# Patient Record
Sex: FEMALE | Race: WHITE | NOT HISPANIC OR LATINO | Employment: OTHER | URBAN - METROPOLITAN AREA
[De-identification: names, ages, dates, MRNs, and addresses within clinical notes are randomized per-mention and may not be internally consistent; named-entity substitution may affect disease eponyms.]

---

## 2017-03-21 DIAGNOSIS — I10 HYPERTENSION GOAL BP (BLOOD PRESSURE) < 140/90: ICD-10-CM

## 2017-03-21 NOTE — TELEPHONE ENCOUNTER
Lisinopril-hydrochl 20-25      Last Written Prescription Date: 06/16/2016  Last Fill Quantity: 90, # refills: 2  Last Office Visit with G, P or [facility] prescribing provider: 07/06/2016       Potassium   Date Value Ref Range Status   02/04/2015 4.1 3.4 - 5.3 mmol/L Final     Creatinine   Date Value Ref Range Status   02/04/2015 0.59 0.52 - 1.04 mg/dL Final     BP Readings from Last 3 Encounters:   11/23/16 122/68   07/06/16 116/54   04/19/16 122/82       Thank you!  ~Carolin [employer]  [facility]

## 2017-03-22 RX ORDER — LISINOPRIL AND HYDROCHLOROTHIAZIDE 20; 25 MG/1; MG/1
1 TABLET ORAL DAILY
Qty: 90 TABLET | Refills: 0 | Status: SHIPPED | OUTPATIENT
Start: 2017-03-22 | End: 2017-04-04

## 2017-03-22 NOTE — TELEPHONE ENCOUNTER
Routing refill request to provider for review/approval because:  Labs not current:  No K+ since 2015.  Isamar Rapp [facility]  [phone]

## 2017-03-23 NOTE — TELEPHONE ENCOUNTER
Refill approved x1.  Clinic appointment please for a yearly medicare exam with lab panel/screenings.    Thankyou.    Health Maintenance Due   Topic Date Due     DEXA SCAN SCREENING (SYSTEM ASSIGNED)  09/28/2008     PHQ-9 Q6 MONTHS (NO INBASKET)  10/19/2016     FALL RISK ASSESSMENT  04/19/2017     DEPRESSION ACTION PLAN Q1 YR (NO INBASKET)  04/19/2017

## 2017-03-31 NOTE — TELEPHONE ENCOUNTER
Patient will call back to schedule her yearly exam.    Krysta Nino Elliott  March 31, 2017 2:24 PM

## 2017-04-04 ENCOUNTER — OFFICE VISIT (OUTPATIENT)
Dept: FAMILY MEDICINE | Facility: CLINIC | Age: 72
End: 2017-04-04
Payer: COMMERCIAL

## 2017-04-04 VITALS
OXYGEN SATURATION: 97 % | WEIGHT: 131 LBS | BODY MASS INDEX: 22.36 KG/M2 | SYSTOLIC BLOOD PRESSURE: 97 MMHG | HEIGHT: 64 IN | DIASTOLIC BLOOD PRESSURE: 60 MMHG | TEMPERATURE: 97.4 F | HEART RATE: 76 BPM

## 2017-04-04 DIAGNOSIS — Z12.11 SCREEN FOR COLON CANCER: ICD-10-CM

## 2017-04-04 DIAGNOSIS — F32.0 MAJOR DEPRESSIVE DISORDER, SINGLE EPISODE, MILD (H): ICD-10-CM

## 2017-04-04 DIAGNOSIS — Z78.0 ASYMPTOMATIC POSTMENOPAUSAL STATUS: ICD-10-CM

## 2017-04-04 DIAGNOSIS — I10 HYPERTENSION GOAL BP (BLOOD PRESSURE) < 140/90: ICD-10-CM

## 2017-04-04 DIAGNOSIS — Z00.00 ROUTINE GENERAL MEDICAL EXAMINATION AT A HEALTH CARE FACILITY: Primary | ICD-10-CM

## 2017-04-04 DIAGNOSIS — R25.1 TREMOR: ICD-10-CM

## 2017-04-04 DIAGNOSIS — Z23 NEED FOR SHINGLES VACCINE: ICD-10-CM

## 2017-04-04 LAB — VIT B12 SERPL-MCNC: 316 PG/ML (ref 193–986)

## 2017-04-04 PROCEDURE — 82607 VITAMIN B-12: CPT | Performed by: NURSE PRACTITIONER

## 2017-04-04 PROCEDURE — 36415 COLL VENOUS BLD VENIPUNCTURE: CPT | Performed by: NURSE PRACTITIONER

## 2017-04-04 PROCEDURE — 80053 COMPREHEN METABOLIC PANEL: CPT | Performed by: NURSE PRACTITIONER

## 2017-04-04 PROCEDURE — 99397 PER PM REEVAL EST PAT 65+ YR: CPT | Performed by: NURSE PRACTITIONER

## 2017-04-04 PROCEDURE — 84443 ASSAY THYROID STIM HORMONE: CPT | Performed by: NURSE PRACTITIONER

## 2017-04-04 PROCEDURE — 80061 LIPID PANEL: CPT | Performed by: NURSE PRACTITIONER

## 2017-04-04 RX ORDER — CYCLOBENZAPRINE HCL 10 MG
5-10 TABLET ORAL 3 TIMES DAILY PRN
Qty: 30 TABLET | Refills: 1 | Status: CANCELLED | OUTPATIENT
Start: 2017-04-04

## 2017-04-04 RX ORDER — ALBUTEROL SULFATE 90 UG/1
2 AEROSOL, METERED RESPIRATORY (INHALATION) EVERY 4 HOURS PRN
Qty: 1 INHALER | Refills: 5 | Status: SHIPPED | OUTPATIENT
Start: 2017-04-04 | End: 2018-09-18

## 2017-04-04 RX ORDER — ESCITALOPRAM OXALATE 10 MG/1
10 TABLET ORAL DAILY
Qty: 90 TABLET | Refills: 3 | Status: SHIPPED | OUTPATIENT
Start: 2017-04-04 | End: 2018-06-07

## 2017-04-04 RX ORDER — LISINOPRIL AND HYDROCHLOROTHIAZIDE 20; 25 MG/1; MG/1
1 TABLET ORAL DAILY
Qty: 90 TABLET | Refills: 3 | Status: SHIPPED | OUTPATIENT
Start: 2017-04-04 | End: 2018-06-28

## 2017-04-04 RX ORDER — AMLODIPINE BESYLATE 2.5 MG/1
2.5 TABLET ORAL DAILY
Qty: 90 TABLET | Refills: 3 | Status: SHIPPED | OUTPATIENT
Start: 2017-04-04 | End: 2018-04-23

## 2017-04-04 NOTE — LETTER
[facility]   [address], [place]  [address]  [phone]      April 6, 2017      Lenora Hammonds  [address] [address]              Dear MsWolfgang Hammonds,    This is to let you know that the results of your recent blood tests are all great. Let me know if you have any questions.    Results for orders placed or performed in visit on 04/04/17   Comprehensive metabolic panel   Result Value Ref Range    Sodium 143 133 - 144 mmol/L    Potassium 4.0 3.4 - 5.3 mmol/L    Chloride 106 94 - 109 mmol/L    Carbon Dioxide 29 20 - 32 mmol/L    Anion Gap 8 3 - 14 mmol/L    Glucose 91 70 - 99 mg/dL    Urea Nitrogen 26 7 - 30 mg/dL    Creatinine 0.65 0.52 - 1.04 mg/dL    GFR Estimate 89 >60 mL/min/1.7m2    GFR Estimate If Black >90   GFR Calc   >60 mL/min/1.7m2    Calcium 9.6 8.5 - 10.1 mg/dL    Bilirubin Total 0.4 0.2 - 1.3 mg/dL    Albumin 3.8 3.4 - 5.0 g/dL    Protein Total 7.1 6.8 - 8.8 g/dL    Alkaline Phosphatase 67 40 - 150 U/L    ALT 14 0 - 50 U/L    AST 16 0 - 45 U/L   Lipid panel reflex to direct LDL   Result Value Ref Range    Cholesterol 195 <200 mg/dL    Triglycerides 63 <150 mg/dL    HDL Cholesterol 60 >49 mg/dL    LDL Cholesterol Calculated 122 (H) <100 mg/dL    Non HDL Cholesterol 135 (H) <130 mg/dL   TSH with free T4 reflex   Result Value Ref Range    TSH 0.42 0.40 - 4.00 mU/L   Vitamin B12   Result Value Ref Range    Vitamin B12 316 193 - 986 pg/mL           Sincerely,    Siobhan Pineda, CNP/nr

## 2017-04-04 NOTE — MR AVS SNAPSHOT
After Visit Summary   4/4/2017    Lenora Hammonds    MRN: [id number]           Patient Information     Date Of Birth          1945        Visit Information        Provider Department      4/4/2017 11:00 AM Siobhan Pineda APRN CNP [facility]        Today's Diagnoses     Routine general medical examination at a health care facility    -  1    Major depressive disorder, single episode, mild (H)        Hypertension goal BP (blood pressure) < 140/90        Tremor        Screen for colon cancer        Asymptomatic postmenopausal status          Care Instructions    Dexa scan: [phone] for scheduling.    Return to [facility] for a consultation appointment with the neurologist regarding your tremor.          Preventive Health Recommendations    Female Ages 65 +    Yearly exam:     See your health care provider every year in order to  o Review health changes.   o Discuss preventive care.    o Review your medicines if your doctor has prescribed any.      You no longer need a yearly Pap test unless you've had an abnormal Pap test in the past 10 years. If you have vaginal symptoms, such as bleeding or discharge, be sure to talk with your provider about a Pap test.      Every 1 to 2 years, have a mammogram.  If you are over 69, talk with your health care provider about whether or not you want to continue having screening mammograms.      Every 10 years, have a colonoscopy. Or, have a yearly FIT test (stool test). These exams will check for colon cancer.       Have a cholesterol test every 5 years, or more often if your doctor advises it.       Have a diabetes test (fasting glucose) every three years. If you are at risk for diabetes, you should have this test more often.       At age 65, have a bone density scan (DEXA) to check for osteoporosis (brittle bone disease).    Shots:    Get a flu shot each year.    Get a tetanus shot every 10 years.    Talk to your doctor  about your pneumonia vaccines. There are now two you should receive - Pneumovax (PPSV 23) and Prevnar (PCV 13).    Talk to your doctor about the shingles vaccine.    Talk to your doctor about the hepatitis B vaccine.    Nutrition:     Eat at least 5 servings of fruits and vegetables each day.      Eat whole-grain bread, whole-wheat pasta and brown rice instead of white grains and rice.      Talk to your provider about Calcium and Vitamin D.     Lifestyle    Exercise at least 150 minutes a week (30 minutes a day, 5 days a week). This will help you control your weight and prevent disease.      Limit alcohol to one drink per day.      No smoking.       Wear sunscreen to prevent skin cancer.       See your dentist twice a year for an exam and cleaning.      See your eye doctor every 1 to 2 years to screen for conditions such as glaucoma, macular degeneration and cataracts.        Follow-ups after your visit        Additional Services     NEUROLOGY ADULT REFERRAL       Your provider has referred you to: [facility]: [facility] [phone]   [email or web address]    Reason for Referral: Consult    Please be aware that coverage of these services is subject to the terms and limitations of your health insurance plan.  Call member services at your health plan with any benefit or coverage questions.      Please bring the following with you to your appointment:    (1) Any X-Rays, CTs or MRIs which have been performed.  Contact the facility where they were done to arrange for  prior to your scheduled appointment.    (2) List of current medications  (3) This referral request   (4) Any documents/labs given to you for this referral                  Future tests that were ordered for you today     Open Future Orders        Priority Expected Expires Ordered    Fecal colorectal cancer screen (FIT) Routine 4/25/2017 6/27/2017 4/4/2017    DEXA HIP/PELVIS/SPINE - Future  "Routine  2018            Who to contact     If you have questions or need follow up information about today's clinic visit or your schedule please contact [facility] directly at [phone].  Normal or non-critical lab and imaging results will be communicated to you by MyChart, letter or phone within 4 business days after the clinic has received the results. If you do not hear from us within 7 days, please contact the clinic through [employer] or phone. If you have a critical or abnormal lab result, we will notify you by phone as soon as possible.  Submit refill requests through [employer] or call your pharmacy and they will forward the refill request to us. Please allow 3 business days for your refill to be completed.          Additional Information About Your Visit        [employer] Information     [employer] lets you send messages to your doctor, view your test results, renew your prescriptions, schedule appointments and more. To sign up, go to [email or web address] . Click on \"Log in\" on the left side of the screen, which will take you to the Welcome page. Then click on \"Sign up Now\" on the right side of the page.     You will be asked to enter the access code listed below, as well as some personal information. Please follow the directions to create your username and password.     Your access code is: [email or web address]  Expires: 7/3/2017 11:20 AM     Your access code will  in 90 days. If you need help or a new code, please call your [place] clinic or [phone].        Care EveryWhere ID     This is your Care EveryWhere ID. This could be used by other organizations to access your [place] medical records  [id number]        Your Vitals Were     Pulse Temperature Height Pulse Oximetry Breastfeeding? BMI (Body Mass Index)    76 97.4  F (36.3  C) (Oral) 5' 4\" (1.626 m) 97% No 22.49 kg/m2       Blood Pressure from Last 3 Encounters:   17 97/60   16 122/68   16 116/54    " Weight from Last 3 Encounters:   04/04/17 131 lb (59.4 kg)   11/23/16 125 lb (56.7 kg)   07/06/16 133 lb 6.4 oz (60.5 kg)              We Performed the Following     Comprehensive metabolic panel     DEPRESSION ACTION PLAN (DAP) Order [[id number]]     Lipid panel reflex to direct LDL     NEUROLOGY ADULT REFERRAL     TSH with free T4 reflex     Vitamin B12          Today's Medication Changes          These changes are accurate as of: 4/4/17 11:20 AM.  If you have any questions, ask your nurse or doctor.               These medicines have changed or have updated prescriptions.        Dose/Directions    escitalopram 10 MG tablet   Commonly known as:  LEXAPRO   This may have changed:    - how much to take  - how to take this  - when to take this   Used for:  Major depressive disorder, single episode, mild (H)   Changed by:  Siobhan Pineda APRN CNP        Dose:  10 mg   Take 1 tablet (10 mg) by mouth daily   Quantity:  90 tablet   Refills:  3            Where to get your medicines      These medications were sent to [facility] - [place], [place] - [address]  [address], [place] [address]     Phone:  [phone]     albuterol 108 (90 BASE) MCG/ACT Inhaler    amLODIPine 2.5 MG tablet    escitalopram 10 MG tablet    lisinopril-hydrochlorothiazide 20-25 MG per tablet                Primary Care Provider Office Phone # Fax #    STACY Castro -[phone]       [facility] [address] [place] [address]        Thank you!     Thank you for choosing [facility]  for your care. Our goal is always to provide you with excellent care. Hearing back from our patients is one way we can continue to improve our services. Please take a few minutes to complete the written survey that you may receive in the mail after your visit with us. Thank you!             Your Updated Medication List - Protect others around you: Learn how  to safely use, store and throw away your medicines at www.disposemymeds.org.          This list is accurate as of: 4/4/17 11:20 AM.  Always use your most recent med list.                   Brand Name Dispense Instructions for use    albuterol 108 (90 BASE) MCG/ACT Inhaler    PROAIR HFA/PROVENTIL HFA/VENTOLIN HFA    1 Inhaler    Inhale 2 puffs into the lungs every 4 hours as needed for shortness of breath / dyspnea or wheezing       amLODIPine 2.5 MG tablet    NORVASC    90 tablet    Take 1 tablet (2.5 mg) by mouth daily       aspirin 325 MG tablet      Take 325 mg by mouth daily       cyclobenzaprine 10 MG tablet    FLEXERIL    30 tablet    Take 0.5-1 tablets (5-10 mg) by mouth 3 times daily as needed for muscle spasms       escitalopram 10 MG tablet    LEXAPRO    90 tablet    Take 1 tablet (10 mg) by mouth daily       lisinopril-hydrochlorothiazide 20-25 MG per tablet    PRINZIDE/ZESTORETIC    90 tablet    Take 1 tablet by mouth daily       VITAMIN D3 PO      Take 1,000 Units by mouth daily

## 2017-04-04 NOTE — PATIENT INSTRUCTIONS
Dexa scan: [phone] for scheduling.    Return to [facility] for a consultation appointment with the neurologist regarding your tremor.          Preventive Health Recommendations    Female Ages 65 +    Yearly exam:     See your health care provider every year in order to  o Review health changes.   o Discuss preventive care.    o Review your medicines if your doctor has prescribed any.      You no longer need a yearly Pap test unless you've had an abnormal Pap test in the past 10 years. If you have vaginal symptoms, such as bleeding or discharge, be sure to talk with your provider about a Pap test.      Every 1 to 2 years, have a mammogram.  If you are over 69, talk with your health care provider about whether or not you want to continue having screening mammograms.      Every 10 years, have a colonoscopy. Or, have a yearly FIT test (stool test). These exams will check for colon cancer.       Have a cholesterol test every 5 years, or more often if your doctor advises it.       Have a diabetes test (fasting glucose) every three years. If you are at risk for diabetes, you should have this test more often.       At age 65, have a bone density scan (DEXA) to check for osteoporosis (brittle bone disease).    Shots:    Get a flu shot each year.    Get a tetanus shot every 10 years.    Talk to your doctor about your pneumonia vaccines. There are now two you should receive - Pneumovax (PPSV 23) and Prevnar (PCV 13).    Talk to your doctor about the shingles vaccine.    Talk to your doctor about the hepatitis B vaccine.    Nutrition:     Eat at least 5 servings of fruits and vegetables each day.      Eat whole-grain bread, whole-wheat pasta and brown rice instead of white grains and rice.      Talk to your provider about Calcium and Vitamin D.     Lifestyle    Exercise at least 150 minutes a week (30 minutes a day, 5 days a week). This will help you control your weight and prevent disease.      Limit alcohol  to one drink per day.      No smoking.       Wear sunscreen to prevent skin cancer.       See your dentist twice a year for an exam and cleaning.      See your eye doctor every 1 to 2 years to screen for conditions such as glaucoma, macular degeneration and cataracts.

## 2017-04-04 NOTE — PROGRESS NOTES
SUBJECTIVE:                                                            Lenora Hammonds is a 71 year old female who presents for Preventive Visit.  Are you in the first 12 months of your Medicare Part B coverage?  No    Healthy Habits:    Do you get at least three servings of calcium containing foods daily (dairy, green leafy vegetables, etc.)? yes    Amount of exercise or daily activities, outside of work: 7 day(s) per week    Problems taking medications regularly No    Medication side effects: No    Have you had an eye exam in the past two years? No-due     Do you see a dentist twice per year? yes    Do you have sleep apnea, excessive snoring or daytime drowsiness?no    COGNITIVE SCREEN  1) Repeat 3 items (Banana, Sunrise, Chair)    2) Clock draw: NORMAL  3) 3 item recall: Recalls 3 objects  Results: 3 items recalled: COGNITIVE IMPAIRMENT LESS LIKELY    Mini-CogTM Copyright S [employer]. Licensed by the author for use in [facility]; reprinted with permission (maura@[facility]). All rights reserved.        Reviewed and updated as needed this visit by clinical staff  Tobacco  Allergies  Meds  Med Hx  Surg Hx  Fam Hx  Soc Hx        Reviewed and updated as needed this visit by Provider        Social History   Substance Use Topics     Smoking status: Former Smoker     Packs/day: 0.30     Years: 50.00     Types: Cigarettes     Smokeless tobacco: Never Used      Comment: quit 12/2014     Alcohol use Yes      Comment: occasionally        The patient does not drink >3 drinks per day nor >7 drinks per week.    Today's PHQ-2 Score:   PHQ-2 ( 1999 [employer]) 4/4/2017 1/6/2016   Q1: Little interest or pleasure in doing things 0 0   Q2: Feeling down, depressed or hopeless 0 0   PHQ-2 Score 0 0       Do you feel safe in your environment - No    Do you have a Health Care Directive?: No: Advance care planning was reviewed with patient; patient declined at this time.    Current providers sharing in care for this patient  include:   Patient Care Team:  Siobhan Pineda APRN CNP as PCP - General      Hearing impairment: yes    Ability to successfully perform activities of daily living: Yes, no assistance needed     Fall risk:    Home safety:  none identified      The following health maintenance items are reviewed in Epic and correct as of today:  Health Maintenance   Topic Date Due     DEXA SCAN SCREENING (SYSTEM ASSIGNED)  09/28/2008     PHQ-9 Q6 MONTHS (NO INBASKET)  10/19/2016     FALL RISK ASSESSMENT  04/19/2017     DEPRESSION ACTION PLAN Q1 YR (NO INBASKET)  04/19/2017     FIT Q1 YR (NO INBASKET)  04/25/2017     ADVANCE DIRECTIVE PLANNING Q5 YRS (NO INBASKET)  08/15/2017     INFLUENZA VACCINE (SYSTEM ASSIGNED)  09/01/2017     MAMMO Q1 YR NO INBASKET MESSAGE  11/02/2017     TETANUS IMMUNIZATION (SYSTEM ASSIGNED)  11/17/2018     LIPID SCREEN Q5 YR FEMALE (SYSTEM ASSIGNED)  10/13/2019     PNEUMOCOCCAL  Completed       Depression:  On lexapro for years.  Her previous prescriber just moved and Lenora is asking that I can prescribe the lexapro for her.  Lenora is not in therapy.  Mentally feeling good.  No depression.    Blood pressure:  Taking norvasc and lisinopril/hctz as prescribed.  No side effects or problems with her medications.    Flexeril:      Tremor:  Has been ongoing for years.      ROS:  Constitutional, HEENT, cardiovascular, pulmonary, GI, , musculoskeletal, neuro, skin, endocrine and psych systems are negative, except as otherwise noted.    Problem list, Medication list, Allergies, and Medical/Social/Surgical histories reviewed in [facility] and updated as appropriate.  Labs reviewed in EPIC  BP Readings from Last 3 Encounters:   04/04/17 97/60   11/23/16 122/68   07/06/16 116/54    Wt Readings from Last 3 Encounters:   04/04/17 131 lb (59.4 kg)   11/23/16 125 lb (56.7 kg)   07/06/16 133 lb 6.4 oz (60.5 kg)                  Patient Active Problem List   Diagnosis     Tremor     CARDIOVASCULAR SCREENING; LDL GOAL LESS  THAN 160     Mild major depression (H)     Dyspepsia     Wears glasses     Urinary incontinence     Snores     Histoplasmosis     Advanced directives, counseling/discussion     Neoplasm of uncertain behavior of skin     Epigastric pain     Cervical pain     Benign neoplasm of cranial nerve (H)     Acoustic neuroma (H)     Hypertension goal BP (blood pressure) < 140/90     Acute bilateral low back pain without sciatica     Past Surgical History:   Procedure Laterality Date     C THORACOTOMY,MAJOR,EXPLOR/BIOPSY      histoplasmosis, right lower lobe     TONSILLECTOMY         Social History   Substance Use Topics     Smoking status: Former Smoker     Packs/day: 0.30     Years: 50.00     Types: Cigarettes     Smokeless tobacco: Never Used      Comment: quit 2014     Alcohol use Yes      Comment: occasionally      Family History   Problem Relation Age of Onset     Hypertension Mother       at age 84     Neurologic Disorder Mother      ?parkinsonism - she had a tremor; walked normally. voice was soft     CANCER Father      brain tumor;  at age 62 y rs     CANCER Sister      breast cancer     CANCER Brother      bladder cancer;          Current Outpatient Prescriptions   Medication Sig Dispense Refill     lisinopril-hydrochlorothiazide (PRINZIDE/ZESTORETIC) 20-25 MG per tablet Take 1 tablet by mouth daily 90 tablet 3     amLODIPine (NORVASC) 2.5 MG tablet Take 1 tablet (2.5 mg) by mouth daily 90 tablet 3     escitalopram (LEXAPRO) 10 MG tablet Take 1 tablet (10 mg) by mouth daily 90 tablet 3     albuterol (PROAIR HFA/PROVENTIL HFA/VENTOLIN HFA) 108 (90 BASE) MCG/ACT Inhaler Inhale 2 puffs into the lungs every 4 hours as needed for shortness of breath / dyspnea or wheezing 1 Inhaler 5     cyclobenzaprine (FLEXERIL) 10 MG tablet Take 0.5-1 tablets (5-10 mg) by mouth 3 times daily as needed for muscle spasms 30 tablet 1     Cholecalciferol (VITAMIN D3 PO) Take 1,000 Units by mouth daily       aspirin  "325 MG tablet Take 325 mg by mouth daily       [DISCONTINUED] lisinopril-hydrochlorothiazide (PRINZIDE/ZESTORETIC) 20-25 MG per tablet Take 1 tablet by mouth daily 90 tablet 0     [DISCONTINUED] amLODIPine (NORVASC) 2.5 MG tablet Take 1 tablet (2.5 mg) by mouth daily 90 tablet 3     [DISCONTINUED] escitalopram (LEXAPRO) 10 MG tablet        [DISCONTINUED] albuterol (PROAIR HFA, PROVENTIL HFA, VENTOLIN HFA) 108 (90 BASE) MCG/ACT inhaler Inhale 2 puffs into the lungs every 4 hours as needed for shortness of breath / dyspnea or wheezing 1 Inhaler 0     No Known Allergies  Recent Labs   Lab Test  02/04/15   1234  10/13/14   1352   08/12/11   2205  06/15/11   1047  06/09/10   1415   LDL   --   106   --    --   120  121   HDL   --   57   --    --   57  58   TRIG   --   95   --    --   82  65   ALT   --   22   --   13   --    --    CR  0.59  0.59   < >  0.60   --   0.66   GFRESTIMATED  >90  Non  GFR Calc    >90  Non  GFR Calc     < >  >90   --   90   GFRESTBLACK  >90   GFR Calc    >90   GFR Calc     < >  >90   --   >90   POTASSIUM  4.1  3.6   < >  3.6   --   4.9   TSH   --    --    --    --   0.67  0.49    < > = values in this interval not displayed.      OBJECTIVE:                                                            BP 97/60  Pulse 76  Temp 97.4  F (36.3  C) (Oral)  Ht 5' 4\" (1.626 m)  Wt 131 lb (59.4 kg)  SpO2 97%  Breastfeeding? No  BMI 22.49 kg/m2 Estimated body mass index is 22.49 kg/(m^2) as calculated from the following:    Height as of this encounter: 5' 4\" (1.626 m).    Weight as of this encounter: 131 lb (59.4 kg).  EXAM:   GENERAL: healthy, alert and no distress  EYES: Eyes grossly normal to inspection, PERRL and conjunctivae and sclerae normal  HENT: ear canals and TM's normal, nose and mouth without ulcers or lesions  NECK: no adenopathy, no asymmetry, masses, or scars and thyroid normal to palpation  RESP: lungs clear to auscultation " - no rales, rhonchi or wheezes  CV: regular rate and rhythm, normal S1 S2, no S3 or S4, no murmur, click or rub, no peripheral edema and peripheral pulses strong  ABDOMEN: soft, nontender, no hepatosplenomegaly, no masses and bowel sounds normal  MS: no gross musculoskeletal defects noted, no edema  SKIN: no suspicious lesions or rashes  NEURO: Normal strength and tone, mentation intact and speech normal. + tremor    PSYCH: mentation appears normal, affect normal/bright    ASSESSMENT / PLAN:                                                              (Z00.00) Routine general medical examination at a health care facility  (primary encounter diagnosis)  Comment:   Plan: albuterol (PROAIR HFA/PROVENTIL HFA/VENTOLIN         HFA) 108 (90 BASE) MCG/ACT Inhaler,         Comprehensive metabolic panel, Lipid panel         reflex to direct LDL, TSH with free T4 reflex,         Vitamin B12            (F32.0) Major depressive disorder, single episode, mild (H)  Comment:   Plan: escitalopram (LEXAPRO) 10 MG tablet        She will continue her citalopram.  She declines therapy/referral for therapy.  She will return to see me/PHQ-9 in 6 months. She will return to the clinic sooner if problems.      (I10) Hypertension goal BP (blood pressure) < 140/90  Comment:   Plan: lisinopril-hydrochlorothiazide         (PRINZIDE/ZESTORETIC) 20-25 MG per tablet,         amLODIPine (NORVASC) 2.5 MG tablet        Controlled, refills given.  Continue taking medications as prescribed.  Labs pending for routine follow-up.    (R25.1) Tremor  Comment:   Plan: Comprehensive metabolic panel, TSH with free T4        reflex, Vitamin B12        Lenora Had a neurology consultation a few years ago. It was determined at that time that she had an essential tremor.  She is requesting a another opinion at this time.  Referral given.    (Z12.11) Screen for colon cancer  Comment:   Plan: Fecal colorectal cancer screen (FIT)        FIT at earliest convenience.  If  "negative for blood, next FIT test in one year.  If positive for blood, referral for colonoscopy.  Patient verbalizes understanding.      (Z78.0) Asymptomatic postmenopausal status  Comment:   Plan: DEXA HIP/PELVIS/SPINE - Future        She will proceed with the DEXA at her earliest convenience.    (Z23) Need for shingles vaccine  Comment:   Plan: I did discuss with with the patient today.  After our discussion, she declined vaccination.       COUNSELING:  Reviewed preventive health counseling, as reflected in patient instructions        Estimated body mass index is 22.49 kg/(m^2) as calculated from the following:    Height as of this encounter: 5' 4\" (1.626 m).    Weight as of this encounter: 131 lb (59.4 kg).     reports that she has quit smoking. Her smoking use included Cigarettes. She has a 15.00 pack-year smoking history. She has never used smokeless tobacco.      Appropriate preventive services were discussed with this patient, including applicable screening as appropriate for cardiovascular disease, diabetes, osteopenia/osteoporosis, and glaucoma.  As appropriate for age/gender, discussed screening for colorectal cancer, prostate cancer, breast cancer, and cervical cancer. Checklist reviewing preventive services available has been given to the patient.    Reviewed patients plan of care and provided an AVS. The Basic Care Plan (routine screening as documented in Health Maintenance) for Lenora meets the Care Plan requirement. This Care Plan has been established and reviewed with the Patient.    Counseling Resources:  ATP IV Guidelines  Pooled Cohorts Equation Calculator  Breast Cancer Risk Calculator  FRAX Risk Assessment  ICSI Preventive Guidelines  Dietary Guidelines for Americans, 2010  USDA's MyPlate  ASA Prophylaxis  Lung CA Screening    STACY Forman CNP  [facility]  "

## 2017-04-05 LAB
ALBUMIN SERPL-MCNC: 3.8 G/DL (ref 3.4–5)
ALP SERPL-CCNC: 67 U/L (ref 40–150)
ALT SERPL W P-5'-P-CCNC: 14 U/L (ref 0–50)
ANION GAP SERPL CALCULATED.3IONS-SCNC: 8 MMOL/L (ref 3–14)
AST SERPL W P-5'-P-CCNC: 16 U/L (ref 0–45)
BILIRUB SERPL-MCNC: 0.4 MG/DL (ref 0.2–1.3)
BUN SERPL-MCNC: 26 MG/DL (ref 7–30)
CALCIUM SERPL-MCNC: 9.6 MG/DL (ref 8.5–10.1)
CHLORIDE SERPL-SCNC: 106 MMOL/L (ref 94–109)
CHOLEST SERPL-MCNC: 195 MG/DL
CO2 SERPL-SCNC: 29 MMOL/L (ref 20–32)
CREAT SERPL-MCNC: 0.65 MG/DL (ref 0.52–1.04)
GFR SERPL CREATININE-BSD FRML MDRD: 89 ML/MIN/1.7M2
GLUCOSE SERPL-MCNC: 91 MG/DL (ref 70–99)
HDLC SERPL-MCNC: 60 MG/DL
LDLC SERPL CALC-MCNC: 122 MG/DL
NONHDLC SERPL-MCNC: 135 MG/DL
POTASSIUM SERPL-SCNC: 4 MMOL/L (ref 3.4–5.3)
PROT SERPL-MCNC: 7.1 G/DL (ref 6.8–8.8)
SODIUM SERPL-SCNC: 143 MMOL/L (ref 133–144)
TRIGL SERPL-MCNC: 63 MG/DL
TSH SERPL DL<=0.005 MIU/L-ACNC: 0.42 MU/L (ref 0.4–4)

## 2017-04-05 ASSESSMENT — PATIENT HEALTH QUESTIONNAIRE - PHQ9: SUM OF ALL RESPONSES TO PHQ QUESTIONS 1-9: 0

## 2017-04-06 ENCOUNTER — OFFICE VISIT (OUTPATIENT)
Dept: NEUROLOGY | Facility: CLINIC | Age: 72
End: 2017-04-06
Attending: NURSE PRACTITIONER
Payer: COMMERCIAL

## 2017-04-06 ENCOUNTER — RADIANT APPOINTMENT (OUTPATIENT)
Dept: BONE DENSITY | Facility: CLINIC | Age: 72
End: 2017-04-06
Attending: NURSE PRACTITIONER
Payer: COMMERCIAL

## 2017-04-06 VITALS
RESPIRATION RATE: 16 BRPM | WEIGHT: 131 LBS | HEART RATE: 68 BPM | BODY MASS INDEX: 22.36 KG/M2 | SYSTOLIC BLOOD PRESSURE: 120 MMHG | DIASTOLIC BLOOD PRESSURE: 64 MMHG | TEMPERATURE: 98.1 F | HEIGHT: 64 IN

## 2017-04-06 DIAGNOSIS — R25.1 TREMOR: Primary | ICD-10-CM

## 2017-04-06 DIAGNOSIS — Z78.0 ASYMPTOMATIC POSTMENOPAUSAL STATUS: ICD-10-CM

## 2017-04-06 PROCEDURE — 99204 OFFICE O/P NEW MOD 45 MIN: CPT | Performed by: PSYCHIATRY & NEUROLOGY

## 2017-04-06 PROCEDURE — 77085 DXA BONE DENSITY AXL VRT FX: CPT | Performed by: INTERNAL MEDICINE

## 2017-04-06 PROCEDURE — 82274 ASSAY TEST FOR BLOOD FECAL: CPT | Performed by: NURSE PRACTITIONER

## 2017-04-06 NOTE — PATIENT INSTRUCTIONS
AFTER VISIT SUMMARY (AVS):    At today's visit we discussed various diagnostic possibilities for your symptoms that are likely due to essential tremor, though tremor-predominant Parkinson's disease is not totally excluded.    No new medications were ordered. We discussed several options. At this time you are not interested to try any. You will let me know if you decide in the future.    Preventive Neurology: Encouraged to stay physically and mentally active with particular emphasis on daily mentally stimulating activities of your choice (such as crosswords, puzzles, sudoku, etc.), stretching exercises, walking, and healthy eating.    Next follow-up appointment is in next 1 year or earlier if needed.    Please do not hesitate to call me with any questions or concerns.    Thanks.      Essential Tremor Disorder  Essential tremor disorder is a nerve disorder. It is also called a trembling disorder. It causes a person s hands, head, trunk, voice, or legs to shake rhythmically. It s often confused with Parkinson disease.  A tremor can also be a symptom of another disease or condition. So it s important to see your health care provider for a diagnosis.  Who is likely to get essential tremor?  Everyone has some essential tremor. But these movements usually can t be seen or felt. It s called essential tremor when the movements can be noticed.  Essential tremor is most common in people older than 65. But it can affect people at any age. The condition seems to run in families.  For some people, essential tremor is mild and never gets any worse. For other people, it starts on one side of the body and eventually affects both sides. The hands are the most common part of the body affected. Some people also have changes in the way they walk (gait).  Doctors don t know what causes essential tremor. One theory is that in people with the disorder the cerebellum and certain other parts of the brain don t talk to each other as they  should. The cerebellum is the part of the brain that controls muscle coordination.  What are the symptoms of essential tremor?  People with essential tremor have shaking and trembling at different times and in different cases. But some symptoms are common to all. Here are symptoms you might have:    Tremors occur when you move and are less noticeable when you rest.    Tremors get worse when you take certain medicines.    Tremors get worse after you have caffeine or when you are stressed.    Tremors get worse as you get older.    Tremors don t affect both sides of the body in the same way.  Here are different signs of essential tremor:    Tremors that are most noticeable in your hands    Difficulty doing things with your hands, such as writing or using tools    Shaking or quivering in your voice    Head-nodding that you can t control  In rare instances, you may have tremors in your legs or feet.  How is essential tremor diagnosed?  Your health care provider will ask you questions about your medical and family history. He or she will also look at your trembling symptoms. Your provider will probably need to rule out other conditions that could cause the shaking or trembling. For example, tremors can be symptoms of diseases like hyperthyroidism or Parkinson disease. Your provider might also test you for these diseases.  In some cases, the tremors might be related to other factors. To find out for sure, your provider may have you:    Not drink alcohol. In people who are alcoholics, trembling is a common symptom.    Stop smoking.    Avoid caffeine.    Avoid certain medicines.  How is essential tremor treated?  Essential tremor usually isn t dangerous, but it can be frustrating if you have it. Several medicines are available to treat essential tremor. Some affect how brain nerves work. This can calm your trembling. Sometimes antiseizure medicines are prescribed. Tranquilizers are other medicines that may be given for  essential tremor.  For severe tremors, your health care provider may suggest that a stimulating device be implanted in your brain.  Can I prevent essential tremor?  Alcohol, cigarettes, caffeine, and stress all seem to make tremors worse. Staying away from these things as much as possible can help. Some drugs can also make trembling worse. Ask your health care provider if any of your medications might be playing a role.    6359-1383 [employer]. [address] [place] [address], [place] [address]. All rights reserved. This information is not intended as a substitute for professional medical care. Always follow your healthcare professional's instructions.

## 2017-04-06 NOTE — MR AVS SNAPSHOT
After Visit Summary   4/6/2017    Lenora Hammonds    MRN: [id number]           Patient Information     Date Of Birth          1945        Visit Information        Provider Department      4/6/2017 10:00 AM Alon Pastrana MD [facility]        Today's Diagnoses     Tremor    -  1      Care Instructions    AFTER VISIT SUMMARY (AVS):    At today's visit we discussed various diagnostic possibilities for your symptoms that are likely due to essential tremor, though tremor-predominant Parkinson's disease is not totally excluded.    No new medications were ordered. We discussed several options. At this time you are not interested to try any. You will let riya know if you decide in the future.    Preventive Neurology: Encouraged to stay physically and mentally active with particular emphasis on daily mentally stimulating activities of your choice (such as crosswords, puzzles, sudoku, etc.), stretching exercises, walking, and healthy eating.    Next follow-up appointment is in next 1 year or earlier if needed.    Please do not hesitate to call me with any questions or concerns.    Thanks.      Essential Tremor Disorder  Essential tremor disorder is a nerve disorder. It is also called a trembling disorder. It causes a person s hands, head, trunk, voice, or legs to shake rhythmically. It s often confused with Parkinson disease.  A tremor can also be a symptom of another disease or condition. So it s important to see your health care provider for a diagnosis.  Who is likely to get essential tremor?  Everyone has some essential tremor. But these movements usually can t be seen or felt. It s called essential tremor when the movements can be noticed.  Essential tremor is most common in people older than 65. But it can affect people at any age. The condition seems to run in families.  For some people, essential tremor is mild and never gets any worse. For other people, it  starts on one side of the body and eventually affects both sides. The hands are the most common part of the body affected. Some people also have changes in the way they walk (gait).  Doctors don t know what causes essential tremor. One theory is that in people with the disorder the cerebellum and certain other parts of the brain don t talk to each other as they should. The cerebellum is the part of the brain that controls muscle coordination.  What are the symptoms of essential tremor?  People with essential tremor have shaking and trembling at different times and in different cases. But some symptoms are common to all. Here are symptoms you might have:    Tremors occur when you move and are less noticeable when you rest.    Tremors get worse when you take certain medicines.    Tremors get worse after you have caffeine or when you are stressed.    Tremors get worse as you get older.    Tremors don t affect both sides of the body in the same way.  Here are different signs of essential tremor:    Tremors that are most noticeable in your hands    Difficulty doing things with your hands, such as writing or using tools    Shaking or quivering in your voice    Head-nodding that you can t control  In rare instances, you may have tremors in your legs or feet.  How is essential tremor diagnosed?  Your health care provider will ask you questions about your medical and family history. He or she will also look at your trembling symptoms. Your provider will probably need to rule out other conditions that could cause the shaking or trembling. For example, tremors can be symptoms of diseases like hyperthyroidism or Parkinson disease. Your provider might also test you for these diseases.  In some cases, the tremors might be related to other factors. To find out for sure, your provider may have you:    Not drink alcohol. In people who are alcoholics, trembling is a common symptom.    Stop smoking.    Avoid caffeine.    Avoid certain  medicines.  How is essential tremor treated?  Essential tremor usually isn t dangerous, but it can be frustrating if you have it. Several medicines are available to treat essential tremor. Some affect how brain nerves work. This can calm your trembling. Sometimes antiseizure medicines are prescribed. Tranquilizers are other medicines that may be given for essential tremor.  For severe tremors, your health care provider may suggest that a stimulating device be implanted in your brain.  Can I prevent essential tremor?  Alcohol, cigarettes, caffeine, and stress all seem to make tremors worse. Staying away from these things as much as possible can help. Some drugs can also make trembling worse. Ask your health care provider if any of your medications might be playing a role.    3011-7792 [employer]. [address] [place] [address], [place] [address]. All rights reserved. This information is not intended as a substitute for professional medical care. Always follow your healthcare professional's instructions.              Follow-ups after your visit        Follow-up notes from your care team     Return in about 1 year (around 4/6/2018).      Your next 10 appointments already scheduled     Apr 06, 2017 11:30 AM CDT   DX HIP/PELVIS/SPINE with [id number]   [facility] ([facility])    [address] [place] [id number] [phone]           Please do not take any of the following 48 hours prior to your exam: vitamins, calcium tablets, antacids.              Who to contact     If you have questions or need follow up information about today's clinic visit or your schedule please contact [facility] directly at [phone].  Normal or non-critical lab and imaging results will be communicated to you by MyChart, letter or phone within 4 business days after the clinic has received the results. If you do not hear from us within 7 days, please contact the clinic  "through [employer] or phone. If you have a critical or abnormal lab result, we will notify you by phone as soon as possible.  Submit refill requests through [employer] or call your pharmacy and they will forward the refill request to us. Please allow 3 business days for your refill to be completed.          Additional Information About Your Visit        [employer] Information     [employer] lets you send messages to your doctor, view your test results, renew your prescriptions, schedule appointments and more. To sign up, go to [email or web address] . Click on \"Log in\" on the left side of the screen, which will take you to the Welcome page. Then click on \"Sign up Now\" on the right side of the page.     You will be asked to enter the access code listed below, as well as some personal information. Please follow the directions to create your username and password.     Your access code is: [email or web address]  Expires: 7/3/2017 11:20 AM     Your access code will  in 90 days. If you need help or a new code, please call your [facility] clinic or [phone].        Care EveryWhere ID     This is your Care EveryWhere ID. This could be used by other organizations to access your [facility] medical records  [id number]        Your Vitals Were     Pulse Temperature Respirations Height BMI (Body Mass Index)       68 98.1  F (36.7  C) (Oral) 16 1.626 m (5' 4\") 22.49 kg/m2        Blood Pressure from Last 3 Encounters:   17 120/64   17 97/60   16 122/68    Weight from Last 3 Encounters:   17 59.4 kg (131 lb)   17 59.4 kg (131 lb)   16 56.7 kg (125 lb)              Today, you had the following     No orders found for display       Primary Care Provider Office Phone # Fax #    STACY Castro [phone]       [facility] [address] [place] [address]        Thank you!     Thank you for choosing [facility]  for your care. Our goal is " always to provide you with excellent care. Hearing back from our patients is one way we can continue to improve our services. Please take a few minutes to complete the written survey that you may receive in the mail after your visit with us. Thank you!             Your Updated Medication List - Protect others around you: Learn how to safely use, store and throw away your medicines at www.disposemymeds.org.          This list is accurate as of: 4/6/17 10:56 AM.  Always use your most recent med list.                   Brand Name Dispense Instructions for use    albuterol 108 (90 BASE) MCG/ACT Inhaler    PROAIR HFA/PROVENTIL HFA/VENTOLIN HFA    1 Inhaler    Inhale 2 puffs into the lungs every 4 hours as needed for shortness of breath / dyspnea or wheezing       amLODIPine 2.5 MG tablet    NORVASC    90 tablet    Take 1 tablet (2.5 mg) by mouth daily       aspirin 325 MG tablet      Take 325 mg by mouth daily       cyclobenzaprine 10 MG tablet    FLEXERIL    30 tablet    Take 0.5-1 tablets (5-10 mg) by mouth 3 times daily as needed for muscle spasms       escitalopram 10 MG tablet    LEXAPRO    90 tablet    Take 1 tablet (10 mg) by mouth daily       lisinopril-hydrochlorothiazide 20-25 MG per tablet    PRINZIDE/ZESTORETIC    90 tablet    Take 1 tablet by mouth daily       VITAMIN D3 PO      Take 1,000 Units by mouth daily

## 2017-04-06 NOTE — PROGRESS NOTES
"INITIAL NEUROLOGY CONSULTATION    DATE OF VISIT: 4/6/2017  CLINIC LOCATION: [facility]  MRN: [id number]  PATIENT NAME: Lenora Hammonds  YOB: 1945    PRIMARY CARE PROVIDER: STACY Forman CNP     REASON FOR VISIT:   Chief Complaint   Patient presents with     Consult     tremor     HISTORY OF PRESENT ILLNESS:                                                    Ms. Lneora Hammonds is 71 year old left handed female patient with past medical history of left vestibular schwannoma who was seen in consultation today requested by STACY Forman CNP for tremor.    Per patient's report, she was in her usual state of health until about 7 years ago when she noticed bilateral postural hand tremor. Tremor does not occur at rest or with movements. Mainly, she notices it while holding objects (newspaper). Drinking coffee from a cup or eating soup with a spoon is difficult, because of the constant spilling. She denies any difficulty buttoning her shirts or threading needles. The patient feels that the tremor is stable and not getting worse over time.    She also noticed bilateral lower extremity tremor, when she bends her knees while standing. For this reason, she is not able to play golf anymore. She denies lower extremity tremor while walking or sitting.    Overall, extremity tremor does not affect her everyday activities much and is more of cosmetic nuisance.    She did not notice any changes of her tremor with alcohol. She denies any aggravating or alleviating factors. She thinks that the tremor might be caused by antidepressants, though admits that she was on an antidepressant since 1989 and did not have any tremors, until 7 years ago.    The patient reports that her handwriting is \"terrible\", meaning less legible, but not necessarily smaller. She denies any voice changes, acting out in sleep, anosmia, changes in her facial expressions, slowness of movements, " stiffness, or postural instability.    She endorses family history of tremor in her mother who had bilateral hand tremor for 20 years without any other features of Parkinson's disease. The patient feels that her symptoms are pretty similar to her mother's tremor.     In October 2011, she was seen at [facility] clinic by Dr. Trejo. At that time, she denied any additional symptoms or parkinsonian features. She was noted to have orthostatic tremor present upon standing. Her presentation felt consistent with essential tremor versus parkinsonism. Proposed Dat[employer] was deferred by the patient. Few medication trials were recommended, but the patient was not interested.    Prior neurological history: negative for migraine headaches, stroke, seizure disorders, multiple sclerosis, major head injuries, and CNS infections. As already mentioned, she has left vestibular schwannoma that is currently observed with regular brain MRIs.    Neurologic Review of Systems - no amaurosis, diplopia, abnormal speech, unilateral numbness or weakness. She endorses left sided hearing loss (due to known vestibular schwannoma) and depression (well controlled). Otherwise, she denies any other complaints on 14-point comprehensive review of systems.    PAST MEDICAL/SURGICAL HISTORY:                                                    I personally reviewed patient's past medical and surgical history with the patient at today's visit.  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  Current Outpatient Prescriptions on File Prior to Visit:  lisinopril-hydrochlorothiazide (PRINZIDE/ZESTORETIC) 20-25 MG per tablet Take 1 tablet by mouth daily   amLODIPine (NORVASC) 2.5 MG tablet Take 1 tablet (2.5 mg) by mouth daily   escitalopram (LEXAPRO) 10 MG tablet Take 1 tablet (10 mg) by mouth daily   albuterol (PROAIR HFA/PROVENTIL HFA/VENTOLIN HFA) 108 (90 BASE) MCG/ACT Inhaler Inhale 2 puffs into the lungs every 4 hours as needed for shortness of breath / dyspnea or wheezing   cyclobenzaprine (FLEXERIL) 10 MG tablet Take 0.5-1 tablets (5-10 mg) by mouth 3 times daily as needed for muscle spasms   Cholecalciferol (VITAMIN D3 PO) Take 1,000 Units by mouth daily   aspirin 325 MG tablet Take 325 mg by mouth daily     No current facility-administered medications on file prior to visit.   ALLERGIES:                                                    No Known Allergies  FAMILY/SOCIAL HISTORY:                                                    Family and social history was reviewed with the patient at today's visit. Sister has a history of headaches. Her mother had bilateral hand tremor, but no other features of Parkinson's disease.    Problem (# of Occurrences) Relation (Name,Age of Onset)    CANCER (3) Father: brain tumor;  at age 62 y rs, Sister: breast cancer, Brother: bladder cancer;     Hypertension (1) Mother:  at age 84    Neurologic Disorder (1) Mother: she had a tremor; walked normally. voice was soft        , lives with her , Brent, has 2 grown songs, denies current tobacco, alcohol, and recreational drug use. Former smoker, quit 5 years ago. Retired for the last 7 years. Used to own a hardware company.  Social History   Substance Use Topics     Smoking status: Former Smoker     Packs/day: 0.30     Years: 50.00     Types: Cigarettes     Smokeless tobacco: Never Used      Comment: quit 2014     Alcohol use Yes      Comment: occasionally      REVIEW OF  "SYSTEMS:                                                    Patient has completed a [facility] Patient Health History, including a 14-system review which was personally reviewed, and pertinent positives are listed in HPI. She denies any additional problems on the further questioning.    EXAM:                                                    VITAL SIGNS:   /64 (BP Location: Right arm, Patient Position: Chair, Cuff Size: Adult Regular)  Pulse 68  Temp 98.1  F (36.7  C) (Oral)  Resp 16  Ht 1.626 m (5' 4\")  Wt 59.4 kg (131 lb)  BMI 22.49 kg/m2    General: pt is in NAD, cooperative.  Skin: normal turgor, moist mucous membranes, no lesions/rashes noticed.  HEENT: ATNC, EOMI, PERRL, white sclera, normal conjunctiva, no nystagmus or ptosis. No carotid bruits bilaterally.  Respiratory: lung sounds clear to auscultation bilaterally, no crackles, wheezes, rhonchi. Symmetric lung excursion, no accessory respiratory muscle use.  Cardiovascular: normal S1/S2, no murmurs/rubs/gallops.  Abdomen: Not distended.  : deferred.    Neurological:  Mental: alert, follows commands, mini-cog is 5/5 with 3/3 on memory recall, no aphasia or dysarthria. Fund of knowledge is appropriate for age.  Cranial Nerves:  CN II: visual acuity - able to accurately count fingers with each eye. Visual fields intact, fundi: discs sharp, no papilledema and normal vessels bilaterally.  CN III, IV, VI: EOM intact, pupils equal and reactive  CN V: facial sensation nl  CN VII: face symmetric, no facial droop  CN VIII: hearing diminished in the left ear  CN IX: palate elevation symmetric, uvula at midline  CN XI SCM normal, shoulder shrug nl  CN XII: tongue midline  Motor: Strength: 5/5 in all major groups of all extremities. Normal tone. Patient has bilateral postural moderate frequency and amplitude hand tremor. In addition, she has very mild intermittent head and body tremor. Upon standing (with slightly bent legs at the knees), she " also has mild bilateral lower extremity orthostatic tremor. No other abnormal movements. No bradykinesia, no micrographia, and no hypomimia. No pronator drift b/l. The patient completed spiral tremor sheet which will be scanned into her electronic medical record.  Reflexes: Triceps, biceps, brachioradialis, patellar, and achilles reflexes normal and symmetric. No clonus noted. Toes are down-going b/l.   Sensory: temperature, light touch, pinprick, and vibration intact. Romberg: negative.  Coordination: FNF and heel-shin tests intact b/l. No dysdiadochokinesia with rapid alternating movements.  Gait:  Normal, able tandem, toe, heel walk.    DATA:     LABS: I personally reviewed the following labs:  Office Visit on 04/04/2017   Component Date Value Ref Range Status     Sodium 04/04/2017 143  133 - 144 mmol/L Final     Potassium 04/04/2017 4.0  3.4 - 5.3 mmol/L Final     Chloride 04/04/2017 106  94 - 109 mmol/L Final     Carbon Dioxide 04/04/2017 29  20 - 32 mmol/L Final     Anion Gap 04/04/2017 8  3 - 14 mmol/L Final     Glucose 04/04/2017 91  70 - 99 mg/dL Final    Fasting specimen     Urea Nitrogen 04/04/2017 26  7 - 30 mg/dL Final     Creatinine 04/04/2017 0.65  0.52 - 1.04 mg/dL Final     GFR Estimate 04/04/2017 89  >60 mL/min/1.7m2 Final    Non  GFR Calc     GFR Estimate If Black 04/04/2017   >60 mL/min/1.7m2 Final                    Value:>90   GFR Calc       Calcium 04/04/2017 9.6  8.5 - 10.1 mg/dL Final     Bilirubin Total 04/04/2017 0.4  0.2 - 1.3 mg/dL Final     Albumin 04/04/2017 3.8  3.4 - 5.0 g/dL Final     Protein Total 04/04/2017 7.1  6.8 - 8.8 g/dL Final     Alkaline Phosphatase 04/04/2017 67  40 - 150 U/L Final     ALT 04/04/2017 14  0 - 50 U/L Final     AST 04/04/2017 16  0 - 45 U/L Final     Cholesterol 04/04/2017 195  <200 mg/dL Final     Triglycerides 04/04/2017 63  <150 mg/dL Final    Fasting specimen     HDL Cholesterol 04/04/2017 60  >49 mg/dL Final     LDL  Cholesterol Calculated 04/04/2017 122* <100 mg/dL Final    Comment: Above desirable:  100-129 mg/dl   Borderline High:  130-159 mg/dL   High:             160-189 mg/dL   Very high:       >189 mg/dl       Non HDL Cholesterol 04/04/2017 135* <130 mg/dL Final    Comment: Above Desirable:  130-159 mg/dl   Borderline high:  160-189 mg/dl   High:             190-219 mg/dl   Very high:       >219 mg/dl       TSH 04/04/2017 0.42  0.40 - 4.00 mU/L Final     Vitamin B12 04/04/2017 316  193 - 986 pg/mL Final     IMAGING: I also personally reviewed following brain imaging and agree with the formal radiology report/s as follows:   Brain MRI with imaging primarily for the purposes of stereotactic evaluation 03/26/2014: Impression: Limited imaging performed for the purposes of stereotactic evaluation. Left cerebellopontine angle mass extending into the internal auditory canal compatible with a vestibular schwannoma.    ASSESSMENT and PLAN:      ASSESSMENT: Lenora Hammonds is a 71 year old female who presents with tremor started about 7 years ago. She has positive family history of tremor in her mother.    Her neurological exam is notable for bilateral postural moderate frequency and amplitude hand tremor, mild intermittent head/ body tremor, and orthostatic lower extremity tremor. Limited brain MRI from 2014 and pertinent recent labs were also reviewed.     Her clinical presentation is likely consistent with essential/familial tremor with potential contribution from her medications (Lexapro and albuterol). However, tremor predominant Parkinson's disease cannot be fully excluded. Observation over time is needed for more accurate diagnosis.    Meanwhile, we discussed in detail most likely diagnosis, available treatment options, and prognosis. I reviewed the recommended medications, including propranolol and primidone (their mechanism of action and common/serious side effects). We also discussed the use of benzodiazepines for the  treatment of orthostatic tremor. At this time, the patient is not interested to try any of these options. She will contact my office, if she decides to try them in the future.    DIAGNOSES:    ICD-10-CM    1. Tremor R25.1      PLAN: At today's visit we discussed various diagnostic possibilities for her symptoms that are likely due to essential tremor, though tremor-predominant Parkinson's disease is not totally excluded.    Additional information on essential tremor was provided.    No new medications were ordered.    Preventive Neurology: Encouraged to stay physically and mentally active with particular emphasis on daily mentally stimulating activities of her choice (such as crosswords, puzzles, sudoku, etc.), stretching exercises, walking, and healthy eating.    Next follow-up appointment is in next 1 year or earlier if needed.    I encouraged the patient to call me with any questions or concerns.    Total Time:  60 minutes with > 50% spent counseling the patient on stated above assessment and recommendations, including nature of the diagnosis, needed w/u, proposed plan of treatment, and prognosis.    Alon Pastrana MD  / Neurology  [facility]  (Chart documentation was completed in part with Dragon voice-recognition software. Even though reviewed, some grammatical, spelling, and word errors may remain.)

## 2017-04-06 NOTE — NURSING NOTE
"Chief Complaint   Patient presents with     Consult     tremor       Initial /64 (BP Location: Right arm, Patient Position: Chair, Cuff Size: Adult Regular)  Pulse 68  Temp 98.1  F (36.7  C) (Oral)  Resp 16  Ht 1.626 m (5' 4\")  Wt 59.4 kg (131 lb)  BMI 22.49 kg/m2 Estimated body mass index is 22.49 kg/(m^2) as calculated from the following:    Height as of this encounter: 1.626 m (5' 4\").    Weight as of this encounter: 59.4 kg (131 lb).  Medication Reconciliation: karen Bunn CMA      "

## 2017-04-07 DIAGNOSIS — Z12.11 SCREEN FOR COLON CANCER: ICD-10-CM

## 2017-04-08 LAB — HEMOCCULT STL QL IA: NEGATIVE

## 2017-04-12 PROBLEM — M85.80 OSTEOPENIA: Status: ACTIVE | Noted: 2017-04-12

## 2017-05-13 ENCOUNTER — OFFICE VISIT (OUTPATIENT)
Dept: URGENT CARE | Facility: URGENT CARE | Age: 72
End: 2017-05-13
Payer: COMMERCIAL

## 2017-05-13 VITALS
HEIGHT: 64 IN | SYSTOLIC BLOOD PRESSURE: 100 MMHG | HEART RATE: 91 BPM | OXYGEN SATURATION: 97 % | BODY MASS INDEX: 22.2 KG/M2 | TEMPERATURE: 97.1 F | DIASTOLIC BLOOD PRESSURE: 73 MMHG | WEIGHT: 130 LBS

## 2017-05-13 DIAGNOSIS — J11.1 INFLUENZA-LIKE ILLNESS: Primary | ICD-10-CM

## 2017-05-13 PROCEDURE — 99213 OFFICE O/P EST LOW 20 MIN: CPT

## 2017-05-13 RX ORDER — AZITHROMYCIN 250 MG/1
TABLET, FILM COATED ORAL
Qty: 6 TABLET | Refills: 0 | Status: SHIPPED | OUTPATIENT
Start: 2017-05-13 | End: 2017-10-23

## 2017-05-13 NOTE — PROGRESS NOTES
SUBJECTIVE:   Chief Complaint   Patient presents with     Urgent Care     Cough     c/o cough and chest congestion 3 days     Lenora Hammonds is a 71 year old female who present complaining of flu-like symptoms: fevers, chills, myalgias, headache,  congestion, sore throat and cough for 3 days. Denies    dyspnea /  wheezing.  Has not known exposure to people with influenza.  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        Current Outpatient Prescriptions on File Prior to Visit:  lisinopril-hydrochlorothiazide (PRINZIDE/ZESTORETIC) 20-25 MG per tablet Take 1 tablet by mouth daily   amLODIPine (NORVASC) 2.5 MG tablet Take 1 tablet (2.5 mg) by mouth daily   escitalopram (LEXAPRO) 10 MG tablet Take 1 tablet (10 mg) by mouth daily   albuterol (PROAIR HFA/PROVENTIL HFA/VENTOLIN HFA) 108 (90 BASE) MCG/ACT Inhaler Inhale 2 puffs into the lungs every 4 hours as needed for shortness of breath / dyspnea or wheezing   cyclobenzaprine (FLEXERIL) 10 MG tablet Take 0.5-1 tablets (5-10 mg) by mouth 3 times daily as needed for muscle spasms   Cholecalciferol (VITAMIN D3 PO) Take 1,000 Units by mouth daily   aspirin 325 MG tablet Take 325 mg by mouth daily     No current facility-administered medications on file prior to visit.     Social History   Substance Use Topics     Smoking status: Former Smoker     Packs/day: 0.30     Years: 50.00     Types: Cigarettes     Smokeless tobacco: Never Used      Comment: quit 2014     Alcohol use Yes      Comment: occasionally        Family History   Problem Relation Age of Onset     Hypertension Mother       at age 84     Neurologic Disorder Mother       "?parkinsonism - she had a tremor; walked normally. voice was soft     CANCER Father      brain tumor;  at age 62 y rs     CANCER Sister      breast cancer     CANCER Brother      bladder cancer;          ROS:  INTEGUMENTARY/SKIN: NEGATIVE for worrisome rashes, moles or lesions  EYES: NEGATIVE for vision changes or irritation  GI: NEGATIVE for nausea, abdominal pain, heartburn, or change in bowel habits    OBJECTIVE:  /73 (BP Location: Left arm, Patient Position: Chair, Cuff Size: Adult Regular)  Pulse 91  Temp 97.1  F (36.2  C) (Tympanic)  Ht 5' 4\" (1.626 m)  Wt 130 lb (59 kg)  SpO2 97%  BMI 22.31 kg/m2  Appears fatigued,  moderately ill but not toxic;  Ears normal. Throat and pharynx normal.  Neck supple. No adenopathy in the neck. Sinuses non tender. The chest has a few scattered ronchi.       ASSESSMENT:  Influenza-like illness      - azithromycin (ZITHROMAX) 250 MG tablet; 2 tablets day 1 then 1 tablet daily for 4 days       We discussed the limitations of antiviral therapy against influenza, that treatment should usually be initiated within 2 days of the start of symptoms and is most critical for persons with weak immunity and chronic respiratory illnesses     Symptomatic therapy suggested:  Rest, drink lots of fluids,  Acetaminophen / ibuprofen for body aches and fever,  Salt water gargles for sore throat,  OTC anesthetic lozenges for sore throat,  OTC cough medications.   Call or return to clinic prn if these symptoms worsen or fail to improve as anticipated.     Advised that influenza illness usually lasts a week, sometimes more and that the patient should avoid contact with others, and cover the mouth when coughing to limit spread of the infection.    Discussed that influenza is a potent virus that can cause damage to airways making increased risk for developing bronchitis or pneumonia.  In severe cases of chest symptoms and antibiotic can treat the bacterial superinfection, but " I explained that the antibiotic is not effective against the influenza virus.

## 2017-05-13 NOTE — PATIENT INSTRUCTIONS
Bronchitis, Antibiotic Treatment (Adult)    Bronchitis is an infection of the air passages (bronchial tubes) in your lungs. It often occurs when you have a cold. This illness is contagious during the first few days and is spread through the air by coughing and sneezing, or by direct contact (touching the sick person and then touching your own eyes, nose, or mouth).  Symptoms of bronchitis include cough with mucus (phlegm) and low-grade fever. Bronchitis usually lasts 7 to 14 days. Mild cases can be treated with simple home remedies. More severe infection is treated with an antibiotic.  Home care  Follow these guidelines when caring for yourself at home:    If your symptoms are severe, rest at home for the first 2 to 3 days. When you go back to your usual activities, don't let yourself get too tired.    Do not smoke. Also avoid being exposed to secondhand smoke.    You may use over-the-counter medicines to control fever or pain, unless another medicine was prescribed. (Note: If you have chronic liver or kidney disease or have ever had a stomach ulcer or gastrointestinal bleeding, talk with your healthcare provider before using these medicines. Also talk to your provider if you are taking medicine to prevent blood clots.) Aspirin should never be given to anyone younger than 18 years of age who is ill with a viral infection or fever. It may cause severe liver or brain damage.    Your appetite may be poor, so a light diet is fine. Avoid dehydration by drinking 6 to 8 glasses of fluids per day (such as water, soft drinks, sports drinks, juices, tea, or soup). Extra fluids will help loosen secretions in the nose and lungs.    Over-the-counter cough, cold, and sore-throat medicines will not shorten the length of the illness, but they may be helpful to reduce symptoms. (Note: Do not use decongestants if you have high blood pressure.)    Finish all antibiotic medicine. Do this even if you are feeling better after only a  few days.  Follow-up care  Follow up with your healthcare provider, or as advised. If you had an X-ray or ECG (electrocardiogram), a specialist will review it. You will be notified of any new findings that may affect your care.  Note: If you are age 65 or older, or if you have a chronic lung disease or condition that affects your immune system, or you smoke, talk to your healthcare provider about having pneumococcal vaccinations and a yearly influenza vaccination (flu shot).  When to seek medical advice  Call your healthcare provider right away if any of these occur:    Fever of 100.4 F (38 C) or higher    Coughing up increased amounts of colored sputum    Weakness, drowsiness, headache, facial pain, ear pain, or a stiff neck   Call 911, or get immediate medical care  Contact emergency services right away if any of these occur.    Coughing up blood    Worsening weakness, drowsiness, headache, or stiff neck    Trouble breathing, wheezing, or pain with breathing    5131-3656 [employer]. [address] [place] [address], [place] [address]. All rights reserved. This information is not intended as a substitute for professional medical care. Always follow your healthcare professional's instructions.

## 2017-05-13 NOTE — NURSING NOTE
"Chief Complaint   Patient presents with     Urgent Care     Cough     c/o cough and chest congestion 3 days       Initial /73 (BP Location: Left arm, Patient Position: Chair, Cuff Size: Adult Regular)  Pulse 91  Temp 97.1  F (36.2  C) (Tympanic)  Ht 5' 4\" (1.626 m)  Wt 130 lb (59 kg)  SpO2 97%  BMI 22.31 kg/m2 Estimated body mass index is 22.31 kg/(m^2) as calculated from the following:    Height as of this encounter: 5' 4\" (1.626 m).    Weight as of this encounter: 130 lb (59 kg).  Medication Reconciliation: complete   Smitha Rojas MA    "

## 2017-05-13 NOTE — MR AVS SNAPSHOT
After Visit Summary   5/13/2017    Lenora Hammonds    MRN: [id number]           Patient Information     Date Of Birth          1945        Visit Information        Provider Department      5/13/2017 8:30 AM Provider, [facility]        Today's Diagnoses     Influenza-like illness    -  1      Care Instructions      Bronchitis, Antibiotic Treatment (Adult)    Bronchitis is an infection of the air passages (bronchial tubes) in your lungs. It often occurs when you have a cold. This illness is contagious during the first few days and is spread through the air by coughing and sneezing, or by direct contact (touching the sick person and then touching your own eyes, nose, or mouth).  Symptoms of bronchitis include cough with mucus (phlegm) and low-grade fever. Bronchitis usually lasts 7 to 14 days. Mild cases can be treated with simple home remedies. More severe infection is treated with an antibiotic.  Home care  Follow these guidelines when caring for yourself at home:    If your symptoms are severe, rest at home for the first 2 to 3 days. When you go back to your usual activities, don't let yourself get too tired.    Do not smoke. Also avoid being exposed to secondhand smoke.    You may use over-the-counter medicines to control fever or pain, unless another medicine was prescribed. (Note: If you have chronic liver or kidney disease or have ever had a stomach ulcer or gastrointestinal bleeding, talk with your healthcare provider before using these medicines. Also talk to your provider if you are taking medicine to prevent blood clots.) Aspirin should never be given to anyone younger than 18 years of age who is ill with a viral infection or fever. It may cause severe liver or brain damage.    Your appetite may be poor, so a light diet is fine. Avoid dehydration by drinking 6 to 8 glasses of fluids per day (such as water, soft drinks, sports drinks, juices, tea, or  soup). Extra fluids will help loosen secretions in the nose and lungs.    Over-the-counter cough, cold, and sore-throat medicines will not shorten the length of the illness, but they may be helpful to reduce symptoms. (Note: Do not use decongestants if you have high blood pressure.)    Finish all antibiotic medicine. Do this even if you are feeling better after only a few days.  Follow-up care  Follow up with your healthcare provider, or as advised. If you had an X-ray or ECG (electrocardiogram), a specialist will review it. You will be notified of any new findings that may affect your care.  Note: If you are age 65 or older, or if you have a chronic lung disease or condition that affects your immune system, or you smoke, talk to your healthcare provider about having pneumococcal vaccinations and a yearly influenza vaccination (flu shot).  When to seek medical advice  Call your healthcare provider right away if any of these occur:    Fever of 100.4 F (38 C) or higher    Coughing up increased amounts of colored sputum    Weakness, drowsiness, headache, facial pain, ear pain, or a stiff neck   Call 911, or get immediate medical care  Contact emergency services right away if any of these occur.    Coughing up blood    Worsening weakness, drowsiness, headache, or stiff neck    Trouble breathing, wheezing, or pain with breathing    7448-5605 [employer]. [address] [place] [address], [place] [address]. All rights reserved. This information is not intended as a substitute for professional medical care. Always follow your healthcare professional's instructions.              Follow-ups after your visit        Who to contact     If you have questions or need follow up information about today's clinic visit or your schedule please contact [facility] directly at [phone].  Normal or non-critical lab and imaging results will be communicated to you by MyChart, letter or phone within 4 business  "days after the clinic has received the results. If you do not hear from us within 7 days, please contact the clinic through [employer] or phone. If you have a critical or abnormal lab result, we will notify you by phone as soon as possible.  Submit refill requests through [employer] or call your pharmacy and they will forward the refill request to us. Please allow 3 business days for your refill to be completed.          Additional Information About Your Visit        [employer] Information     [employer] lets you send messages to your doctor, view your test results, renew your prescriptions, schedule appointments and more. To sign up, go to [email or web address] . Click on \"Log in\" on the left side of the screen, which will take you to the Welcome page. Then click on \"Sign up Now\" on the right side of the page.     You will be asked to enter the access code listed below, as well as some personal information. Please follow the directions to create your username and password.     Your access code is: [email or web address]  Expires: 7/3/2017 11:20 AM     Your access code will  in 90 days. If you need help or a new code, please call your [facility] clinic or [phone].        Care EveryWhere ID     This is your Care EveryWhere ID. This could be used by other organizations to access your [facility] medical records  [id number]        Your Vitals Were     Pulse Temperature Height Pulse Oximetry BMI (Body Mass Index)       91 97.1  F (36.2  C) (Tympanic) 5' 4\" (1.626 m) 97% 22.31 kg/m2        Blood Pressure from Last 3 Encounters:   17 100/73   17 120/64   17 97/60    Weight from Last 3 Encounters:   17 130 lb (59 kg)   17 131 lb (59.4 kg)   17 131 lb (59.4 kg)              Today, you had the following     No orders found for display         Today's Medication Changes          These changes are accurate as of: 17  8:50 AM.  If you have any questions, ask your nurse or doctor.               Start " taking these medicines.        Dose/Directions    azithromycin 250 MG tablet   Commonly known as:  ZITHROMAX   Used for:  Influenza-like illness   Started by:  Provider, [facility]        2 tablets day 1 then 1 tablet daily for 4 days   Quantity:  6 tablet   Refills:  0            Where to get your medicines      These medications were sent to [facility] - [place], [place] - [address]  [address], [place] [address]     Phone:  [phone]     azithromycin 250 MG tablet                Primary Care Provider Office Phone # Fax #    Siobhan RAZO Miriam, STACY MEDELLIN [phone]       [facility] [address] [facility] [address]  [place] [address]        Thank you!     Thank you for choosing [facility]  for your care. Our goal is always to provide you with excellent care. Hearing back from our patients is one way we can continue to improve our services. Please take a few minutes to complete the written survey that you may receive in the mail after your visit with us. Thank you!             Your Updated Medication List - Protect others around you: Learn how to safely use, store and throw away your medicines at www.disposemymeds.org.          This list is accurate as of: 5/13/17  8:50 AM.  Always use your most recent med list.                   Brand Name Dispense Instructions for use    albuterol 108 (90 BASE) MCG/ACT Inhaler    PROAIR HFA/PROVENTIL HFA/VENTOLIN HFA    1 Inhaler    Inhale 2 puffs into the lungs every 4 hours as needed for shortness of breath / dyspnea or wheezing       amLODIPine 2.5 MG tablet    NORVASC    90 tablet    Take 1 tablet (2.5 mg) by mouth daily       aspirin 325 MG tablet      Take 325 mg by mouth daily       azithromycin 250 MG tablet    ZITHROMAX    6 tablet    2 tablets day 1 then 1 tablet daily for 4 days       cyclobenzaprine 10 MG tablet    FLEXERIL    30 tablet    Take 0.5-1 tablets (5-10 mg) by mouth 3 times  daily as needed for muscle spasms       escitalopram 10 MG tablet    LEXAPRO    90 tablet    Take 1 tablet (10 mg) by mouth daily       lisinopril-hydrochlorothiazide 20-25 MG per tablet    PRINZIDE/ZESTORETIC    90 tablet    Take 1 tablet by mouth daily       VITAMIN D3 PO      Take 1,000 Units by mouth daily

## 2017-05-15 ENCOUNTER — OFFICE VISIT (OUTPATIENT)
Dept: FAMILY MEDICINE | Facility: CLINIC | Age: 72
End: 2017-05-15
Payer: COMMERCIAL

## 2017-05-15 VITALS
HEART RATE: 86 BPM | WEIGHT: 128 LBS | DIASTOLIC BLOOD PRESSURE: 55 MMHG | TEMPERATURE: 98.3 F | OXYGEN SATURATION: 94 % | RESPIRATION RATE: 14 BRPM | SYSTOLIC BLOOD PRESSURE: 97 MMHG | BODY MASS INDEX: 21.97 KG/M2

## 2017-05-15 DIAGNOSIS — R05.9 COUGH: ICD-10-CM

## 2017-05-15 DIAGNOSIS — J11.1 INFLUENZA-LIKE ILLNESS: Primary | ICD-10-CM

## 2017-05-15 PROCEDURE — 99213 OFFICE O/P EST LOW 20 MIN: CPT | Performed by: NURSE PRACTITIONER

## 2017-05-15 RX ORDER — BENZONATATE 200 MG/1
200 CAPSULE ORAL 3 TIMES DAILY PRN
Qty: 30 CAPSULE | Refills: 0 | Status: SHIPPED | OUTPATIENT
Start: 2017-05-15 | End: 2017-10-23

## 2017-05-15 RX ORDER — CODEINE PHOSPHATE AND GUAIFENESIN 10; 100 MG/5ML; MG/5ML
1-2 SOLUTION ORAL EVERY 4 HOURS PRN
Qty: 120 ML | Refills: 0 | Status: SHIPPED | OUTPATIENT
Start: 2017-05-15 | End: 2017-10-23

## 2017-05-15 NOTE — PATIENT INSTRUCTIONS
For your illness:  Continue to take your azithromycin as prescribed.  You can take the tessalon 3 times a day as needed for cough.  You can also use the albuterol inhaler every 4 hours as needed for wheezing or cough.  You can take the robitussin with codeine at night time for cough suppression and sleep.  Take care of yourself with a healthy diet, fluids, rest, etc.  Follow up with me if your symptoms do not improve or with any worsening.

## 2017-05-15 NOTE — MR AVS SNAPSHOT
"              After Visit Summary   5/15/2017    Lenora Hammonds    MRN: [id number]           Patient Information     Date Of Birth          1945        Visit Information        Provider Department      5/15/2017 2:00 PM Siobhan Pineda APRN CNP [facility]        Today's Diagnoses     Influenza-like illness    -  1    Cough          Care Instructions    For your illness:  Continue to take your azithromycin as prescribed.  You can take the tessalon 3 times a day as needed for cough.  You can also use the albuterol inhaler every 4 hours as needed for wheezing or cough.  You can take the robitussin with codeine at night time for cough suppression and sleep.  Take care of yourself with a healthy diet, fluids, rest, etc.  Follow up with me if your symptoms do not improve or with any worsening.          Follow-ups after your visit        Who to contact     If you have questions or need follow up information about today's clinic visit or your schedule please contact [facility] directly at [phone].  Normal or non-critical lab and imaging results will be communicated to you by [employer], letter or phone within 4 business days after the clinic has received the results. If you do not hear from us within 7 days, please contact the clinic through [employer] or phone. If you have a critical or abnormal lab result, we will notify you by phone as soon as possible.  Submit refill requests through [employer] or call your pharmacy and they will forward the refill request to us. Please allow 3 business days for your refill to be completed.          Additional Information About Your Visit        [employer] Information     [employer] lets you send messages to your doctor, view your test results, renew your prescriptions, schedule appointments and more. To sign up, go to [email or web address] . Click on \"Log in\" on the left side of the screen, which will take you to the Welcome page. Then " "click on \"Sign up Now\" on the right side of the page.     You will be asked to enter the access code listed below, as well as some personal information. Please follow the directions to create your username and password.     Your access code is: [email or web address]  Expires: 7/3/2017 11:20 AM     Your access code will  in 90 days. If you need help or a new code, please call your [facility] clinic or [phone].        Care EveryWhere ID     This is your Care EveryWhere ID. This could be used by other organizations to access your [facility] medical records  [id number]        Your Vitals Were     Pulse Temperature Pulse Oximetry BMI (Body Mass Index)          86 98.3  F (36.8  C) (Oral) 94% 21.97 kg/m2         Blood Pressure from Last 3 Encounters:   05/15/17 97/55   17 100/73   17 120/64    Weight from Last 3 Encounters:   05/15/17 128 lb (58.1 kg)   17 130 lb (59 kg)   17 131 lb (59.4 kg)              Today, you had the following     No orders found for display         Today's Medication Changes          These changes are accurate as of: 5/15/17  2:29 PM.  If you have any questions, ask your nurse or doctor.               Start taking these medicines.        Dose/Directions    benzonatate 200 MG capsule   Commonly known as:  TESSALON   Used for:  Influenza-like illness   Started by:  Siobhan Pineda APRN CNP        Dose:  200 mg   Take 1 capsule (200 mg) by mouth 3 times daily as needed for cough   Quantity:  30 capsule   Refills:  0       guaiFENesin-codeine 100-10 MG/5ML Soln solution   Commonly known as:  ROBITUSSIN AC   Used for:  Cough   Started by:  Siobhan Pineda APRN CNP        Dose:  1-2 tsp.   Take 5-10 mLs by mouth every 4 hours as needed for cough   Quantity:  120 mL   Refills:  0            Where to get your medicines      These medications were sent to [facility] - [place], [place] - [address]  [address], [place] [address]     " Phone:  [phone]     benzonatate 200 MG capsule         Some of these will need a paper prescription and others can be bought over the counter.  Ask your nurse if you have questions.     Bring a paper prescription for each of these medications     guaiFENesin-codeine 100-10 MG/5ML Soln solution                Primary Care Provider Office Phone # Fax #    STACY Castro [phone]       [place] [address]  [place] [address]        Thank you!     Thank you for choosing [facility]  for your care. Our goal is always to provide you with excellent care. Hearing back from our patients is one way we can continue to improve our services. Please take a few minutes to complete the written survey that you may receive in the mail after your visit with us. Thank you!             Your Updated Medication List - Protect others around you: Learn how to safely use, store and throw away your medicines at www.disposemymeds.org.          This list is accurate as of: 5/15/17  2:29 PM.  Always use your most recent med list.                   Brand Name Dispense Instructions for use    albuterol 108 (90 BASE) MCG/ACT Inhaler    PROAIR HFA/PROVENTIL HFA/VENTOLIN HFA    1 Inhaler    Inhale 2 puffs into the lungs every 4 hours as needed for shortness of breath / dyspnea or wheezing       amLODIPine 2.5 MG tablet    NORVASC    90 tablet    Take 1 tablet (2.5 mg) by mouth daily       aspirin 325 MG tablet      Take 325 mg by mouth daily       azithromycin 250 MG tablet    ZITHROMAX    6 tablet    2 tablets day 1 then 1 tablet daily for 4 days       benzonatate 200 MG capsule    TESSALON    30 capsule    Take 1 capsule (200 mg) by mouth 3 times daily as needed for cough       cyclobenzaprine 10 MG tablet    FLEXERIL    30 tablet    Take 0.5-1 tablets (5-10 mg) by mouth 3 times daily as needed for muscle spasms       escitalopram 10 MG tablet    LEXAPRO    90  tablet    Take 1 tablet (10 mg) by mouth daily       guaiFENesin-codeine 100-10 MG/5ML Soln solution    ROBITUSSIN AC    120 mL    Take 5-10 mLs by mouth every 4 hours as needed for cough       lisinopril-hydrochlorothiazide 20-25 MG per tablet    PRINZIDE/ZESTORETIC    90 tablet    Take 1 tablet by mouth daily       VITAMIN D3 PO      Take 1,000 Units by mouth daily

## 2017-05-15 NOTE — NURSING NOTE
"Chief Complaint   Patient presents with     RECHECK       Initial BP 97/55  Pulse 86  Temp 98.3  F (36.8  C) (Oral)  Wt 128 lb (58.1 kg)  SpO2 94%  BMI 21.97 kg/m2 Estimated body mass index is 21.97 kg/(m^2) as calculated from the following:    Height as of 5/13/17: 5' 4\" (1.626 m).    Weight as of this encounter: 128 lb (58.1 kg).  Medication Reconciliation: complete       Jermaine Blanton MA       "

## 2017-05-15 NOTE — PROGRESS NOTES
"  SUBJECTIVE:                                                    Lenora Hammonds is a 71 year old female who presents to clinic today for the following health issues:      ED/UC Followup:    Facility:  [facility]  Date of visit: 05/13/2017  Reason for visit: cough   Current Status: cough has not improved.      Lenora was seen in UC 2 days ago with URI symptoms.  She has been taking her zpack as prescribed (on day 3 today).  Her cough is profound.  Worse when she lays down.  \"I can't eat.\"  Weight is down 2 pounds from 2 days ago.      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     Snores 10/6/2011     Current Outpatient Prescriptions   Medication Sig Dispense Refill     benzonatate (TESSALON) 200 MG capsule Take 1 capsule (200 mg) by mouth 3 times daily as needed for cough 30 capsule 0     guaiFENesin-codeine (ROBITUSSIN AC) 100-10 MG/5ML SOLN solution Take 5-10 mLs by mouth every 4 hours as needed for cough 120 mL 0     azithromycin (ZITHROMAX) 250 MG tablet 2 tablets day 1 then 1 tablet daily for 4 days 6 tablet 0     lisinopril-hydrochlorothiazide (PRINZIDE/ZESTORETIC) 20-25 MG per tablet Take 1 tablet by mouth daily 90 tablet 3     amLODIPine (NORVASC) 2.5 MG tablet Take 1 tablet (2.5 mg) by mouth daily 90 tablet 3     escitalopram (LEXAPRO) 10 MG tablet Take 1 tablet (10 mg) by mouth daily 90 tablet 3     albuterol (PROAIR HFA/PROVENTIL HFA/VENTOLIN HFA) 108 (90 BASE) MCG/ACT Inhaler Inhale 2 puffs into the lungs every 4 hours as needed for shortness of breath / dyspnea or wheezing 1 Inhaler 5     cyclobenzaprine (FLEXERIL) 10 MG tablet Take 0.5-1 tablets (5-10 mg) by mouth 3 times daily as needed for muscle spasms 30 tablet 1     Cholecalciferol (VITAMIN D3 PO) Take 1,000 Units by mouth daily       aspirin 325 MG tablet Take 325 mg by mouth daily       Social History "   Substance Use Topics     Smoking status: Former Smoker     Packs/day: 0.30     Years: 50.00     Types: Cigarettes     Smokeless tobacco: Never Used      Comment: quit 12/2014     Alcohol use Yes      Comment: occasionally        ROS:  7 point Review of systems negative except as stated above.    OBJECTIVE  :BP 97/55  Pulse 86  Temp 98.3  F (36.8  C) (Oral)  Resp 14  Wt 128 lb (58.1 kg)  SpO2 94%  BMI 21.97 kg/m2  EXAM:  Constitutional: healthy, alert, active and no distress  Neck: Neck supple. No adenopathy.  ENT: Bilateral TM's are normal.  Posterior oropharynx is clear.  Nares clear without congestion.  Cardiovascular: S1, S2  Respiratory: frequent hacky/congested cough.  Respirations easy and regular. No respiratory distress. Lungs sounds CTA.  Skin: warm and dry  Psychiatric: mentation appears normal. and affect normal/bright      ASSESSMENT:  (R69) Influenza-like illness  (primary encounter diagnosis)  Comment:   Plan: benzonatate (TESSALON) 200 MG capsule            (R05) Cough  Comment:   Plan: guaiFENesin-codeine (ROBITUSSIN AC) 100-10         MG/5ML SOLN solution         Patient Instructions   For your illness:  Continue to take your azithromycin as prescribed.  You can take the tessalon 3 times a day as needed for cough.  You can also use the albuterol inhaler every 4 hours as needed for wheezing or cough.  You can take the robitussin with codeine at night time for cough suppression and sleep.  Take care of yourself with a healthy diet, fluids, rest, etc.  Follow up with me if your symptoms do not improve or with any worsening.

## 2017-10-23 ENCOUNTER — OFFICE VISIT (OUTPATIENT)
Dept: URGENT CARE | Facility: URGENT CARE | Age: 72
End: 2017-10-23
Payer: COMMERCIAL

## 2017-10-23 ENCOUNTER — RADIANT APPOINTMENT (OUTPATIENT)
Dept: GENERAL RADIOLOGY | Facility: CLINIC | Age: 72
End: 2017-10-23
Attending: FAMILY MEDICINE
Payer: COMMERCIAL

## 2017-10-23 VITALS
TEMPERATURE: 98 F | HEART RATE: 98 BPM | DIASTOLIC BLOOD PRESSURE: 42 MMHG | HEIGHT: 64 IN | BODY MASS INDEX: 22.2 KG/M2 | WEIGHT: 130 LBS | OXYGEN SATURATION: 95 % | SYSTOLIC BLOOD PRESSURE: 102 MMHG

## 2017-10-23 DIAGNOSIS — R07.1 PAINFUL RESPIRATION: ICD-10-CM

## 2017-10-23 DIAGNOSIS — J18.9 PNEUMONIA OF RIGHT UPPER LOBE DUE TO INFECTIOUS ORGANISM: Primary | ICD-10-CM

## 2017-10-23 DIAGNOSIS — R50.9 FEELS FEVERISH: ICD-10-CM

## 2017-10-23 LAB
FLUAV+FLUBV AG SPEC QL: NEGATIVE
FLUAV+FLUBV AG SPEC QL: NEGATIVE
SPECIMEN SOURCE: NORMAL

## 2017-10-23 PROCEDURE — 71020 XR CHEST 2 VW: CPT

## 2017-10-23 PROCEDURE — 87804 INFLUENZA ASSAY W/OPTIC: CPT | Performed by: FAMILY MEDICINE

## 2017-10-23 PROCEDURE — 99214 OFFICE O/P EST MOD 30 MIN: CPT | Performed by: FAMILY MEDICINE

## 2017-10-23 RX ORDER — LEVOFLOXACIN 500 MG/1
500 TABLET, FILM COATED ORAL DAILY
Qty: 10 TABLET | Refills: 0 | Status: SHIPPED | OUTPATIENT
Start: 2017-10-23 | End: 2017-11-02

## 2017-10-23 NOTE — NURSING NOTE
"Lenora Hammonds;   Chief Complaint   Patient presents with     Sick     3 days ago started with pain in right chest with taking a breath, diarrhea, not much coughing - \"feel like I am on my death bed\"      Urgent Care     Initial /42 (BP Location: Left arm, Patient Position: Chair, Cuff Size: Adult Regular)  Pulse 98  Temp 98  F (36.7  C) (Oral)  Ht 5' 4\" (1.626 m)  Wt 130 lb (59 kg)  SpO2 95%  BMI 22.31 kg/m2 Estimated body mass index is 22.31 kg/(m^2) as calculated from the following:    Height as of this encounter: 5' 4\" (1.626 m).    Weight as of this encounter: 130 lb (59 kg)..  BP completed using cuff size regular.  Gunjan Juarez R.N.  "

## 2017-10-23 NOTE — PROGRESS NOTES
"SUBJECTIVE:   Lenora Hammonds is a 72 year old female who complains of malaise, fatigue, feverish feeling, and right pleuritic chest pain for 2 days.  No cough. She denies a history of non-productive cough. She denies a history of asthma. Patient does not smoke cigarettes.  Does have a h/o pneumonia.       OBJECTIVE:/42 (BP Location: Left arm, Patient Position: Chair, Cuff Size: Adult Regular)  Pulse 98  Temp 98  F (36.7  C) (Oral)  Ht 5' 4\" (1.626 m)  Wt 130 lb (59 kg)  SpO2 95%  BMI 22.31 kg/m2   Appearance: in no apparent distress, alert, cooperative and moderately ill.   ENT- ENT exam normal, no neck nodes or sinus tenderness.   Chest - no tachypnea, retractions or cyanosis, S1, S2 normal, no murmur, no gallop, rate regular and crackles noted at lingular area.    Labs:   Results for orders placed or performed in visit on 10/23/17   Influenza A/B antigen   Result Value Ref Range    Influenza A/B Agn Specimen Nasopharyngeal     Influenza A Negative NEG^Negative    Influenza B Negative NEG^Negative        CXR PA/L: New right upper lobe and perihilar infiltrate and  consolidation is concerning for pneumonia. Possible lingular  infiltrate better seen on the lateral view is also evident. Heart is  normal in size. Right hilar clips are unchanged. No pneumothorax or  pleural effusions.     ASSESSMENT:   Right lung pneumonia    PLAN:  As per orders.   Symptomatic therapy suggested: push fluids, rest, use vaporizer or mist needed  and use acetaminophen, ibuprofen as needed. Call or return to clinic prn if these symptoms worsen or fail to improve as anticipated. See PCP in 2-3 weeks for follow-up CXR.   Yolette Anderson MD   "

## 2017-10-23 NOTE — MR AVS SNAPSHOT
After Visit Summary   10/23/2017    Lenora Hammonds    MRN: [id number]           Patient Information     Date Of Birth          1945        Visit Information        Provider Department      10/23/2017 5:50 PM Yolette Hoyt MD [facility]        Today's Diagnoses     Pneumonia of right upper lobe due to infectious organism (H)    -  1    Feels feverish        Painful respiration          Care Instructions      Pneumonia (Adult)  Pneumonia is an infection deep within the lungs. It is in the small air sacs (alveoli). Pneumonia may be caused by a virus or bacteria. Pneumonia caused by bacteria is usually treated with an antibiotic. Severe cases may need to be treated in the hospital. Milder cases can be treated at home. Symptoms usually start to get better during the first 2 days of treatment.    Home care  Follow these guidelines when caring for yourself at home:    Rest at home for the first 2 to 3 days, or until you feel stronger. Don t let yourself get overly tired when you go back to your activities.    Stay away from cigarette smoke - yours or other people s.    You may use acetaminophen or ibuprofen to control fever or pain, unless another medicine was prescribed. If you have chronic liver or kidney disease, talk with your healthcare provider before using these medicines. Also talk with your provider if you ve had a stomach ulcer or gastrointestinal bleeding. Don t give aspirin to anyone younger than 18 years of age who is ill with a fever. It may cause severe liver damage.    Your appetite may be poor, so a light diet is fine.    Drink 6 to 8 glasses of fluids every day to make sure you are getting enough fluids. Beverages can include water, sport drinks, sodas without caffeine, juices, tea, or soup. Fluids will help loosen secretions in the lung. This will make it easier for you to cough up the phlegm (sputum). If you also have heart or kidney disease,  check with your healthcare provider before you drink extra fluids.    Take antibiotic medicine prescribed until it is all gone, even if you are feeling better after a few days.  Follow-up care  Follow up with your healthcare provider in the next 2 to 3 days, or as advised. This is to be sure the medicine is helping you get better.  If you are 65 or older, you should get a pneumococcal vaccine and a yearly flu (influenza) shot. You should also get these vaccines if you have chronic lung disease like asthma, emphysema, or COPD. Recently, a second type of pneumonia vaccine has become available for everyone over 65 years old. This is in addition to the previous vaccine. Ask your provider about this.  When to seek medical advice  Call your healthcare provider right away if any of these occur:    You don t get better within the first 48 hours of treatment    Shortness of breath gets worse    Rapid breathing (more than 25 breaths per minute)    Coughing up blood    Chest pain gets worse with breathing    Fever of 100.4 F (38 C) or higher that doesn t get better with fever medicine    Weakness, dizziness, or fainting that gets worse    Thirst or dry mouth that gets worse    Sinus pain, headache, or a stiff neck    Chest pain not caused by coughing  Date Last Reviewed: 1/1/2017 2000-2017 [employer]. [address] [place] [address], [place] [address]. All rights reserved. This information is not intended as a substitute for professional medical care. Always follow your healthcare professional's instructions.                Follow-ups after your visit        Follow-up notes from your care team     Return if symptoms worsen or fail to improve.      Who to contact     If you have questions or need follow up information about today's clinic visit or your schedule please contact [facility] directly at [phone].  Normal or non-critical lab and imaging results will be communicated to you by  "MyChart, letter or phone within 4 business days after the clinic has received the results. If you do not hear from us within 7 days, please contact the clinic through [employer] or phone. If you have a critical or abnormal lab result, we will notify you by phone as soon as possible.  Submit refill requests through [employer] or call your pharmacy and they will forward the refill request to us. Please allow 3 business days for your refill to be completed.          Additional Information About Your Visit        [employer] Information     [employer] lets you send messages to your doctor, view your test results, renew your prescriptions, schedule appointments and more. To sign up, go to [email or web address] . Click on \"Log in\" on the left side of the screen, which will take you to the Welcome page. Then click on \"Sign up Now\" on the right side of the page.     You will be asked to enter the access code listed below, as well as some personal information. Please follow the directions to create your username and password.     Your access code is: [email or web address]  Expires: 2018  7:10 PM     Your access code will  in 90 days. If you need help or a new code, please call your [facility] clinic or [phone].        Care EveryWhere ID     This is your Care EveryWhere ID. This could be used by other organizations to access your [facility] medical records  [id number]        Your Vitals Were     Pulse Temperature Height Pulse Oximetry BMI (Body Mass Index)       98 98  F (36.7  C) (Oral) 5' 4\" (1.626 m) 95% 22.31 kg/m2        Blood Pressure from Last 3 Encounters:   10/23/17 102/42   05/15/17 97/55   17 100/73    Weight from Last 3 Encounters:   10/23/17 130 lb (59 kg)   05/15/17 128 lb (58.1 kg)   17 130 lb (59 kg)              We Performed the Following     Influenza A/B antigen          Today's Medication Changes          These changes are accurate as of: 10/23/17  7:10 PM.  If you have any questions, ask your nurse " or doctor.               Start taking these medicines.        Dose/Directions    levofloxacin 500 MG tablet   Commonly known as:  LEVAQUIN   Used for:  Pneumonia of right upper lobe due to infectious organism (H)   Started by:  Yolette Hoyt MD        Dose:  500 mg   Take 1 tablet (500 mg) by mouth daily for 10 days   Quantity:  10 tablet   Refills:  0         Stop taking these medicines if you haven't already. Please contact your care team if you have questions.     azithromycin 250 MG tablet   Commonly known as:  ZITHROMAX   Stopped by:  Yolette Hoyt MD           benzonatate 200 MG capsule   Commonly known as:  TESSALON   Stopped by:  Yolette Hoyt MD           cyclobenzaprine 10 MG tablet   Commonly known as:  FLEXERIL   Stopped by:  Yolette Hoyt MD           guaiFENesin-codeine 100-10 MG/5ML Soln solution   Commonly known as:  ROBITUSSIN AC   Stopped by:  Yolette Hoyt MD                Where to get your medicines      These medications were sent to [facility] [address] [place] [id number]     Phone:  [phone]     levofloxacin 500 MG tablet                Primary Care Provider Office Phone # Fax #    Siobhan RAZO Miriam, STACY [facility] [phone]       [address] [place] [address]        Equal Access to Services     MELY MAYNARD : Hadii piper ku hadasho Soomaali, waaxda luqadaha, qaybta kaalmada adeegyada, brent morrison. So [facility] [phone].    ATENCIÓN: Si habla fransisco, tiene a gleason disposición servicios gratuitos de asistencia lingüística. Llame al [phone].    We comply with applicable federal civil rights laws and [place] laws. We do not discriminate on the basis of race, color, national origin, age, disability, sex, sexual orientation, or gender identity.             Thank you!     Thank you for choosing [facility]  for your care. Our goal is always to provide you with excellent care. Hearing back from our patients is one way we can continue to improve our services. Please take a few minutes to complete the written survey that you may receive in the mail after your visit with us. Thank you!             Your Updated Medication List - Protect others around you: Learn how to safely use, store and throw away your medicines at www.disposemymeds.org.          This list is accurate as of: 10/23/17  7:10 PM.  Always use your most recent med list.                   Brand Name Dispense Instructions for use Diagnosis    albuterol 108 (90 BASE) MCG/ACT Inhaler    PROAIR HFA/PROVENTIL HFA/VENTOLIN HFA    1 Inhaler    Inhale 2 puffs into the lungs every 4 hours as needed for shortness of breath / dyspnea or wheezing    Routine general medical examination at a health care facility       amLODIPine 2.5 MG tablet    NORVASC    90 tablet    Take 1 tablet (2.5 mg) by mouth daily    Hypertension goal BP (blood pressure) < 140/90       aspirin 325 MG tablet      Take 325 mg by mouth daily        escitalopram 10 MG tablet    LEXAPRO    90 tablet    Take 1 tablet (10 mg) by mouth daily    Major depressive disorder, single episode, mild (H)       levofloxacin 500 MG tablet    LEVAQUIN    10 tablet    Take 1 tablet (500 mg) by mouth daily for 10 days    Pneumonia of right upper lobe due to infectious organism (H)       lisinopril-hydrochlorothiazide 20-25 MG per tablet    PRINZIDE/ZESTORETIC    90 tablet    Take 1 tablet by mouth daily    Hypertension goal BP (blood pressure) < 140/90       VITAMIN D3 PO      Take 1,000 Units by mouth daily

## 2017-10-24 NOTE — PATIENT INSTRUCTIONS

## 2017-11-01 ENCOUNTER — OFFICE VISIT (OUTPATIENT)
Dept: FAMILY MEDICINE | Facility: CLINIC | Age: 72
End: 2017-11-01
Payer: COMMERCIAL

## 2017-11-01 VITALS
BODY MASS INDEX: 22.2 KG/M2 | DIASTOLIC BLOOD PRESSURE: 55 MMHG | TEMPERATURE: 98.6 F | SYSTOLIC BLOOD PRESSURE: 97 MMHG | RESPIRATION RATE: 20 BRPM | HEART RATE: 104 BPM | WEIGHT: 130 LBS | HEIGHT: 64 IN | OXYGEN SATURATION: 95 %

## 2017-11-01 DIAGNOSIS — K59.00 CONSTIPATION, UNSPECIFIED CONSTIPATION TYPE: ICD-10-CM

## 2017-11-01 DIAGNOSIS — J18.9 PNEUMONIA OF RIGHT UPPER LOBE DUE TO INFECTIOUS ORGANISM: Primary | ICD-10-CM

## 2017-11-01 PROCEDURE — 99213 OFFICE O/P EST LOW 20 MIN: CPT | Performed by: NURSE PRACTITIONER

## 2017-11-01 ASSESSMENT — PATIENT HEALTH QUESTIONNAIRE - PHQ9: SUM OF ALL RESPONSES TO PHQ QUESTIONS 1-9: 4

## 2017-11-01 NOTE — MR AVS SNAPSHOT
After Visit Summary   11/1/2017    Lenora Hammonds    MRN: [id number]           Patient Information     Date Of Birth          1945        Visit Information        Provider Department      11/1/2017 10:20 AM Siobhan Pineda APRN [facility]        Today's Diagnoses     Pneumonia of right upper lobe due to infectious organism (H)    -  1    Constipation, unspecified constipation type          Care Instructions    Use Probiotics (Floragen 3) and yogurt as needed to promote bowel movements.  Continue Miralax once a day as needed.  20mg Prilosec x 2 weeks.  You may use Tums in addition to the prilosec for additional stomach upset.    Return for follow-up chest x-ray in 2 weeks.    Constipation (Adult)  Constipation means that you have bowel movements that are less frequent than usual. Stools often become very hard and difficult to pass.  Constipation is very common. At some point in life it affects almost everyone. Since everyone's bowel habits are different, what is constipation to one person may not be to another. Your healthcare provider may do tests to diagnose constipation. It depends on what he or she finds when evaluating you.    Symptoms of constipation include:    Abdominal pain    Bloating    Vomiting    Painful bowel movements    Itching, swelling, bleeding, or pain around the anus  Causes  Constipation can have many causes. These include:    Diet low in fiber    Too much dairy    Not drinking enough liquids    Lack of exercise or physical activity. This is especially true for older adults.    Changes in lifestyle or daily routine, including pregnancy, aging, work, and travel    Frequent use or misuse of laxatives    Ignoring the urge to have a bowel movement or delaying it until later    Medicines, such as certain prescription pain medicines, iron supplements, antacids, certain antidepressants, and calcium supplements    Diseases like irritable bowel  syndrome, bowel obstructions, stroke, diabetes, thyroid disease, Parkinson disease, hemorrhoids, and colon cancer  Complications  Potential complications of constipation can include:    Hemorrhoids    Rectal bleeding from hemorrhoids or anal fissures (skin tears)    Hernias    Dependency on laxatives    Chronic constipation    Fecal impaction    Bowel obstruction or perforation  Home care  All treatment should be done after talking with your healthcare provider. This is especially true if you have another medical problems, are taking prescription medicines, or are an older adult. Treatment most often involves lifestyle changes. You may also need medicines. Your healthcare provider will tell you which will work best for you. Follow the advice below to help avoid this problem in the future.  Lifestyle changes  These lifestyle changes can help prevent constipation:    Diet. Eat a high-fiber diet, with fresh fruit and vegetables, and reduce dairy intake, meats, and processed foods    Fluids. It's important to get enough fluids each day. Drink plenty of water when you eat more fiber. If you are on diet that limits the amount of fluid you can have, talk about this with your healthcare provider.    Regular exercise. Check with your healthcare provider first.  Medications  Take any medicines as directed. Some laxatives are safe to use only every now and then. Others can be taken on a regular basis. Talk with your doctor or pharmacist if you have questions.  Prescription pain medicines can cause constipation. If you are taking this kind of medicine, ask your healthcare provider if you should also take a stool softener.  Medicines you may take to treat constipation include:    Fiber supplements    Stool softeners    Laxatives    Enemas    Rectal suppositories  Follow-up care  Follow up with your healthcare provider if symptoms don't get better in the next few days. You may need to have more tests or see a specialist.  Call  "911  Call 911 if any of these occur:    Trouble breathing    Stiff, rigid abdomen that is severely painful to touch    Confusion    Fainting or loss of consciousness    Rapid heart rate    Chest pain  When to seek medical advice  Call your healthcare provider right away if any of these occur:    Fever over 100.4 F (38 C)    Failure to resume normal bowel movements    Pain in your abdomen or back gets worse    Nausea or vomiting    Swelling in your abdomen    Blood in the stool    Black, tarry stool    Involuntary weight loss    Weakness  Date Last Reviewed: 12/30/2015 2000-2017 [employer] [address] [place] [address]. All rights reserved. This information is not intended as a substitute for professional medical care. Always follow your healthcare professional's instructions.                    Follow-ups after your visit        Who to contact     If you have questions or need follow up information about today's clinic visit or your schedule please contact [facility] directly at [phone].  Normal or non-critical lab and imaging results will be communicated to you by MyChart, letter or phone within 4 business days after the clinic has received the results. If you do not hear from us within 7 days, please contact the clinic through Zentacthart or phone. If you have a critical or abnormal lab result, we will notify you by phone as soon as possible.  Submit refill requests through Sien or call your pharmacy and they will forward the refill request to us. Please allow 3 business days for your refill to be completed.          Additional Information About Your Visit        ZentactharLastline Information     Sien lets you send messages to your doctor, view your test results, renew your prescriptions, schedule appointments and more. To sign up, go to [email or web address] . Click on \"Log in\" on the left side of the screen, which will take you to the Welcome page. Then click on " "\"Sign up Now\" on the right side of the page.     You will be asked to enter the access code listed below, as well as some personal information. Please follow the directions to create your username and password.     Your access code is: [email or web address]  Expires: 2018  7:10 PM     Your access code will  in 90 days. If you need help or a new code, please call your [facility] or [phone].        Care EveryWhere ID     This is your Care EveryWhere ID. This could be used by other organizations to access your [facility] medical records  [id number]        Your Vitals Were     Pulse Temperature Respirations Height Pulse Oximetry BMI (Body Mass Index)    104 98.6  F (37  C) (Oral) 20 5' 4\" (1.626 m) 95% 22.31 kg/m2       Blood Pressure from Last 3 Encounters:   17 97/55   10/23/17 102/42   05/15/17 97/55    Weight from Last 3 Encounters:   17 130 lb (59 kg)   10/23/17 130 lb (59 kg)   05/15/17 128 lb (58.1 kg)              Today, you had the following     No orders found for display       Primary Care Provider Office Phone # Fax #    STACY Castro [facility] [phone] [address] [place] [address]        Equal Access to Services     [facility]: Hadii aad ku hadasho Soomaali, waaxda luqadaha, qaybta kaalmada adeegyada, brent sin . So [facility] [phone].    ATENCIÓN: Si habla español, tiene a gleason disposición servicios gratuitos de asistencia lingüística. Llame al [phone].    We comply with applicable federal civil rights laws and [place] laws. We do not discriminate on the basis of race, color, national origin, age, disability, sex, sexual orientation, or gender identity.            Thank you!     Thank you for choosing [facility]  for your care. Our goal is always to provide you with excellent care. Hearing back from our patients is one way we can continue to improve our services. Please take a " few minutes to complete the written survey that you may receive in the mail after your visit with us. Thank you!             Your Updated Medication List - Protect others around you: Learn how to safely use, store and throw away your medicines at www.disposemymeds.org.          This list is accurate as of: 11/1/17 10:45 AM.  Always use your most recent med list.                   Brand Name Dispense Instructions for use Diagnosis    albuterol 108 (90 BASE) MCG/ACT Inhaler    PROAIR HFA/PROVENTIL HFA/VENTOLIN HFA    1 Inhaler    Inhale 2 puffs into the lungs every 4 hours as needed for shortness of breath / dyspnea or wheezing    Routine general medical examination at a health care facility       amLODIPine 2.5 MG tablet    NORVASC    90 tablet    Take 1 tablet (2.5 mg) by mouth daily    Hypertension goal BP (blood pressure) < 140/90       aspirin 325 MG tablet      Take 325 mg by mouth daily        escitalopram 10 MG tablet    LEXAPRO    90 tablet    Take 1 tablet (10 mg) by mouth daily    Major depressive disorder, single episode, mild (H)       levofloxacin 500 MG tablet    LEVAQUIN    10 tablet    Take 1 tablet (500 mg) by mouth daily for 10 days    Pneumonia of right upper lobe due to infectious organism (H)       lisinopril-hydrochlorothiazide 20-25 MG per tablet    PRINZIDE/ZESTORETIC    90 tablet    Take 1 tablet by mouth daily    Hypertension goal BP (blood pressure) < 140/90       VITAMIN D3 PO      Take 1,000 Units by mouth daily

## 2017-11-01 NOTE — PROGRESS NOTES
SUBJECTIVE:   Lenora Hammonds is a 72 year old female who presents to clinic today for the following health issues:      ED/UC Followup:    Facility:  [facility]   Date of visit:10/23/17  Reason for visit: pneumonia   Current Status: feeling better but concerned about increased nausea and stomach pain. Has not had a bowel movement in 4 days.      HPI: Patient presents to the clinic for a pneumonia follow-up.  Lenora was diagnosed with a Right lung pneumonia and she was prescribed levaquin. She took her medications as prescribed. She finished her course of levaquin today and reports she is feeling much better.  She is still feeling some tightness in her right lower chest without SOB.  She denies fevers, chills, nausea, vomiting, or diarrhea.  Her appetite is improving and she is drinking plenty of fluids.  She has been drinking Ensure shakes with her decreased appetite.  She is experiencing some epigastric pain since starting the Levoquin that is relieved with eating.  She also states she has not been able to have a BM in 4 days.  Before the pneumonia, she reports daily BMs and no history of constipation.  She started Miralax this morning. Her energy level is still low.       Problem list and histories reviewed & adjusted, as indicated.  Additional history: as documented    Patient Active Problem List   Diagnosis     Tremor     CARDIOVASCULAR SCREENING; LDL GOAL LESS THAN 160     Mild major depression (H)     Dyspepsia     Wears glasses     Urinary incontinence     Snores     Histoplasmosis     Advanced directives, counseling/discussion     Neoplasm of uncertain behavior of skin     Epigastric pain     Cervical pain     Benign neoplasm of cranial nerve (H)     Acoustic neuroma (H)     Hypertension goal BP (blood pressure) < 140/90     Acute bilateral low back pain without sciatica     Osteopenia     Past Surgical History:   Procedure Laterality Date     C THORACOTOMY,MAJOR,EXPLOR/BIOPSY  1983     histoplasmosis, right lower lobe     TONSILLECTOMY         Social History   Substance Use Topics     Smoking status: Former Smoker     Packs/day: 0.30     Years: 50.00     Types: Cigarettes     Smokeless tobacco: Never Used      Comment: quit 2014     Alcohol use Yes      Comment: occasionally      Family History   Problem Relation Age of Onset     Hypertension Mother       at age 84     Neurologic Disorder Mother      ?parkinsonism - she had a tremor; walked normally. voice was soft     CANCER Father      brain tumor;  at age 62 y rs     CANCER Sister      breast cancer     CANCER Brother      bladder cancer;          Current Outpatient Prescriptions   Medication Sig Dispense Refill     omeprazole (PRILOSEC) 20 MG CR capsule Take 1 capsule (20 mg) by mouth daily 14 capsule 0     Probiotic Product (PROBIOTIC FORMULA) CAPS Take 1 capsule by mouth daily . Floragen 3 30 capsule 0     levofloxacin (LEVAQUIN) 500 MG tablet Take 1 tablet (500 mg) by mouth daily for 10 days 10 tablet 0     lisinopril-hydrochlorothiazide (PRINZIDE/ZESTORETIC) 20-25 MG per tablet Take 1 tablet by mouth daily 90 tablet 3     amLODIPine (NORVASC) 2.5 MG tablet Take 1 tablet (2.5 mg) by mouth daily 90 tablet 3     escitalopram (LEXAPRO) 10 MG tablet Take 1 tablet (10 mg) by mouth daily 90 tablet 3     albuterol (PROAIR HFA/PROVENTIL HFA/VENTOLIN HFA) 108 (90 BASE) MCG/ACT Inhaler Inhale 2 puffs into the lungs every 4 hours as needed for shortness of breath / dyspnea or wheezing 1 Inhaler 5     Cholecalciferol (VITAMIN D3 PO) Take 1,000 Units by mouth daily       aspirin 325 MG tablet Take 325 mg by mouth daily           Reviewed and updated as needed this visit by clinical staff       Reviewed and updated as needed this visit by Provider         ROS:  Constitutional, HEENT, cardiovascular, pulmonary, gi and gu systems are negative, except as otherwise noted.      OBJECTIVE:   BP 97/55  Pulse 104  Temp 98.6  F (37  C)  "(Oral)  Resp 20  Ht 5' 4\" (1.626 m)  Wt 130 lb (59 kg)  SpO2 95%  BMI 22.31 kg/m2  Body mass index is 22.31 kg/(m^2).  GENERAL: healthy, alert and no distress  EYES: Eyes grossly normal to inspection, PERRL and conjunctivae and sclerae normal  HENT: ear canals and TM's normal, nose and mouth without ulcers or lesions  NECK: no adenopathy, no asymmetry, masses, or scars and thyroid normal to palpation  RESP: lungs clear to auscultation - no rales, rhonchi or wheezes  CV: regular rate and rhythm, normal S1 S2  ABDOMEN: soft, nontender, no hepatosplenomegaly, no masses and bowel sounds normal  Skin: warm and dry.  Psyche: in good spirits.      ASSESSMENT/PLAN:       1. Pneumonia of right upper lobe due to infectious organism (H)  Patient reports improvement and her assessment today is normal.  She should continue pushing fluids and increase oral intake as tolerated.  She can also continue the ensure shakes until her appetite is back to normal. She is to anticipate slow, steady improvement of her energy level. If her symptoms worsen again in any way, she is to return for a recheck. Otherwise she is to do a follow-up X-ray in 2 weeks to ensure pneumonia resolution.  She agrees.  - XR Chest 2 Views; Future    2. Constipation, unspecified constipation type  Patient is experiencing constipation and GI upset most likely as a result of the Levaquin.  She also had a decreased appetite and has not been ambulating with her illness.  Patient should continue hydration and miralax.  We also recommended a 2 week course of Prilosec to help reduce stomach acid, as well as the Probiotic Capsules (floragen 3 one per day), and yogurt.  If her symptoms persist, she should contact us an we will work her epigastric pain up further.  - omeprazole (PRILOSEC) 20 MG CR capsule; Take 1 capsule (20 mg) by mouth daily  Dispense: 14 capsule; Refill: 0  - Probiotic Product (PROBIOTIC FORMULA) CAPS; Take 1 capsule by mouth daily . Floragen 3  " Dispense: 30 capsule; Refill: 0    This note was scribed by Kandi Frausto RN, Student NP    In supervising the nurse practitioner student, I have reviewed the ROS and PSFH documented by the student.  I performed the pertinent history, exam and assessment and plan components as documented above.   STACY Forman [facility]

## 2017-11-01 NOTE — PATIENT INSTRUCTIONS
Use Probiotics (Floragen 3) and yogurt as needed to promote bowel movements.  Continue Miralax once a day as needed.  20mg Prilosec x 2 weeks.  You may use Tums in addition to the prilosec for additional stomach upset.    Return for follow-up chest x-ray in 2 weeks.    Constipation (Adult)  Constipation means that you have bowel movements that are less frequent than usual. Stools often become very hard and difficult to pass.  Constipation is very common. At some point in life it affects almost everyone. Since everyone's bowel habits are different, what is constipation to one person may not be to another. Your healthcare provider may do tests to diagnose constipation. It depends on what he or she finds when evaluating you.    Symptoms of constipation include:    Abdominal pain    Bloating    Vomiting    Painful bowel movements    Itching, swelling, bleeding, or pain around the anus  Causes  Constipation can have many causes. These include:    Diet low in fiber    Too much dairy    Not drinking enough liquids    Lack of exercise or physical activity. This is especially true for older adults.    Changes in lifestyle or daily routine, including pregnancy, aging, work, and travel    Frequent use or misuse of laxatives    Ignoring the urge to have a bowel movement or delaying it until later    Medicines, such as certain prescription pain medicines, iron supplements, antacids, certain antidepressants, and calcium supplements    Diseases like irritable bowel syndrome, bowel obstructions, stroke, diabetes, thyroid disease, Parkinson disease, hemorrhoids, and colon cancer  Complications  Potential complications of constipation can include:    Hemorrhoids    Rectal bleeding from hemorrhoids or anal fissures (skin tears)    Hernias    Dependency on laxatives    Chronic constipation    Fecal impaction    Bowel obstruction or perforation  Home care  All treatment should be done after talking with your healthcare provider. This is  especially true if you have another medical problems, are taking prescription medicines, or are an older adult. Treatment most often involves lifestyle changes. You may also need medicines. Your healthcare provider will tell you which will work best for you. Follow the advice below to help avoid this problem in the future.  Lifestyle changes  These lifestyle changes can help prevent constipation:    Diet. Eat a high-fiber diet, with fresh fruit and vegetables, and reduce dairy intake, meats, and processed foods    Fluids. It's important to get enough fluids each day. Drink plenty of water when you eat more fiber. If you are on diet that limits the amount of fluid you can have, talk about this with your healthcare provider.    Regular exercise. Check with your healthcare provider first.  Medications  Take any medicines as directed. Some laxatives are safe to use only every now and then. Others can be taken on a regular basis. Talk with your doctor or pharmacist if you have questions.  Prescription pain medicines can cause constipation. If you are taking this kind of medicine, ask your healthcare provider if you should also take a stool softener.  Medicines you may take to treat constipation include:    Fiber supplements    Stool softeners    Laxatives    Enemas    Rectal suppositories  Follow-up care  Follow up with your healthcare provider if symptoms don't get better in the next few days. You may need to have more tests or see a specialist.  Call 911  Call 911 if any of these occur:    Trouble breathing    Stiff, rigid abdomen that is severely painful to touch    Confusion    Fainting or loss of consciousness    Rapid heart rate    Chest pain  When to seek medical advice  Call your healthcare provider right away if any of these occur:    Fever over 100.4 F (38 C)    Failure to resume normal bowel movements    Pain in your abdomen or back gets worse    Nausea or vomiting    Swelling in your abdomen    Blood in the  stool    Black, tarry stool    Involuntary weight loss    Weakness  Date Last Reviewed: 12/30/2015 2000-2017 [employer]. [address] [place] [address], [place] [address]. All rights reserved. This information is not intended as a substitute for professional medical care. Always follow your healthcare professional's instructions.

## 2017-11-01 NOTE — NURSING NOTE
"Chief Complaint   Patient presents with     RECHECK     urgent care follow up for pneumonia      Abdominal Pain       Initial BP 97/55  Pulse 104  Temp 98.6  F (37  C) (Oral)  Resp 20  Ht 5' 4\" (1.626 m)  Wt 130 lb (59 kg)  SpO2 95%  BMI 22.31 kg/m2 Estimated body mass index is 22.31 kg/(m^2) as calculated from the following:    Height as of this encounter: 5' 4\" (1.626 m).    Weight as of this encounter: 130 lb (59 kg).  Medication Reconciliation: complete       Jermaine Blanton MA         "

## 2017-11-06 ENCOUNTER — RADIANT APPOINTMENT (OUTPATIENT)
Dept: MAMMOGRAPHY | Facility: CLINIC | Age: 72
End: 2017-11-06
Attending: NURSE PRACTITIONER
Payer: MEDICARE

## 2017-11-06 DIAGNOSIS — Z12.31 VISIT FOR SCREENING MAMMOGRAM: ICD-10-CM

## 2017-11-06 PROCEDURE — G0202 SCR MAMMO BI INCL CAD: HCPCS

## 2017-11-21 ENCOUNTER — RADIANT APPOINTMENT (OUTPATIENT)
Dept: GENERAL RADIOLOGY | Facility: CLINIC | Age: 72
End: 2017-11-21
Attending: NURSE PRACTITIONER
Payer: COMMERCIAL

## 2017-11-21 PROCEDURE — 71020 XR CHEST 2 VW: CPT

## 2017-11-22 ENCOUNTER — TELEPHONE (OUTPATIENT)
Dept: FAMILY MEDICINE | Facility: CLINIC | Age: 72
End: 2017-11-22

## 2017-11-22 DIAGNOSIS — R91.1 PULMONARY NODULE, RIGHT: ICD-10-CM

## 2017-11-22 DIAGNOSIS — J18.9 PNEUMONIA OF RIGHT UPPER LOBE DUE TO INFECTIOUS ORGANISM: Primary | ICD-10-CM

## 2017-11-22 NOTE — TELEPHONE ENCOUNTER
"I placed a telephone call to the patient.  I left a voicemail asking her to call back regarding her last chest xray results.    Per the xray, her results are not totally clear. She also has a small \"stable\" nodularity (spot) in her right lower lung.  I would recommend that she have another chest xray any time now to make sure the pneumonia has cleared.  I have placed the order as a future chest xray.   She can do the xray and leave.  If she feels like she would need to see a provider for a re-evaluation as well, the xray can be done with that appointment.    "

## 2017-11-24 NOTE — TELEPHONE ENCOUNTER
Triage spoke with patient and conveyed message.  She has set up a CXR repeat on 11/28/17.  She states she is feeling better.   Informed of the details in message below from provider.  Nilda Calderon RN

## 2017-11-28 DIAGNOSIS — J18.9 PNEUMONIA OF RIGHT UPPER LOBE DUE TO INFECTIOUS ORGANISM: ICD-10-CM

## 2017-11-28 PROCEDURE — 71020 XR CHEST 2 VW: CPT

## 2017-12-04 ENCOUNTER — APPOINTMENT (OUTPATIENT)
Dept: CT IMAGING | Facility: CLINIC | Age: 72
End: 2017-12-04
Attending: EMERGENCY MEDICINE
Payer: MEDICARE

## 2017-12-04 ENCOUNTER — HOSPITAL ENCOUNTER (EMERGENCY)
Facility: CLINIC | Age: 72
Discharge: ANOTHER HOSPITAL | End: 2017-12-05
Attending: EMERGENCY MEDICINE | Admitting: EMERGENCY MEDICINE
Payer: MEDICARE

## 2017-12-04 DIAGNOSIS — K52.9 COLITIS: ICD-10-CM

## 2017-12-04 LAB
ALBUMIN SERPL-MCNC: 3.5 G/DL (ref 3.4–5)
ALP SERPL-CCNC: 66 U/L (ref 40–150)
ALT SERPL W P-5'-P-CCNC: 19 U/L (ref 0–50)
ANION GAP SERPL CALCULATED.3IONS-SCNC: 10 MMOL/L (ref 3–14)
AST SERPL W P-5'-P-CCNC: 20 U/L (ref 0–45)
BASOPHILS # BLD AUTO: 0 10E9/L (ref 0–0.2)
BASOPHILS NFR BLD AUTO: 0.1 %
BILIRUB SERPL-MCNC: 1.3 MG/DL (ref 0.2–1.3)
BUN SERPL-MCNC: 43 MG/DL (ref 7–30)
CALCIUM SERPL-MCNC: 9.6 MG/DL (ref 8.5–10.1)
CHLORIDE SERPL-SCNC: 100 MMOL/L (ref 94–109)
CO2 SERPL-SCNC: 28 MMOL/L (ref 20–32)
CREAT SERPL-MCNC: 1.01 MG/DL (ref 0.52–1.04)
DIFFERENTIAL METHOD BLD: ABNORMAL
EOSINOPHIL # BLD AUTO: 0 10E9/L (ref 0–0.7)
EOSINOPHIL NFR BLD AUTO: 0 %
ERYTHROCYTE [DISTWIDTH] IN BLOOD BY AUTOMATED COUNT: 13.4 % (ref 10–15)
GFR SERPL CREATININE-BSD FRML MDRD: 54 ML/MIN/1.7M2
GLUCOSE SERPL-MCNC: 149 MG/DL (ref 70–99)
HCT VFR BLD AUTO: 40.9 % (ref 35–47)
HGB BLD-MCNC: 13.8 G/DL (ref 11.7–15.7)
IMM GRANULOCYTES # BLD: 0.1 10E9/L (ref 0–0.4)
IMM GRANULOCYTES NFR BLD: 0.3 %
LACTATE BLD-SCNC: 1.7 MMOL/L (ref 0.7–2)
LIPASE SERPL-CCNC: 113 U/L (ref 73–393)
LYMPHOCYTES # BLD AUTO: 1.4 10E9/L (ref 0.8–5.3)
LYMPHOCYTES NFR BLD AUTO: 6 %
MCH RBC QN AUTO: 31.2 PG (ref 26.5–33)
MCHC RBC AUTO-ENTMCNC: 33.7 G/DL (ref 31.5–36.5)
MCV RBC AUTO: 93 FL (ref 78–100)
MONOCYTES # BLD AUTO: 1.2 10E9/L (ref 0–1.3)
MONOCYTES NFR BLD AUTO: 5.1 %
NEUTROPHILS # BLD AUTO: 20.2 10E9/L (ref 1.6–8.3)
NEUTROPHILS NFR BLD AUTO: 88.5 %
NRBC # BLD AUTO: 0 10*3/UL
NRBC BLD AUTO-RTO: 0 /100
PLATELET # BLD AUTO: 223 10E9/L (ref 150–450)
POTASSIUM SERPL-SCNC: 3.7 MMOL/L (ref 3.4–5.3)
PROT SERPL-MCNC: 7.7 G/DL (ref 6.8–8.8)
RBC # BLD AUTO: 4.42 10E12/L (ref 3.8–5.2)
SODIUM SERPL-SCNC: 138 MMOL/L (ref 133–144)
WBC # BLD AUTO: 22.8 10E9/L (ref 4–11)

## 2017-12-04 PROCEDURE — 96368 THER/DIAG CONCURRENT INF: CPT | Mod: 59 | Performed by: EMERGENCY MEDICINE

## 2017-12-04 PROCEDURE — 96365 THER/PROPH/DIAG IV INF INIT: CPT | Mod: 59 | Performed by: EMERGENCY MEDICINE

## 2017-12-04 PROCEDURE — 74177 CT ABD & PELVIS W/CONTRAST: CPT

## 2017-12-04 PROCEDURE — 25000128 H RX IP 250 OP 636: Performed by: EMERGENCY MEDICINE

## 2017-12-04 PROCEDURE — 83690 ASSAY OF LIPASE: CPT | Performed by: EMERGENCY MEDICINE

## 2017-12-04 PROCEDURE — 99285 EMERGENCY DEPT VISIT HI MDM: CPT | Mod: Z6 | Performed by: EMERGENCY MEDICINE

## 2017-12-04 PROCEDURE — 83605 ASSAY OF LACTIC ACID: CPT | Performed by: EMERGENCY MEDICINE

## 2017-12-04 PROCEDURE — 25000125 ZZHC RX 250: Performed by: EMERGENCY MEDICINE

## 2017-12-04 PROCEDURE — 80053 COMPREHEN METABOLIC PANEL: CPT | Performed by: EMERGENCY MEDICINE

## 2017-12-04 PROCEDURE — 36415 COLL VENOUS BLD VENIPUNCTURE: CPT | Performed by: EMERGENCY MEDICINE

## 2017-12-04 PROCEDURE — 85025 COMPLETE CBC W/AUTO DIFF WBC: CPT | Performed by: EMERGENCY MEDICINE

## 2017-12-04 PROCEDURE — 99285 EMERGENCY DEPT VISIT HI MDM: CPT | Mod: 25 | Performed by: EMERGENCY MEDICINE

## 2017-12-04 PROCEDURE — 96366 THER/PROPH/DIAG IV INF ADDON: CPT | Mod: 59 | Performed by: EMERGENCY MEDICINE

## 2017-12-04 RX ORDER — IOPAMIDOL 755 MG/ML
100 INJECTION, SOLUTION INTRAVASCULAR ONCE
Status: COMPLETED | OUTPATIENT
Start: 2017-12-04 | End: 2017-12-04

## 2017-12-04 RX ORDER — CIPROFLOXACIN 2 MG/ML
400 INJECTION, SOLUTION INTRAVENOUS ONCE
Status: COMPLETED | OUTPATIENT
Start: 2017-12-04 | End: 2017-12-05

## 2017-12-04 RX ADMIN — SODIUM CHLORIDE 1000 ML: 9 INJECTION, SOLUTION INTRAVENOUS at 20:45

## 2017-12-04 RX ADMIN — CIPROFLOXACIN 400 MG: 2 INJECTION, SOLUTION INTRAVENOUS at 22:36

## 2017-12-04 RX ADMIN — SODIUM CHLORIDE 56 ML: 9 INJECTION, SOLUTION INTRAVENOUS at 21:16

## 2017-12-04 RX ADMIN — IOPAMIDOL 66 ML: 755 INJECTION, SOLUTION INTRAVENOUS at 21:15

## 2017-12-04 RX ADMIN — METRONIDAZOLE 500 MG: 500 INJECTION, SOLUTION INTRAVENOUS at 21:29

## 2017-12-04 ASSESSMENT — ENCOUNTER SYMPTOMS
ABDOMINAL PAIN: 1
DIARRHEA: 1
PALPITATIONS: 0
BLOOD IN STOOL: 1
FREQUENCY: 0
HEMATURIA: 0
VOMITING: 1
CHILLS: 0
FEVER: 0
APPETITE CHANGE: 1
DYSURIA: 0
NAUSEA: 1

## 2017-12-04 NOTE — Clinical Note
Bed Type: Adult Med/Surg [46]   Bed request comments: [facility] requested due to possible need for GI consult for colonoscopy

## 2017-12-05 ENCOUNTER — HOSPITAL ENCOUNTER (INPATIENT)
Facility: CLINIC | Age: 72
LOS: 5 days | Discharge: HOME | DRG: 394 | End: 2017-12-10
Attending: INTERNAL MEDICINE | Admitting: INTERNAL MEDICINE
Payer: MEDICARE

## 2017-12-05 VITALS
SYSTOLIC BLOOD PRESSURE: 132 MMHG | OXYGEN SATURATION: 94 % | RESPIRATION RATE: 16 BRPM | TEMPERATURE: 98.4 F | WEIGHT: 134 LBS | HEART RATE: 89 BPM | DIASTOLIC BLOOD PRESSURE: 65 MMHG | BODY MASS INDEX: 23 KG/M2

## 2017-12-05 DIAGNOSIS — K51.00 PANCOLITIS (H): Primary | ICD-10-CM

## 2017-12-05 LAB
ABO + RH BLD: NORMAL
ABO + RH BLD: NORMAL
ALBUMIN UR-MCNC: 10 MG/DL
APPEARANCE UR: CLEAR
APTT PPP: 31 SEC (ref 22–37)
BASOPHILS # BLD AUTO: 0 10E9/L (ref 0–0.2)
BASOPHILS NFR BLD AUTO: 0.1 %
BILIRUB UR QL STRIP: NEGATIVE
BLD GP AB SCN SERPL QL: NORMAL
BLOOD BANK CMNT PATIENT-IMP: NORMAL
COLOR UR AUTO: YELLOW
DIFFERENTIAL METHOD BLD: ABNORMAL
EOSINOPHIL # BLD AUTO: 0 10E9/L (ref 0–0.7)
EOSINOPHIL NFR BLD AUTO: 0 %
ERYTHROCYTE [DISTWIDTH] IN BLOOD BY AUTOMATED COUNT: 13.7 % (ref 10–15)
GLUCOSE UR STRIP-MCNC: NEGATIVE MG/DL
HCT VFR BLD AUTO: 34.2 % (ref 35–47)
HGB BLD-MCNC: 11 G/DL (ref 11.7–15.7)
HGB BLD-MCNC: 11.2 G/DL (ref 11.7–15.7)
HGB BLD-MCNC: 11.8 G/DL (ref 11.7–15.7)
HGB UR QL STRIP: ABNORMAL
HYALINE CASTS #/AREA URNS LPF: 1 /LPF (ref 0–2)
IMM GRANULOCYTES # BLD: 0.1 10E9/L (ref 0–0.4)
IMM GRANULOCYTES NFR BLD: 0.4 %
INR PPP: 1.2 (ref 0.86–1.14)
KETONES UR STRIP-MCNC: 10 MG/DL
LEUKOCYTE ESTERASE UR QL STRIP: NEGATIVE
LYMPHOCYTES # BLD AUTO: 1.4 10E9/L (ref 0.8–5.3)
LYMPHOCYTES NFR BLD AUTO: 7 %
MCH RBC QN AUTO: 30.3 PG (ref 26.5–33)
MCHC RBC AUTO-ENTMCNC: 32.7 G/DL (ref 31.5–36.5)
MCV RBC AUTO: 92 FL (ref 78–100)
MONOCYTES # BLD AUTO: 1.1 10E9/L (ref 0–1.3)
MONOCYTES NFR BLD AUTO: 5.3 %
MUCOUS THREADS #/AREA URNS LPF: PRESENT /LPF
NEUTROPHILS # BLD AUTO: 17.6 10E9/L (ref 1.6–8.3)
NEUTROPHILS NFR BLD AUTO: 87.2 %
NITRATE UR QL: NEGATIVE
NRBC # BLD AUTO: 0 10*3/UL
NRBC BLD AUTO-RTO: 0 /100
PH UR STRIP: 5.5 PH (ref 5–7)
PLATELET # BLD AUTO: 175 10E9/L (ref 150–450)
RBC # BLD AUTO: 3.7 10E12/L (ref 3.8–5.2)
RBC #/AREA URNS AUTO: 1 /HPF (ref 0–2)
SOURCE: ABNORMAL
SP GR UR STRIP: 1.03 (ref 1–1.03)
SPECIMEN EXP DATE BLD: NORMAL
UROBILINOGEN UR STRIP-MCNC: NORMAL MG/DL (ref 0–2)
WBC # BLD AUTO: 20.2 10E9/L (ref 4–11)
WBC #/AREA URNS AUTO: 1 /HPF (ref 0–2)

## 2017-12-05 PROCEDURE — A9270 NON-COVERED ITEM OR SERVICE: HCPCS | Mod: GY | Performed by: INTERNAL MEDICINE

## 2017-12-05 PROCEDURE — 25000128 H RX IP 250 OP 636: Performed by: EMERGENCY MEDICINE

## 2017-12-05 PROCEDURE — 85025 COMPLETE CBC W/AUTO DIFF WBC: CPT | Performed by: EMERGENCY MEDICINE

## 2017-12-05 PROCEDURE — 87493 C DIFF AMPLIFIED PROBE: CPT | Performed by: PHYSICIAN ASSISTANT

## 2017-12-05 PROCEDURE — 25000128 H RX IP 250 OP 636: Performed by: PHYSICIAN ASSISTANT

## 2017-12-05 PROCEDURE — 86850 RBC ANTIBODY SCREEN: CPT | Performed by: PHYSICIAN ASSISTANT

## 2017-12-05 PROCEDURE — A9270 NON-COVERED ITEM OR SERVICE: HCPCS | Mod: GY | Performed by: EMERGENCY MEDICINE

## 2017-12-05 PROCEDURE — 87506 IADNA-DNA/RNA PROBE TQ 6-11: CPT | Performed by: PHYSICIAN ASSISTANT

## 2017-12-05 PROCEDURE — 25000125 ZZHC RX 250: Performed by: PHYSICIAN ASSISTANT

## 2017-12-05 PROCEDURE — 25000132 ZZH RX MED GY IP 250 OP 250 PS 637: Mod: GY | Performed by: INTERNAL MEDICINE

## 2017-12-05 PROCEDURE — 86901 BLOOD TYPING SEROLOGIC RH(D): CPT | Performed by: PHYSICIAN ASSISTANT

## 2017-12-05 PROCEDURE — S0028 INJECTION, FAMOTIDINE, 20 MG: HCPCS | Performed by: PHYSICIAN ASSISTANT

## 2017-12-05 PROCEDURE — 99207 ZZC CDG-MDM COMPONENT: MEETS LOW - DOWN CODED: CPT | Performed by: INTERNAL MEDICINE

## 2017-12-05 PROCEDURE — 96361 HYDRATE IV INFUSION ADD-ON: CPT | Performed by: EMERGENCY MEDICINE

## 2017-12-05 PROCEDURE — 99222 1ST HOSP IP/OBS MODERATE 55: CPT | Mod: AI | Performed by: INTERNAL MEDICINE

## 2017-12-05 PROCEDURE — 85018 HEMOGLOBIN: CPT | Performed by: PHYSICIAN ASSISTANT

## 2017-12-05 PROCEDURE — 12000000 ZZH R&B MED SURG/OB

## 2017-12-05 PROCEDURE — 25000125 ZZHC RX 250: Performed by: EMERGENCY MEDICINE

## 2017-12-05 PROCEDURE — 85730 THROMBOPLASTIN TIME PARTIAL: CPT | Performed by: PHYSICIAN ASSISTANT

## 2017-12-05 PROCEDURE — 85610 PROTHROMBIN TIME: CPT | Performed by: PHYSICIAN ASSISTANT

## 2017-12-05 PROCEDURE — 36415 COLL VENOUS BLD VENIPUNCTURE: CPT | Performed by: PHYSICIAN ASSISTANT

## 2017-12-05 PROCEDURE — 25000132 ZZH RX MED GY IP 250 OP 250 PS 637: Mod: GY | Performed by: EMERGENCY MEDICINE

## 2017-12-05 PROCEDURE — 81001 URINALYSIS AUTO W/SCOPE: CPT | Performed by: EMERGENCY MEDICINE

## 2017-12-05 PROCEDURE — 86900 BLOOD TYPING SEROLOGIC ABO: CPT | Performed by: PHYSICIAN ASSISTANT

## 2017-12-05 PROCEDURE — 25000128 H RX IP 250 OP 636

## 2017-12-05 RX ORDER — SODIUM CHLORIDE 9 MG/ML
INJECTION, SOLUTION INTRAVENOUS CONTINUOUS
Status: DISCONTINUED | OUTPATIENT
Start: 2017-12-05 | End: 2017-12-05 | Stop reason: HOSPADM

## 2017-12-05 RX ORDER — LIDOCAINE 40 MG/G
CREAM TOPICAL
Status: DISCONTINUED | OUTPATIENT
Start: 2017-12-05 | End: 2017-12-06 | Stop reason: HOSPADM

## 2017-12-05 RX ORDER — PROCHLORPERAZINE MALEATE 5 MG
5 TABLET ORAL EVERY 6 HOURS PRN
Status: DISCONTINUED | OUTPATIENT
Start: 2017-12-05 | End: 2017-12-10 | Stop reason: HOSPADM

## 2017-12-05 RX ORDER — NALOXONE HYDROCHLORIDE 0.4 MG/ML
.1-.4 INJECTION, SOLUTION INTRAMUSCULAR; INTRAVENOUS; SUBCUTANEOUS
Status: DISCONTINUED | OUTPATIENT
Start: 2017-12-05 | End: 2017-12-10 | Stop reason: HOSPADM

## 2017-12-05 RX ORDER — ACETAMINOPHEN 650 MG/1
650 SUPPOSITORY RECTAL EVERY 4 HOURS PRN
Status: DISCONTINUED | OUTPATIENT
Start: 2017-12-05 | End: 2017-12-10 | Stop reason: HOSPADM

## 2017-12-05 RX ORDER — LIDOCAINE 40 MG/G
CREAM TOPICAL
Status: DISCONTINUED | OUTPATIENT
Start: 2017-12-05 | End: 2017-12-10 | Stop reason: HOSPADM

## 2017-12-05 RX ORDER — ACETAMINOPHEN 325 MG/1
650 TABLET ORAL EVERY 4 HOURS PRN
Status: DISCONTINUED | OUTPATIENT
Start: 2017-12-05 | End: 2017-12-10 | Stop reason: HOSPADM

## 2017-12-05 RX ORDER — AMLODIPINE BESYLATE 2.5 MG/1
2.5 TABLET ORAL DAILY
Status: DISCONTINUED | OUTPATIENT
Start: 2017-12-05 | End: 2017-12-05 | Stop reason: HOSPADM

## 2017-12-05 RX ORDER — LISINOPRIL AND HYDROCHLOROTHIAZIDE 20; 25 MG/1; MG/1
1 TABLET ORAL DAILY
Status: DISCONTINUED | OUTPATIENT
Start: 2017-12-05 | End: 2017-12-05 | Stop reason: HOSPADM

## 2017-12-05 RX ORDER — POTASSIUM CHLORIDE 1500 MG/1
20-40 TABLET, EXTENDED RELEASE ORAL
Status: DISCONTINUED | OUTPATIENT
Start: 2017-12-05 | End: 2017-12-10 | Stop reason: HOSPADM

## 2017-12-05 RX ORDER — ONDANSETRON 4 MG/1
4 TABLET, ORALLY DISINTEGRATING ORAL EVERY 6 HOURS PRN
Status: DISCONTINUED | OUTPATIENT
Start: 2017-12-05 | End: 2017-12-10 | Stop reason: HOSPADM

## 2017-12-05 RX ORDER — POTASSIUM CHLORIDE 7.45 MG/ML
10 INJECTION INTRAVENOUS
Status: DISCONTINUED | OUTPATIENT
Start: 2017-12-05 | End: 2017-12-10 | Stop reason: HOSPADM

## 2017-12-05 RX ORDER — SODIUM CHLORIDE 9 MG/ML
INJECTION, SOLUTION INTRAVENOUS
Status: COMPLETED
Start: 2017-12-05 | End: 2017-12-05

## 2017-12-05 RX ORDER — POTASSIUM CL/LIDO/0.9 % NACL 10MEQ/0.1L
10 INTRAVENOUS SOLUTION, PIGGYBACK (ML) INTRAVENOUS
Status: DISCONTINUED | OUTPATIENT
Start: 2017-12-05 | End: 2017-12-10 | Stop reason: HOSPADM

## 2017-12-05 RX ORDER — POTASSIUM CHLORIDE 1.5 G/1.58G
20-40 POWDER, FOR SOLUTION ORAL
Status: DISCONTINUED | OUTPATIENT
Start: 2017-12-05 | End: 2017-12-10 | Stop reason: HOSPADM

## 2017-12-05 RX ORDER — PROCHLORPERAZINE 25 MG
12.5 SUPPOSITORY, RECTAL RECTAL EVERY 12 HOURS PRN
Status: DISCONTINUED | OUTPATIENT
Start: 2017-12-05 | End: 2017-12-10 | Stop reason: HOSPADM

## 2017-12-05 RX ORDER — ESCITALOPRAM OXALATE 10 MG/1
10 TABLET ORAL DAILY
Status: DISCONTINUED | OUTPATIENT
Start: 2017-12-05 | End: 2017-12-05 | Stop reason: HOSPADM

## 2017-12-05 RX ORDER — ONDANSETRON 2 MG/ML
4 INJECTION INTRAMUSCULAR; INTRAVENOUS EVERY 6 HOURS PRN
Status: DISCONTINUED | OUTPATIENT
Start: 2017-12-05 | End: 2017-12-10 | Stop reason: HOSPADM

## 2017-12-05 RX ORDER — HYDROMORPHONE HYDROCHLORIDE 1 MG/ML
0.2 INJECTION, SOLUTION INTRAMUSCULAR; INTRAVENOUS; SUBCUTANEOUS
Status: DISCONTINUED | OUTPATIENT
Start: 2017-12-05 | End: 2017-12-10 | Stop reason: HOSPADM

## 2017-12-05 RX ORDER — POTASSIUM CHLORIDE 29.8 MG/ML
20 INJECTION INTRAVENOUS
Status: DISCONTINUED | OUTPATIENT
Start: 2017-12-05 | End: 2017-12-05 | Stop reason: RX

## 2017-12-05 RX ADMIN — SODIUM CHLORIDE: 9 INJECTION, SOLUTION INTRAVENOUS at 00:18

## 2017-12-05 RX ADMIN — POLYETHYLENE GLYCOL-3350 AND ELECTROLYTES 2000 ML: 236; 6.74; 5.86; 2.97; 22.74 POWDER, FOR SOLUTION ORAL at 17:56

## 2017-12-05 RX ADMIN — SODIUM CHLORIDE, POTASSIUM CHLORIDE, SODIUM LACTATE AND CALCIUM CHLORIDE 500 ML: 600; 310; 30; 20 INJECTION, SOLUTION INTRAVENOUS at 08:52

## 2017-12-05 RX ADMIN — DEXTROSE AND SODIUM CHLORIDE: 5; 900 INJECTION, SOLUTION INTRAVENOUS at 21:28

## 2017-12-05 RX ADMIN — METRONIDAZOLE 500 MG: 500 INJECTION, SOLUTION INTRAVENOUS at 08:56

## 2017-12-05 RX ADMIN — LISINOPRIL AND HYDROCHLOROTHIAZIDE 1 TABLET: 25; 20 TABLET ORAL at 07:46

## 2017-12-05 RX ADMIN — AMLODIPINE BESYLATE 2.5 MG: 2.5 TABLET ORAL at 07:47

## 2017-12-05 RX ADMIN — FAMOTIDINE 20 MG: 10 INJECTION, SOLUTION INTRAVENOUS at 16:23

## 2017-12-05 RX ADMIN — DEXTROSE AND SODIUM CHLORIDE: 5; 900 INJECTION, SOLUTION INTRAVENOUS at 11:49

## 2017-12-05 RX ADMIN — SODIUM CHLORIDE: 9 INJECTION, SOLUTION INTRAVENOUS at 05:25

## 2017-12-05 RX ADMIN — ESCITALOPRAM OXALATE 10 MG: 10 TABLET, FILM COATED ORAL at 07:46

## 2017-12-05 ASSESSMENT — ACTIVITIES OF DAILY LIVING (ADL)
BATHING: 0-->INDEPENDENT
RETIRED_COMMUNICATION: 0-->UNDERSTANDS/COMMUNICATES WITHOUT DIFFICULTY
TOILETING: 2 - ASSISTIVE PERSON
TOILETING: 0-->INDEPENDENT
SWALLOWING: 0 - SWALLOWS FOODS/LIQUIDS WITHOUT DIFFICULTY
FALL_HISTORY_WITHIN_LAST_SIX_MONTHS: NO
BATHING: 2 - ASSISTIVE PERSON
SWALLOWING: 0-->SWALLOWS FOODS/LIQUIDS WITHOUT DIFFICULTY
AMBULATION: 0-->INDEPENDENT
EATING: 0 - INDEPENDENT
TRANSFERRING: 2 - ASSISTIVE PERSON
AMBULATION: 2 - ASSISTIVE PERSON
RETIRED_EATING: 0-->INDEPENDENT
COGNITION: 0 - NO COGNITION ISSUES REPORTED
TRANSFERRING: 0-->INDEPENDENT
COMMUNICATION: 0 - UNDERSTANDS/COMMUNICATES WITHOUT DIFFICULTY
DRESS: 0-->INDEPENDENT
WHICH_OF_THE_ABOVE_FUNCTIONAL_RISKS_HAD_A_RECENT_ONSET_OR_CHANGE?: AMBULATION
DRESS: 0 - INDEPENDENT

## 2017-12-05 NOTE — CONSULTS
[facility]  Gastroenterology Consultation         Lenora Hammonds  [address] [facility] [address]  72 year old female    Admission Date/Time: 12/5/2017  Primary Care Provider: Siobhan Pineda  Referring / Attending Physician:  Myles PERES    We were asked to see the patient in consultation by Dr. Myles PERES for evaluation of GI Bleed.      CC: abdominal pain, rectalbleeding    HPI:  Lenora Hammonds is a 72 year old female who with past medical history of hypertension, depression, acoustic neuroma and tremor who presented to the Emergency Department at the [facility] for evaluation of acute onset abdominal pain.  The patient reported that on Sunday evening, 12/03/2017, she and her  consumed a steak dinner with a baked potato and shortly afterward she developed symptoms consisting of nausea, vomiting and diarrhea.  The patient has experienced symptoms similar to this in the past after eating higher fat content meals and therefore did not seek care immediately.  Her symptoms persisted overnight and into the following morning when she began to develop the presence of blood in her diarrhea.  Her nausea and vomiting subsequently improved, however, she had persistent watery diarrhea with blood present and therefore, she presented to the Emergency Department for evaluation.  She was evaluated with laboratory studies including CMP, CBC, lactic acid, urinalysis which revealed leukocytosis with a white count of 22.8.  Lactic acid was notably not elevated.  Remainder of laboratory studies were unremarkable.  Given patient's age, with symptoms and elevated white count, CT abdomen and pelvis was obtained with IV contrast which revealed moderate to severe colitis involving the transverse, descending and sigmoid colon.  No identifiable abscess or free air.   Patient is feeling somewhat better. Patient has h/o diarrhea for 2-3 days prior to acute abdominal pain. No H/O  fever, chills, nausea, vomiting.         ROS: A comprehensive ten point review of systems was negative aside from those in mentioned in the HPI.      PAST MED HX:  I have reviewed this patient's medical history and updated it with pertinent information if needed.   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 Taking?   Cholecalciferol (VITAMIN D3 PO) 12/4/2017 at Unknown time  Yes Yes   Sig: Take 1,000 Units by mouth daily   Probiotic Product (PROBIOTIC FORMULA) CAPS 12/4/2017 at Unknown time  No Yes   Sig: Take 1 capsule by mouth daily . Floragen 3   albuterol (PROAIR HFA/PROVENTIL HFA/VENTOLIN HFA) 108 (90 BASE) MCG/ACT Inhaler Past Month at Unknown time  No Yes   Sig: Inhale 2 puffs into the lungs every 4 hours as needed for shortness of breath / dyspnea or wheezing   amLODIPine (NORVASC) 2.5 MG tablet 12/4/2017 at Unknown time  No Yes   Sig: Take 1 tablet (2.5 mg) by mouth daily   aspirin 325 MG tablet 12/4/2017 at Unknown time  Yes Yes   Sig: Take 325 mg by mouth daily   escitalopram (LEXAPRO) 10 MG tablet 12/4/2017 at Unknown time  No Yes   Sig: Take 1 tablet (10 mg) by mouth daily   lisinopril-hydrochlorothiazide (PRINZIDE/ZESTORETIC) 20-25 MG per tablet 12/4/2017 at Unknown time  No Yes   Sig: Take 1 tablet by mouth daily   omeprazole (PRILOSEC) 20 MG CR capsule 12/4/2017 at Unknown time  No Yes   Sig: Take 1 capsule (20 mg) by mouth daily      Facility-Administered Medications: None       ALLERGIES: No Known Allergies    SOCIAL HISTORY:  Social History   Substance Use Topics     Smoking status: Former Smoker     Packs/day: 0.30     Years: 50.00     Types: Cigarettes     Smokeless tobacco: Never Used      Comment: quit 12/2014     Alcohol use  Yes      Comment: occasionally        FAMILY HISTORY:  Family History   Problem Relation Age of Onset     Hypertension Mother       at age 84     Neurologic Disorder Mother      ?parkinsonism - she had a tremor; walked normally. voice was soft     CANCER Father      brain tumor;  at age 62 y rs     CANCER Sister      breast cancer     CANCER Brother      bladder cancer;        PHYSICAL EXAM:   General  Awake, alert, oriented  Vital Signs with Ranges  Temp: 98.6  F (37  C) Temp src: Oral BP: 136/50     Resp: 18 SpO2: 95 % O2 Device: None (Room air)         Constitutional: healthy, alert and no distress   Cardiovascular: negative, PMI normal. No lifts, heaves, or thrills. RRR. No murmurs, clicks gallops or rub  Respiratory: negative, Percussion normal. Good diaphragmatic excursion. Lungs clear  Head: Normocephalic. No masses, lesions, tenderness or abnormalities  Neck: Neck supple. No adenopathy. Thyroid symmetric, normal size,, Carotids without bruits.  Abdomen: Abdomen soft, non-tender. BS normal. No masses, organomegaly          ADDITIONAL COMMENTS:   I reviewed the patient's new clinical lab test results.   Recent Labs   Lab Test  17   1140  17   0742  17   2205   WBC   --   20.2*  22.8*   --   7.0   HGB  11.0*  11.2*  13.8   < >  15.4   MCV   --   92  93   --   94   PLT   --   175  223   --   199   INR  1.20*   --    --    --    --     < > = values in this interval not displayed.     Recent Labs   Lab Test  17   1135  02/04/15   1234   POTASSIUM  3.7  4.0  4.1   CHLORIDE  100  106  105   CO2  28  29  34*   BUN  43*  26  22   ANIONGAP  10  8  2*     Recent Labs   Lab Test  17   0519  17   1135  16   1423  10/13/14   1352  11   2205   ALBUMIN   --   3.5  3.8   --   3.9  4.5   BILITOTAL   --   1.3  0.4   --   0.5  1.0   ALT   --   19  14   --   22  13   AST   --   20  16   --   19  22   PROTEIN   10*   --    --   100*   --    --    LIPASE   --   113   --    --    --   122       I reviewed the patient's new imaging results.        CONSULTATION ASSESSMENT AND PLAN:    Active Problems:    Pancolitis (H)    Assessment: 72 year old female with H/O abdominal pain, rectal bleeding, diarrhea. Elevated WBC and pancolitis on CT scan.  D/D would include infectious vs ischemic colitis. Less likely acute IBD.      Plan:  Clear liquid diet   Stool studies.   Colonoscopy tomorrow.   Continue on supportive care and monitor labs         Moise Vásquez MD, FACP  [facility].  Office: [phone]  Cell : [phone]

## 2017-12-05 NOTE — IP AVS SNAPSHOT
[address], [address]    [facility] [address]    Phone:  [phone]                                       After Visit Summary   12/5/2017    Lenora Hammonds    MRN: [id number]           After Visit Summary Signature Page     I have received my discharge instructions, and my questions have been answered. I have discussed any challenges I see with this plan with the nurse or doctor.    ..........................................................................................................................................  Patient/Patient Representative Signature      ..........................................................................................................................................  Patient Representative Print Name and Relationship to Patient    ..................................................               ................................................  Date                                            Time    ..........................................................................................................................................  Reviewed by Signature/Title    ...................................................              ..............................................  Date                                                            Time

## 2017-12-05 NOTE — PHARMACY-ADMISSION MEDICATION HISTORY
Admission medication history interview status for the 12/5/2017  admission is complete. See EPIC admission navigator for prior to admission medications     Medication history source reliability:Good    Actions taken by pharmacist (provider contacted, etc):None     Additional medication history information not noted on PTA med list :None    Medication reconciliation/reorder completed by provider prior to medication history? No    Time spent in this activity: 15 minutes    Prior to Admission medications    Medication Sig Last Dose Taking? Auth Provider   omeprazole (PRILOSEC) 20 MG CR capsule Take 1 capsule (20 mg) by mouth daily 12/4/2017 at Unknown time Yes Siobhan Pineda APRN CNP   Probiotic Product (PROBIOTIC FORMULA) CAPS Take 1 capsule by mouth daily . Floragen 3 12/4/2017 at Unknown time Yes Siobhan Pineda APRN CNP   lisinopril-hydrochlorothiazide (PRINZIDE/ZESTORETIC) 20-25 MG per tablet Take 1 tablet by mouth daily 12/4/2017 at Unknown time Yes Siobhan Pineda APRN CNP   amLODIPine (NORVASC) 2.5 MG tablet Take 1 tablet (2.5 mg) by mouth daily 12/4/2017 at Unknown time Yes Siobhan Pineda APRN CNP   escitalopram (LEXAPRO) 10 MG tablet Take 1 tablet (10 mg) by mouth daily 12/4/2017 at Unknown time Yes Siobhan Pineda APRN CNP   albuterol (PROAIR HFA/PROVENTIL HFA/VENTOLIN HFA) 108 (90 BASE) MCG/ACT Inhaler Inhale 2 puffs into the lungs every 4 hours as needed for shortness of breath / dyspnea or wheezing Past Month at Unknown time Yes Siobhan Pineda APRN CNP   Cholecalciferol (VITAMIN D3 PO) Take 1,000 Units by mouth daily 12/4/2017 at Unknown time Yes Reported, Patient   aspirin 325 MG tablet Take 325 mg by mouth daily 12/4/2017 at Unknown time Yes Reported, Patient

## 2017-12-05 NOTE — H&P
PRIMARY CARE PROVIDER:  Siobhan Pineda      CHIEF COMPLAINT:  Abdominal pain.      HISTORY OF PRESENT ILLNESS:  Lenora Hammonds is a 72-year-old female with past medical history of hypertension, depression, acoustic neuroma and tremor who presented to the Emergency Department at the [facility] for evaluation of acute onset abdominal pain.  The patient reported that on Sunday evening, 12/03/2017, she and her  consumed a steak dinner with a baked potato and shortly afterward she developed symptoms consisting of nausea, vomiting and diarrhea.  The patient has experienced symptoms similar to this in the past after eating higher fat content meals and therefore did not seek care immediately.  Her symptoms persisted overnight and into the following morning when she began to develop the presence of blood in her diarrhea.  Her nausea and vomiting subsequently improved, however, she had persistent watery diarrhea with blood present and therefore, she presented to the Emergency Department for evaluation.      On arrival in the Emergency Department, she was seen by Dr. Banerjee.  She was evaluated with laboratory studies including CMP, CBC, lactic acid, urinalysis which revealed leukocytosis with a white count of 22.8.  Lactic acid was notably not elevated.  Remainder of laboratory studies were unremarkable.  Given patient's age, with symptoms and elevated white count, CT abdomen and pelvis was obtained with IV contrast which revealed moderate to severe colitis involving the transverse, descending and sigmoid colon.  No identifiable abscess or free air.  Given findings, patient was given Cipro and Flagyl and discussed with inpatient Hospitalist Service for admission.  Unfortunately, there were no beds available at the [facility], therefore the patient was transferred to [facility] for admission.      The patient is presently evaluated in hospital  room.  She currently reports that her abdominal pain is still present but it is rated as a 2/10 while at rest and does increase to approximately a 7-8/10 with movement and is primarily located on the left side of her abdomen.  She reports that prior to Sunday she was in her otherwise normal state of health.  She specifically did not have any fevers, chills, difficulty breathing, cough, chest pain, chest discomfort.  Prior to Sunday she was having normal bowel movements without blood present.  She denies any change in urinary symptoms.  She does report that she was recently treated early in November for pneumonia with a course of Levaquin.  She otherwise has not been on any antibiotics recently.  Her  is not ill at home.      PAST MEDICAL HISTORY:   1.  Hypertension.   2.  Depression.   3.  History of acoustic neuroma on the left.   4.  Tremor.       PAST SURGICAL HISTORY:     1.  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   2.  Tonsillectomy.      PRIOR TO ADMISSION MEDICATIONS:    Prior to Admission medications    Medication Sig Last Dose Taking? Auth Provider   omeprazole (PRILOSEC) 20 MG CR capsule Take 1 capsule (20 mg) by mouth daily 12/4/2017 at Unknown time Yes Siobhan Pineda APRN CNP   Probiotic Product (PROBIOTIC FORMULA) CAPS Take 1 capsule by mouth daily . Floragen 3 12/4/2017 at Unknown time Yes Siobhan Pineda APRN CNP   lisinopril-hydrochlorothiazide (PRINZIDE/ZESTORETIC) 20-25 MG per tablet Take 1 tablet by mouth daily 12/4/2017 at Unknown time Yes Siobhan Pineda APRN CNP   amLODIPine (NORVASC) 2.5 MG tablet Take 1 tablet (2.5 mg) by mouth daily 12/4/2017 at Unknown time Yes Siobhan Pineda APRN CNP   escitalopram (LEXAPRO) 10 MG tablet Take 1 tablet (10 mg) by mouth daily 12/4/2017 at Unknown time Yes Siobhan Pineda APRN CNP   albuterol (PROAIR HFA/PROVENTIL HFA/VENTOLIN HFA) 108 (90 BASE) MCG/ACT Inhaler Inhale  2 puffs into the lungs every 4 hours as needed for shortness of breath / dyspnea or wheezing Past Month at Unknown time Yes Siobhan Pineda APRN CNP   Cholecalciferol (VITAMIN D3 PO) Take 1,000 Units by mouth daily 12/4/2017 at Unknown time Yes Reported, Patient   aspirin 325 MG tablet Take 325 mg by mouth daily 12/4/2017 at Unknown time Yes Reported, Patient         ALLERGIES:  No known drug allergies.      FAMILY HISTORY:  Reviewed and is noncontributory to present admission.      SOCIAL HISTORY:  The patient currently lives in [place] with her .  She is a lifelong nonsmoker.  She does not consume alcohol on a regular basis.      REVIEW OF SYSTEMS:  A 10-point review of systems was performed and is otherwise negative.  Please refer to the HPI.      PHYSICAL EXAMINATION:   VITAL SIGNS:  Temperature 98.6, heart rate 86, respiratory rate 18, blood pressure 136/50, SpO2 of 95% on room air.   GENERAL:  Well-developed, well-nourished female who appears comfortable.   HEENT:  Head is normocephalic.  Pupils are equal, round, reactive to light bilaterally.  EOMs are intact bilaterally.  Nose, mouth are patent.  Mucous membranes are dry.   LUNGS:  Clear to auscultation bilaterally without wheezes or crackles.   CARDIOVASCULAR:  Regular rate and rhythm, normal S1, S2, no murmur.   ABDOMEN:  Soft, tender to left abdomen.  She has hyperactive bowel sounds diffusely.  There is no guarding or rigidity.  It is not distended.     MUSCULOSKELETAL:  The patient is spontaneously moving bilateral upper and lower extremities.  Radial and pedal pulses are 2+ bilaterally.   SKIN:  Warm and dry, no rash, no pedal edema.      Labs and imaging as reviewed in epic and as noted in HPI.      ASSESSMENT AND PLAN:  Lenora Hammonds is a 72-year-old female with past medical history of hypertension, depression, acoustic neuroma as well as tremor who presented to the Emergency Department for evaluation of acute onset abdominal pain  with associated nausea, vomiting and bloody diarrhea.  The patient will be admitted under inpatient status for further management.   1.  Pancolitis.  It was identified on CT abdomen and pelvis with moderate to severe colitis in the transverse, descending and sigmoid colon.  The patient does have risk factors for developing C. diff with recent course of antibiotics within the last month.  Additionally considered in the differential is a food borne pathogen given her recent consumption of steak with baked potato immediately prior to symptom onset.  The patient will be continued on Cipro, Flagyl at this time.  Her fever curve will be monitored and we will have GI come see her.  She will also have stool cultures sent for C. diff as well as enteric pathogens and she will have continued IV fluids for ongoing hydration.   2.  Rectal bleeding began following onset of diarrhea.  The patient's hemoglobin while at the Emergency Department in a period of 12 hours decreased from 13.8 to 11.2, although did also receive quite a bit of IV fluid.  Will check hemoglobins q.6 h.  Blood consent has been signed if needed.   3.  Hypertension.  She is maintained prior to admission on lisinopril, hydrochlorothiazide as well as amlodipine which will be resumed with the exception of the hydrochlorothiazide portion.   4.  Depression, continues on prior to admission escitalopram.   5.  Gastroesophageal reflux disease, continue prior to admission omeprazole.   6.  Deep venous thrombosis prophylaxis, PCDs.      CODE STATUS:  Full code.      This patient was staffed with Dr. Olga Lidia Johnson who independently interviewed and evaluated patient and is in agreement with above-mentioned plan.         OLGA LIDIA JOHNSON MD       As dictated by ANGEL PEREZ PA-C            D: 2017 12:24   T: 2017 13:11   MT: CHELITA      Name:     SEBASTIAN PORTILLO   MRN:      [id number]        Account:      [id number]   :      1945           Admitted:      [id number]      Document: [id number]       cc: Siobhan Pineda NP

## 2017-12-05 NOTE — ED NOTES
Patient has IVF bolus infusing.  She is quiet, but in good spirits when spoken to.  Reports that she feels better than when she arrived.  When asked about bowel status, reports she has not had a BM today, and last BM was 2 days ago, and she saw blood in her stool. She had two diarrhea stools that day, with blood.   Last emesis was yesterday afternoon.  She has not eaten since then, but has had water to drink. To CT via cart.

## 2017-12-05 NOTE — ED PROVIDER NOTES
History     Chief Complaint   Patient presents with     Nausea, Vomiting, & Diarrhea     Onset last night at 6 pm with vomiting and diarrhea, today blood noted with diarrhea, left sided abdominal pain.     HPI  Lenora Hammonds is a 72 year old female with a history of HTN and histoplasmosis who presents to the Emergency Department today for evaluation of vomiting and bloody diarrhea. The patient reports that she and her  ate steak and baked potatoes last night and a few hours after she started having vomiting and diarrhea. The patient states that she has a history of vomiting and diarrhea after eating greasy food; however, this time was much worse. This morning, the patient notes that she started having bright red blood in her diarrhea. She has never had bloody stool like this before. She denies having blood in her vomit. She reports that she has had at least 12 episodes of diarrhea and her vomiting has stopped. The patient also reports that she has been having some LLQ abdominal pain. Her pain is mild at rest and rates it at a 2 out of 10; however, with movement or palpation her pain is rated at an 8 out of 10. The patient denies any past diverticulitis or c. diff. The patient denies any urinary symptoms. The patient has not been having any rectal pain. She has never had a colonoscopy in the past.     I have reviewed the Medications, Allergies, Past Medical and Surgical History, and Social History in the [facility] system.    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 voice was soft     CANCER Father      brain tumor;  at age 62 y rs     CANCER Sister      breast cancer     CANCER Brother      bladder cancer;        Social History   Substance Use Topics     Smoking status: Former Smoker     Packs/day: 0.30     Years: 50.00     Types: Cigarettes     Smokeless tobacco: Never Used      Comment: quit 2014     Alcohol use Yes      Comment: occasionally        Current Facility-Administered Medications   Medication     0.9% sodium chloride BOLUS     piperacillin-tazobactam (ZOSYN) 3.375 g vial to attach to  mL bag     Current Outpatient Prescriptions   Medication     omeprazole (PRILOSEC) 20 MG CR capsule     Probiotic Product (PROBIOTIC FORMULA) CAPS     lisinopril-hydrochlorothiazide (PRINZIDE/ZESTORETIC) 20-25 MG per tablet     amLODIPine (NORVASC) 2.5 MG tablet     escitalopram (LEXAPRO) 10 MG tablet     albuterol (PROAIR HFA/PROVENTIL HFA/VENTOLIN HFA) 108 (90 BASE) MCG/ACT Inhaler     Cholecalciferol (VITAMIN D3 PO)     aspirin 325 MG tablet      No Known Allergies     Review of Systems   Constitutional: Positive for appetite change. Negative for chills and fever.   Cardiovascular: Negative for chest pain and palpitations.   Gastrointestinal: Positive for abdominal pain (LLQ), blood in stool, diarrhea, nausea and vomiting (Non bloody).   Genitourinary: Negative for dysuria, frequency, hematuria and urgency.       Physical Exam   BP: 95/63  Pulse: 111  Heart Rate: 111  Temp: 97.6  F (36.4  C)  Resp: 18  Weight: 60.8 kg (134 lb)  SpO2: 98 %      Physical Exam   Constitutional: She is oriented to person, place, and time. She appears well-nourished.   HENT:   Head: Normocephalic and atraumatic.   Mouth/Throat: Oropharynx is clear and moist.   Eyes: Conjunctivae are normal. Pupils are equal, round, and reactive to light.   Neck: Normal range of motion.   Cardiovascular:  Tachycardia present.    Pulmonary/Chest: Effort normal. No respiratory distress. She has no wheezes. She has no rales.   Abdominal: Soft. There is tenderness in the left lower quadrant. There is no rigidity and no guarding.   Musculoskeletal: She exhibits no edema.   Neurological: She is alert and oriented to person, place, and time. No cranial nerve deficit.   Skin: Skin is warm. No rash noted. She is not diaphoretic.   Psychiatric: She has a normal mood and affect.       ED Course   8:17 PM  The patient was seen and examined by Dr. Lawson in Room 09.    ED Course     Procedures             Labs Ordered and Resulted from Time of ED Arrival Up to the Time of Departure from the ED - No data to display         Assessments & Plan (with Medical Decision Making)   72-year-old female without significant past medical history arriving today to the Emergency Department for evaluation primarily of hematochezia.  Upon arrival, she is noted alert, she is currently afebrile, and hemodynamically stable with soft systolic pressures in slight tachycardia to 111.  She appears uncomfortable but is nontoxic.  The patient does have left lower quadrant discomfort with palpation with no generalized pain or involuntary guarding.  I do not suspect acute or surgical abdomen such as ischemic gut, bowel obstruction or volvulus.  Based on history I suspect this to be most consistent with a diverticulitis; however, the patient has had persistent bleeding which she describes as all day long.  She has no sign, clinically, of major hemorrhage.  Based on symptoms and no prior similar history I would obtain laboratory studies to include CBC, CMP, lactate, and CT scan.  With persistence of bleeding, mild tachycardia, and age I would anticipate a night stay NPO status with possible GI prep and colonoscopy.    Laboratory studies reveal a significant leukocytosis with no LA elevation.  With recent antibiotic use, I would plan to cover for possible  colitis (c. Diff) vs. Diverticulitis with Flagyl and Cipro.      CT does reveal moderate to severe colitis.  Based on persistent GI loss and associated bleeding, she will require hospitalization for antibiotics, IVF, and monitoring of Hgb.    I have reviewed the nursing notes.    I have reviewed the findings, diagnosis, plan and need for follow up with the patient.    New Prescriptions    No medications on file       Final diagnoses:   Colitis   I, Jagdish Guzman, am serving as a trained medical scribe to document services personally performed by Awais Lawson MD, based on the provider's statements to me.   Awais ABBOTT MD, was physically present and have reviewed and verified the accuracy of this note documented by Jagdish Guzman.     12/4/2017   [facility], [place], EMERGENCY DEPARTMENT     Awais Lawson MD  12/04/17 4450

## 2017-12-05 NOTE — PROVIDER NOTIFICATION
MD Notification    Notified Person:  PA    Notified Persons Name: JA Jasmine    Notification Date/Time: 12/5/2017 4:45 PM     Notification Interaction:  Talked with Physician    Purpose of Notification: clarification of diet/bowel prep    Orders Received: Give half bowel prep tonight and half in AM, clear liquid diet okay, can still collect stool sample post bowel prep. Okay to give meds in AM if necessary. Plan for colonoscopy around 4pm, may do sooner if able.     Comments:

## 2017-12-05 NOTE — ED PROVIDER NOTES
Sign out note: Lenora Hammonds is a 72 year old female was signed out to me by Dr. Banerjee at 0715 am.  Please see initial dictation for full details and history.  Patient has pancolitis and bloody stool with an elevated white count of 22.8 . Plan at time of sign out is to admit the patient to the medicine service at the [facility].  .       Course in the ED:  Unfortunately, there are no beds available at the [facility] and likely will not be any until late today and perhaps tomorrow.  The [facility] hospitalists (Dr. Sanders and Dr. Landa) feel the patient is not appropriate for this [facility] given her dropping hemoglobin and pancolitis.  There are no GI physicians available at the [place] campus.  Results for orders placed or performed during the hospital encounter of 12/04/17 (from the past 24 hour(s))   CBC with platelets differential   Result Value Ref Range    WBC 22.8 (H) 4.0 - 11.0 10e9/L    RBC Count 4.42 3.8 - 5.2 10e12/L    Hemoglobin 13.8 11.7 - 15.7 g/dL    Hematocrit 40.9 35.0 - 47.0 %    MCV 93 78 - 100 fl    MCH 31.2 26.5 - 33.0 pg    MCHC 33.7 31.5 - 36.5 g/dL    RDW 13.4 10.0 - 15.0 %    Platelet Count 223 150 - 450 10e9/L    Diff Method Automated Method     % Neutrophils 88.5 %    % Lymphocytes 6.0 %    % Monocytes 5.1 %    % Eosinophils 0.0 %    % Basophils 0.1 %    % Immature Granulocytes 0.3 %    Nucleated RBCs 0 0 /100    Absolute Neutrophil 20.2 (H) 1.6 - 8.3 10e9/L    Absolute Lymphocytes 1.4 0.8 - 5.3 10e9/L    Absolute Monocytes 1.2 0.0 - 1.3 10e9/L    Absolute Eosinophils 0.0 0.0 - 0.7 10e9/L    Absolute Basophils 0.0 0.0 - 0.2 10e9/L    Abs Immature Granulocytes 0.1 0 - 0.4 10e9/L    Absolute Nucleated RBC 0.0    Comprehensive metabolic panel   Result Value Ref Range    Sodium 138 133 - 144 mmol/L    Potassium 3.7 3.4 - 5.3 mmol/L    Chloride 100 94 - 109 mmol/L    Carbon Dioxide 28 20 - 32 mmol/L    Anion Gap 10 3 - 14 mmol/L    Glucose 149 (H) 70 - 99 mg/dL    Urea Nitrogen 43 (H) 7 - 30  mg/dL    Creatinine 1.01 0.52 - 1.04 mg/dL    GFR Estimate 54 (L) >60 mL/min/1.7m2    GFR Estimate If Black 65 >60 mL/min/1.7m2    Calcium 9.6 8.5 - 10.1 mg/dL    Bilirubin Total 1.3 0.2 - 1.3 mg/dL    Albumin 3.5 3.4 - 5.0 g/dL    Protein Total 7.7 6.8 - 8.8 g/dL    Alkaline Phosphatase 66 40 - 150 U/L    ALT 19 0 - 50 U/L    AST 20 0 - 45 U/L   Lipase   Result Value Ref Range    Lipase 113 73 - 393 U/L   Lactic acid whole blood   Result Value Ref Range    Lactic Acid 1.7 0.7 - 2.0 mmol/L   CT Abdomen Pelvis w Contrast    Narrative    CT ABDOMEN AND PELVIS WITH CONTRAST   12/4/2017 9:17 PM     HISTORY: Left lower quadrant pain. Diarrhea. Diverticulitis.    TECHNIQUE: CT abdomen and pelvis with Isovue-370, 66mL IV. Radiation  dose for this scan was reduced using automated exposure control,  adjustment of the mA and/or kV according to patient size, or iterative  reconstruction technique.    COMPARISON: None.    FINDINGS: 0.7 cm pulmonary nodule at the right lower lobe series 2  image 1. Moderate to severe colitis involves the transverse colon,  descending colon and sigmoid. There is small to moderate volume free  fluid in the pelvis. No organized abscess identified at this time. No  free air. There is no bowel obstruction. Vascular calcifications.  Liver, gallbladder, adrenals, spleen, pancreas, and kidneys do not  show any acute abnormalities. Appendix not seen for assessment. No  convincing signs of appendicitis.      Impression    IMPRESSION:  1. Moderate to severe colitis involves the transverse colon,  descending colon, and sigmoid. No abscess. Moderate free pelvic fluid.  2. Pulmonary nodule is indeterminant at the right lung base. See below  for follow up imaging guidelines.    Recommendations for an incidental lung nodule = or > 6mm to 8mm:    Low risk patients: Initial follow-up CT at 6-12 months, then  consider CT at 18-24 months if no change.    High risk patients: Initial follow-up CT at 6-12 months, then  CT at  18-24 months if no change.    *Low Risk: Minimal or absent history of smoking or other known risk  factors.  *Nonsolid (ground-glass) or partly solid nodules may require longer  follow-up to exclude indolent adenocarcinoma.  *Recommendations based on Guidelines for the Management of Incidental  Pulmonary Nodules Detected at CT: From the [employer] 2017,  Radiology 2017.    BRONSON TYLER MD   UA reflex to Microscopic and Culture   Result Value Ref Range    Color Urine Yellow     Appearance Urine Clear     Glucose Urine Negative NEG^Negative mg/dL    Bilirubin Urine Negative NEG^Negative    Ketones Urine 10 (A) NEG^Negative mg/dL    Specific Gravity Urine 1.035 1.003 - 1.035    Blood Urine Small (A) NEG^Negative    pH Urine 5.5 5.0 - 7.0 pH    Protein Albumin Urine 10 (A) NEG^Negative mg/dL    Urobilinogen mg/dL Normal 0.0 - 2.0 mg/dL    Nitrite Urine Negative NEG^Negative    Leukocyte Esterase Urine Negative NEG^Negative    Source Midstream Urine     RBC Urine 1 0 - 2 /HPF    WBC Urine 1 0 - 2 /HPF    Mucous Urine Present (A) NEG^Negative /LPF    Hyaline Casts 1 0 - 2 /LPF   CBC with platelets differential   Result Value Ref Range    WBC 20.2 (H) 4.0 - 11.0 10e9/L    RBC Count 3.70 (L) 3.8 - 5.2 10e12/L    Hemoglobin 11.2 (L) 11.7 - 15.7 g/dL    Hematocrit 34.2 (L) 35.0 - 47.0 %    MCV 92 78 - 100 fl    MCH 30.3 26.5 - 33.0 pg    MCHC 32.7 31.5 - 36.5 g/dL    RDW 13.7 10.0 - 15.0 %    Platelet Count 175 150 - 450 10e9/L    Diff Method Automated Method     % Neutrophils 87.2 %    % Lymphocytes 7.0 %    % Monocytes 5.3 %    % Eosinophils 0.0 %    % Basophils 0.1 %    % Immature Granulocytes 0.4 %    Nucleated RBCs 0 0 /100    Absolute Neutrophil 17.6 (H) 1.6 - 8.3 10e9/L    Absolute Lymphocytes 1.4 0.8 - 5.3 10e9/L    Absolute Monocytes 1.1 0.0 - 1.3 10e9/L    Absolute Eosinophils 0.0 0.0 - 0.7 10e9/L    Absolute Basophils 0.0 0.0 - 0.2 10e9/L    Abs Immature Granulocytes 0.1 0 - 0.4 10e9/L    Absolute  Nucleated RBC 0.0       Hemoglobin has dropped from 13.8 on arrival about 12 and half hours ago to 11.2.  White count has gone from 22.8 down to 20.2. Platelets dropped from 223,000 to 175,000.     I talked to Dr. Verde, Hospitalist,  at [facility], who accepted the patient in transfer.  He requested that she be treated with an additional dose of Flagyl IV.  She has already received Flagyl IV and Cipro IV late last evening.  I ordered 500 mg of Flagyl IV as well as a lactated ringer bolus.    I was informed by our tech about 0945 am that the patient used a bedpan and had quite a bit of bloody rectal discharge without apparent stool. C. Diff had been ordered but has not yet been collected.     The patient is a 72-year-old female with pancolitis, bloody diarrhea and likely GI bleed.  The patient was transferred by ambulance to [facility] at approximately 1010  am.   MD Murphy Lara Alda L, MD  12/05/17 1011

## 2017-12-05 NOTE — ED NOTES
73 yo F who presented with vomiting and bloody diarrhea beginning this evening.  No diarrhea overnight.  Lenora will be admitted to the Medicine service at the [facility].  NPO for now.     Neo Banerjee MD  12/05/17 0667

## 2017-12-05 NOTE — IP AVS SNAPSHOT
MRN:[id number]                      After Visit Summary   12/5/2017    Lenora Hammonds    MRN: [id number]           Thank you!     Thank you for choosing [facility] for your care. Our goal is always to provide you with excellent care. Hearing back from our patients is one way we can continue to improve our services. Please take a few minutes to complete the written survey that you may receive in the mail after you visit with us. Thank you!        Patient Information     Date Of Birth          1945        Designated Caregiver       Most Recent Value    Caregiver    Will someone help with your care after discharge? yes    Name of designated caregiver jose    Phone number of caregiver [phone]    Caregiver address [address] [place] [address]      About your hospital stay     You were admitted on:  December 5, 2017 You last received care in the:  [facility] [address] [facility]    You were discharged on:  December 10, 2017       Who to Call     For medical emergencies, please call 911.  For non-urgent questions about your medical care, please call your primary care provider or clinic, [phone]  For questions related to your surgery, please call your surgery clinic        Attending Provider     Provider Specialty    Santi Verde MD Internal Medicine       Primary Care Provider Office Phone # Fax #    STACY Castro [facility] [phone]      After Care Instructions     Activity       Your activity upon discharge: activity as tolerated            Diet       Follow this diet upon discharge: Orders Placed This Encounter      Regular Diet Adult                  Follow-up Appointments     Follow-up and recommended labs and tests        Follow up with primary care provider, Siobhan Pineda, within 1-2 weeks, for hospital follow- up.                  Further instructions from your care team       Moise Vásquez MD,  12/7/2017  [facility]  "Consultants  Office : [phone]    Pending Results     No orders found from 12/3/2017 to 2017.            Statement of Approval     Ordered          12/10/17 1044  I have reviewed and agree with all the recommendations and orders detailed in this document.  EFFECTIVE NOW     Approved and electronically signed by:  Ion Singh MD             Admission Information     Date & Time Provider Department Dept. Phone    2017 Santi Verde MD [place] [address] [facility] [phone]      Your Vitals Were     Blood Pressure Pulse Temperature Respirations Height Weight    130/57 (BP Location: Left arm) 89 96.6  F (35.9  C) (Oral) 16 1.626 m (5' 4\") 60.4 kg (133 lb 1.6 oz)    Pulse Oximetry BMI (Body Mass Index)                93% 22.85 kg/m2          [employer] Information     [employer] lets you send messages to your doctor, view your test results, renew your prescriptions, schedule appointments and more. To sign up, go to [email or web address] . Click on \"Log in\" on the left side of the screen, which will take you to the Welcome page. Then click on \"Sign up Now\" on the right side of the page.     You will be asked to enter the access code listed below, as well as some personal information. Please follow the directions to create your username and password.     Your access code is: [email or web address]  Expires: 2018  6:10 PM     Your access code will  in 90 days. If you need help or a new code, please call your [facility] clinic or [phone].        Care EveryWhere ID     This is your Care EveryWhere ID. This could be used by other organizations to access your [facility] medical records  [id number]        Equal Access to Services     [facility] CAESAR : Criss Alvarez, christine munoz, brent doss. So [facility] [phone].    ATENCIÓN: Si habla español, tiene a gleason disposición servicios gratuitos de asistencia lingüística. Llame al " [phone].    We comply with applicable federal civil rights laws and [place] laws. We do not discriminate on the basis of race, color, national origin, age, disability, sex, sexual orientation, or gender identity.               Review of your medicines      START taking        Dose / Directions    levofloxacin 500 MG tablet   Commonly known as:  LEVAQUIN        Dose:  500 mg   Take 1 tablet (500 mg) by mouth daily for 7 days   Quantity:  7 tablet   Refills:  0         CONTINUE these medicines which have NOT CHANGED        Dose / Directions    albuterol 108 (90 BASE) MCG/ACT Inhaler   Commonly known as:  PROAIR HFA/PROVENTIL HFA/VENTOLIN HFA   Used for:  Routine general medical examination at a health care facility        Dose:  2 puff   Inhale 2 puffs into the lungs every 4 hours as needed for shortness of breath / dyspnea or wheezing   Quantity:  1 Inhaler   Refills:  5       amLODIPine 2.5 MG tablet   Commonly known as:  NORVASC   Used for:  Hypertension goal BP (blood pressure) < 140/90        Dose:  2.5 mg   Take 1 tablet (2.5 mg) by mouth daily   Quantity:  90 tablet   Refills:  3       aspirin 325 MG tablet        Dose:  325 mg   Take 325 mg by mouth daily   Refills:  0       escitalopram 10 MG tablet   Commonly known as:  LEXAPRO   Used for:  Major depressive disorder, single episode, mild (H)        Dose:  10 mg   Take 1 tablet (10 mg) by mouth daily   Quantity:  90 tablet   Refills:  3       lisinopril-hydrochlorothiazide 20-25 MG per tablet   Commonly known as:  PRINZIDE/ZESTORETIC   Used for:  Hypertension goal BP (blood pressure) < 140/90        Dose:  1 tablet   Take 1 tablet by mouth daily   Quantity:  90 tablet   Refills:  3       omeprazole 20 MG CR capsule   Commonly known as:  priLOSEC   Used for:  Constipation, unspecified constipation type        Dose:  20 mg   Take 1 capsule (20 mg) by mouth daily   Quantity:  14 capsule   Refills:  0       PROBIOTIC FORMULA Caps   Used for:  Constipation,  unspecified constipation type        Dose:  1 capsule   Take 1 capsule by mouth daily . Floragen 3   Quantity:  30 capsule   Refills:  0       VITAMIN D3 PO        Dose:  1000 Units   Take 1,000 Units by mouth daily   Refills:  0            Where to get your medicines      These medications were sent to [facility] [address] [place] [address] AT [address] [place] [id number]     Phone:  [phone]     levofloxacin 500 MG tablet               ANTIBIOTIC INSTRUCTION     You've Been Prescribed an Antibiotic - Now What?  Your healthcare team thinks that you or your loved one might have an infection. Some infections can be treated with antibiotics, which are powerful, life-saving drugs. Like all medications, antibiotics have side effects and should only be used when necessary. There are some important things you should know about your antibiotic treatment.      Your healthcare team may run tests before you start taking an antibiotic.    Your team may take samples (e.g., from your blood, urine or other areas) to run tests to look for bacteria. These test can be important to determine if you need an antibiotic at all and, if you do, which antibiotic will work best.      Within a few days, your healthcare team might change or even stop your antibiotic.    Your team may start you on an antibiotic while they are working to find out what is making you sick.    Your team might change your antibiotic because test results show that a different antibiotic would be better to treat your infection.    In some cases, once your team has more information, they learn that you do not need an antibiotic at all. They may find out that you don't have an infection, or that the antibiotic you're taking won't work against your infection. For example, an infection caused by a virus can't be treated with antibiotics. Staying on an antibiotic when you don't need it is more  likely to be harmful than helpful.      You may experience side effects from your antibiotic.    Like all medications, antibiotics have side effects. Some of these can be serious.    Let you healthcare team know if you have any known allergies when you are admitted to the hospital.    One significant side effect of nearly all antibiotics is the risk of severe and sometimes deadly diarrhea caused by Clostridium difficile (C. Difficile). This occurs when a person takes antibiotics because some good germs are destroyed. Antibiotic use allows C. diificile to take over, putting patients at high risk for this serious infection.    As a patient or caregiver, it is important to understand your or your loved one's antibiotic treatment. It is especially important for caregivers to speak up when patients can't speak for themselves. Here are some important questions to ask your healthcare team.    What infection is this antibiotic treating and how do you know I have that infection?    What side effects might occur from this antibiotic?    How long will I need to take this antibiotic?    Is it safe to take this antibiotic with other medications or supplements (e.g., vitamins) that I am taking?     Are there any special directions I need to know about taking this antibiotic? For example, should I take it with food?    How will I be monitored to know whether my infection is responding to the antibiotic?    What tests may help to make sure the right antibiotic is prescribed for me?      Information provided by:  www.cdc.gov/getsmart  U.S. Department of Health and Human Services  Centers for disease Control and Prevention  National Center for Emerging and Zoonotic Infectious Diseases  Division of Healthcare Quality Promotion         Protect others around you: Learn how to safely use, store and throw away your medicines at www.disposemymeds.org.             Medication List: This is a list of all your medications and when to take  them. Check marks below indicate your daily home schedule. Keep this list as a reference.      Medications           Morning Afternoon Evening Bedtime As Needed    albuterol 108 (90 BASE) MCG/ACT Inhaler   Commonly known as:  PROAIR HFA/PROVENTIL HFA/VENTOLIN HFA   Inhale 2 puffs into the lungs every 4 hours as needed for shortness of breath / dyspnea or wheezing                                amLODIPine 2.5 MG tablet   Commonly known as:  NORVASC   Take 1 tablet (2.5 mg) by mouth daily   Last time this was given:  2.5 mg on 12/10/2017  7:45 AM                                aspirin 325 MG tablet   Take 325 mg by mouth daily                                escitalopram 10 MG tablet   Commonly known as:  LEXAPRO   Take 1 tablet (10 mg) by mouth daily   Last time this was given:  10 mg on 12/10/2017  7:45 AM                                levofloxacin 500 MG tablet   Commonly known as:  LEVAQUIN   Take 1 tablet (500 mg) by mouth daily for 7 days                                lisinopril-hydrochlorothiazide 20-25 MG per tablet   Commonly known as:  PRINZIDE/ZESTORETIC   Take 1 tablet by mouth daily                                omeprazole 20 MG CR capsule   Commonly known as:  priLOSEC   Take 1 capsule (20 mg) by mouth daily   Last time this was given:  20 mg on 12/10/2017  7:45 AM                                PROBIOTIC FORMULA Caps   Take 1 capsule by mouth daily . Floragen 3                                VITAMIN D3 PO   Take 1,000 Units by mouth daily                                          More Information        Ulcerative Colitis  You have been diagnosed with ulcerative colitis. Ulcerative colitis is a chronic condition that causes inflammation and ulcers in the rectum and colon. It is a form of inflammatory bowel disease (IBD). The disease is usually diagnosed by a special procedure called a colonoscopy. The symptoms usually develop over time. There is no medicine that can cure ulcerative colitis. The goal of  "treatment is to reduce the symptoms, and cause a remission.  Symptoms of ulcerative colitis include:    Abdominal cramps and pain    Diarrhea, usually bloody    Rectal bleeding    Rectal pain    Fever    Decreased appetite and weight loss    Low energy    Inflammation outside of the colon can occur and can cause pain or swelling in places like the eyes, skin, and joints  Home care  No one knows what exactly causes IBD. The goal is to control and relieve the symptoms, and prevent complications, so you can lead a full and active life. No medicine can cure the disease, but in some cases, surgery to remove the whole colon can be curative. However, surgery causes other side effects and so medicines are often preferred. Discuss your options with your healthcare provider.  Diet  Your diet did not cause your condition, but it can affect it. Unfortunately, no one diet that works for everyone, so you have to experiment. Below are some recommendations, but what works for you may be different. Keep a food log to figure out what you are sensitive to.    Eat more slowly. Eat smaller amounts at a time, but more often. Remember, you can always eat more, but can't eat less once you've eaten too much.    High-fiber foods are complicated. While they may help constipation, they can make bloating, cramping, gas, and diarrhea worse.    Eat less sugar.    Try avoiding dairy products if you feel you are sensitive to lactose.    Try cutting out foods that are high in fat and fatty meats.    You can control bloating and passing excess gas. Be careful with \"gassy\" vegetables and fruits like beans, cabbage, broccoli, and cauliflower.    Be careful of carbonated beverages and fruit juices. They can make bloating and diarrhea worse.    Caffeine, alcohol, and stimulants may make symptoms worse.  Lifestyle  Although stress doesn't cause IBD, it is a factor in flare-ups, and how you feel and react to your condition.    Look for things that seem to " make your symptoms worse, such as stress and emotions.    Counseling can help you deal with stress. So can self-help measure like exercise, yoga, and meditation.    Depression can be a part of this illness and antidepressant medicine may be prescribed. This may actually help with diarrhea, constipation, and cramping, as well as symptoms of depression.    Smoking can make symptoms worse.    Lack of sleep can make symptoms seem worse.    Alcohol use can make symptoms worse.  Medicines  Your healthcare provider may prescribe medicines. Take them as directed. In most situations, lifelong medicine is necessary. For acute flares, additional prescription medicines can be prescribed. Call your provider if you need these.    Ask your healthcare provider before taking any medicines for diarrhea.    Avoid anti-inflammatory medicines like ibuprofen or naproxen.    Consider nutritional supplements. This is especially true if the diarrhea is prolonged, or you aren't eating or are losing weight.  Follow-up care  Follow up with your healthcare provider, or as advised. Tell your provider if you lose more than 5 pounds over 3 to 6 months, and you aren't trying to lose weight.  If a stool sample was taken, or cultures were done, you will be told if they are positive, or if your treatment needs to be changed. You can call as directed for results.  If X-rays were done, a radiologist will look at them. You will be told if you need a change in treatment  It is very important to tell your doctor if you intend to get pregnant, or find out you are pregnant. You will need to discuss your disease, medicines, and plan as early as possible and preferably before you conceive.  Call 911  Call 911 if any of these occur:    Trouble breathing    Confusion    Very drowsy or trouble awakening    Fainting or loss of consciousness    Rapid heart rate    Chest pain  When to seek medical advice  Call your healthcare provider right away if any of these  occur:    Bleeding from your rectum    Frequent diarrhea or abdominal pain that's not controlled by your medicine    Bloody diarrhea    Fever of 100.4 F (38 C) or higher, or as directed by your health care provider    Persistent nausea or repeated vomiting   Date Last Reviewed: 12/30/2015 2000-2017 [employer]. [address] [place] [id number]. All rights reserved. This information is not intended as a substitute for professional medical care. Always follow your healthcare professional's instructions.

## 2017-12-05 NOTE — PLAN OF CARE
Problem: Patient Care Overview  Goal: Plan of Care/Patient Progress Review  Outcome: No Change  Pt A/O x4, VSS, afebrile LS clear, BS hypoactive. Admitted to unit 1100 from [facility] with diarrhea/rectal bleeding, N&V. Pt given Flagyl x2, no diarrhea since admission to [facility], but BRBPR noted from rectum. C/O LLQ pain 3/10, tender to touch. Up SBA + GB. NPO Family at bedside. Plan: GI consult, c-diff collect to r/o.

## 2017-12-06 LAB
ANION GAP SERPL CALCULATED.3IONS-SCNC: 6 MMOL/L (ref 3–14)
BASOPHILS # BLD AUTO: 0 10E9/L (ref 0–0.2)
BASOPHILS NFR BLD AUTO: 0.1 %
BUN SERPL-MCNC: 12 MG/DL (ref 7–30)
C COLI+JEJUNI+LARI FUSA STL QL NAA+PROBE: NOT DETECTED
C DIFF TOX B STL QL: NEGATIVE
CALCIUM SERPL-MCNC: 8.1 MG/DL (ref 8.5–10.1)
CHLORIDE SERPL-SCNC: 104 MMOL/L (ref 94–109)
CO2 SERPL-SCNC: 30 MMOL/L (ref 20–32)
CREAT SERPL-MCNC: 0.53 MG/DL (ref 0.52–1.04)
DIFFERENTIAL METHOD BLD: ABNORMAL
EC STX1 GENE STL QL NAA+PROBE: NOT DETECTED
EC STX2 GENE STL QL NAA+PROBE: NOT DETECTED
ENTERIC PATHOGEN COMMENT: NORMAL
EOSINOPHIL # BLD AUTO: 0 10E9/L (ref 0–0.7)
EOSINOPHIL NFR BLD AUTO: 0.1 %
ERYTHROCYTE [DISTWIDTH] IN BLOOD BY AUTOMATED COUNT: 13.5 % (ref 10–15)
GFR SERPL CREATININE-BSD FRML MDRD: >90 ML/MIN/1.7M2
GLUCOSE SERPL-MCNC: 141 MG/DL (ref 70–99)
HCT VFR BLD AUTO: 32.5 % (ref 35–47)
HGB BLD-MCNC: 10.6 G/DL (ref 11.7–15.7)
HGB BLD-MCNC: 10.9 G/DL (ref 11.7–15.7)
HGB BLD-MCNC: 11.1 G/DL (ref 11.7–15.7)
HGB BLD-MCNC: 11.3 G/DL (ref 11.7–15.7)
IMM GRANULOCYTES # BLD: 0.1 10E9/L (ref 0–0.4)
IMM GRANULOCYTES NFR BLD: 0.7 %
LACTATE BLD-SCNC: 0.8 MMOL/L (ref 0.7–2)
LYMPHOCYTES # BLD AUTO: 1.6 10E9/L (ref 0.8–5.3)
LYMPHOCYTES NFR BLD AUTO: 8.3 %
MCH RBC QN AUTO: 30.7 PG (ref 26.5–33)
MCHC RBC AUTO-ENTMCNC: 33.5 G/DL (ref 31.5–36.5)
MCV RBC AUTO: 92 FL (ref 78–100)
MONOCYTES # BLD AUTO: 1.3 10E9/L (ref 0–1.3)
MONOCYTES NFR BLD AUTO: 6.9 %
NEUTROPHILS # BLD AUTO: 16 10E9/L (ref 1.6–8.3)
NEUTROPHILS NFR BLD AUTO: 83.9 %
NOROV GI+II ORF1-ORF2 JNC STL QL NAA+PR: NOT DETECTED
NRBC # BLD AUTO: 0 10*3/UL
NRBC BLD AUTO-RTO: 0 /100
PLATELET # BLD AUTO: 171 10E9/L (ref 150–450)
POTASSIUM SERPL-SCNC: 2.6 MMOL/L (ref 3.4–5.3)
POTASSIUM SERPL-SCNC: 3.6 MMOL/L (ref 3.4–5.3)
RBC # BLD AUTO: 3.55 10E12/L (ref 3.8–5.2)
RVA NSP5 STL QL NAA+PROBE: NOT DETECTED
SALMONELLA SP RPOD STL QL NAA+PROBE: NOT DETECTED
SHIGELLA SP+EIEC IPAH STL QL NAA+PROBE: NOT DETECTED
SODIUM SERPL-SCNC: 140 MMOL/L (ref 133–144)
SPECIMEN SOURCE: NORMAL
V CHOL+PARA RFBL+TRKH+TNAA STL QL NAA+PR: NOT DETECTED
WBC # BLD AUTO: 19 10E9/L (ref 4–11)
Y ENTERO RECN STL QL NAA+PROBE: NOT DETECTED

## 2017-12-06 PROCEDURE — 88305 TISSUE EXAM BY PATHOLOGIST: CPT | Mod: 26 | Performed by: INTERNAL MEDICINE

## 2017-12-06 PROCEDURE — 88305 TISSUE EXAM BY PATHOLOGIST: CPT | Performed by: INTERNAL MEDICINE

## 2017-12-06 PROCEDURE — 25000125 ZZHC RX 250: Performed by: PHYSICIAN ASSISTANT

## 2017-12-06 PROCEDURE — 85025 COMPLETE CBC W/AUTO DIFF WBC: CPT | Performed by: PHYSICIAN ASSISTANT

## 2017-12-06 PROCEDURE — 83605 ASSAY OF LACTIC ACID: CPT | Performed by: INTERNAL MEDICINE

## 2017-12-06 PROCEDURE — G0500 MOD SEDAT ENDO SERVICE >5YRS: HCPCS | Performed by: INTERNAL MEDICINE

## 2017-12-06 PROCEDURE — A9270 NON-COVERED ITEM OR SERVICE: HCPCS | Mod: GY | Performed by: INTERNAL MEDICINE

## 2017-12-06 PROCEDURE — 25000132 ZZH RX MED GY IP 250 OP 250 PS 637: Mod: GY | Performed by: INTERNAL MEDICINE

## 2017-12-06 PROCEDURE — 99232 SBSQ HOSP IP/OBS MODERATE 35: CPT | Performed by: INTERNAL MEDICINE

## 2017-12-06 PROCEDURE — 25000128 H RX IP 250 OP 636: Performed by: INTERNAL MEDICINE

## 2017-12-06 PROCEDURE — 80048 BASIC METABOLIC PNL TOTAL CA: CPT | Performed by: PHYSICIAN ASSISTANT

## 2017-12-06 PROCEDURE — 0DBM8ZX EXCISION OF DESCENDING COLON, VIA NATURAL OR ARTIFICIAL OPENING ENDOSCOPIC, DIAGNOSTIC: ICD-10-PCS | Performed by: INTERNAL MEDICINE

## 2017-12-06 PROCEDURE — S0028 INJECTION, FAMOTIDINE, 20 MG: HCPCS | Performed by: PHYSICIAN ASSISTANT

## 2017-12-06 PROCEDURE — 0DBL8ZX EXCISION OF TRANSVERSE COLON, VIA NATURAL OR ARTIFICIAL OPENING ENDOSCOPIC, DIAGNOSTIC: ICD-10-PCS | Performed by: INTERNAL MEDICINE

## 2017-12-06 PROCEDURE — 36415 COLL VENOUS BLD VENIPUNCTURE: CPT | Performed by: PHYSICIAN ASSISTANT

## 2017-12-06 PROCEDURE — 45380 COLONOSCOPY AND BIOPSY: CPT | Performed by: INTERNAL MEDICINE

## 2017-12-06 PROCEDURE — 25000132 ZZH RX MED GY IP 250 OP 250 PS 637: Mod: GY | Performed by: PHYSICIAN ASSISTANT

## 2017-12-06 PROCEDURE — 84132 ASSAY OF SERUM POTASSIUM: CPT | Performed by: PHYSICIAN ASSISTANT

## 2017-12-06 PROCEDURE — 25000128 H RX IP 250 OP 636: Performed by: PHYSICIAN ASSISTANT

## 2017-12-06 PROCEDURE — 85018 HEMOGLOBIN: CPT | Performed by: PHYSICIAN ASSISTANT

## 2017-12-06 PROCEDURE — A9270 NON-COVERED ITEM OR SERVICE: HCPCS | Mod: GY | Performed by: PHYSICIAN ASSISTANT

## 2017-12-06 PROCEDURE — 36415 COLL VENOUS BLD VENIPUNCTURE: CPT | Performed by: INTERNAL MEDICINE

## 2017-12-06 PROCEDURE — 12000000 ZZH R&B MED SURG/OB

## 2017-12-06 PROCEDURE — 99153 MOD SED SAME PHYS/QHP EA: CPT | Performed by: INTERNAL MEDICINE

## 2017-12-06 RX ORDER — ALBUTEROL SULFATE 90 UG/1
2 AEROSOL, METERED RESPIRATORY (INHALATION) EVERY 4 HOURS PRN
Status: DISCONTINUED | OUTPATIENT
Start: 2017-12-06 | End: 2017-12-10 | Stop reason: HOSPADM

## 2017-12-06 RX ORDER — AMLODIPINE BESYLATE 2.5 MG/1
2.5 TABLET ORAL DAILY
Status: DISCONTINUED | OUTPATIENT
Start: 2017-12-06 | End: 2017-12-10 | Stop reason: HOSPADM

## 2017-12-06 RX ORDER — ESCITALOPRAM OXALATE 10 MG/1
10 TABLET ORAL DAILY
Status: DISCONTINUED | OUTPATIENT
Start: 2017-12-06 | End: 2017-12-10 | Stop reason: HOSPADM

## 2017-12-06 RX ORDER — LABETALOL HYDROCHLORIDE 5 MG/ML
10 INJECTION, SOLUTION INTRAVENOUS
Status: DISCONTINUED | OUTPATIENT
Start: 2017-12-06 | End: 2017-12-10 | Stop reason: HOSPADM

## 2017-12-06 RX ORDER — FENTANYL CITRATE 50 UG/ML
INJECTION, SOLUTION INTRAMUSCULAR; INTRAVENOUS PRN
Status: DISCONTINUED | OUTPATIENT
Start: 2017-12-06 | End: 2017-12-06 | Stop reason: HOSPADM

## 2017-12-06 RX ADMIN — DEXTROSE AND SODIUM CHLORIDE: 5; 900 INJECTION, SOLUTION INTRAVENOUS at 08:21

## 2017-12-06 RX ADMIN — FAMOTIDINE 20 MG: 10 INJECTION, SOLUTION INTRAVENOUS at 15:49

## 2017-12-06 RX ADMIN — POTASSIUM CHLORIDE 40 MEQ: 1500 TABLET, EXTENDED RELEASE ORAL at 06:51

## 2017-12-06 RX ADMIN — FAMOTIDINE 20 MG: 10 INJECTION, SOLUTION INTRAVENOUS at 03:34

## 2017-12-06 RX ADMIN — POTASSIUM CHLORIDE 40 MEQ: 1500 TABLET, EXTENDED RELEASE ORAL at 09:08

## 2017-12-06 RX ADMIN — AMLODIPINE BESYLATE 2.5 MG: 2.5 TABLET ORAL at 19:50

## 2017-12-06 RX ADMIN — DEXTROSE AND SODIUM CHLORIDE: 5; 900 INJECTION, SOLUTION INTRAVENOUS at 21:30

## 2017-12-06 RX ADMIN — ESCITALOPRAM 10 MG: 10 TABLET, FILM COATED ORAL at 19:50

## 2017-12-06 ASSESSMENT — PAIN DESCRIPTION - DESCRIPTORS: DESCRIPTORS: CRAMPING

## 2017-12-06 NOTE — PLAN OF CARE
Problem: Patient Care Overview  Goal: Plan of Care/Patient Progress Review  A&O x4, pleasant and cooperative. VSS. LS clear, BS active, has had several watery stools d/t bowel prep given tonight, unable to obtain sample for cdiff d/t mixing with urine. Had bright red blood in brief at beginning of shift, seems to be lessening as the evening progresses. Hgb check q 6hr, last 11.8. C/O abdominal pain 2-3/10, declines interventions. Up SBA + GB. Clear liquid diet. Plan: finish bowel prep in AM, colonoscopy around 1600, may try to fit pt in earlier if able, NPO other than prep after 0400 tonight (although endo stated water would be okay). Will continue to monitor.

## 2017-12-06 NOTE — PROGRESS NOTES
[facility]    Hospitalist Progress Note    Date of Service (when I saw the patient): 12/06/2017    Assessment & Plan   Lenora Hammonds is a 72 year old female who was admitted on 12/5/2017 with hematochezia.    1. Pancolitis.  C diff and stool CATERINA negative.  Colonoscopy later today.  Appreciated GI consult.  Continue IVF.    2. HTN.  Continue lisinopril and amlodipine.  Hold HCTZ.    3. Depression.  Continue escitalopram.    4. GERD.  Continue omeprazole.    DVT Prophylaxis: Pneumatic Compression Devices  Code Status: Full Code    Disposition: Expected discharge in 1-3 days.    Santi Verde  Text Page (7 am to 6 pm)    Interval History   The patient is resting in bed.  She complains of 3/10 abdominal pain, relieved with defecation.  She denies fevers or chills.    -Data reviewed today: I reviewed all new labs and imaging results over the last 24 hours. I personally reviewed no images or EKG's today.    Physical Exam   Temp: 98.5  F (36.9  C) Temp src: Oral BP: 139/58   Heart Rate: 101 Resp: 16 SpO2: 97 % O2 Device: None (Room air)    Vitals:    12/05/17 1052   Weight: 60.4 kg (133 lb 1.6 oz)     Vital Signs with Ranges  Temp:  [97.4  F (36.3  C)-99.2  F (37.3  C)] 98.5  F (36.9  C)  Heart Rate:  [] 101  Resp:  [16-20] 16  BP: (132-152)/(58-64) 139/58  SpO2:  [94 %-97 %] 97 %  I/O last 3 completed shifts:  In: 2477 [P.O.:600; I.V.:1877]  Out: 1675 [Urine:1575; Stool:100]    Gen: Well nourished, well developed, elderly female, alert and oriented x 3, no acute distressed  HEENT: Atraumatic, normocephalic  Lungs: Clear to ausculation without wheezes, rhonchi, or rales  Heart: Regular rate and rhythm, no murmurs, gallops, or rubs  GI: Bowel sound decreased, no hepatosplenomegaly or masses  Lymph: No lymphadenopathy or edema  Skin: No rashes     Medications     dextrose 5% and 0.9% NaCl 100 mL/hr at 12/06/17 0821     - MEDICATION INSTRUCTIONS -       - MEDICATION INSTRUCTIONS -         sodium  chloride (PF)  3 mL Intracatheter Q8H     famotidine  20 mg Intravenous Q12H       Data     Recent Labs  Lab 12/06/17  1305 12/06/17  0554 12/06/17  0100  12/05/17  1140 12/05/17  0742 12/04/17 2026   WBC  --  19.0*  --   --   --  20.2* 22.8*   HGB 11.3* 10.9* 11.1*  < > 11.0* 11.2* 13.8   MCV  --  92  --   --   --  92 93   PLT  --  171  --   --   --  175 223   INR  --   --   --   --  1.20*  --   --    NA  --  140  --   --   --   --  138   POTASSIUM 3.6 2.6*  --   --   --   --  3.7   CHLORIDE  --  104  --   --   --   --  100   CO2  --  30  --   --   --   --  28   BUN  --  12  --   --   --   --  43*   CR  --  0.53  --   --   --   --  1.01   ANIONGAP  --  6  --   --   --   --  10   KATHLEEN  --  8.1*  --   --   --   --  9.6   GLC  --  141*  --   --   --   --  149*   ALBUMIN  --   --   --   --   --   --  3.5   PROTTOTAL  --   --   --   --   --   --  7.7   BILITOTAL  --   --   --   --   --   --  1.3   ALKPHOS  --   --   --   --   --   --  66   ALT  --   --   --   --   --   --  19   AST  --   --   --   --   --   --  20   LIPASE  --   --   --   --   --   --  113   < > = values in this interval not displayed.    No results found for this or any previous visit (from the past 24 hour(s)).

## 2017-12-06 NOTE — PLAN OF CARE
Problem: Gastrointestinal Bleeding (Adult)  Goal: Signs and Symptoms of Listed Potential Problems Will be Absent, Minimized or Managed (Gastrointestinal Bleeding)  Signs and symptoms of listed potential problems will be absent, minimized or managed by discharge/transition of care (reference Gastrointestinal Bleeding (Adult) CPG).   Outcome: No Change  A/Ox4, forgetful. Tachy, other VSS. Heat pack effective for mild abdominal cramping. Up SBA to bathroom or commode. NPO, completed remainder of bowel prep at 1100, multiple loose, watery stools this shift. Plan for colonoscopy at 1600.  Potassium 2.6, recheck @ 1400. C. Diff negative, enteric panel pending. Enteric precautions maintained. Continue to monitor.

## 2017-12-06 NOTE — PROGRESS NOTES
[facility]  Gastroenterology Progress Note     Lenora Hammonds MRN# [id number]   YOB: 1945 Age: 72 year old          Assessment and Plan:     Pancolitis (H)- CT of abdomen and pelvis was obtained with IV contrast which revealed moderate to severe colitis involving the transverse, descending and sigmoid colon.    Diarrhea- negative C diff  GI bleed-plan EGD and colonoscopy today. Colon prep finished, bright red blood present in stool  Anemia- Hemoglobin stable around 11.0, continue serial hemoglobin q 6 hours  NPO           Interval History:   no new complaints, doing well, pain is controlled, alert, oriented to person, place and time, has had a bowel movement in the last 24 hours and doing well; no cp, sob, n/v/d, or abd pain.              Review of Systems:   C: NEGATIVE for fever, chills, change in weight  E/M: NEGATIVE for ear, mouth and throat problems  R: NEGATIVE for significant cough or SOB  CV: NEGATIVE for chest pain, palpitations or peripheral edema             Medications:   I have reviewed this patient's current medications    sodium chloride (PF)  3 mL Intracatheter Q8H     famotidine  20 mg Intravenous Q12H                  Physical Exam:   Vitals were reviewed  Vital Signs with Ranges  Temp:  [97.4  F (36.3  C)-99.2  F (37.3  C)] 98.9  F (37.2  C)  Pulse:  [89] 89  Heart Rate:  [] 95  Resp:  [16-20] 16  BP: (132-152)/(50-65) 132/60  SpO2:  [94 %-95 %] 94 %  I/O last 3 completed shifts:  In: 2477 [P.O.:600; I.V.:1877]  Out: 1675 [Urine:1575; Stool:100]  Constitutional: healthy, alert, no distress and cooperative   Cardiovascular: negative, PMI normal. No lifts, heaves, or thrills. RRR. No murmurs, clicks gallops or rub  Respiratory: negative, Percussion normal. Good diaphragmatic excursion. Lungs clear  Abdomen: Abdomen soft, non-tender. BS normal. No masses, organomegaly           Data:   I reviewed the patient's new clinical lab test results.   Recent Labs   Lab  Test  12/06/17   0554  12/06/17   0100  12/05/17   1848  12/05/17   1140  12/05/17   0742  12/04/17 2026   WBC  19.0*   --    --    --   20.2*  22.8*   HGB  10.9*  11.1*  11.8  11.0*  11.2*  13.8   MCV  92   --    --    --   92  93   PLT  171   --    --    --   175  223   INR   --    --    --   1.20*   --    --      Recent Labs   Lab Test  12/06/17   0554  12/04/17 2026 04/04/17   1135   POTASSIUM  2.6*  3.7  4.0   CHLORIDE  104  100  106   CO2  30  28  29   BUN  12  43*  26   ANIONGAP  6  10  8     Recent Labs   Lab Test  12/05/17   0519  12/04/17 2026 04/04/17   1135  01/06/16   1423  10/13/14   1352  08/12/11   2205   ALBUMIN   --   3.5  3.8   --   3.9  4.5   BILITOTAL   --   1.3  0.4   --   0.5  1.0   ALT   --   19  14   --   22  13   AST   --   20  16   --   19  22   PROTEIN  10*   --    --   100*   --    --    LIPASE   --   113   --    --    --   122       I reviewed the patient's new imaging results.    All laboratory data reviewed  All imaging studies reviewed by me.    Digna Cuenca PA-C,  12/6/2017  [facility]  Office : [phone]  Cell: [phone] (Dr. Vásquez)  Cell: [phone] (Digna Cuenca PA-C)

## 2017-12-06 NOTE — PLAN OF CARE
Problem: Patient Care Overview  Goal: Plan of Care/Patient Progress Review  A/O.  Tachy, other VSS.   Heat pack effective for mild abdominal cramping.  NPO.  Watery BM x2 overnight.   Hgb q6h, trending down.  Lactic fired overnight, WNL.  CDiff negative, enteric panel pending.  Enteric precautions maintained.  Plan to complete bowel prep this AM and colonoscopy this afternoon.  Continue to monitor.

## 2017-12-07 LAB
ALBUMIN SERPL-MCNC: 2.2 G/DL (ref 3.4–5)
ALP SERPL-CCNC: 49 U/L (ref 40–150)
ALT SERPL W P-5'-P-CCNC: 10 U/L (ref 0–50)
ANION GAP SERPL CALCULATED.3IONS-SCNC: 5 MMOL/L (ref 3–14)
AST SERPL W P-5'-P-CCNC: 10 U/L (ref 0–45)
BILIRUB SERPL-MCNC: 0.7 MG/DL (ref 0.2–1.3)
BUN SERPL-MCNC: 11 MG/DL (ref 7–30)
CALCIUM SERPL-MCNC: 8.2 MG/DL (ref 8.5–10.1)
CHLORIDE SERPL-SCNC: 111 MMOL/L (ref 94–109)
CO2 SERPL-SCNC: 29 MMOL/L (ref 20–32)
COLONOSCOPY: NORMAL
CREAT SERPL-MCNC: 0.53 MG/DL (ref 0.52–1.04)
CRP SERPL-MCNC: 154 MG/L (ref 0–8)
ERYTHROCYTE [DISTWIDTH] IN BLOOD BY AUTOMATED COUNT: 13.5 % (ref 10–15)
GFR SERPL CREATININE-BSD FRML MDRD: >90 ML/MIN/1.7M2
GLUCOSE SERPL-MCNC: 126 MG/DL (ref 70–99)
HCT VFR BLD AUTO: 31.9 % (ref 35–47)
HGB BLD-MCNC: 10.5 G/DL (ref 11.7–15.7)
HGB BLD-MCNC: 10.6 G/DL (ref 11.7–15.7)
LACTATE BLD-SCNC: 0.5 MMOL/L (ref 0.7–2)
MCH RBC QN AUTO: 30.9 PG (ref 26.5–33)
MCHC RBC AUTO-ENTMCNC: 33.2 G/DL (ref 31.5–36.5)
MCV RBC AUTO: 93 FL (ref 78–100)
PLATELET # BLD AUTO: 207 10E9/L (ref 150–450)
POTASSIUM SERPL-SCNC: 3.1 MMOL/L (ref 3.4–5.3)
POTASSIUM SERPL-SCNC: 3.5 MMOL/L (ref 3.4–5.3)
PROT SERPL-MCNC: 5.2 G/DL (ref 6.8–8.8)
RBC # BLD AUTO: 3.43 10E12/L (ref 3.8–5.2)
SODIUM SERPL-SCNC: 145 MMOL/L (ref 133–144)
WBC # BLD AUTO: 14.6 10E9/L (ref 4–11)

## 2017-12-07 PROCEDURE — 85027 COMPLETE CBC AUTOMATED: CPT | Performed by: INTERNAL MEDICINE

## 2017-12-07 PROCEDURE — 25000132 ZZH RX MED GY IP 250 OP 250 PS 637: Mod: GY | Performed by: INTERNAL MEDICINE

## 2017-12-07 PROCEDURE — 80053 COMPREHEN METABOLIC PANEL: CPT | Performed by: INTERNAL MEDICINE

## 2017-12-07 PROCEDURE — 36415 COLL VENOUS BLD VENIPUNCTURE: CPT | Performed by: INTERNAL MEDICINE

## 2017-12-07 PROCEDURE — 83605 ASSAY OF LACTIC ACID: CPT | Performed by: INTERNAL MEDICINE

## 2017-12-07 PROCEDURE — A9270 NON-COVERED ITEM OR SERVICE: HCPCS | Mod: GY | Performed by: INTERNAL MEDICINE

## 2017-12-07 PROCEDURE — S0028 INJECTION, FAMOTIDINE, 20 MG: HCPCS | Performed by: PHYSICIAN ASSISTANT

## 2017-12-07 PROCEDURE — 99232 SBSQ HOSP IP/OBS MODERATE 35: CPT | Performed by: INTERNAL MEDICINE

## 2017-12-07 PROCEDURE — 12000000 ZZH R&B MED SURG/OB

## 2017-12-07 PROCEDURE — 84132 ASSAY OF SERUM POTASSIUM: CPT | Performed by: INTERNAL MEDICINE

## 2017-12-07 PROCEDURE — 36415 COLL VENOUS BLD VENIPUNCTURE: CPT | Performed by: PHYSICIAN ASSISTANT

## 2017-12-07 PROCEDURE — 86140 C-REACTIVE PROTEIN: CPT | Performed by: INTERNAL MEDICINE

## 2017-12-07 PROCEDURE — 25000128 H RX IP 250 OP 636: Performed by: PHYSICIAN ASSISTANT

## 2017-12-07 PROCEDURE — 85018 HEMOGLOBIN: CPT | Performed by: PHYSICIAN ASSISTANT

## 2017-12-07 PROCEDURE — 25000125 ZZHC RX 250: Performed by: PHYSICIAN ASSISTANT

## 2017-12-07 PROCEDURE — A9270 NON-COVERED ITEM OR SERVICE: HCPCS | Mod: GY | Performed by: PHYSICIAN ASSISTANT

## 2017-12-07 PROCEDURE — 25000132 ZZH RX MED GY IP 250 OP 250 PS 637: Mod: GY | Performed by: PHYSICIAN ASSISTANT

## 2017-12-07 RX ORDER — POLYETHYLENE GLYCOL 3350 17 G/17G
17 POWDER, FOR SOLUTION ORAL DAILY
Status: DISCONTINUED | OUTPATIENT
Start: 2017-12-07 | End: 2017-12-10 | Stop reason: HOSPADM

## 2017-12-07 RX ADMIN — ACETAMINOPHEN 650 MG: 325 TABLET, FILM COATED ORAL at 18:15

## 2017-12-07 RX ADMIN — POTASSIUM CHLORIDE 20 MEQ: 1500 TABLET, EXTENDED RELEASE ORAL at 10:52

## 2017-12-07 RX ADMIN — ESCITALOPRAM 10 MG: 10 TABLET, FILM COATED ORAL at 08:48

## 2017-12-07 RX ADMIN — ONDANSETRON 4 MG: 2 INJECTION INTRAMUSCULAR; INTRAVENOUS at 07:53

## 2017-12-07 RX ADMIN — POLYETHYLENE GLYCOL 3350 17 G: 17 POWDER, FOR SOLUTION ORAL at 13:49

## 2017-12-07 RX ADMIN — AMLODIPINE BESYLATE 2.5 MG: 2.5 TABLET ORAL at 08:48

## 2017-12-07 RX ADMIN — FAMOTIDINE 20 MG: 10 INJECTION, SOLUTION INTRAVENOUS at 04:08

## 2017-12-07 RX ADMIN — ONDANSETRON 4 MG: 2 INJECTION INTRAMUSCULAR; INTRAVENOUS at 18:06

## 2017-12-07 RX ADMIN — DEXTROSE AND SODIUM CHLORIDE: 5; 900 INJECTION, SOLUTION INTRAVENOUS at 17:44

## 2017-12-07 RX ADMIN — POTASSIUM CHLORIDE 40 MEQ: 1500 TABLET, EXTENDED RELEASE ORAL at 08:48

## 2017-12-07 RX ADMIN — DEXTROSE AND SODIUM CHLORIDE: 5; 900 INJECTION, SOLUTION INTRAVENOUS at 07:25

## 2017-12-07 ASSESSMENT — PAIN DESCRIPTION - DESCRIPTORS
DESCRIPTORS: CRAMPING
DESCRIPTORS: CRAMPING

## 2017-12-07 NOTE — PLAN OF CARE
Problem: Patient Care Overview  Goal: Plan of Care/Patient Progress Review  A/Ox4. Tachy, other VSS. Denies abdominal pain. SBA. Colonoscopy today, showing ischemic colitis. Full liquid diet. Lethargic post procedure, bed alarms in place. Potassium replaced day shift, recheck AM. Continue to monitor.

## 2017-12-07 NOTE — PROVIDER NOTIFICATION
MD Notification    Notified Person:  MD    Notified Persons Name: Mar    Notification Date/Time: 12/6/2017 18:00    Notification Interaction:  Talked with Physician    Purpose of Notification: MD note stated to continue with lisinopril and amlodipine, but were not ordered. Paged Dr. Verde x2 at 5:00 and 5:30pm with no response back, called on-call who ordered PTA amlodipine.    Orders Received:    Comments:

## 2017-12-07 NOTE — PROGRESS NOTES
Colonoscopy: Sever ischemic colitis involving most of the colon with edema and ulceration    Plan:  Full liquid diet.   Monitor labs.   Aggressive hydration.   Miralax daily.    Moise Vásquez MD  [phone]

## 2017-12-07 NOTE — PLAN OF CARE
Problem: Patient Care Overview  Goal: Plan of Care/Patient Progress Review  Outcome: Improving  A/O.  Tmax 99.4, other VSS.  Denies pain.  No BM since colonoscopy yesterday.  Hgb q6h, stable.  Slept well overnight.  Continue to monitor.

## 2017-12-07 NOTE — PROGRESS NOTES
[facility]    Hospitalist Progress Note    Date of Service (when I saw the patient): 12/07/2017    Assessment & Plan   Lenora Hammonds is a 72 year old female who was admitted on 12/5/2017 with hematochezia.    1. Ischemic colitis.  C diff and stool CATERINA negative.  Colonoscopy demonstrates ischemic colitis.  Appreciated GI consult.  Continue IVF.  Miralax daily.    2. HTN.  Continue amlodipine.  Hold HCTZ and lisinopril.    3. Depression.  Continue escitalopram.    4. GERD.  Continue omeprazole.    DVT Prophylaxis: Pneumatic Compression Devices  Code Status: Full Code    Disposition: Expected discharge in 1 days.    Santi Verde  Text Page (7 am to 6 pm)    Interval History   The patient is resting in bed.  She complains of abdominal bloating.  She denies fevers or chills.    -Data reviewed today: I reviewed all new labs and imaging results over the last 24 hours. I personally reviewed no images or EKG's today.    Physical Exam   Temp: 97.5  F (36.4  C) Temp src: Oral BP: 132/61 Pulse: 90 Heart Rate: 89 Resp: 16 SpO2: 95 % O2 Device: None (Room air)    Vitals:    12/05/17 1052   Weight: 60.4 kg (133 lb 1.6 oz)     Vital Signs with Ranges  Temp:  [97.1  F (36.2  C)-99.4  F (37.4  C)] 97.5  F (36.4  C)  Pulse:  [90] 90  Heart Rate:  [] 89  Resp:  [0-26] 16  BP: (132-167)/(50-88) 132/61  SpO2:  [94 %-98 %] 95 %  I/O last 3 completed shifts:  In: -   Out: 400 [Stool:400]    Gen: Well nourished, well developed, elderly female, alert and oriented x 3, no acute distressed  HEENT: Atraumatic, normocephalic  Lungs: Clear to ausculation without wheezes, rhonchi, or rales  Heart: Regular rate and rhythm, no murmurs, gallops, or rubs  GI: Bowel sound decreased, no hepatosplenomegaly or masses  Lymph: No lymphadenopathy or edema  Skin: No rashes     Medications     dextrose 5% and 0.9% NaCl 100 mL/hr at 12/07/17 0725       polyethylene glycol  17 g Oral Daily     escitalopram  10 mg Oral Daily      amLODIPine  2.5 mg Oral Daily     sodium chloride (PF)  3 mL Intracatheter Q8H     famotidine  20 mg Intravenous Q12H       Data     Recent Labs  Lab 12/07/17  1233 12/07/17  0555 12/07/17  0044  12/06/17  1305 12/06/17  0554  12/05/17  1140 12/05/17  0742 12/04/17  2026   WBC  --  14.6*  --   --   --  19.0*  --   --  20.2* 22.8*   HGB 10.6* 10.6* 10.5*  < > 11.3* 10.9*  < > 11.0* 11.2* 13.8   MCV  --  93  --   --   --  92  --   --  92 93   PLT  --  207  --   --   --  171  --   --  175 223   INR  --   --   --   --   --   --   --  1.20*  --   --    NA  --  145*  --   --   --  140  --   --   --  138   POTASSIUM  --  3.1*  --   --  3.6 2.6*  --   --   --  3.7   CHLORIDE  --  111*  --   --   --  104  --   --   --  100   CO2  --  29  --   --   --  30  --   --   --  28   BUN  --  11  --   --   --  12  --   --   --  43*   CR  --  0.53  --   --   --  0.53  --   --   --  1.01   ANIONGAP  --  5  --   --   --  6  --   --   --  10   KATHLEEN  --  8.2*  --   --   --  8.1*  --   --   --  9.6   GLC  --  126*  --   --   --  141*  --   --   --  149*   ALBUMIN  --  2.2*  --   --   --   --   --   --   --  3.5   PROTTOTAL  --  5.2*  --   --   --   --   --   --   --  7.7   BILITOTAL  --  0.7  --   --   --   --   --   --   --  1.3   ALKPHOS  --  49  --   --   --   --   --   --   --  66   ALT  --  10  --   --   --   --   --   --   --  19   AST  --  10  --   --   --   --   --   --   --  20   LIPASE  --   --   --   --   --   --   --   --   --  113   < > = values in this interval not displayed.    No results found for this or any previous visit (from the past 24 hour(s)).

## 2017-12-07 NOTE — PLAN OF CARE
"Problem: Patient Care Overview  Goal: Plan of Care/Patient Progress Review  Outcome: No Change  Tmax 99. All other VSS. Reports abdominal tenderness but declines intervention. Nausea this AM; zofran given x1. Potassium 3.1; replaced. Recheck at 1500. 1 \"bloody\" stool per pt this AM; Hgb stable at 10.6. Being checked q6h. On full liquids; no appetite today. Up with SBA in room. Likely d/c home tomorrow pending progress. Continue to monitor.       "

## 2017-12-07 NOTE — PROGRESS NOTES
HOSPITALIST SERVICE CROSS-COVER NOTE:    Ordered PTA amlodipine and prn labetalol for HTN.      Mahin Manuel MD  Hospitalist  Pager # [phone]

## 2017-12-08 LAB
ANION GAP SERPL CALCULATED.3IONS-SCNC: 4 MMOL/L (ref 3–14)
BUN SERPL-MCNC: 8 MG/DL (ref 7–30)
CALCIUM SERPL-MCNC: 8.2 MG/DL (ref 8.5–10.1)
CHLORIDE SERPL-SCNC: 109 MMOL/L (ref 94–109)
CO2 SERPL-SCNC: 29 MMOL/L (ref 20–32)
COPATH REPORT: NORMAL
CREAT SERPL-MCNC: 0.5 MG/DL (ref 0.52–1.04)
ERYTHROCYTE [DISTWIDTH] IN BLOOD BY AUTOMATED COUNT: 13.2 % (ref 10–15)
GFR SERPL CREATININE-BSD FRML MDRD: >90 ML/MIN/1.7M2
GLUCOSE SERPL-MCNC: 119 MG/DL (ref 70–99)
HCT VFR BLD AUTO: 31.2 % (ref 35–47)
HGB BLD-MCNC: 10.2 G/DL (ref 11.7–15.7)
HGB BLD-MCNC: 10.3 G/DL (ref 11.7–15.7)
HGB BLD-MCNC: 10.7 G/DL (ref 11.7–15.7)
HGB BLD-MCNC: 9.9 G/DL (ref 11.7–15.7)
MCH RBC QN AUTO: 30.6 PG (ref 26.5–33)
MCHC RBC AUTO-ENTMCNC: 33 G/DL (ref 31.5–36.5)
MCV RBC AUTO: 93 FL (ref 78–100)
PLATELET # BLD AUTO: 242 10E9/L (ref 150–450)
POTASSIUM SERPL-SCNC: 3.2 MMOL/L (ref 3.4–5.3)
POTASSIUM SERPL-SCNC: 3.7 MMOL/L (ref 3.4–5.3)
RBC # BLD AUTO: 3.37 10E12/L (ref 3.8–5.2)
SODIUM SERPL-SCNC: 142 MMOL/L (ref 133–144)
WBC # BLD AUTO: 13.6 10E9/L (ref 4–11)

## 2017-12-08 PROCEDURE — A9270 NON-COVERED ITEM OR SERVICE: HCPCS | Mod: GY | Performed by: PHYSICIAN ASSISTANT

## 2017-12-08 PROCEDURE — 85018 HEMOGLOBIN: CPT | Performed by: PHYSICIAN ASSISTANT

## 2017-12-08 PROCEDURE — 25000132 ZZH RX MED GY IP 250 OP 250 PS 637: Mod: GY | Performed by: PHYSICIAN ASSISTANT

## 2017-12-08 PROCEDURE — A9270 NON-COVERED ITEM OR SERVICE: HCPCS | Mod: GY | Performed by: INTERNAL MEDICINE

## 2017-12-08 PROCEDURE — 84132 ASSAY OF SERUM POTASSIUM: CPT | Performed by: INTERNAL MEDICINE

## 2017-12-08 PROCEDURE — 99232 SBSQ HOSP IP/OBS MODERATE 35: CPT | Performed by: INTERNAL MEDICINE

## 2017-12-08 PROCEDURE — 85027 COMPLETE CBC AUTOMATED: CPT | Performed by: INTERNAL MEDICINE

## 2017-12-08 PROCEDURE — 25000132 ZZH RX MED GY IP 250 OP 250 PS 637: Mod: GY | Performed by: INTERNAL MEDICINE

## 2017-12-08 PROCEDURE — 80048 BASIC METABOLIC PNL TOTAL CA: CPT | Performed by: INTERNAL MEDICINE

## 2017-12-08 PROCEDURE — 85018 HEMOGLOBIN: CPT | Performed by: INTERNAL MEDICINE

## 2017-12-08 PROCEDURE — 36415 COLL VENOUS BLD VENIPUNCTURE: CPT | Performed by: INTERNAL MEDICINE

## 2017-12-08 PROCEDURE — 36415 COLL VENOUS BLD VENIPUNCTURE: CPT | Performed by: PHYSICIAN ASSISTANT

## 2017-12-08 PROCEDURE — 25000128 H RX IP 250 OP 636: Performed by: PHYSICIAN ASSISTANT

## 2017-12-08 PROCEDURE — 12000000 ZZH R&B MED SURG/OB

## 2017-12-08 RX ADMIN — DEXTROSE AND SODIUM CHLORIDE: 5; 900 INJECTION, SOLUTION INTRAVENOUS at 12:35

## 2017-12-08 RX ADMIN — POTASSIUM CHLORIDE 20 MEQ: 1500 TABLET, EXTENDED RELEASE ORAL at 11:18

## 2017-12-08 RX ADMIN — Medication 0.2 MG: at 19:23

## 2017-12-08 RX ADMIN — DEXTROSE AND SODIUM CHLORIDE: 5; 900 INJECTION, SOLUTION INTRAVENOUS at 22:44

## 2017-12-08 RX ADMIN — POLYETHYLENE GLYCOL 3350 17 G: 17 POWDER, FOR SOLUTION ORAL at 08:38

## 2017-12-08 RX ADMIN — AMLODIPINE BESYLATE 2.5 MG: 2.5 TABLET ORAL at 08:38

## 2017-12-08 RX ADMIN — ACETAMINOPHEN 650 MG: 325 TABLET, FILM COATED ORAL at 00:52

## 2017-12-08 RX ADMIN — POTASSIUM CHLORIDE 40 MEQ: 1500 TABLET, EXTENDED RELEASE ORAL at 08:38

## 2017-12-08 RX ADMIN — ESCITALOPRAM 10 MG: 10 TABLET, FILM COATED ORAL at 08:38

## 2017-12-08 RX ADMIN — ONDANSETRON 4 MG: 2 INJECTION INTRAMUSCULAR; INTRAVENOUS at 08:38

## 2017-12-08 RX ADMIN — OMEPRAZOLE 20 MG: 20 CAPSULE, DELAYED RELEASE ORAL at 06:32

## 2017-12-08 RX ADMIN — DEXTROSE AND SODIUM CHLORIDE: 5; 900 INJECTION, SOLUTION INTRAVENOUS at 03:26

## 2017-12-08 ASSESSMENT — PAIN DESCRIPTION - DESCRIPTORS: DESCRIPTORS: DISCOMFORT

## 2017-12-08 NOTE — PLAN OF CARE
Problem: Patient Care Overview  Goal: Plan of Care/Patient Progress Review  Outcome: No Change  Pt is A&Ox4. VSS. C/o some abdominal cramping/discomfort, did not request any pain medication. Zofran given x1 for nausea with relief. Potassium 3.2 this AM, replaced, recheck at 1530. Loose stools, no blood noted. Hgb checks Q6h. Full liquid diet, poor appetite, encouraged fluids and to drink ensure. Ambulated around unit x1. Likely d/c tomorrow pending progress. Continue to monitor.

## 2017-12-08 NOTE — PLAN OF CARE
Problem: Patient Care Overview  Goal: Plan of Care/Patient Progress Review  A&Ox4. Afebrile this shift, other VSS. Reports some abdominal cramping/discomfort, relieved by warm pack and tylenol x1. Mild nausea this evening, zofran given x1, effective. Potassium replaced on days, recheck back at 3.5. 2 loose incontinent stools, no blood noted. Hgb checks q6h, stable at 10.6. On full liquids; poor appetite today. Encouraged fluids. SBA. Likely d/c home tomorrow pending progress. Continue to monitor.

## 2017-12-08 NOTE — PLAN OF CARE
Problem: Patient Care Overview  Goal: Plan of Care/Patient Progress Review  Outcome: No Change  A&Ox4. Afebrile this shift, other VSS. Reports some abdominal cramping/discomfort, relieved by warm pack and tylenol x1.  No c/o nausea.  Potassium 3.5 at 1547 yesterday.  Loose stools, no blood noted. Hgb checks q6h, recheck at 0000 at 9.9. On full liquids. Encouraged fluids. SBA. Likely d/c home today pending progress. Continue to monitor.

## 2017-12-08 NOTE — PROGRESS NOTES
[facility]    Hospitalist Progress Note    Date of Service (when I saw the patient): 12/08/2017    Assessment & Plan   Lenora Hammonds is a 72 year old female who was admitted on 12/5/2017 with hematochezia.    1. Ischemic colitis.  C diff and stool CATERINA negative.  Colonoscopy demonstrates severe ischemic colitis.  Appreciated GI consult.  Continue IVF.  Miralax daily.  Encourage ambulation.  Full liquid diet.    2. HTN.  Continue amlodipine.  Hold HCTZ and lisinopril.    3. Depression.  Continue escitalopram.    4. GERD.  Continue omeprazole.    DVT Prophylaxis: Pneumatic Compression Devices  Code Status: Full Code    Disposition: Expected discharge in 1 days.    Santi Verde  Text Page (7 am to 6 pm)    Interval History   The patient is resting in bed.  She complains of abdominal bloating.  Appetite is poor.    -Data reviewed today: I reviewed all new labs and imaging results over the last 24 hours. I personally reviewed no images or EKG's today.    Physical Exam   Temp: 97.4  F (36.3  C) Temp src: Oral BP: 141/67   Heart Rate: 81 Resp: 16 SpO2: 93 % O2 Device: None (Room air)    Vitals:    12/05/17 1052   Weight: 60.4 kg (133 lb 1.6 oz)     Vital Signs with Ranges  Temp:  [97.4  F (36.3  C)-98.7  F (37.1  C)] 97.4  F (36.3  C)  Heart Rate:  [81-94] 81  Resp:  [16] 16  BP: (132-152)/(57-67) 141/67  SpO2:  [93 %-95 %] 93 %  I/O last 3 completed shifts:  In: 3127 [P.O.:250; I.V.:2877]  Out: -     Gen: Well nourished, well developed, elderly female, alert and oriented x 3, no acute distressed  HEENT: Atraumatic, normocephalic  Lungs: Clear to ausculation without wheezes, rhonchi, or rales  Heart: Regular rate and rhythm, no murmurs, gallops, or rubs  GI: Bowel sound decreased, no hepatosplenomegaly or masses  Lymph: No lymphadenopathy or edema  Skin: No rashes     Medications     dextrose 5% and 0.9% NaCl 100 mL/hr at 12/08/17 0326       polyethylene glycol  17 g Oral Daily     omeprazole  20 mg  Oral QAM AC     escitalopram  10 mg Oral Daily     amLODIPine  2.5 mg Oral Daily     sodium chloride (PF)  3 mL Intracatheter Q8H       Data     Recent Labs  Lab 12/08/17  0549 12/08/17  0050 12/07/17  1905 12/07/17  1547  12/07/17  0555  12/06/17  0554  12/05/17  1140  12/04/17  2026   WBC 13.6*  --   --   --   --  14.6*  --  19.0*  --   --   < > 22.8*   HGB 10.3* 9.9* 10.6*  --   < > 10.6*  < > 10.9*  < > 11.0*  < > 13.8   MCV 93  --   --   --   --  93  --  92  --   --   < > 93     --   --   --   --  207  --  171  --   --   < > 223   INR  --   --   --   --   --   --   --   --   --  1.20*  --   --      --   --   --   --  145*  --  140  --   --   --  138   POTASSIUM 3.2*  --   --  3.5  --  3.1*  < > 2.6*  --   --   --  3.7   CHLORIDE 109  --   --   --   --  111*  --  104  --   --   --  100   CO2 29  --   --   --   --  29  --  30  --   --   --  28   BUN 8  --   --   --   --  11  --  12  --   --   --  43*   CR 0.50*  --   --   --   --  0.53  --  0.53  --   --   --  1.01   ANIONGAP 4  --   --   --   --  5  --  6  --   --   --  10   KATHLEEN 8.2*  --   --   --   --  8.2*  --  8.1*  --   --   --  9.6   *  --   --   --   --  126*  --  141*  --   --   --  149*   ALBUMIN  --   --   --   --   --  2.2*  --   --   --   --   --  3.5   PROTTOTAL  --   --   --   --   --  5.2*  --   --   --   --   --  7.7   BILITOTAL  --   --   --   --   --  0.7  --   --   --   --   --  1.3   ALKPHOS  --   --   --   --   --  49  --   --   --   --   --  66   ALT  --   --   --   --   --  10  --   --   --   --   --  19   AST  --   --   --   --   --  10  --   --   --   --   --  20   LIPASE  --   --   --   --   --   --   --   --   --   --   --  113   < > = values in this interval not displayed.    No results found for this or any previous visit (from the past 24 hour(s)).

## 2017-12-08 NOTE — PROGRESS NOTES
[facility]  Gastroenterology Progress Note     Lenora Hammonds MRN# [id number]   YOB: 1945 Age: 72 year old          Assessment and Plan:     Pancolitis (H)   Ischemic colitis.  Clinically feeling better.   WBC better. Hemoglobin stable.  1 BM this AM  Worsening of Na and Cl.  Will continue supportive care.   Electrolyte management.  Aggressive hydration           Data Unavailable      Interval History:   doing well; no cp, sob, n/v/d, or abd pain.              Review of Systems:   C: NEGATIVE for fever, chills, change in weight  E/M: NEGATIVE for ear, mouth and throat problems  R: NEGATIVE for significant cough or SOB  CV: NEGATIVE for chest pain, palpitations or peripheral edema             Medications:   I have reviewed this patient's current medications    polyethylene glycol  17 g Oral Daily     [START ON 12/8/2017] omeprazole  20 mg Oral QAM AC     escitalopram  10 mg Oral Daily     amLODIPine  2.5 mg Oral Daily     sodium chloride (PF)  3 mL Intracatheter Q8H                  Physical Exam:   Vitals were reviewed  Vital Signs with Ranges  Temp:  [97.5  F (36.4  C)-99.4  F (37.4  C)] 98.6  F (37  C)  Pulse:  [90] 90  Heart Rate:  [89-97] 94  Resp:  [16] 16  BP: (132-154)/(50-66) 149/66  SpO2:  [93 %-96 %] 93 %  I/O last 3 completed shifts:  In: 2277 [P.O.:200; I.V.:2077]  Out: -   Constitutional: healthy, alert and no distress   Cardiovascular: negative, PMI normal. No lifts, heaves, or thrills. RRR. No murmurs, clicks gallops or rub  Respiratory: negative, Percussion normal. Good diaphragmatic excursion. Lungs clear  Head: Normocephalic. No masses, lesions, tenderness or abnormalities  Neck: Neck supple. No adenopathy. Thyroid symmetric, normal size,, Carotids without bruits.  Abdomen: Abdomen soft, non-tender. BS normal. No masses, organomegaly  SKIN: no suspicious lesions or rashes           Data:   I reviewed the patient's new clinical lab test results.   Recent Labs   Lab  Test  12/07/17   1233  12/07/17   0555  12/07/17   0044   12/06/17   0554   12/05/17   1140  12/05/17   0742   WBC   --   14.6*   --    --   19.0*   --    --   20.2*   HGB  10.6*  10.6*  10.5*   < >  10.9*   < >  11.0*  11.2*   MCV   --   93   --    --   92   --    --   92   PLT   --   207   --    --   171   --    --   175   INR   --    --    --    --    --    --   1.20*   --     < > = values in this interval not displayed.     Recent Labs   Lab Test  12/07/17   1547  12/07/17   0555  12/06/17   1305  12/06/17   0554  12/04/17 2026   POTASSIUM  3.5  3.1*  3.6  2.6*  3.7   CHLORIDE   --   111*   --   104  100   CO2   --   29   --   30  28   BUN   --   11   --   12  43*   ANIONGAP   --   5   --   6  10     Recent Labs   Lab Test  12/07/17   0555  12/05/17   0519  12/04/17   2026  04/04/17   1135  01/06/16   1423   08/12/11   2205   ALBUMIN  2.2*   --   3.5  3.8   --    < >  4.5   BILITOTAL  0.7   --   1.3  0.4   --    < >  1.0   ALT  10   --   19  14   --    < >  13   AST  10   --   20  16   --    < >  22   PROTEIN   --   10*   --    --   100*   --    --    LIPASE   --    --   113   --    --    --   122    < > = values in this interval not displayed.       I reviewed the patient's new imaging results.    All laboratory data reviewed  All imaging studies reviewed by me.    Moise Vásquez MD,  12/7/2017  [facility]  Office : [phone]  Cell: [phone]

## 2017-12-09 LAB
ANION GAP SERPL CALCULATED.3IONS-SCNC: 4 MMOL/L (ref 3–14)
BUN SERPL-MCNC: 6 MG/DL (ref 7–30)
CALCIUM SERPL-MCNC: 8.2 MG/DL (ref 8.5–10.1)
CHLORIDE SERPL-SCNC: 106 MMOL/L (ref 94–109)
CO2 SERPL-SCNC: 31 MMOL/L (ref 20–32)
CREAT SERPL-MCNC: 0.58 MG/DL (ref 0.52–1.04)
ERYTHROCYTE [DISTWIDTH] IN BLOOD BY AUTOMATED COUNT: 13.1 % (ref 10–15)
GFR SERPL CREATININE-BSD FRML MDRD: >90 ML/MIN/1.7M2
GLUCOSE SERPL-MCNC: 107 MG/DL (ref 70–99)
HCT VFR BLD AUTO: 29.6 % (ref 35–47)
HGB BLD-MCNC: 10.1 G/DL (ref 11.7–15.7)
HGB BLD-MCNC: 9.6 G/DL (ref 11.7–15.7)
HGB BLD-MCNC: 9.8 G/DL (ref 11.7–15.7)
MCH RBC QN AUTO: 30.5 PG (ref 26.5–33)
MCHC RBC AUTO-ENTMCNC: 33.1 G/DL (ref 31.5–36.5)
MCV RBC AUTO: 92 FL (ref 78–100)
PLATELET # BLD AUTO: 261 10E9/L (ref 150–450)
POTASSIUM SERPL-SCNC: 3.5 MMOL/L (ref 3.4–5.3)
RBC # BLD AUTO: 3.21 10E12/L (ref 3.8–5.2)
SODIUM SERPL-SCNC: 141 MMOL/L (ref 133–144)
WBC # BLD AUTO: 10.8 10E9/L (ref 4–11)

## 2017-12-09 PROCEDURE — 85027 COMPLETE CBC AUTOMATED: CPT | Performed by: INTERNAL MEDICINE

## 2017-12-09 PROCEDURE — A9270 NON-COVERED ITEM OR SERVICE: HCPCS | Mod: GY | Performed by: PHYSICIAN ASSISTANT

## 2017-12-09 PROCEDURE — 25000132 ZZH RX MED GY IP 250 OP 250 PS 637: Mod: GY | Performed by: INTERNAL MEDICINE

## 2017-12-09 PROCEDURE — 84132 ASSAY OF SERUM POTASSIUM: CPT | Performed by: INTERNAL MEDICINE

## 2017-12-09 PROCEDURE — 36415 COLL VENOUS BLD VENIPUNCTURE: CPT | Performed by: INTERNAL MEDICINE

## 2017-12-09 PROCEDURE — 25000128 H RX IP 250 OP 636: Performed by: PHYSICIAN ASSISTANT

## 2017-12-09 PROCEDURE — 36415 COLL VENOUS BLD VENIPUNCTURE: CPT | Performed by: PHYSICIAN ASSISTANT

## 2017-12-09 PROCEDURE — A9270 NON-COVERED ITEM OR SERVICE: HCPCS | Mod: GY | Performed by: INTERNAL MEDICINE

## 2017-12-09 PROCEDURE — 85018 HEMOGLOBIN: CPT | Performed by: PHYSICIAN ASSISTANT

## 2017-12-09 PROCEDURE — 12000000 ZZH R&B MED SURG/OB

## 2017-12-09 PROCEDURE — 25000132 ZZH RX MED GY IP 250 OP 250 PS 637: Mod: GY | Performed by: PHYSICIAN ASSISTANT

## 2017-12-09 PROCEDURE — 99232 SBSQ HOSP IP/OBS MODERATE 35: CPT | Performed by: INTERNAL MEDICINE

## 2017-12-09 PROCEDURE — 80048 BASIC METABOLIC PNL TOTAL CA: CPT | Performed by: INTERNAL MEDICINE

## 2017-12-09 RX ADMIN — ACETAMINOPHEN 650 MG: 325 TABLET, FILM COATED ORAL at 00:17

## 2017-12-09 RX ADMIN — AMLODIPINE BESYLATE 2.5 MG: 2.5 TABLET ORAL at 08:26

## 2017-12-09 RX ADMIN — ESCITALOPRAM 10 MG: 10 TABLET, FILM COATED ORAL at 08:26

## 2017-12-09 RX ADMIN — DEXTROSE AND SODIUM CHLORIDE: 5; 900 INJECTION, SOLUTION INTRAVENOUS at 09:20

## 2017-12-09 RX ADMIN — DEXTROSE AND SODIUM CHLORIDE: 5; 900 INJECTION, SOLUTION INTRAVENOUS at 19:11

## 2017-12-09 RX ADMIN — OMEPRAZOLE 20 MG: 20 CAPSULE, DELAYED RELEASE ORAL at 06:34

## 2017-12-09 NOTE — PROVIDER NOTIFICATION
MD Notification    Person notified: primary hospitalist    Person Name: Dr. garcia    Date/Time: 12/9/17 at 910    Interaction: web-based page    Purpose of Notification: FYI pt is anxious to go home if you are able to see and write dc orders. thanks!    Orders Received:    Comments:

## 2017-12-09 NOTE — PLAN OF CARE
Problem: Patient Care Overview  Goal: Plan of Care/Patient Progress Review  Outcome: Improving  Patient is A&Ox4. VSS. Denies pain and nausea. Tolerated soup breakfast but ate only about 25% of eggs and toast for dinner. Hemoglobin stable. Possible discharge 12/10 to home, once tolerating reg diet and stable hgb. Nursing will continue to monitor.

## 2017-12-09 NOTE — PROGRESS NOTES
"[facility] Internal Medicine Progress Note     Date of Service (when I saw the patient): 12/09/2017    REASON FOR ADMISSION / INTERVAL HISTORY:  Presented with n/v/d and abdominal pain. See details below.    ASSESSMENT/PLAN:   SEVERE ISCHEMIC COLITIS  Presented with n/v/d and abdominal pain. , CT abdomen and pelvis was obtained with IV contrast which revealed moderate to severe colitis involving the transverse, descending and sigmoid colon. c diff negative. GI was consulted. S/p colonoscopy 12/6-Sever ischemic colitis involving most of the colon with edema and ulceration. Recommended aggressive hydration/miralax daily and full liq diet. Was admitted on cipro/flagyl which were stopped after colonoscopy.  Pt not eating yet.  -continue fluids, encourage po.     LEUCOCYTOSIS  Presented with WBC of 22k. Likely due to inflamation. Remained elevated until today  -continue fluids.    HTN  Stable  -continue meds    GERD  Continue meds    DEPRESSION  Continue meds    DISPO  Home when eating      MAKENZIE GARRIDO MD   Pg [phone]    DVT Prhylaxis: Low Risk/Ambulatory with no VTE prophylaxis indicated  Code Status: Full Code    ROS:  As described in A/P and Exam.  Otherwise ALL are  negative.    PHYSICAL EXAM:  All vitals have been reviewed    Blood pressure 143/63, pulse 89, temperature 97.7  F (36.5  C), temperature source Oral, resp. rate 16, height 1.626 m (5' 4\"), weight 60.4 kg (133 lb 1.6 oz), SpO2 92 %, not currently breastfeeding.         GENERAL APPEARANCE: healthy, alert and no distress  EYES: conjunctiva clear, eyes grossly normal  HENT: external ears and nose normal   NECK: supple, no masses or adenopathy  RESP: lungs clear to auscultation - no rales, rhonchi or wheezes  CV: regular rate and rhythm, normal S1 S2, no S3 or S4 and no murmur, click or rub   ABDOMEN: soft, nontender, no HSM or masses and bowel sounds normal  MS: no clubbing, cyanosis; no edema  SKIN: clear without significant rashes or " lesions  NEURO: -non-focal moves all 4 extr    ROUTINE  LABS (Last four results)  CMP  Recent Labs  Lab 12/09/17  0610 12/08/17  1540 12/08/17  0549 12/07/17  1547 12/07/17  0555  12/06/17  0554 12/04/17 2026     --  142  --  145*  --  140 138   POTASSIUM 3.5 3.7 3.2* 3.5 3.1*  < > 2.6* 3.7   CHLORIDE 106  --  109  --  111*  --  104 100   CO2 31  --  29  --  29  --  30 28   ANIONGAP 4  --  4  --  5  --  6 10   *  --  119*  --  126*  --  141* 149*   BUN 6*  --  8  --  11  --  12 43*   CR 0.58  --  0.50*  --  0.53  --  0.53 1.01   GFRESTIMATED >90  --  >90  --  >90  --  >90 54*   GFRESTBLACK >90  --  >90  --  >90  --  >90 65   KATHLEEN 8.2*  --  8.2*  --  8.2*  --  8.1* 9.6   PROTTOTAL  --   --   --   --  5.2*  --   --  7.7   ALBUMIN  --   --   --   --  2.2*  --   --  3.5   BILITOTAL  --   --   --   --  0.7  --   --  1.3   ALKPHOS  --   --   --   --  49  --   --  66   AST  --   --   --   --  10  --   --  20   ALT  --   --   --   --  10  --   --  19   < > = values in this interval not displayed.  CBC  Recent Labs  Lab 12/09/17  1219 12/09/17  0610 12/09/17  0040 12/08/17  1750  12/08/17  0549  12/07/17  0555  12/06/17  0554   WBC  --  10.8  --   --   --  13.6*  --  14.6*  --  19.0*   RBC  --  3.21*  --   --   --  3.37*  --  3.43*  --  3.55*   HGB 10.1* 9.8* 9.6* 10.7*  < > 10.3*  < > 10.6*  < > 10.9*   HCT  --  29.6*  --   --   --  31.2*  --  31.9*  --  32.5*   MCV  --  92  --   --   --  93  --  93  --  92   MCH  --  30.5  --   --   --  30.6  --  30.9  --  30.7   MCHC  --  33.1  --   --   --  33.0  --  33.2  --  33.5   RDW  --  13.1  --   --   --  13.2  --  13.5  --  13.5   PLT  --  261  --   --   --  242  --  207  --  171   < > = values in this interval not displayed.  INR  Recent Labs  Lab 12/05/17  1140   INR 1.20*     Arterial Blood GasNo lab results found in last 7 days.    No results found for this or any previous visit (from the past 24 hour(s)).

## 2017-12-09 NOTE — PLAN OF CARE
Problem: Patient Care Overview  Goal: Plan of Care/Patient Progress Review  Outcome: No Change  Patient is A&Ox4. VSS ex Tmax 99.8, gave Tylenol x1, now 97.1. Denies pain and nausea. Tolerating full liquid diet but needs encouragement to drink fluids and ensure. Hemoglobin was 9.8 at morning lab draw. Possible discharge 12/9 to home. Will continue to monitor.

## 2017-12-09 NOTE — PLAN OF CARE
Problem: Patient Care Overview  Goal: Plan of Care/Patient Progress Review  Outcome: No Change  Pt is A&Ox4. Elevated BP, other VSS. C/o lower back pain rating at 6/0, hot packs and prn Dilaudid given x1, effective.  Denies nausea. Potassium 3.2 this AM, replaced, recheck was 3.7. 1 loose BM this shift. Hgb checks Q6h. Full liquid diet, poor appetite, encouraged fluids and to drink ensure. Encouraged ambulation, pt refused this shift. Plan: continue IVF, Miralax daily and encourage ambulation. Likely d/c tomorrow pending progress. Continue to monitor.

## 2017-12-10 VITALS
BODY MASS INDEX: 22.72 KG/M2 | RESPIRATION RATE: 16 BRPM | OXYGEN SATURATION: 93 % | WEIGHT: 133.1 LBS | TEMPERATURE: 96.6 F | DIASTOLIC BLOOD PRESSURE: 57 MMHG | HEIGHT: 64 IN | SYSTOLIC BLOOD PRESSURE: 130 MMHG | HEART RATE: 89 BPM

## 2017-12-10 PROCEDURE — 25000132 ZZH RX MED GY IP 250 OP 250 PS 637: Mod: GY | Performed by: INTERNAL MEDICINE

## 2017-12-10 PROCEDURE — 99239 HOSP IP/OBS DSCHRG MGMT >30: CPT | Performed by: INTERNAL MEDICINE

## 2017-12-10 PROCEDURE — A9270 NON-COVERED ITEM OR SERVICE: HCPCS | Mod: GY | Performed by: INTERNAL MEDICINE

## 2017-12-10 PROCEDURE — A9270 NON-COVERED ITEM OR SERVICE: HCPCS | Mod: GY | Performed by: PHYSICIAN ASSISTANT

## 2017-12-10 PROCEDURE — 25000132 ZZH RX MED GY IP 250 OP 250 PS 637: Mod: GY | Performed by: PHYSICIAN ASSISTANT

## 2017-12-10 PROCEDURE — 25000128 H RX IP 250 OP 636: Performed by: PHYSICIAN ASSISTANT

## 2017-12-10 RX ORDER — LEVOFLOXACIN 500 MG/1
500 TABLET, FILM COATED ORAL DAILY
Qty: 7 TABLET | Refills: 0 | Status: SHIPPED | OUTPATIENT
Start: 2017-12-10 | End: 2017-12-17

## 2017-12-10 RX ADMIN — OMEPRAZOLE 20 MG: 20 CAPSULE, DELAYED RELEASE ORAL at 07:45

## 2017-12-10 RX ADMIN — DEXTROSE AND SODIUM CHLORIDE: 5; 900 INJECTION, SOLUTION INTRAVENOUS at 05:03

## 2017-12-10 RX ADMIN — AMLODIPINE BESYLATE 2.5 MG: 2.5 TABLET ORAL at 07:45

## 2017-12-10 RX ADMIN — ESCITALOPRAM 10 MG: 10 TABLET, FILM COATED ORAL at 07:45

## 2017-12-10 RX ADMIN — ACETAMINOPHEN 650 MG: 325 TABLET, FILM COATED ORAL at 01:44

## 2017-12-10 ASSESSMENT — PAIN DESCRIPTION - DESCRIPTORS: DESCRIPTORS: ACHING

## 2017-12-10 NOTE — DISCHARGE SUMMARY
[facility]ist Discharge Summary    Lenora Hammonds MRN# [id number]   Age: 72 year old YOB: 1945     Date of Admission:  12/5/2017  Date of Discharge::  12/10/2017  Admitting Physician:  Santi Verde MD  Discharge Physician:  Ion Singh MD  Primary Physician: Siohban Pineda  Transferring Facility: N/A     Home clinic: [address], [place] [address]          Admission Diagnoses:   bloody colitis  Pancolitis (H)  GIB          Discharge Diagnosis:   Principle diagnosis: SEVERE ISCHEMIC COLITIS  Secondary diagnoses:  Patient Active Problem List   Diagnosis     Tremor     CARDIOVASCULAR SCREENING; LDL GOAL LESS THAN 160     Mild major depression (H)     Dyspepsia     Wears glasses     Urinary incontinence     Snores     Histoplasmosis     Advanced directives, counseling/discussion     Neoplasm of uncertain behavior of skin     Epigastric pain     Cervical pain     Benign neoplasm of cranial nerve (H)     Acoustic neuroma (H)     Hypertension goal BP (blood pressure) < 140/90     Acute bilateral low back pain without sciatica     Osteopenia     Pulmonary nodule, right     Pancolitis (H)          Brief History of Presenting Illness:   As per admit hx  Lenora Hammonds is a 72-year-old female with past medical history of hypertension, depression, acoustic neuroma and tremor who presented to the Emergency Department at the [facility] for evaluation of acute onset abdominal pain.  The patient reported that on Sunday evening, 12/03/2017, she and her  consumed a steak dinner with a baked potato and shortly afterward she developed symptoms consisting of nausea, vomiting and diarrhea.  The patient has experienced symptoms similar to this in the past after eating higher fat content meals and therefore did not seek care immediately.  Her symptoms persisted overnight and into the following morning when she began to develop the presence of blood in her  diarrhea.  Her nausea and vomiting subsequently improved, however, she had persistent watery diarrhea with blood present and therefore, she presented to the Emergency Department for evaluation.         No results found for this or any previous visit (from the past 24 hour(s)).         Hospital Course:   SEVERE ISCHEMIC COLITIS  Presented with n/v/d and abdominal pain. , CT abdomen and pelvis was obtained with IV contrast which revealed moderate to severe colitis involving the transverse, descending and sigmoid colon. c diff negative. GI was consulted. S/p colonoscopy 12/6-Sever ischemic colitis involving most of the colon with edema and ulceration. Recommended aggressive hydration/miralax daily and full liq diet. Was admitted on cipro/flagyl which were stopped after colonoscopy. BX--Consistent with severe ischemic colitis.  Negative for   malignancy  Pt eating regular diet--though still has no appetite. Has no pain and is doing well. Per recommendations on uptodate, for severe ischemic colitis, will rx with emp broad spectrum antbx.  -will d/c on po levaquin x 7 days.    LEUCOCYTOSIS  Presented with WBC of 22k. Likely due to inflamation. Remained elevated until  12/9     HTN  Stable  -continue meds     GERD  Continue meds     DEPRESSION  Continue meds          Procedures:   Colonoscopy results:Findings:        The perianal and digital rectal examinations were normal.        A diffuse area of severely congested, erythematous, eroded        (linear-pattern), inflamed, nodular, ulcerated,        vascular-pattern-decreased and thickened folds of the mucosa was found        in the sigmoid colon, in the descending colon, at the splenic flexure,        in the mid transverse colon and in the distal transverse colon. Biopsies        were taken with a cold forceps for histology.        The rectum, recto-sigmoid colon, proximal transverse colon, hepatic        flexure, ascending colon, cecum and appendiceal orifice appeared  normal.        The terminal ileum appeared normal.        Internal Hemorrhoids seen on retroflexion        A 3 mm polyp was found in the rectum.                   Allergies:    No Known Allergies          Medications Prior to Admission:     Prescriptions Prior to Admission   Medication Sig Dispense Refill Last Dose     omeprazole (PRILOSEC) 20 MG CR capsule Take 1 capsule (20 mg) by mouth daily 14 capsule 0 12/4/2017 at Unknown time     Probiotic Product (PROBIOTIC FORMULA) CAPS Take 1 capsule by mouth daily . Floragen 3 30 capsule 0 12/4/2017 at Unknown time     lisinopril-hydrochlorothiazide (PRINZIDE/ZESTORETIC) 20-25 MG per tablet Take 1 tablet by mouth daily 90 tablet 3 12/4/2017 at Unknown time     amLODIPine (NORVASC) 2.5 MG tablet Take 1 tablet (2.5 mg) by mouth daily 90 tablet 3 12/4/2017 at Unknown time     escitalopram (LEXAPRO) 10 MG tablet Take 1 tablet (10 mg) by mouth daily 90 tablet 3 12/4/2017 at Unknown time     albuterol (PROAIR HFA/PROVENTIL HFA/VENTOLIN HFA) 108 (90 BASE) MCG/ACT Inhaler Inhale 2 puffs into the lungs every 4 hours as needed for shortness of breath / dyspnea or wheezing 1 Inhaler 5 Past Month at Unknown time     Cholecalciferol (VITAMIN D3 PO) Take 1,000 Units by mouth daily   12/4/2017 at Unknown time     aspirin 325 MG tablet Take 325 mg by mouth daily   12/4/2017 at Unknown time             Discharge Medications:     Current Discharge Medication List      CONTINUE these medications which have NOT CHANGED    Details   omeprazole (PRILOSEC) 20 MG CR capsule Take 1 capsule (20 mg) by mouth daily  Qty: 14 capsule, Refills: 0    Associated Diagnoses: Constipation, unspecified constipation type      Probiotic Product (PROBIOTIC FORMULA) CAPS Take 1 capsule by mouth daily . Floragen 3  Qty: 30 capsule, Refills: 0    Associated Diagnoses: Constipation, unspecified constipation type      lisinopril-hydrochlorothiazide (PRINZIDE/ZESTORETIC) 20-25 MG per tablet Take 1 tablet by mouth  "daily  Qty: 90 tablet, Refills: 3    Associated Diagnoses: Hypertension goal BP (blood pressure) < 140/90      amLODIPine (NORVASC) 2.5 MG tablet Take 1 tablet (2.5 mg) by mouth daily  Qty: 90 tablet, Refills: 3    Associated Diagnoses: Hypertension goal BP (blood pressure) < 140/90      escitalopram (LEXAPRO) 10 MG tablet Take 1 tablet (10 mg) by mouth daily  Qty: 90 tablet, Refills: 3    Associated Diagnoses: Major depressive disorder, single episode, mild (H)      albuterol (PROAIR HFA/PROVENTIL HFA/VENTOLIN HFA) 108 (90 BASE) MCG/ACT Inhaler Inhale 2 puffs into the lungs every 4 hours as needed for shortness of breath / dyspnea or wheezing  Qty: 1 Inhaler, Refills: 5    Comments: Profile Rx: patient will contact pharmacy when needed  Associated Diagnoses: Routine general medical examination at a health care facility      Cholecalciferol (VITAMIN D3 PO) Take 1,000 Units by mouth daily      aspirin 325 MG tablet Take 325 mg by mouth daily                   Consultations:   Consultation during this admission received from gastroenterology            Discharge Exam:   Blood pressure 130/57, pulse 89, temperature 96.6  F (35.9  C), temperature source Oral, resp. rate 16, height 1.626 m (5' 4\"), weight 60.4 kg (133 lb 1.6 oz), SpO2 93 %, not currently breastfeeding.  GENERAL APPEARANCE: healthy, alert and no distress  EYES: conjunctiva clear, eyes grossly normal  HENT: external ears and nose normal   NECK: supple, no masses or adenopathy  RESP: lungs clear to auscultation - no rales, rhonchi or wheezes  CV: regular rate and rhythm, normal S1 S2, no S3 or S4 and no murmur, click or rub   ABDOMEN: soft, nontender, no HSM or masses and bowel sounds normal  MS: no clubbing, cyanosis; no edema  SKIN: clear without significant rashes or lesions  NEURO: Normal strength and tone, sensory exam grossly normal, mentation intact and speech normal    Unresulted Labs Ordered in the Past 30 Days of this Admission     No orders found " from 10/6/2017 to 12/6/2017.          No results found for this or any previous visit (from the past 24 hour(s)).         Pending Tests at Discharge:   None         Discharge Instructions and Follow-Up:   Discharge diet: Regular   Discharge activity: Activity as tolerated   Discharge follow-up: Follow up with primary care provider in 2 weeks           Discharge Disposition:   Discharged to home      Attestation:  I have reviewed today's vital signs, notes, medications, labs and imaging.    Time Spent on this Encounter   I, Ion Singh, personally saw the patient today and spent greater than 30 minutes discharging this patient.      Ion Singh MD

## 2017-12-10 NOTE — PLAN OF CARE
"Problem: Patient Care Overview  Goal: Plan of Care/Patient Progress Review  Outcome: Improving   Pt is A+O x 4. VSS on RA. Denied abd pain, nausea, emesis. Up IND.Tolerated clear liquid for me. Appetite is poor. See previous RN's note. Stating \" I am not hungry\". IVF is infusing. Hemoglobin is stable. Amb. PRN Acetaminophen given for LLE ache. Voiding. Plan for possible discharge 12/10 to home, once tolerating reg diet. Will continue to monitor.      "

## 2017-12-11 ENCOUNTER — TELEPHONE (OUTPATIENT)
Dept: FAMILY MEDICINE | Facility: CLINIC | Age: 72
End: 2017-12-11

## 2017-12-11 NOTE — TELEPHONE ENCOUNTER
ED / Discharge Outreach Protocol    Patient Contact    Attempt # 1    Was call answered?  No.  Left message on voicemail with information to call me back.    Jessica Anderson RN  ]

## 2017-12-12 NOTE — TELEPHONE ENCOUNTER
"ED/Discharge Protocol    \"Hi, my name is Jessica YUNG Anderson, a registered nurse, and I am calling on behalf of Siobhan Lynne's office at [place].  I am calling to follow up and see how things are going for you after your recent visit.\"    \"I see that you were in the (ER/UC/IP) on 12/4/2017.    How are you doing now that you are home?\" better    Is patient experiencing symptoms that may require a hospital visit?  No    Discharge Instructions    \"Let's review your discharge instructions.  What is/are the follow-up recommendations?  Pt. Response: to follow up    \"Were you instructed to make a follow-up appointment?\"  Pt. Response: Yes.  Has appointment been made?   No.  \"Can I help you schedule that appointment?\" appt scheduled for 12/18/2017      \"When you see the provider, I would recommend that you bring your discharge instructions with you.    Medications    \"How many new medications are you on since your hospitalization/ED visit?\"    0-1  \"How many of your current medicines changed (dose, timing, name, etc.) while you were in the hospital/ED visit?\"   0-1  \"Do you have questions about your medications?\"   No  \"Were you newly diagnosed with heart failure, COPD, diabetes or did you have a heart attack?\"   No  For patients on insulin: \"Did you start on insulin in the hospital or did you have your insulin dose changed?\"   No    Medication reconciliation completed? Yes    Was MTM referral placed (*Make sure to put transitions as reason for referral)?   No    Call Summary    \"Do you have any questions or concerns about your condition or care plan at the moment?\"    No  Triage nurse advice given: take meds as directed and call if questions or concerns    Patient was in ER 1 time  in the past year (assess appropriateness of ER visits.)      \"If you have questions or things don't continue to improve, we encourage you contact us through the main clinic number,  [phone].  Even if the clinic is not open, triage nurses " "are available 24/7 to help you.     We would like you to know that our clinic has extended hours (provide information).  We also have urgent care (provide details on closest location and hours/contact info)\"      \"Thank you for your time and take care!\"        "

## 2017-12-18 ENCOUNTER — OFFICE VISIT (OUTPATIENT)
Dept: FAMILY MEDICINE | Facility: CLINIC | Age: 72
End: 2017-12-18
Payer: COMMERCIAL

## 2017-12-18 VITALS
TEMPERATURE: 97.7 F | BODY MASS INDEX: 21.59 KG/M2 | DIASTOLIC BLOOD PRESSURE: 63 MMHG | WEIGHT: 125.8 LBS | RESPIRATION RATE: 16 BRPM | SYSTOLIC BLOOD PRESSURE: 100 MMHG | HEART RATE: 78 BPM

## 2017-12-18 DIAGNOSIS — K51.00 PANCOLITIS (H): Primary | ICD-10-CM

## 2017-12-18 DIAGNOSIS — H61.23 BILATERAL IMPACTED CERUMEN: ICD-10-CM

## 2017-12-18 LAB
ERYTHROCYTE [DISTWIDTH] IN BLOOD BY AUTOMATED COUNT: 13.2 % (ref 10–15)
HCT VFR BLD AUTO: 34.5 % (ref 35–47)
HGB BLD-MCNC: 10.9 G/DL (ref 11.7–15.7)
MCH RBC QN AUTO: 30.7 PG (ref 26.5–33)
MCHC RBC AUTO-ENTMCNC: 31.6 G/DL (ref 31.5–36.5)
MCV RBC AUTO: 97 FL (ref 78–100)
PLATELET # BLD AUTO: 418 10E9/L (ref 150–450)
RBC # BLD AUTO: 3.55 10E12/L (ref 3.8–5.2)
WBC # BLD AUTO: 5.5 10E9/L (ref 4–11)

## 2017-12-18 PROCEDURE — 99213 OFFICE O/P EST LOW 20 MIN: CPT | Mod: 25 | Performed by: NURSE PRACTITIONER

## 2017-12-18 PROCEDURE — 80048 BASIC METABOLIC PNL TOTAL CA: CPT | Performed by: NURSE PRACTITIONER

## 2017-12-18 PROCEDURE — 36415 COLL VENOUS BLD VENIPUNCTURE: CPT | Performed by: NURSE PRACTITIONER

## 2017-12-18 PROCEDURE — 69209 REMOVE IMPACTED EAR WAX UNI: CPT | Performed by: NURSE PRACTITIONER

## 2017-12-18 PROCEDURE — 85027 COMPLETE CBC AUTOMATED: CPT | Performed by: NURSE PRACTITIONER

## 2017-12-18 NOTE — NURSING NOTE
Lenora Hammonds is a 72 year old female who presents in clinic with complaint of impacted ear wax (cerumen).  Per the order of Arina Bonilla, ear wax was removed from both sides by flushing with warm water and Stool softener solution and manual debridement has been performed. Patient denies pain/dizziness/discharge/drainage  (if yes, stop procedure and huddle with provider).  Ear wax has been successfully removed. (If not, huddle with provider).   After ear wash ear examined by Arina Schulz

## 2017-12-18 NOTE — PROGRESS NOTES
SUBJECTIVE:   Lenora Hammonds is a 72 year old female who presents to clinic today for the following health issues:      Hospital Follow-up Visit:    Hospital/Nursing Home/IP Rehab Facility: [facility]  Date of Admission: 12-5-17  Date of Discharge: 12-10-17  Reason(s) for Admission: Pancolitis          Admission Diagnoses:   bloody colitis  Pancolitis (H)  GIB               Discharge Diagnosis:    Principle diagnosis: SEVERE ISCHEMIC COLITIS  Secondary diagnoses:      Patient Active Problem List   Diagnosis     Tremor     CARDIOVASCULAR SCREENING; LDL GOAL LESS THAN 160     Mild major depression (H)     Dyspepsia     Wears glasses     Urinary incontinence     Snores     Histoplasmosis     Advanced directives, counseling/discussion     Neoplasm of uncertain behavior of skin     Epigastric pain     Cervical pain     Benign neoplasm of cranial nerve (H)     Acoustic neuroma (H)     Hypertension goal BP (blood pressure) < 140/90     Acute bilateral low back pain without sciatica     Osteopenia     Pulmonary nodule, right     Pancolitis (H)           Brief History of Presenting Illness:   As per admit hx  Lenora Hammonds is a 72-year-old female with past medical history of hypertension, depression, acoustic neuroma and tremor who presented to the Emergency Department at the [facility] for evaluation of acute onset abdominal pain.  The patient reported that on Sunday evening, 12/03/2017, she and her  consumed a steak dinner with a baked potato and shortly afterward she developed symptoms consisting of nausea, vomiting and diarrhea.  The patient has experienced symptoms similar to this in the past after eating higher fat content meals and therefore did not seek care immediately.  Her symptoms persisted overnight and into the following morning when she began to develop the presence of blood in her diarrhea.  Her nausea and vomiting subsequently improved, however, she had  persistent watery diarrhea with blood present and therefore, she presented to the Emergency Department for evaluation.          No results found for this or any previous visit (from the past 24 hour(s)).          Hospital Course:   SEVERE ISCHEMIC COLITIS  Presented with n/v/d and abdominal pain. , CT abdomen and pelvis was obtained with IV contrast which revealed moderate to severe colitis involving the transverse, descending and sigmoid colon. c diff negative. GI was consulted. S/p colonoscopy 12/6-Sever ischemic colitis involving most of the colon with edema and ulceration. Recommended aggressive hydration/miralax daily and full liq diet. Was admitted on cipro/flagyl which were stopped after colonoscopy. BX--Consistent with severe ischemic colitis.  Negative for   malignancy  Pt eating regular diet--though still has no appetite. Has no pain and is doing well. Per recommendations on uptodate, for severe ischemic colitis, will rx with emp broad spectrum antbx.  -will d/c on po levaquin x 7 days.     LEUCOCYTOSIS  Presented with WBC of 22k. Likely due to inflamation. Remained elevated until  12/9      HTN  Stable  -continue meds      GERD  Continue meds      DEPRESSION  Continue meds           Procedures:   Colonoscopy results:Findings:        The perianal and digital rectal examinations were normal.        A diffuse area of severely congested, erythematous, eroded        (linear-pattern), inflamed, nodular, ulcerated,        vascular-pattern-decreased and thickened folds of the mucosa was found        in the sigmoid colon, in the descending colon, at the splenic flexure,        in the mid transverse colon and in the distal transverse colon. Biopsies        were taken with a cold forceps for histology.        The rectum, recto-sigmoid colon, proximal transverse colon, hepatic        flexure, ascending colon, cecum and appendiceal orifice appeared normal.        The terminal ileum appeared normal.        Internal  Hemorrhoids seen on retroflexion        A 3 mm polyp was found in the rectum.                       Allergies:    No Known Allergies           Medications Prior to Admission:       Prescriptions Prior to Admission                Prescriptions Prior to Admission   Medication Sig Dispense Refill Last Dose     omeprazole (PRILOSEC) 20 MG CR capsule Take 1 capsule (20 mg) by mouth daily 14 capsule 0 12/4/2017 at Unknown time     Probiotic Product (PROBIOTIC FORMULA) CAPS Take 1 capsule by mouth daily . Floragen 3 30 capsule 0 12/4/2017 at Unknown time     lisinopril-hydrochlorothiazide (PRINZIDE/ZESTORETIC) 20-25 MG per tablet Take 1 tablet by mouth daily 90 tablet 3 12/4/2017 at Unknown time     amLODIPine (NORVASC) 2.5 MG tablet Take 1 tablet (2.5 mg) by mouth daily 90 tablet 3 12/4/2017 at Unknown time     escitalopram (LEXAPRO) 10 MG tablet Take 1 tablet (10 mg) by mouth daily 90 tablet 3 12/4/2017 at Unknown time     albuterol (PROAIR HFA/PROVENTIL HFA/VENTOLIN HFA) 108 (90 BASE) MCG/ACT Inhaler Inhale 2 puffs into the lungs every 4 hours as needed for shortness of breath / dyspnea or wheezing 1 Inhaler 5 Past Month at Unknown time     Cholecalciferol (VITAMIN D3 PO) Take 1,000 Units by mouth daily     12/4/2017 at Unknown time     aspirin 325 MG tablet Take 325 mg by mouth daily     12/4/2017 at Unknown time                  Discharge Medications:           Current Discharge Medication List            CONTINUE these medications which have NOT CHANGED     Details   omeprazole (PRILOSEC) 20 MG CR capsule Take 1 capsule (20 mg) by mouth daily  Qty: 14 capsule, Refills: 0     Associated Diagnoses: Constipation, unspecified constipation type       Probiotic Product (PROBIOTIC FORMULA) CAPS Take 1 capsule by mouth daily . Floragen 3  Qty: 30 capsule, Refills: 0     Associated Diagnoses: Constipation, unspecified constipation type       lisinopril-hydrochlorothiazide (PRINZIDE/ZESTORETIC) 20-25 MG per tablet Take 1  "tablet by mouth daily  Qty: 90 tablet, Refills: 3     Associated Diagnoses: Hypertension goal BP (blood pressure) < 140/90       amLODIPine (NORVASC) 2.5 MG tablet Take 1 tablet (2.5 mg) by mouth daily  Qty: 90 tablet, Refills: 3     Associated Diagnoses: Hypertension goal BP (blood pressure) < 140/90       escitalopram (LEXAPRO) 10 MG tablet Take 1 tablet (10 mg) by mouth daily  Qty: 90 tablet, Refills: 3     Associated Diagnoses: Major depressive disorder, single episode, mild (H)       albuterol (PROAIR HFA/PROVENTIL HFA/VENTOLIN HFA) 108 (90 BASE) MCG/ACT Inhaler Inhale 2 puffs into the lungs every 4 hours as needed for shortness of breath / dyspnea or wheezing  Qty: 1 Inhaler, Refills: 5     Comments: Profile Rx: patient will contact pharmacy when needed  Associated Diagnoses: Routine general medical examination at a health care facility       Cholecalciferol (VITAMIN D3 PO) Take 1,000 Units by mouth daily       aspirin 325 MG tablet Take 325 mg by mouth daily                       Consultations:   Consultation during this admission received from gastroenterology              Discharge Exam:   Blood pressure 130/57, pulse 89, temperature 96.6  F (35.9  C), temperature source Oral, resp. rate 16, height 1.626 m (5' 4\"), weight 60.4 kg (133 lb 1.6 oz), SpO2 93 %, not currently breastfeeding.  GENERAL APPEARANCE: healthy, alert and no distress  EYES: conjunctiva clear, eyes grossly normal  HENT: external ears and nose normal   NECK: supple, no masses or adenopathy  RESP: lungs clear to auscultation - no rales, rhonchi or wheezes  CV: regular rate and rhythm, normal S1 S2, no S3 or S4 and no murmur, click or rub   ABDOMEN: soft, nontender, no HSM or masses and bowel sounds normal  MS: no clubbing, cyanosis; no edema  SKIN: clear without significant rashes or lesions  NEURO: Normal strength and tone, sensory exam grossly normal, mentation intact and speech normal         Unresulted Labs Ordered in the Past 30 Days " of this Admission      No orders found from 10/6/2017 to 12/6/2017.             No results found for this or any previous visit (from the past 24 hour(s)).          Pending Tests at Discharge:   None               Discharge Instructions and Follow-Up:    Discharge diet: Regular   Discharge activity: Activity as tolerated   Discharge follow-up: Follow up with primary care provider in 2 weeks               Discharge Disposition:    Discharged to home      Attestation:  I have reviewed today's vital signs, notes, medications, labs and imaging.     Time Spent on this Encounter   I, Ion Singh, personally saw the patient today and spent greater than 30 minutes discharging this patient.        Ion Singh MD      She has been feeling very fatigued.  She still does not have much of an appetite, but she is making an effort to eat, also drinking Ensure.  Bowel movements are formed, no blood.   No fevers.  Finished Levaquin 2 days ago.                Problems taking medications regularly:  None       Medication changes since discharge: None       Problems adhering to non-medication therapy:  None    Summary of hospitalization:  [facility] discharge summary reviewed  Diagnostic Tests/Treatments reviewed.  Follow up needed: hgb, potassium  Other Healthcare Providers Involved in Patient s Care:         None  Update since discharge: stable.     Post Discharge Medication Reconciliation: discharge medications reconciled, continue medications without change.  Plan of care communicated with patient     Coding guidelines for this visit:  Type of Medical   Decision Making Face-to-Face Visit       within 7 Days of discharge Face-to-Face Visit        within 14 days of discharge   Moderate Complexity [id number]   High Complexity [id number]                  Problem list and histories reviewed & adjusted, as indicated.  Additional history: as documented        Reviewed and updated as needed this visit by clinical staff        Reviewed and updated as needed this visit by Provider         ROS:  C: NEGATIVE for fever, chills, change in weight  E/M: NEGATIVE for ear, mouth and throat problems  R: NEGATIVE for significant cough or SOB  CV: NEGATIVE for chest pain, palpitations or peripheral edema  GI: NEGATIVE for nausea, abdominal pain, heartburn, or change in bowel habits  MUSCULOSKELETAL: NEGATIVE for significant arthralgias or myalgia  NEURO: NEGATIVE for weakness, dizziness or paresthesias  ENDOCRINE: NEGATIVE for temperature intolerance, skin/hair changes  HEME/ALLERGY/IMMUNE: NEGATIVE for bleeding problems    OBJECTIVE:     /63  Pulse 78  Temp 97.7  F (36.5  C) (Oral)  Resp 16  Wt 125 lb 12.8 oz (57.1 kg)  BMI 21.59 kg/m2  Body mass index is 21.59 kg/(m^2).  GENERAL: healthy, alert and no distress  HEENT: TMs obscured bilaterally  RESP: lungs clear to auscultation - no rales, rhonchi or wheezes  CV: regular rate and rhythm, normal S1 S2, no S3 or S4, no murmur, click or rub, no peripheral edema and peripheral pulses strong  ABDOMEN: soft, nontender, no hepatosplenomegaly, no masses and bowel sounds normal  SKIN: no suspicious lesions or rashes  PSYCH: mentation appears normal, affect normal/bright        ASSESSMENT/PLAN:             1. Pancolitis (H)  Improved.  Discussed high iron foods.  She will follow up if she has a recurrence of any diarrhea, blood in stool, abdominal pain, fevers.   - Basic metabolic panel  - CBC with platelets    2. Bilateral impacted cerumen  Bilateral ear irrigation by MA done without instruments.           Arina Bonilla NP  [facility]

## 2017-12-18 NOTE — MR AVS SNAPSHOT
"              After Visit Summary   2017    Lenora Hammonds    MRN: [id number]           Patient Information     Date Of Birth          1945        Visit Information        Provider Department      2017 2:15 PM Arina Bonilla NP [facility]        Today's Diagnoses     Pancolitis (H)    -  1    Bilateral impacted cerumen           Follow-ups after your visit        Who to contact     If you have questions or need follow up information about today's clinic visit or your schedule please contact [facility] directly at [phone].  Normal or non-critical lab and imaging results will be communicated to you by Yushinohart, letter or phone within 4 business days after the clinic has received the results. If you do not hear from us within 7 days, please contact the clinic through Yushinohart or phone. If you have a critical or abnormal lab result, we will notify you by phone as soon as possible.  Submit refill requests through Graph[employer] or call your pharmacy and they will forward the refill request to us. Please allow 3 business days for your refill to be completed.          Additional Information About Your Visit        MyChart Information     GraphScience lets you send messages to your doctor, view your test results, renew your prescriptions, schedule appointments and more. To sign up, go to [email or web address] . Click on \"Log in\" on the left side of the screen, which will take you to the Welcome page. Then click on \"Sign up Now\" on the right side of the page.     You will be asked to enter the access code listed below, as well as some personal information. Please follow the directions to create your username and password.     Your access code is: [email or web address]  Expires: 2018  6:10 PM     Your access code will  in 90 days. If you need help or a new code, please call your [facility] or [phone].        Care EveryWhere ID     This is your Care EveryWhere " ID. This could be used by other organizations to access your [place] medical records  [id number]        Your Vitals Were     Pulse Temperature Respirations BMI (Body Mass Index)          78 97.7  F (36.5  C) (Oral) 16 21.59 kg/m2         Blood Pressure from Last 3 Encounters:   12/18/17 100/63   12/10/17 130/57   12/05/17 132/65    Weight from Last 3 Encounters:   12/18/17 125 lb 12.8 oz (57.1 kg)   12/05/17 133 lb 1.6 oz (60.4 kg)   12/04/17 134 lb (60.8 kg)              We Performed the Following     Basic metabolic panel     CBC with platelets        Primary Care Provider Office Phone # Fax #    Siobhan S STACY Pineda [phone] [address] [place] [address]        Equal Access to Services     [facility] : Hadii aad ku hadasho Soomaali, waaxda luqadaha, qaybta kaalmada adeegyada, waxephraim pratherin hayjulio sin . So [facility] [phone].    ATENCIÓN: Si habla español, tiene a gleason disposición servicios gratuitos de asistencia lingüística. Llame al [phone].    We comply with applicable federal civil rights laws and [place] laws. We do not discriminate on the basis of race, color, national origin, age, disability, sex, sexual orientation, or gender identity.            Thank you!     Thank you for choosing [facility]  for your care. Our goal is always to provide you with excellent care. Hearing back from our patients is one way we can continue to improve our services. Please take a few minutes to complete the written survey that you may receive in the mail after your visit with us. Thank you!             Your Updated Medication List - Protect others around you: Learn how to safely use, store and throw away your medicines at www.disposemymeds.org.          This list is accurate as of: 12/18/17  3:39 PM.  Always use your most recent med list.                   Brand Name Dispense Instructions for use Diagnosis    albuterol 108 (90 BASE)  MCG/ACT Inhaler    PROAIR HFA/PROVENTIL HFA/VENTOLIN HFA    1 Inhaler    Inhale 2 puffs into the lungs every 4 hours as needed for shortness of breath / dyspnea or wheezing    Routine general medical examination at a health care facility       amLODIPine 2.5 MG tablet    NORVASC    90 tablet    Take 1 tablet (2.5 mg) by mouth daily    Hypertension goal BP (blood pressure) < 140/90       aspirin 325 MG tablet      Take 325 mg by mouth daily        escitalopram 10 MG tablet    LEXAPRO    90 tablet    Take 1 tablet (10 mg) by mouth daily    Major depressive disorder, single episode, mild (H)       lisinopril-hydrochlorothiazide 20-25 MG per tablet    PRINZIDE/ZESTORETIC    90 tablet    Take 1 tablet by mouth daily    Hypertension goal BP (blood pressure) < 140/90       omeprazole 20 MG CR capsule    priLOSEC    14 capsule    Take 1 capsule (20 mg) by mouth daily    Constipation, unspecified constipation type       PROBIOTIC FORMULA Caps     30 capsule    Take 1 capsule by mouth daily . Floragen 3    Constipation, unspecified constipation type       VITAMIN D3 PO      Take 1,000 Units by mouth daily

## 2017-12-19 LAB
ANION GAP SERPL CALCULATED.3IONS-SCNC: 9 MMOL/L (ref 3–14)
BUN SERPL-MCNC: 29 MG/DL (ref 7–30)
CALCIUM SERPL-MCNC: 9.3 MG/DL (ref 8.5–10.1)
CHLORIDE SERPL-SCNC: 101 MMOL/L (ref 94–109)
CO2 SERPL-SCNC: 29 MMOL/L (ref 20–32)
CREAT SERPL-MCNC: 0.71 MG/DL (ref 0.52–1.04)
GFR SERPL CREATININE-BSD FRML MDRD: 80 ML/MIN/1.7M2
GLUCOSE SERPL-MCNC: 93 MG/DL (ref 70–99)
POTASSIUM SERPL-SCNC: 4.2 MMOL/L (ref 3.4–5.3)
SODIUM SERPL-SCNC: 139 MMOL/L (ref 133–144)

## 2017-12-21 ENCOUNTER — TELEPHONE (OUTPATIENT)
Dept: FAMILY MEDICINE | Facility: CLINIC | Age: 72
End: 2017-12-21

## 2017-12-21 DIAGNOSIS — D50.9 IRON DEFICIENCY ANEMIA, UNSPECIFIED IRON DEFICIENCY ANEMIA TYPE: Primary | ICD-10-CM

## 2018-01-08 DIAGNOSIS — K59.09 OTHER CONSTIPATION: Primary | ICD-10-CM

## 2018-01-08 DIAGNOSIS — K52.9 COLITIS: ICD-10-CM

## 2018-01-08 LAB
BASOPHILS # BLD AUTO: 0 10E9/L (ref 0–0.2)
BASOPHILS NFR BLD AUTO: 0.7 %
CRP SERPL-MCNC: <2.9 MG/L (ref 0–8)
DIFFERENTIAL METHOD BLD: ABNORMAL
EOSINOPHIL # BLD AUTO: 0.2 10E9/L (ref 0–0.7)
EOSINOPHIL NFR BLD AUTO: 3.8 %
ERYTHROCYTE [DISTWIDTH] IN BLOOD BY AUTOMATED COUNT: 13.6 % (ref 10–15)
HCT VFR BLD AUTO: 37.8 % (ref 35–47)
HGB BLD-MCNC: 11.6 G/DL (ref 11.7–15.7)
LYMPHOCYTES # BLD AUTO: 2.8 10E9/L (ref 0.8–5.3)
LYMPHOCYTES NFR BLD AUTO: 50.9 %
MCH RBC QN AUTO: 29.7 PG (ref 26.5–33)
MCHC RBC AUTO-ENTMCNC: 30.7 G/DL (ref 31.5–36.5)
MCV RBC AUTO: 97 FL (ref 78–100)
MONOCYTES # BLD AUTO: 0.5 10E9/L (ref 0–1.3)
MONOCYTES NFR BLD AUTO: 9.2 %
NEUTROPHILS # BLD AUTO: 1.9 10E9/L (ref 1.6–8.3)
NEUTROPHILS NFR BLD AUTO: 35.4 %
PLATELET # BLD AUTO: 228 10E9/L (ref 150–450)
RBC # BLD AUTO: 3.9 10E12/L (ref 3.8–5.2)
WBC # BLD AUTO: 5.5 10E9/L (ref 4–11)

## 2018-01-08 PROCEDURE — 36415 COLL VENOUS BLD VENIPUNCTURE: CPT | Performed by: FAMILY MEDICINE

## 2018-01-08 PROCEDURE — 80050 GENERAL HEALTH PANEL: CPT | Performed by: FAMILY MEDICINE

## 2018-01-08 PROCEDURE — 86140 C-REACTIVE PROTEIN: CPT | Performed by: FAMILY MEDICINE

## 2018-01-09 LAB
ALBUMIN SERPL-MCNC: 3.6 G/DL (ref 3.4–5)
ALP SERPL-CCNC: 61 U/L (ref 40–150)
ALT SERPL W P-5'-P-CCNC: 16 U/L (ref 0–50)
ANION GAP SERPL CALCULATED.3IONS-SCNC: 9 MMOL/L (ref 3–14)
AST SERPL W P-5'-P-CCNC: 17 U/L (ref 0–45)
BILIRUB SERPL-MCNC: 0.4 MG/DL (ref 0.2–1.3)
BUN SERPL-MCNC: 24 MG/DL (ref 7–30)
CALCIUM SERPL-MCNC: 9.1 MG/DL (ref 8.5–10.1)
CHLORIDE SERPL-SCNC: 103 MMOL/L (ref 94–109)
CO2 SERPL-SCNC: 30 MMOL/L (ref 20–32)
CREAT SERPL-MCNC: 0.59 MG/DL (ref 0.52–1.04)
GFR SERPL CREATININE-BSD FRML MDRD: >90 ML/MIN/1.7M2
GLUCOSE SERPL-MCNC: 97 MG/DL (ref 70–99)
POTASSIUM SERPL-SCNC: 3.6 MMOL/L (ref 3.4–5.3)
PROT SERPL-MCNC: 6.9 G/DL (ref 6.8–8.8)
SODIUM SERPL-SCNC: 142 MMOL/L (ref 133–144)
TSH SERPL DL<=0.005 MIU/L-ACNC: 0.41 MU/L (ref 0.4–4)

## 2018-02-12 ENCOUNTER — OFFICE VISIT (OUTPATIENT)
Dept: FAMILY MEDICINE | Facility: CLINIC | Age: 73
End: 2018-02-12
Payer: COMMERCIAL

## 2018-02-12 VITALS
TEMPERATURE: 96.7 F | HEIGHT: 64 IN | OXYGEN SATURATION: 98 % | WEIGHT: 125.5 LBS | DIASTOLIC BLOOD PRESSURE: 63 MMHG | BODY MASS INDEX: 21.43 KG/M2 | SYSTOLIC BLOOD PRESSURE: 110 MMHG | HEART RATE: 72 BPM | RESPIRATION RATE: 18 BRPM

## 2018-02-12 DIAGNOSIS — M75.01 ADHESIVE CAPSULITIS OF RIGHT SHOULDER: Primary | ICD-10-CM

## 2018-02-12 DIAGNOSIS — D33.3 ACOUSTIC NEUROMA (H): ICD-10-CM

## 2018-02-12 DIAGNOSIS — F32.0 MILD MAJOR DEPRESSION (H): ICD-10-CM

## 2018-02-12 PROBLEM — K51.00 PANCOLITIS (H): Status: RESOLVED | Noted: 2017-12-05 | Resolved: 2018-02-12

## 2018-02-12 PROCEDURE — 99214 OFFICE O/P EST MOD 30 MIN: CPT | Performed by: NURSE PRACTITIONER

## 2018-02-12 RX ORDER — POLYETHYLENE GLYCOL 3350 17 G/17G
1 POWDER, FOR SOLUTION ORAL DAILY
COMMUNITY
End: 2022-05-19

## 2018-02-12 NOTE — PATIENT INSTRUCTIONS
Frozen Shoulder (Adhesive Capsulitis)     Frozen shoulder is when pain and stiffness make it difficult to move your shoulder normally. This condition is also called adhesive capsulitis.  The shoulder is a ball-and-socket joint. The ball on the upper arm bone fits into a socket on the shoulder blade. The joint is covered by strong connective tissue called the shoulder capsule.  If the shoulder capsule becomes inflamed and swollen, movement is painful. Bands of scar tissue form on the joint s surface. This limits your shoulder's range of motion.  In most cases, only one shoulder at a time is affected. Some people may get frozen shoulder in the other shoulder, at a later time.  Frozen shoulder develops slowly in 3 stages. Each stage can last a few months or longer:  1. Painful stage. Pain occurs when raising your arm up or to the side, or when reaching behind your back. Your range of motion decreases. The pain may be worse at night and keep you from sleeping.  2. Frozen stage. Shoulder may be less painful, but it is stiffer. Range of motion is very limited.  3. Thawing stage. Range of motion starts to improve with treatment.  Experts don t know what causes frozen shoulder. It may occur after an injury such as a fracture. Or it may happen if the shoulder is immobile for a long time, such as after surgery. It may also be an autoimmune response. Risk factors include being over age 40, or having diabetes, heart disease, lung disease, or an overactive thyroid.  The diagnosis is made by physical exam and an X-ray of the shoulder joint. Sometimes an MRI is done to look for other causes.  Mild cases may be treated with a home exercise program and anti-inflammatory medicines. More severe cases require physical therapy. In some cases a steroid is shot, or injected, into the shoulder area. In hard to treat cases, forced movement of the shoulder is done while you are asleep under general anesthesia. This will break up scar  tissue and increase your range of motion. In rare cases, surgery may be needed to remove the scar tissue. Over time, most people with frozen shoulder get back nearly all range of motion without pain. Recovery may take a few months up to 1 year. You may still feel some stiffness.  Home care    If physical therapy was prescribed, keep up with any home exercise program you were given.    Keep using the affected shoulder and arm as much as possible.    You may use over-the-counter pain medicine to control pain, unless another pain medicine was prescribed. Anti-inflammatory pain medicines may work better than acetaminophen. If you have chronic liver or kidney disease or ever had a stomach ulcer or GI bleeding, talk with your healthcare provider before using these medicines.  Follow-up care  Follow up with your healthcare provider, or as advised. Or follow up sooner if pain increases or your shoulder motion decreases.  If X-rays or an MRI were done, you will be told of any new findings that may affect your care.  When to seek medical advice  Call your healthcare provider right away if any of these occur:    Your shoulder is red or swollen    Your arm feels weak or numb    Your shoulder movement decreases    Your shoulder pain increases even after using pain medicines  Date Last Reviewed: 11/24/2015 2000-2017 [employer]. [address] [place] [address], [place] [address]. All rights reserved. This information is not intended as a substitute for professional medical care. Always follow your healthcare professional's instructions.

## 2018-02-12 NOTE — MR AVS SNAPSHOT
After Visit Summary   2/12/2018    Lenora Hammonds    MRN: [id number]           Patient Information     Date Of Birth          1945        Visit Information        Provider Department      2/12/2018 10:30 AM Arina Bonilla NP [facility]        Today's Diagnoses     Adhesive capsulitis of right shoulder    -  1      Care Instructions      Frozen Shoulder (Adhesive Capsulitis)     Frozen shoulder is when pain and stiffness make it difficult to move your shoulder normally. This condition is also called adhesive capsulitis.  The shoulder is a ball-and-socket joint. The ball on the upper arm bone fits into a socket on the shoulder blade. The joint is covered by strong connective tissue called the shoulder capsule.  If the shoulder capsule becomes inflamed and swollen, movement is painful. Bands of scar tissue form on the joint s surface. This limits your shoulder's range of motion.  In most cases, only one shoulder at a time is affected. Some people may get frozen shoulder in the other shoulder, at a later time.  Frozen shoulder develops slowly in 3 stages. Each stage can last a few months or longer:  1. Painful stage. Pain occurs when raising your arm up or to the side, or when reaching behind your back. Your range of motion decreases. The pain may be worse at night and keep you from sleeping.  2. Frozen stage. Shoulder may be less painful, but it is stiffer. Range of motion is very limited.  3. Thawing stage. Range of motion starts to improve with treatment.  Experts don t know what causes frozen shoulder. It may occur after an injury such as a fracture. Or it may happen if the shoulder is immobile for a long time, such as after surgery. It may also be an autoimmune response. Risk factors include being over age 40, or having diabetes, heart disease, lung disease, or an overactive thyroid.  The diagnosis is made by physical exam and an X-ray of the shoulder joint. Sometimes an  MRI is done to look for other causes.  Mild cases may be treated with a home exercise program and anti-inflammatory medicines. More severe cases require physical therapy. In some cases a steroid is shot, or injected, into the shoulder area. In hard to treat cases, forced movement of the shoulder is done while you are asleep under general anesthesia. This will break up scar tissue and increase your range of motion. In rare cases, surgery may be needed to remove the scar tissue. Over time, most people with frozen shoulder get back nearly all range of motion without pain. Recovery may take a few months up to 1 year. You may still feel some stiffness.  Home care    If physical therapy was prescribed, keep up with any home exercise program you were given.    Keep using the affected shoulder and arm as much as possible.    You may use over-the-counter pain medicine to control pain, unless another pain medicine was prescribed. Anti-inflammatory pain medicines may work better than acetaminophen. If you have chronic liver or kidney disease or ever had a stomach ulcer or GI bleeding, talk with your healthcare provider before using these medicines.  Follow-up care  Follow up with your healthcare provider, or as advised. Or follow up sooner if pain increases or your shoulder motion decreases.  If X-rays or an MRI were done, you will be told of any new findings that may affect your care.  When to seek medical advice  Call your healthcare provider right away if any of these occur:    Your shoulder is red or swollen    Your arm feels weak or numb    Your shoulder movement decreases    Your shoulder pain increases even after using pain medicines  Date Last Reviewed: 11/24/2015 2000-2017 [employer]. [address] [place] [address], [place] [address]. All rights reserved. This information is not intended as a substitute for professional medical care. Always follow your healthcare professional's instructions.                 Follow-ups after your visit        Additional Services     [facility] PT, HAND, AND CHIROPRACTIC REFERRAL       **This order will print in the [facility] Scheduling Office**    Physical Therapy, Hand Therapy and Chiropractic Care are available through:    *[facility]  *[facility]  *[facility]    Call one number to schedule at any of the above locations: [phone].    Your provider has referred you to: Physical Therapy at [facility] or [facility]    Indication/Reason for Referral: Shoulder Pain  Onset of Illness:   Therapy Orders: Evaluate and Treat  Special Programs:   Special Request:     Sheryl Saucedo      Additional Comments for the Therapist or Chiropractor:     Please be aware that coverage of these services is subject to the terms and limitations of your health insurance plan.  Call member services at your health plan with any benefit or coverage questions.      Please bring the following to your appointment:    *Your personal calendar for scheduling future appointments  *Comfortable clothing            ORTHOPEDICS ADULT REFERRAL       Your provider has referred you to: [facility] [phone]   [email or web address] Dr. [email or web address]    Please be aware that coverage of these services is subject to the terms and limitations of your health insurance plan.  Call member services at your health plan with any benefit or coverage questions.      Please bring the following to your appointment:    >>   Any x-rays, CTs or MRIs which have been performed.  Contact the facility where they were done to arrange for  prior to your scheduled appointment.    >>   List of current medications   >>   This referral request   >>   Any documents/labs given to you for this referral                  Who to contact     If you have questions or need follow up information about today's clinic visit or your schedule please contact [facility]  [facility] directly at [phone].  Normal or non-critical lab and imaging results will be communicated to you by SandLinkshart, letter or phone within 4 business days after the clinic has received the results. If you do not hear from us within 7 days, please contact the clinic through SandLinkshart or phone. If you have a critical or abnormal lab result, we will notify you by phone as soon as possible.  Submit refill requests through [email or web address] or call your pharmacy and they will forward the refill request to us. Please allow 3 business days for your refill to be completed.          Additional Information About Your Visit        SandLinksharAvailigent Information     [email or web address] lets you send messages to your doctor, view your test results, renew your prescriptions, schedule appointments and more. To sign up, go to [email or web address] . Click on \"Log in\" on the left side of the screen, which will take you to the Welcome page. Then click on \"Sign up Now\" on the right side of the page.     You will be asked to enter the access code listed below, as well as some personal information. Please follow the directions to create your username and password.     Your access code is: [email or web address]  Expires: 2018 11:24 AM     Your access code will  in 90 days. If you need help or a new code, please call your [place] clinic or [phone].        Care EveryWhere ID     This is your Care EveryWhere ID. This could be used by other organizations to access your [place] medical records  [id number]        Your Vitals Were     Pulse Temperature Respirations Height Pulse Oximetry BMI (Body Mass Index)    72 96.7  F (35.9  C) (Tympanic) 18 5' 4\" (1.626 m) 98% 21.54 kg/m2       Blood Pressure from Last 3 Encounters:   18 110/63   17 100/63   12/10/17 130/57    Weight from Last 3 Encounters:   18 125 lb 8 oz (56.9 kg)   17 125 lb 12.8 oz (57.1 kg)   17 133 lb 1.6 oz (60.4 kg)              We Performed the Following     [facility] PT, " HAND, AND CHIROPRACTIC REFERRAL     ORTHOPEDICS ADULT REFERRAL        Primary Care Provider Office Phone # Fax #    STACY Castro LACIE [phone] [address] [place] [address]        Equal Access to Services     MELY MAYNARD : Hadii aad ku hadasho Soomaali, waaxda luqadaha, qaybta kaalmada adeegyada, waxay idiin hayaan adebradley khandrewjuvencio lakrystle morrison. So [facility] [phone].    ATENCIÓN: Si habla español, tiene a gleason disposición servicios gratuitos de asistencia lingüística. Llame al [phone].    We comply with applicable federal civil rights laws and [place] laws. We do not discriminate on the basis of race, color, national origin, age, disability, sex, sexual orientation, or gender identity.            Thank you!     Thank you for choosing [facility]  for your care. Our goal is always to provide you with excellent care. Hearing back from our patients is one way we can continue to improve our services. Please take a few minutes to complete the written survey that you may receive in the mail after your visit with us. Thank you!             Your Updated Medication List - Protect others around you: Learn how to safely use, store and throw away your medicines at www.disposemymeds.org.          This list is accurate as of 2/12/18 11:24 AM.  Always use your most recent med list.                   Brand Name Dispense Instructions for use Diagnosis    albuterol 108 (90 BASE) MCG/ACT Inhaler    PROAIR HFA/PROVENTIL HFA/VENTOLIN HFA    1 Inhaler    Inhale 2 puffs into the lungs every 4 hours as needed for shortness of breath / dyspnea or wheezing    Routine general medical examination at a health care facility       amLODIPine 2.5 MG tablet    NORVASC    90 tablet    Take 1 tablet (2.5 mg) by mouth daily    Hypertension goal BP (blood pressure) < 140/90       aspirin 325 MG tablet      Take 325 mg by mouth daily        escitalopram 10 MG tablet    LEXAPRO    90  tablet    Take 1 tablet (10 mg) by mouth daily    Major depressive disorder, single episode, mild (H)       lisinopril-hydrochlorothiazide 20-25 MG per tablet    PRINZIDE/ZESTORETIC    90 tablet    Take 1 tablet by mouth daily    Hypertension goal BP (blood pressure) < 140/90       MIRALAX powder   Generic drug:  polyethylene glycol      Take 1 capful by mouth daily        omeprazole 20 MG CR capsule    priLOSEC    14 capsule    Take 1 capsule (20 mg) by mouth daily    Constipation, unspecified constipation type       VITAMIN D3 PO      Take 1,000 Units by mouth daily

## 2018-02-12 NOTE — PROGRESS NOTES
"  SUBJECTIVE:   Lenora Hammonds is a 72 year old female who presents to clinic today for the following health issues:    Musculoskeletal problem/pain      Duration: at least a month     Description  Location: right shoulder     Intensity: severe     Accompanying signs and symptoms: none     History  Previous similar problem: no   Previous evaluation:  none    Precipitating or alleviating factors:  Trauma or overuse: no   Aggravating factors include: changing clothes, lifting, bending. Painful to put coat on.   Difficult to lift arm, get dressed    Therapies tried and outcome: heat and ice do not help    No pain when she doesn't move her arm.  No paresthesias.      On Lexapro for depression, which is in remission.    She has a history of an acoustic neuroma, follows with ENT and is monitored with brain MRIs.       Problem list and histories reviewed & adjusted, as indicated.  Additional history: as documented        Reviewed and updated as needed this visit by clinical staff       Reviewed and updated as needed this visit by Provider         ROS:  C: NEGATIVE for fever, chills, change in weight  R: NEGATIVE for significant cough or SOB  CV: NEGATIVE for chest pain, palpitations or peripheral edema  GI: NEGATIVE for nausea, abdominal pain, heartburn, or change in bowel habits  MUSCULOSKELETAL: see HPI  NEURO: NEGATIVE for dizziness or paresthesias    OBJECTIVE:     /63  Pulse 72  Temp 96.7  F (35.9  C) (Tympanic)  Resp 18  Ht 5' 4\" (1.626 m)  Wt 125 lb 8 oz (56.9 kg)  SpO2 98%  BMI 21.54 kg/m2  Body mass index is 21.54 kg/(m^2).  GENERAL: healthy, alert and no distress  MS: decreased ROM of the left shoulder  SKIN: no suspicious lesions or rashes  NEURO: Normal strength and tone, mentation intact and speech normal  PSYCH: mentation appears normal, affect normal/bright        ASSESSMENT/PLAN:             1. Adhesive capsulitis of right shoulder  Will refer to PT.  Will not start an NSAID due to history of " pancolitis and GERD.  Refer to ortho.   - [facility] PT, HAND, AND CHIROPRACTIC REFERRAL  - ORTHOPEDICS ADULT REFERRAL    2. Mild major depression (H)  In remission  The current medical regimen is effective;  continue present plan and medications.     3. Acoustic neuroma (H)  Follow up with ENT.           Arina Bonilla, SUDHEER  [facility]

## 2018-04-23 DIAGNOSIS — I10 HYPERTENSION GOAL BP (BLOOD PRESSURE) < 140/90: ICD-10-CM

## 2018-04-23 NOTE — TELEPHONE ENCOUNTER
"Requested Prescriptions   Pending Prescriptions Disp Refills     amLODIPine (NORVASC) 2.5 MG tablet [Pharmacy Med Name: AMLODIPINE BESYLATE 2.5MG TABS]  Last Written Prescription Date:  4/4/2017  Last Fill Quantity: 90 TABLET,  # refills: 3   Last Office Visit: 2/12/2018   Future Office Visit:      90 tablet 3     Sig: TAKE ONE TABLET BY MOUTH EVERY DAY    Calcium Channel Blockers Protocol  Passed    4/23/2018  8:26 AM       Passed - Blood pressure under 140/90 in past 12 months    BP Readings from Last 3 Encounters:   02/12/18 110/63   12/18/17 100/63   12/10/17 130/57          Passed - Recent (12 mo) or future (30 days) visit within the authorizing provider's specialty    Patient had office visit in the last 12 months or has a visit in the next 30 days with authorizing provider or within the authorizing provider's specialty.  See \"Patient Info\" tab in inbasket, or \"Choose Columns\" in Meds & Orders section of the refill encounter.           Passed - Patient is age 18 or older       Passed - No active pregnancy on record       Passed - Normal serum creatinine on file in past 12 months    Recent Labs   Lab Test  01/08/18   1435   CR  0.59          Passed - No positive pregnancy test in past 12 months          "

## 2018-04-25 RX ORDER — AMLODIPINE BESYLATE 2.5 MG/1
TABLET ORAL
Qty: 90 TABLET | Refills: 1 | Status: SHIPPED | OUTPATIENT
Start: 2018-04-25 | End: 2018-09-18

## 2018-04-25 NOTE — TELEPHONE ENCOUNTER
Prescription approved per [facility] Refill Protocol.  MARSHAL Palomares, BSN, RN

## 2018-05-02 ENCOUNTER — THERAPY VISIT (OUTPATIENT)
Dept: PHYSICAL THERAPY | Facility: CLINIC | Age: 73
End: 2018-05-02
Payer: COMMERCIAL

## 2018-05-02 DIAGNOSIS — M25.611 STIFFNESS OF RIGHT SHOULDER JOINT: ICD-10-CM

## 2018-05-02 DIAGNOSIS — M25.519 SHOULDER PAIN: Primary | ICD-10-CM

## 2018-05-02 PROCEDURE — 97161 PT EVAL LOW COMPLEX 20 MIN: CPT | Mod: GP | Performed by: PHYSICAL THERAPIST

## 2018-05-02 PROCEDURE — 97110 THERAPEUTIC EXERCISES: CPT | Mod: GP | Performed by: PHYSICAL THERAPIST

## 2018-05-02 NOTE — MR AVS SNAPSHOT
"              After Visit Summary   2018    Lenora Hammonds    MRN: [id number]           Patient Information     Date Of Birth          1945        Visit Information        Provider Department      2018 11:30 AM Elsa Levy PT [facility] Physical [facility]        Today's Diagnoses     Shoulder pain    -  1    Stiffness of right shoulder joint           Follow-ups after your visit        Who to contact     If you have questions or need follow up information about today's clinic visit or your schedule please contact [facility] ATHLETIC [facility] PHYSICAL [facility] directly at [phone].  Normal or non-critical lab and imaging results will be communicated to you by Funangahart, letter or phone within 4 business days after the clinic has received the results. If you do not hear from us within 7 days, please contact the clinic through Funangahart or phone. If you have a critical or abnormal lab result, we will notify you by phone as soon as possible.  Submit refill requests through [employer] or call your pharmacy and they will forward the refill request to us. Please allow 3 business days for your refill to be completed.          Additional Information About Your Visit        MyChart Information     [employer] lets you send messages to your doctor, view your test results, renew your prescriptions, schedule appointments and more. To sign up, go to [email or web address] . Click on \"Log in\" on the left side of the screen, which will take you to the Welcome page. Then click on \"Sign up Now\" on the right side of the page.     You will be asked to enter the access code listed below, as well as some personal information. Please follow the directions to create your username and password.     Your access code is: [email or web address]  Expires: 2018 12:24 PM     Your access code will  in 90 days. If you need help or a new code, please call your [place] clinic or " [phone].        Care EveryWhere ID     This is your Care EveryWhere ID. This could be used by other organizations to access your [place] medical records  [id number]         Blood Pressure from Last 3 Encounters:   02/12/18 110/63   12/18/17 100/63   12/10/17 130/57    Weight from Last 3 Encounters:   02/12/18 56.9 kg (125 lb 8 oz)   12/18/17 57.1 kg (125 lb 12.8 oz)   12/05/17 60.4 kg (133 lb 1.6 oz)              We Performed the Following     PARUL Inital Eval Report     PT Eval, Low Complexity ([id number])     Therapeutic Exercises        Primary Care Provider Office Phone # Fax #    Siobhan RAZO STACY Pineda [phone] [address] [place] [address]        Equal Access to Services     [facility] : Hadii aad ku hadasho Soomaali, waaxda luqadaha, qaybta kaalmada adeegyada, waxay crescencioin hayyuvaln oral sin . So [facility] [phone].    ATENCIÓN: Si habla español, tiene a gleason disposición servicios gratuitos de asistencia lingüística. Llame al [phone].    We comply with applicable federal civil rights laws and [place] laws. We do not discriminate on the basis of race, color, national origin, age, disability, sex, sexual orientation, or gender identity.            Thank you!     Thank you for choosing [facility] PH[facility]  for your care. Our goal is always to provide you with excellent care. Hearing back from our patients is one way we can continue to improve our services. Please take a few minutes to complete the written survey that you may receive in the mail after your visit with us. Thank you!             Your Updated Medication List - Protect others around you: Learn how to safely use, store and throw away your medicines at www.disposemymeds.org.          This list is accurate as of 5/2/18 12:15 PM.  Always use your most recent med list.                   Brand Name Dispense Instructions for use Diagnosis     albuterol 108 (90 Base) MCG/ACT Inhaler    PROAIR HFA/PROVENTIL HFA/VENTOLIN HFA    1 Inhaler    Inhale 2 puffs into the lungs every 4 hours as needed for shortness of breath / dyspnea or wheezing    Routine general medical examination at a health care facility       amLODIPine 2.5 MG tablet    NORVASC    90 tablet    TAKE ONE TABLET BY MOUTH EVERY DAY    Hypertension goal BP (blood pressure) < 140/90       aspirin 325 MG tablet      Take 325 mg by mouth daily        escitalopram 10 MG tablet    LEXAPRO    90 tablet    Take 1 tablet (10 mg) by mouth daily    Major depressive disorder, single episode, mild (H)       lisinopril-hydrochlorothiazide 20-25 MG per tablet    PRINZIDE/ZESTORETIC    90 tablet    Take 1 tablet by mouth daily    Hypertension goal BP (blood pressure) < 140/90       MIRALAX powder   Generic drug:  polyethylene glycol      Take 1 capful by mouth daily        omeprazole 20 MG CR capsule    priLOSEC    14 capsule    Take 1 capsule (20 mg) by mouth daily    Constipation, unspecified constipation type       VITAMIN D3 PO      Take 1,000 Units by mouth daily

## 2018-05-02 NOTE — PROGRESS NOTES
[place] [facility] Initial Evaluation  Subjective:  Patient is a 72 year old female presenting with rehab right shoulder hpi. The history is provided by the patient.   Lenora Hammonds is a 72 year old female with a right shoulder condition.  Condition occurred with:  Unknown cause.  Condition occurred: for unknown reasons.  This is a new condition  Date of orders 2/12/18. Adhesive capsulitis is the concern.        Social: L hand dominant, lives independently.  .    Patient reports pain:  Anterior.     and is intermittent and reported as 10/10.  Associated symptoms:  Loss of motion/stiffness.   Symptoms are exacerbated by using arm overhead, using arm behind back, using arm at shoulder level, lifting and carrying and relieved by ice and heat.  Since onset symptoms are gradually worsening.                                                      Objective:  Standing Alignment:      Shoulder/UE:  Elevated scapula R                                       Shoulder Evaluation:  ROM:  AROM:                        Flexion/External Rotation:  Left:  T1    Right:  Base of head  Extension/Internal Rotation:  Left:  T12    Right:  R buttock    PROM:    Flexion:  Right: 80      Abduction:  Right:  70  Adduction:  Right:  60 in supine 20 deg abd  Internal Rotation:  Right:  45 supine 20 deg abd                      Strength:  : make tests RC R shoulder intact, painful resisted shoulder flex.                        Special Tests:        Right shoulder negative for the following special tests:Rotator cuff tear  Palpation:      Right shoulder tenderness present at: Acrimioclavicular  Mobility Tests:      Glenohumeral posterior right:  Hypomobile  Glenohumeral inferior right:  Hypomobile      Scapulothoracic right:  Hypomobile                                       General     ROS    Assessment/Plan:    Patient is a 72 year old female with right side shoulder complaints.    Patient has the following significant findings with  corresponding treatment plan.                Diagnosis 1:  R shoulder pain consistent with adhesive capsulitis  Pain -  hot/cold therapy, manual therapy, self management, education and home program  Decreased ROM/flexibility - manual therapy, therapeutic exercise and home program  Decreased joint mobility - manual therapy, therapeutic exercise and home program  Decreased strength - therapeutic exercise, therapeutic activities and home program  Decreased proprioception - neuro re-education, therapeutic activities and home program  Impaired posture - neuro re-education, therapeutic activities and home program    Therapy Evaluation Codes:   1) History comprised of:   Personal factors that impact the plan of care:      Age, Gender, Living environment and Time since onset of symptoms.    Comorbidity factors that impact the plan of care are:      None.     Medications impacting care: None.  2) Examination of Body Systems comprised of:   Body structures and functions that impact the plan of care:      Cervical spine and Shoulder.   Activity limitations that impact the plan of care are:      Bathing, Cooking, Driving and Dressing.  3) Clinical presentation characteristics are:   Stable/Uncomplicated.  4) Decision-Making    Low complexity using standardized patient assessment instrument and/or measureable assessment of functional outcome.  Cumulative Therapy Evaluation is: Low complexity.    Previous and current functional limitations:  (See Goal Flow Sheet for this information)    Short term and Long term goals: (See Goal Flow Sheet for this information)     Communication ability:  Patient appears to be able to clearly communicate and understand verbal and written communication and follow directions correctly.  Treatment Explanation - The following has been discussed with the patient:   RX ordered/plan of care  Anticipated outcomes  Possible risks and side effects  This patient would benefit from PT intervention to resume  normal activities.   Rehab potential is excellent.    Frequency:  2 X week, once daily  Duration:  for 2 weeks tapering to 1 X a week over 6 weeks  Discharge Plan:  Achieve all LTG.  Independent in home treatment program.  Reach maximal therapeutic benefit.    Please refer to the daily flowsheet for treatment today, total treatment time and time spent performing 1:1 timed codes.

## 2018-06-07 DIAGNOSIS — F32.0 MAJOR DEPRESSIVE DISORDER, SINGLE EPISODE, MILD (H): ICD-10-CM

## 2018-06-07 NOTE — TELEPHONE ENCOUNTER
"Requested Prescriptions   Pending Prescriptions Disp Refills     escitalopram (LEXAPRO) 10 MG tablet [Pharmacy Med Name: ESCITALOPRAM OXALATE 10MG TABS]  Last Written Prescription Date:  4-4-17  Last Fill Quantity: 90 tab,  # refills: 3   Last office visit: 2/12/2018 with prescribing provider:  Siobhan Pineda    Future Office Visit:   90 tablet 3     Sig: TAKE ONE TABLET BY MOUTH EVERY DAY    SSRIs Protocol Failed    6/7/2018  9:00 AM       Failed - PHQ-9 score less than 5 in past 6 months    Please review last PHQ-9 score.   PHQ-9 SCORE 4/19/2016 4/4/2017 11/1/2017   Total Score - - -   Total Score 2 0 4     No flowsheet data found.       Passed - Patient is age 18 or older       Passed - No active pregnancy on record       Passed - No positive pregnancy test in last 12 months       Passed - Recent (6 mo) or future (30 days) visit within the authorizing provider's specialty    Patient had office visit in the last 6 months or has a visit in the next 30 days with authorizing provider or within the authorizing provider's specialty.  See \"Patient Info\" tab in inbasket, or \"Choose Columns\" in Meds & Orders section of the refill encounter.              "

## 2018-06-11 ENCOUNTER — TELEPHONE (OUTPATIENT)
Dept: FAMILY MEDICINE | Facility: CLINIC | Age: 73
End: 2018-06-11

## 2018-06-11 RX ORDER — ESCITALOPRAM OXALATE 10 MG/1
TABLET ORAL
Qty: 90 TABLET | Refills: 1 | Status: SHIPPED | OUTPATIENT
Start: 2018-06-11 | End: 2018-09-18

## 2018-06-12 ASSESSMENT — PATIENT HEALTH QUESTIONNAIRE - PHQ9: SUM OF ALL RESPONSES TO PHQ QUESTIONS 1-9: 1

## 2018-06-28 ENCOUNTER — HOSPITAL ENCOUNTER (OUTPATIENT)
Facility: CLINIC | Age: 73
Discharge: HOME | End: 2018-06-28
Attending: INTERNAL MEDICINE | Admitting: INTERNAL MEDICINE
Payer: MEDICARE

## 2018-06-28 ENCOUNTER — SURGERY (OUTPATIENT)
Age: 73
End: 2018-06-28

## 2018-06-28 VITALS
OXYGEN SATURATION: 96 % | BODY MASS INDEX: 20.49 KG/M2 | DIASTOLIC BLOOD PRESSURE: 65 MMHG | RESPIRATION RATE: 12 BRPM | WEIGHT: 120 LBS | SYSTOLIC BLOOD PRESSURE: 131 MMHG | HEIGHT: 64 IN

## 2018-06-28 LAB — COLONOSCOPY: NORMAL

## 2018-06-28 PROCEDURE — 88305 TISSUE EXAM BY PATHOLOGIST: CPT | Performed by: INTERNAL MEDICINE

## 2018-06-28 PROCEDURE — G0500 MOD SEDAT ENDO SERVICE >5YRS: HCPCS | Performed by: INTERNAL MEDICINE

## 2018-06-28 PROCEDURE — 25000128 H RX IP 250 OP 636: Performed by: INTERNAL MEDICINE

## 2018-06-28 PROCEDURE — 88305 TISSUE EXAM BY PATHOLOGIST: CPT | Mod: 26 | Performed by: INTERNAL MEDICINE

## 2018-06-28 PROCEDURE — 45380 COLONOSCOPY AND BIOPSY: CPT | Performed by: INTERNAL MEDICINE

## 2018-06-28 RX ORDER — FENTANYL CITRATE 50 UG/ML
INJECTION, SOLUTION INTRAMUSCULAR; INTRAVENOUS PRN
Status: DISCONTINUED | OUTPATIENT
Start: 2018-06-28 | End: 2018-06-28 | Stop reason: HOSPADM

## 2018-06-28 RX ORDER — ONDANSETRON 2 MG/ML
4 INJECTION INTRAMUSCULAR; INTRAVENOUS
Status: DISCONTINUED | OUTPATIENT
Start: 2018-06-28 | End: 2018-06-28 | Stop reason: HOSPADM

## 2018-06-28 RX ORDER — LIDOCAINE 40 MG/G
CREAM TOPICAL
Status: DISCONTINUED | OUTPATIENT
Start: 2018-06-28 | End: 2018-06-28 | Stop reason: HOSPADM

## 2018-06-28 RX ADMIN — FENTANYL CITRATE 100 MCG: 50 INJECTION, SOLUTION INTRAMUSCULAR; INTRAVENOUS at 10:58

## 2018-06-28 RX ADMIN — MIDAZOLAM 2 MG: 1 INJECTION INTRAMUSCULAR; INTRAVENOUS at 11:03

## 2018-06-28 RX ADMIN — SODIUM CHLORIDE 500 ML: 9 INJECTION, SOLUTION INTRAVENOUS at 10:34

## 2018-06-28 RX ADMIN — MIDAZOLAM 2 MG: 1 INJECTION INTRAMUSCULAR; INTRAVENOUS at 10:56

## 2018-06-29 LAB — COPATH REPORT: NORMAL

## 2018-07-19 ENCOUNTER — HOSPITAL ENCOUNTER (OUTPATIENT)
Dept: CT IMAGING | Facility: CLINIC | Age: 73
Discharge: HOME | End: 2018-07-19
Attending: INTERNAL MEDICINE | Admitting: INTERNAL MEDICINE
Payer: MEDICARE

## 2018-07-19 DIAGNOSIS — K55.9 ISCHEMIC COLITIS (H): ICD-10-CM

## 2018-07-19 LAB
CREAT BLD-MCNC: 0.8 MG/DL (ref 0.52–1.04)
GFR SERPL CREATININE-BSD FRML MDRD: 70 ML/MIN/1.7M2

## 2018-07-19 PROCEDURE — 25000125 ZZHC RX 250: Performed by: INTERNAL MEDICINE

## 2018-07-19 PROCEDURE — 74175 CTA ABDOMEN W/CONTRAST: CPT

## 2018-07-19 PROCEDURE — 82565 ASSAY OF CREATININE: CPT

## 2018-07-19 PROCEDURE — 25000128 H RX IP 250 OP 636: Performed by: INTERNAL MEDICINE

## 2018-07-19 RX ORDER — IOPAMIDOL 755 MG/ML
80 INJECTION, SOLUTION INTRAVASCULAR ONCE
Status: COMPLETED | OUTPATIENT
Start: 2018-07-19 | End: 2018-07-19

## 2018-07-19 RX ADMIN — IOPAMIDOL 80 ML: 755 INJECTION, SOLUTION INTRAVENOUS at 12:06

## 2018-07-19 RX ADMIN — SODIUM CHLORIDE, PRESERVATIVE FREE 80 ML: 5 INJECTION INTRAVENOUS at 12:06

## 2018-09-18 ENCOUNTER — OFFICE VISIT (OUTPATIENT)
Dept: FAMILY MEDICINE | Facility: CLINIC | Age: 73
End: 2018-09-18
Payer: COMMERCIAL

## 2018-09-18 VITALS
BODY MASS INDEX: 20.83 KG/M2 | SYSTOLIC BLOOD PRESSURE: 105 MMHG | OXYGEN SATURATION: 98 % | TEMPERATURE: 99 F | WEIGHT: 122 LBS | HEART RATE: 79 BPM | RESPIRATION RATE: 20 BRPM | DIASTOLIC BLOOD PRESSURE: 59 MMHG | HEIGHT: 64 IN

## 2018-09-18 DIAGNOSIS — H61.22 IMPACTED CERUMEN OF LEFT EAR: ICD-10-CM

## 2018-09-18 DIAGNOSIS — M85.80 OSTEOPENIA, UNSPECIFIED LOCATION: ICD-10-CM

## 2018-09-18 DIAGNOSIS — K55.9 ISCHEMIC COLITIS (H): ICD-10-CM

## 2018-09-18 DIAGNOSIS — Z13.6 CARDIOVASCULAR SCREENING; LDL GOAL LESS THAN 160: ICD-10-CM

## 2018-09-18 DIAGNOSIS — Z00.00 MEDICARE ANNUAL WELLNESS VISIT, SUBSEQUENT: Primary | ICD-10-CM

## 2018-09-18 DIAGNOSIS — D33.3 ACOUSTIC NEUROMA (H): ICD-10-CM

## 2018-09-18 DIAGNOSIS — I10 HYPERTENSION GOAL BP (BLOOD PRESSURE) < 140/90: ICD-10-CM

## 2018-09-18 DIAGNOSIS — F32.0 MAJOR DEPRESSIVE DISORDER, SINGLE EPISODE, MILD (H): ICD-10-CM

## 2018-09-18 LAB
CREAT UR-MCNC: 112 MG/DL
MICROALBUMIN UR-MCNC: 12 MG/L
MICROALBUMIN/CREAT UR: 10.62 MG/G CR (ref 0–25)

## 2018-09-18 PROCEDURE — 80061 LIPID PANEL: CPT | Performed by: NURSE PRACTITIONER

## 2018-09-18 PROCEDURE — 80048 BASIC METABOLIC PNL TOTAL CA: CPT | Performed by: NURSE PRACTITIONER

## 2018-09-18 PROCEDURE — 82043 UR ALBUMIN QUANTITATIVE: CPT | Performed by: NURSE PRACTITIONER

## 2018-09-18 PROCEDURE — G0439 PPPS, SUBSEQ VISIT: HCPCS | Mod: 25 | Performed by: NURSE PRACTITIONER

## 2018-09-18 PROCEDURE — 36415 COLL VENOUS BLD VENIPUNCTURE: CPT | Performed by: NURSE PRACTITIONER

## 2018-09-18 PROCEDURE — 69209 REMOVE IMPACTED EAR WAX UNI: CPT | Mod: LT | Performed by: NURSE PRACTITIONER

## 2018-09-18 RX ORDER — ALBUTEROL SULFATE 90 UG/1
1-2 AEROSOL, METERED RESPIRATORY (INHALATION) EVERY 4 HOURS PRN
Qty: 1 INHALER | Refills: 5 | Status: SHIPPED | OUTPATIENT
Start: 2018-09-18 | End: 2021-10-04

## 2018-09-18 RX ORDER — LISINOPRIL AND HYDROCHLOROTHIAZIDE 20; 25 MG/1; MG/1
1 TABLET ORAL DAILY
Qty: 90 TABLET | Refills: 3 | Status: SHIPPED | OUTPATIENT
Start: 2018-09-18 | End: 2019-09-17

## 2018-09-18 RX ORDER — AMLODIPINE BESYLATE 2.5 MG/1
2.5 TABLET ORAL DAILY
Qty: 90 TABLET | Refills: 3 | Status: SHIPPED | OUTPATIENT
Start: 2018-09-18 | End: 2019-09-17

## 2018-09-18 RX ORDER — ESCITALOPRAM OXALATE 10 MG/1
10 TABLET ORAL DAILY
Qty: 90 TABLET | Refills: 3 | Status: SHIPPED | OUTPATIENT
Start: 2018-09-18 | End: 2019-09-17

## 2018-09-18 ASSESSMENT — ACTIVITIES OF DAILY LIVING (ADL)
I_NEED_ASSISTANCE_FOR_THE_FOLLOWING_DAILY_ACTIVITIES:: NO ASSISTANCE IS NEEDED
CURRENT_FUNCTION: NO ASSISTANCE NEEDED

## 2018-09-18 NOTE — PROGRESS NOTES
SUBJECTIVE:   Lenora Hammonds is a 72 year old female who presents for Preventive Visit.    Are you in the first 12 months of your Medicare coverage?  No    Physical   Annual:     Getting at least 3 servings of Calcium per day:  Yes    Bi-annual eye exam:  NO    Dental care twice a year:  Yes    Sleep apnea or symptoms of sleep apnea:  None    Taking medications regularly:  Yes    Medication side effects:  None    Additional concerns today:  No    Ability to successfully perform activities of daily living: no assistance needed    Home Safety:  No safety concerns identified    Hearing Impairment: need to ask people to speak up or repeat themselves      Hearing Acuity: Pass, subjectively normal    Hearing Assessment: normal right. 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  Ability to successfully perform activities of daily living: Yes, no assistance needed  Home safety:  none identified   Hearing impairment:   Fall risk:     click delete button to remove this line now  COGNITIVE SCREEN  1) Repeat 3 items (Leader, Season, Table)    2) Clock draw: NORMAL  3) 3 item recall: Recalls 3 objects  Results: 3 items recalled: COGNITIVE IMPAIRMENT LESS LIKELY    Mini-CogTM Copyright DUNG Villarreal. Licensed by the author for use in [facility]; reprinted with permission (soob@[facility]). All rights reserved.        Reviewed and updated as needed this visit by clinical staff         Reviewed and updated as needed this visit by Provider        Social History   Substance Use Topics     Smoking status: Former Smoker     Packs/day: 0.30     Years: 50.00     Types: Cigarettes     Smokeless tobacco: Never Used      Comment: quit 12/2014     Alcohol use Yes      Comment: occasionally        Alcohol Use 9/18/2018   If you drink alcohol do you typically have greater than 3 drinks per day OR greater than 7 drinks per week? Not Applicable   No flowsheet data found.          Today's PHQ-2 Score:   PHQ-2 ( 1999 [employer]) 9/18/2018   Q1: Little  interest or pleasure in doing things 0   Q2: Feeling down, depressed or hopeless 1   PHQ-2 Score 1   Q1: Little interest or pleasure in doing things Not at all   Q2: Feeling down, depressed or hopeless Several days   PHQ-2 Score 1       Do you feel safe in your environment - Yes    Do you have a Health Care Directive?: No: Advance care planning reviewed with patient; information given to patient to review.    Current providers sharing in care for this patient include:   Patient Care Team:  Siobhan Pineda APRN CNP as PCP - General    The following health maintenance items are reviewed in Epic and correct as of today:  Health Maintenance   Topic Date Due     ADVANCE DIRECTIVE PLANNING Q5 YRS  08/15/2017     DEPRESSION ACTION PLAN Q1 YR  04/04/2018     TETANUS IMMUNIZATION (SYSTEM ASSIGNED)  11/17/2018     PHQ-9 Q6 MONTHS  12/11/2018     FALL RISK ASSESSMENT  02/12/2019     MAMMO SCREEN Q2 YR (SYSTEM ASSIGNED)  11/06/2019     LIPID SCREEN Q5 YR FEMALE (SYSTEM ASSIGNED)  04/04/2022     COLONOSCOPY Q10 YR  06/28/2028     DEXA SCAN SCREENING (SYSTEM ASSIGNED)  Completed     PNEUMOCOCCAL  Completed     INFLUENZA VACCINE  Completed     Labs reviewed in EPIC  BP Readings from Last 3 Encounters:   09/18/18 105/59   06/28/18 131/65   02/12/18 110/63    Wt Readings from Last 3 Encounters:   09/18/18 122 lb (55.3 kg)   06/28/18 120 lb (54.4 kg)   02/12/18 125 lb 8 oz (56.9 kg)                  Patient Active Problem List   Diagnosis     Tremor     CARDIOVASCULAR SCREENING; LDL GOAL LESS THAN 160     Mild major depression (H)     Dyspepsia     Wears glasses     Urinary incontinence     Snores     Histoplasmosis     Advanced directives, counseling/discussion     Neoplasm of uncertain behavior of skin     Epigastric pain     Cervical pain     Benign neoplasm of cranial nerve (H)     Acoustic neuroma (H)     Hypertension goal BP (blood pressure) < 140/90     Acute bilateral low back pain without sciatica     Osteopenia      Pulmonary nodule, right     Stiffness of right shoulder joint     Shoulder pain     Past Surgical History:   Procedure Laterality Date     C THORACOTOMY,MAJOR,EXPLOR/BIOPSY      histoplasmosis, right lower lobe     COLONOSCOPY N/A 2017    Procedure: COMBINED COLONOSCOPY, SINGLE OR MULTIPLE BIOPSY/POLYPECTOMY BY BIOPSY;  colonoscopy;  Surgeon: Moise Vásquez MD;  Location:  GI     COLONOSCOPY N/A 2018    Procedure: COMBINED COLONOSCOPY, SINGLE OR MULTIPLE BIOPSY/POLYPECTOMY BY BIOPSY;  COLONOSCOPY;  Surgeon: Moise Vásquez MD;  Location:  GI     THORACIC SURGERY       TONSILLECTOMY         Social History   Substance Use Topics     Smoking status: Former Smoker     Packs/day: 0.30     Years: 50.00     Types: Cigarettes     Smokeless tobacco: Never Used      Comment: quit 2014     Alcohol use Yes      Comment: occasionally      Family History   Problem Relation Age of Onset     Hypertension Mother       at age 84     Neurologic Disorder Mother      ?parkinsonism - she had a tremor; walked normally. voice was soft     Cancer Father      brain tumor;  at age 62 y rs     Cancer Sister      breast cancer     Cancer Brother      bladder cancer;          Current Outpatient Prescriptions   Medication Sig Dispense Refill     albuterol (PROAIR HFA/PROVENTIL HFA/VENTOLIN HFA) 108 (90 BASE) MCG/ACT Inhaler Inhale 2 puffs into the lungs every 4 hours as needed for shortness of breath / dyspnea or wheezing 1 Inhaler 5     amLODIPine (NORVASC) 2.5 MG tablet TAKE ONE TABLET BY MOUTH EVERY DAY 90 tablet 1     aspirin 325 MG tablet Take 325 mg by mouth daily       Cholecalciferol (VITAMIN D3 PO) Take 1,000 Units by mouth daily       escitalopram (LEXAPRO) 10 MG tablet TAKE ONE TABLET BY MOUTH EVERY DAY 90 tablet 1     lisinopril-hydrochlorothiazide (PRINZIDE/ZESTORETIC) 20-25 MG per tablet TAKE ONE TABLET BY MOUTH EVERY DAY 90 tablet 2     omeprazole (PRILOSEC) 20 MG CR capsule  "Take 1 capsule (20 mg) by mouth daily 14 capsule 0     polyethylene glycol (MIRALAX) powder Take 1 capful by mouth daily       No Known Allergies  Recent Labs   Lab Test  07/19/18   1200  01/08/18   1435  12/18/17   1530   12/07/17   0555   12/04/17   2026  04/04/17   1135   10/13/14   1352   06/15/11   1047   LDL   --    --    --    --    --    --    --   122*   --   106   --   120   HDL   --    --    --    --    --    --    --   60   --   57   --   57   TRIG   --    --    --    --    --    --    --   63   --   95   --   82   ALT   --   16   --    --   10   --   19  14   --   22   < >   --    CR   --   0.59  0.71   < >  0.53   < >  1.01  0.65   < >  0.59   < >   --    GFRESTIMATED  70  >90  80   < >  >90   < >  54*  89   < >  >90  Non  GFR Calc     < >   --    GFRESTBLACK  85  >90  >90   < >  >90   < >  65  >90   GFR Calc     < >  >90   GFR Calc     < >   --    POTASSIUM   --   3.6  4.2   < >  3.1*   < >  3.7  4.0   < >  3.6   < >   --    TSH   --   0.41   --    --    --    --    --   0.42   --    --    --   0.67    < > = values in this interval not displayed.          December 2017:  Hospitalized for one week with colitis.  \"My stomach is not felt the same since.\"  Recent episode   Dr. Vásquez.  Trying to eat healthy.      Acoustic neuroma:  Has been treated.  \"I go back to see them in 2 years.\"  Deaf in left ear.      Mood:  Has generally has been good.  For the last 2 weeks her mood is low (she doesn't know why).  She increased her lexapro to 20mg per day and it has helped.  She will go back down to 10mg per day now.    Osteopenia:  Last dexa 1 year ago.  Drinking 1 glass of milk per day.  She also gets calcium and is taking her vitamin D.      Review of Systems  CONSTITUTIONAL: NEGATIVE for fever, chills, change in weight  INTEGUMENTARY/SKIN: NEGATIVE for worrisome rashes, moles or lesions  EYES: NEGATIVE for vision changes or irritation  ENT/MOUTH: NEGATIVE for " "ear, mouth and throat problems  RESP: NEGATIVE for significant cough or SOB  BREAST: NEGATIVE for masses, tenderness or discharge  CV: NEGATIVE for chest pain, palpitations or peripheral edema  GI: see HPI  : NEGATIVE for frequency, dysuria, or hematuria  MUSCULOSKELETAL: NEGATIVE for significant arthralgias or myalgia  NEURO: NEGATIVE for weakness, dizziness or paresthesias  ENDOCRINE: NEGATIVE for temperature intolerance, skin/hair changes  HEME: NEGATIVE for bleeding problems  PSYCHIATRIC: NEGATIVE for changes in mood or affect    OBJECTIVE:   There were no vitals taken for this visit. Estimated body mass index is 20.6 kg/(m^2) as calculated from the following:    Height as of 6/28/18: 5' 4\" (1.626 m).    Weight as of 6/28/18: 120 lb (54.4 kg).  Physical Exam  GENERAL: healthy, alert and no distress  EYES: Eyes grossly normal to inspection, PERRL and conjunctivae and sclerae normal  HENT: ear canals and TM's normal, nose and mouth without ulcers or lesions  NECK: no adenopathy, no asymmetry, masses, or scars and thyroid normal to palpation  RESP: lungs clear to auscultation - no rales, rhonchi or wheezes  CV: regular rate and rhythm, normal S1 S2, no S3 or S4, no murmur, click or rub, no peripheral edema and peripheral pulses strong  ABDOMEN: soft, nontender, no hepatosplenomegaly, no masses and bowel sounds normal  MS: no gross musculoskeletal defects noted, no edema  SKIN: no suspicious lesions or rashes  NEURO: Normal strength and tone, mentation intact and speech normal  PSYCH: mentation appears normal, affect normal/bright    Diagnostic Test Results:  Labs pending     ASSESSMENT / PLAN:     (Z00.00) Medicare annual wellness visit, subsequent  (primary encounter diagnosis)  Comment:   Plan: albuterol (PROAIR HFA/PROVENTIL HFA/VENTOLIN         HFA) 108 (90 Base) MCG/ACT inhaler, Lipid panel        reflex to direct LDL Fasting, Basic metabolic         panel, Albumin Random Urine Quantitative with         " "Creat Ratio, DERMATOLOGY REFERRAL            (F32.0) Major depressive disorder, single episode, mild (H)  Comment: stable  Plan: escitalopram (LEXAPRO) 10 MG tablet        Refills given    (D33.3) Acoustic neuroma (H)  Comment: History of  Plan: Continue care with specialty      (K55.9) Ischemic colitis (H)  Comment: History of  Plan: Continue care with GI.      (I10) Hypertension goal BP (blood pressure) < 140/90  Comment: Stable  Plan: lisinopril-hydrochlorothiazide         (PRINZIDE/ZESTORETIC) 20-25 MG per tablet,         amLODIPine (NORVASC) 2.5 MG tablet, Basic         metabolic panel, Albumin Random Urine         Quantitative with Creat Ratio        Labs pending.  Refills given.  Yearly check ends for refills, sooner problems.      (M85.80) Osteopenia, unspecified location  Comment:   Plan: Continue calcium and vitamin D.  Plan next DEXA scan in 2019.    (Z13.6) CARDIOVASCULAR SCREENING; LDL GOAL LESS THAN 160  Comment:   Plan: Lipid panel reflex to direct LDL Fasting            (H61.22) Impacted cerumen of left ear  Comment:   Plan: HC REMOVAL IMPACTED CERUMEN IRRIGATION/LVG         UNILAT        Per MA          End of Life Planning:      COUNSELING:  Reviewed preventive health counseling, as reflected in patient instructions    BP Readings from Last 1 Encounters:   06/28/18 131/65     Estimated body mass index is 20.6 kg/(m^2) as calculated from the following:    Height as of 6/28/18: 5' 4\" (1.626 m).    Weight as of 6/28/18: 120 lb (54.4 kg).         reports that she has quit smoking. Her smoking use included Cigarettes. She has a 15.00 pack-year smoking history. She has never used smokeless tobacco.      Appropriate preventive services were discussed with this patient, including applicable screening as appropriate for cardiovascular disease, diabetes, osteopenia/osteoporosis, and glaucoma.  As appropriate for age/gender, discussed screening for colorectal cancer, prostate cancer, breast cancer, and " cervical cancer. Checklist reviewing preventive services available has been given to the patient.    Reviewed patients plan of care and provided an AVS. The Basic Care Plan (routine screening as documented in Health Maintenance) for Lenora meets the Care Plan requirement. This Care Plan has been established and reviewed with the Patient.    Counseling Resources:  ATP IV Guidelines  Pooled Cohorts Equation Calculator  Breast Cancer Risk Calculator  FRAX Risk Assessment  ICSI Preventive Guidelines  Dietary Guidelines for Americans, 2010  USDA's MyPlate  ASA Prophylaxis  Lung CA Screening    STACY Forman [facility]  Answers for HPI/ROS submitted by the patient on 9/18/2018   PHQ-2 Score: 1

## 2018-09-18 NOTE — LETTER
My Depression Action Plan  Name: Lenora Hammonds   Date of Birth 1945  Date: 9/18/2018    My doctor: Siobhan Pineda   My clinic: [address] [address]  [phone]          GREEN    ZONE   Good Control    What it looks like:     Things are going generally well. You have normal up s and down s. You may even feel depressed from time to time, but bad moods usually last less than a day.   What you need to do:  1. Continue to care for yourself (see self care plan)  2. Check your depression survival kit and update it as needed  3. Follow your physician s recommendations including any medication.  4. Do not stop taking medication unless you consult with your physician first.           YELLOW         ZONE Getting Worse    What it looks like:     Depression is starting to interfere with your life.     It may be hard to get out of bed; you may be starting to isolate yourself from others.    Symptoms of depression are starting to last most all day and this has happened for several days.     You may have suicidal thoughts but they are not constant.   What you need to do:     1. Call your care team, your response to treatment will improve if you keep your care team informed of your progress. Yellow periods are signs an adjustment may need to be made.     2. Continue your self-care, even if you have to fake it!    3. Talk to someone in your support network    4. Open up your depression survival kit           RED    ZONE Medical Alert - Get Help    What it looks like:     Depression is seriously interfering with your life.     You may experience these or other symptoms: You can t get out of bed most days, can t work or engage in other necessary activities, you have trouble taking care of basic hygiene, or basic responsibilities, thoughts of suicide or death that will not go away, self-injurious behavior.     What you need to do:  1. Call your care team  and request a same-day appointment. If they are not available (weekends or after hours) call your local crisis line, emergency room or 911.            Depression Self Care Plan / Survival Kit    Self-Care for Depression  Here s the deal. Your body and mind are really not as separate as most people think.  What you do and think affects how you feel and how you feel influences what you do and think. This means if you do things that people who feel good do, it will help you feel better.  Sometimes this is all it takes.  There is also a place for medication and therapy depending on how severe your depression is, so be sure to consult with your medical provider and/ or Behavioral Health Consultant if your symptoms are worsening or not improving.     In order to better manage my stress, I will:    Exercise  Get some form of exercise, every day. This will help reduce pain and release endorphins, the  feel good  chemicals in your brain. This is almost as good as taking antidepressants!  This is not the same as joining a gym and then never going! (they count on that by the way ) It can be as simple as just going for a walk or doing some gardening, anything that will get you moving.      Hygiene   Maintain good hygiene (Get out of bed in the morning, Make your bed, Brush your teeth, Take a shower, and Get dressed like you were going to work, even if you are unemployed).  If your clothes don't fit try to get ones that do.    Diet  I will strive to eat foods that are good for me, drink plenty of water, and avoid excessive sugar, caffeine, alcohol, and other mood-altering substances.  Some foods that are helpful in depression are: complex carbohydrates, B vitamins, flaxseed, fish or fish oil, fresh fruits and vegetables.    Psychotherapy  I agree to participate in Individual Therapy (if recommended).    Medication  If prescribed medications, I agree to take them.  Missing doses can result in serious side effects.  I understand  that drinking alcohol, or other illicit drug use, may cause potential side effects.  I will not stop my medication abruptly without first discussing it with my provider.    Staying Connected With Others  I will stay in touch with my friends, family members, and my primary care provider/team.    Use your imagination  Be creative.  We all have a creative side; it doesn t matter if it s oil painting, sand castles, or mud pies! This will also kick up the endorphins.    Witness Beauty  (AKA stop and smell the roses) Take a look outside, even in mid-winter. Notice colors, textures. Watch the squirrels and birds.     Service to others  Be of service to others.  There is always someone else in need.  By helping others we can  get out of ourselves  and remember the really important things.  This also provides opportunities for practicing all the other parts of the program.    Humor  Laugh and be silly!  Adjust your TV habits for less news and crime-drama and more comedy.    Control your stress  Try breathing deep, massage therapy, biofeedback, and meditation. Find time to relax each day.     My support system    Clinic Contact:  Phone number:    Contact 1:  Phone number:    Contact 2:  Phone number:    Mu-ism/:  Phone number:    Therapist:  Phone number:    Local crisis center:    Phone number:    Other community support:  Phone number:

## 2018-09-18 NOTE — NURSING NOTE
Lenora Hammonds is a 72 year old female who presents in clinic with complaint of impacted ear wax (cerumen).  Per the order of JOHNATHAN villalobos  , ear wax was removed from left side  by flushing with warm water and colace solution and manual debridement has not been performed. Patient denies pain/dizziness/discharge/drainage  (if yes, stop procedure and huddle with provider).  Ear wax has been successfully removed. (If not, huddle with provider).   Jermaine Blanton

## 2018-09-18 NOTE — LETTER
September 20, 2018      Lenora Reinosoradha  [address] [facility] [address]        Dear ,    We are writing to inform you of your test results.    This note is to let you know that all of your lab test results are looking great.  Let me know if you have any questions, otherwise I will plan to recheck these in 1 year.    Resulted Orders   Lipid panel reflex to direct LDL Fasting   Result Value Ref Range    Cholesterol 184 <200 mg/dL    Triglycerides 74 <150 mg/dL      Comment:      Fasting specimen    HDL Cholesterol 59 >49 mg/dL    LDL Cholesterol Calculated 110 (H) <100 mg/dL      Comment:      Above desirable:  100-129 mg/dl  Borderline High:  130-159 mg/dL  High:             160-189 mg/dL  Very high:       >189 mg/dl      Non HDL Cholesterol 125 <130 mg/dL   Basic metabolic panel   Result Value Ref Range    Sodium 139 133 - 144 mmol/L    Potassium 3.6 3.4 - 5.3 mmol/L    Chloride 101 94 - 109 mmol/L    Carbon Dioxide 33 (H) 20 - 32 mmol/L    Anion Gap 5 3 - 14 mmol/L    Glucose 91 70 - 99 mg/dL      Comment:      Fasting specimen    Urea Nitrogen 26 7 - 30 mg/dL    Creatinine 0.66 0.52 - 1.04 mg/dL    GFR Estimate 87 >60 mL/min/1.7m2      Comment:      Non  GFR Calc    GFR Estimate If Black >90 >60 mL/min/1.7m2      Comment:       GFR Calc    Calcium 9.4 8.5 - 10.1 mg/dL   Albumin Random Urine Quantitative with Creat Ratio   Result Value Ref Range    Creatinine Urine 112 mg/dL    Albumin Urine mg/L 12 mg/L    Albumin Urine mg/g Cr 10.62 0 - 25 mg/g Cr       If you have any questions or concerns, please call the clinic at the number listed above.       Sincerely,        Siobhan Pineda, APRN CNP/nr

## 2018-09-19 LAB
ANION GAP SERPL CALCULATED.3IONS-SCNC: 5 MMOL/L (ref 3–14)
BUN SERPL-MCNC: 26 MG/DL (ref 7–30)
CALCIUM SERPL-MCNC: 9.4 MG/DL (ref 8.5–10.1)
CHLORIDE SERPL-SCNC: 101 MMOL/L (ref 94–109)
CHOLEST SERPL-MCNC: 184 MG/DL
CO2 SERPL-SCNC: 33 MMOL/L (ref 20–32)
CREAT SERPL-MCNC: 0.66 MG/DL (ref 0.52–1.04)
GFR SERPL CREATININE-BSD FRML MDRD: 87 ML/MIN/1.7M2
GLUCOSE SERPL-MCNC: 91 MG/DL (ref 70–99)
HDLC SERPL-MCNC: 59 MG/DL
LDLC SERPL CALC-MCNC: 110 MG/DL
NONHDLC SERPL-MCNC: 125 MG/DL
POTASSIUM SERPL-SCNC: 3.6 MMOL/L (ref 3.4–5.3)
SODIUM SERPL-SCNC: 139 MMOL/L (ref 133–144)
TRIGL SERPL-MCNC: 74 MG/DL

## 2018-10-15 ENCOUNTER — OFFICE VISIT (OUTPATIENT)
Dept: DERMATOLOGY | Facility: CLINIC | Age: 73
End: 2018-10-15
Payer: COMMERCIAL

## 2018-10-15 VITALS — HEART RATE: 74 BPM | DIASTOLIC BLOOD PRESSURE: 62 MMHG | OXYGEN SATURATION: 96 % | SYSTOLIC BLOOD PRESSURE: 107 MMHG

## 2018-10-15 DIAGNOSIS — D18.01 CHERRY ANGIOMA: ICD-10-CM

## 2018-10-15 DIAGNOSIS — L81.4 LENTIGINES: ICD-10-CM

## 2018-10-15 DIAGNOSIS — D22.9 MULTIPLE NEVI: ICD-10-CM

## 2018-10-15 DIAGNOSIS — L82.1 SEBORRHEIC KERATOSES: ICD-10-CM

## 2018-10-15 DIAGNOSIS — L82.0 INFLAMED SEBORRHEIC KERATOSIS: ICD-10-CM

## 2018-10-15 DIAGNOSIS — R23.8 VENOUS LAKE OF LIP: Primary | ICD-10-CM

## 2018-10-15 PROCEDURE — 99214 OFFICE O/P EST MOD 30 MIN: CPT | Mod: 25 | Performed by: PHYSICIAN ASSISTANT

## 2018-10-15 PROCEDURE — 17110 DESTRUCTION B9 LES UP TO 14: CPT | Performed by: PHYSICIAN ASSISTANT

## 2018-10-15 NOTE — PATIENT INSTRUCTIONS
Proper skin care from [facility]:    -Eliminate harsh soaps as they strip the natural oils from the skin, often resulting in dry itchy skin ( i.e. Dial, Zest, Pili Spring)  -Use mild soaps such as Cetaphil or Dove Sensitive Skin in the shower. You do not need to use soap on arms, legs, and trunk every time you shower unless visibly soiled.   -Avoid hot or cold showers.  -After showering, lightly dry off and apply moisturizing within 2-3 minutes. This will help trap moisture in the skin.   -Aggressive use of a moisturizer at least 1-2 times a day to the entire body (including -Vanicream, Cetaphil, Aquaphor or Cerave) and moisturize hands after every washing.  -We recommend using moisturizers that come in a tub that needs to be scooped out, not a pump. This has more of an oil base. It will hold moisture in your skin much better than a water base moisturizer. The above recommended are non-pore clogging.           Wear a sunscreen with at least SPF 30 on your face, ears, neck and V of the chest daily. Wear sunscreen on other areas of the body if those areas are exposed to the sun throughout the day. Sunscreens can contain physical and/or chemical blockers. Physical blockers are less likely to clog pores, these include zinc oxide and titanium dioxide. Reapply every two hour and after swimming. Sunscreen examples include Neutrogena, CeraVe, Blue Lizard, Elta MD and many others.    UV radiation  UVA radiation remains constant throughout the day and throughout the year. It is a longer wavelength than UVB and therefore penetrates deeper into the skin leading to immediate and delayed tanning, photoaging, and skin cancer. 70-80% of UVA and UVB radiation occurs between the hours of 10am-2pm.  UVB radiation  UVB radiation causes the most harmful effects and is more significant during the summer months. However, snow and ice can reflect UVB radiation leading to skin damage during the winter months as well. UVB  radiation is responsible for tanning, burning, inflammation, delayed erythema (pinkness), pigmentation (brown spots), and skin cancer.   Just because you do not burn or are not developing a tan does not mean that you are not damaging your skin. A 15 minute drive to and from work for 30 years an lead to chronic sun damage of the skin. It is important to wear a broad spectrum (both UVA and UVB) sunscreen EVERY day with at least 30 SPF. Apply to face, ears, neck and v of the chest as this is where most of our sun exposure is. Reapply sunscreen every two hours if you plan on being outside.   Taj Purcell. Clinical Dermatology: A Color Guide to Diagnosis and Therapy. Elsevier, 2016.     WOUND CARE INSTRUCTIONS  FOR CRYOSURGERY        This area treated with liquid nitrogen will form a blister. You do not need to bandage the area until after the blister forms and breaks (which may be a few days).  When the blister breaks, begin daily dressing changes as follows:    1) Clean and dry the area with tap water using clean Q-tip or sterile gauze pad.    2) Apply Polysporin ointment or Bacitracin ointment over entire wound.  Do NOT use Neosporin ointment.    3) Cover the wound with a band-aid or sterile non-stick gauze pad and micropore paper tape.      REPEAT THESE INSTRUCTIONS AT LEAST ONCE A DAY UNTIL THE WOUND HAS COMPLETELY HEALED.        It is an old wives tale that a wound heals better when it is exposed to air and allowed to dry out. The wound will heal faster with a better cosmetic result if it is kept moist with ointment and covered with a bandage.  Do not let the wound dry out.      IMPORTANT INFORMATION ON REVERSE SIDE    Supplies Needed:     *Cotton tipped applicators (Q-tips)   *Polysporin ointment or Bacitracin ointment (NOT NEOSPORIN)   *Band-aids, or non stick gauze pads and micropore paper tape                PATIENT INFORMATION    During the healing process you will notice a number of changes. All wounds  develop a small halo of redness surrounding the wound.  This means healing is occurring. Severe itching with extensive redness usually indicates sensitivity to the ointment or bandage tape used to dress the wound.  You should call our office if this develops.      Swelling and/or discoloration around your surgical site is common, particularly when performed around the eye.    All wounds normally drain.  The larger the wound the more drainage there will be.  After 7-10 days, you will notice the wound beginning to shrink and new skin will begin to grow.  The wound is healed when you can see skin has formed over the entire area.  A healed wound has a healthy, shiny look to the surface and is red to dark pink in color to normalize.  Wounds may take approximately 4-6 weeks to heal.  Larger wounds may take 6-8 weeks.  After the wound is healed you may discontinue dressing changes.    You may experience a sensation of tightness as your wound heals. This is normal and will gradually subside.    Your healed wound may be sensitive to temperature changes. This sensitivity improves with time, but if you re having a lot of discomfort, try to avoid temperature extremes.    Patients frequently experience itching after their wound appears to have healed because of the continue healing under the skin.  Plain Vaseline will help relieve the itching.

## 2018-10-15 NOTE — LETTER
10/15/2018         RE: Lenora Hammonds  [address] [facility] [address]        Dear Colleague,    Thank you for referring your patient, Lenora Hammonds, to the [facility]. Please see a copy of my visit note below.    HPI:  Lenora Hammonds is a 73 year old female patient here today for spot on right flank .  Patient states this has been present for years.  Patient reports the following symptoms: itches and is irritating .  Patient reports the following previous treatments: none.  Patient reports the following modifying factors: none.  Associated symptoms: none.  Patient has no other skin complaints today.  Remainder of the HPI, Meds, PMH, Allergies, FH, and SH was reviewed in chart.    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     Snores 10/6/2011       Past Surgical History:   Procedure Laterality Date     C THORACOTOMY,MAJOR,EXPLOR/BIOPSY      histoplasmosis, right lower lobe     COLONOSCOPY N/A 2017    Procedure: COMBINED COLONOSCOPY, SINGLE OR MULTIPLE BIOPSY/POLYPECTOMY BY BIOPSY;  colonoscopy;  Surgeon: Moise Vásquez MD;  Location:  GI     COLONOSCOPY N/A 2018    Procedure: COMBINED COLONOSCOPY, SINGLE OR MULTIPLE BIOPSY/POLYPECTOMY BY BIOPSY;  COLONOSCOPY;  Surgeon: Moise Vásquez MD;  Location:  GI     THORACIC SURGERY       TONSILLECTOMY          Family History   Problem Relation Age of Onset     Hypertension Mother       at age 84     Neurologic Disorder Mother      ?parkinsonism - she had a tremor; walked normally. voice was soft     Cancer Father      brain tumor;  at age 62 y rs     Cancer Sister      breast cancer     Cancer Brother      bladder cancer;         Social History     Social History     Marital status:      Spouse name: Yanick     Number of children: 2     Years of education: N/A     Occupational History     wholesale hardware Retired     Social History Main Topics     Smoking status: Former Smoker     Packs/day: 0.30     Years: 50.00     Types: Cigarettes     Smokeless tobacco: Never Used      Comment: quit 2014     Alcohol use Yes      Comment: occasionally      Drug use: No     Sexual activity: Yes     Partners: Male     Other Topics Concern     Parent/Sibling W/ Cabg, Mi Or Angioplasty Before 65f 55m? No     Blood Transfusions No     Caffeine Concern Yes     3 cups caffeine per day     Sleep Concern No     Stress Concern No     Weight Concern No     Special Diet No     Back Care No     Exercise Yes     walks daily 30 minutes and weights     Seat Belt Yes     Social History Narrative    Dairy/d 1-2 servings/d    Caffeine 3 servings/d    Exercise 3-5 x week    Sunscreen used - No    Seatbelts used - Yes    Working smoke/CO detectors in the home - Yes, NO CO    Guns stored in the home - No    Self Breast Exams - Yes    Self Testicular Exam - NOT APPLICABLE    Eye Exam up to date - Yes    Dental Exam up to date - Yes    Pap Smear up to date - yes    Mammogram up to date - Yes    PSA up to date - NOT APPLICABLE    Dexa Scan up to date - No    Flex Sig / Colonoscopy up to date - no    Immunizations up to date - yes    Abuse: Current or Past(Physical, Sexual or Emotional)- No    Do you feel safe in your environment - Yes    6/10       Outpatient Encounter Prescriptions as of 10/15/2018   Medication Sig Dispense Refill     albuterol (PROAIR HFA/PROVENTIL HFA/VENTOLIN HFA) 108 (90 Base) MCG/ACT inhaler Inhale 1-2 puffs into the lungs every 4 hours as needed for shortness of breath / dyspnea or wheezing 1 Inhaler 5     amLODIPine (NORVASC) 2.5 MG tablet Take 1 tablet (2.5 mg) by mouth daily 90 tablet 3     aspirin 325 MG tablet Take 325 mg  by mouth daily       Cholecalciferol (VITAMIN D3 PO) Take 1,000 Units by mouth daily       escitalopram (LEXAPRO) 10 MG tablet Take 1 tablet (10 mg) by mouth daily 90 tablet 3     lisinopril-hydrochlorothiazide (PRINZIDE/ZESTORETIC) 20-25 MG per tablet Take 1 tablet by mouth daily 90 tablet 3     polyethylene glycol (MIRALAX) powder Take 1 capful by mouth daily       No facility-administered encounter medications on file as of 10/15/2018.        Review Of Systems:  Skin: As above  Eyes: negative  Ears/Nose/Throat: negative  Respiratory: No shortness of breath, dyspnea on exertion, cough, or hemoptysis  Cardiovascular: negative  Gastrointestinal: negative  Genitourinary: negative  Musculoskeletal: negative  Neurologic: negative  Psychiatric: negative  Hematologic/Lymphatic/Immunologic: negative  Endocrine: negative      Objective:     /62  Pulse 74  SpO2 96%  Breastfeeding? No  Eyes: Conjunctivae/lids: Normal   ENT: Lips:  Normal  MSK: Normal  Cardiovascular: Peripheral edema none  Pulm: Breathing Normal  Neuro/Psych: Orientation: Normal; Mood/Affect: Normal, NAD, WDWN  Pt accompanied by: self  Following areas examined:   Scalp, face, eyelids, lips, neck, chest, abdomen, back, buttocks, and R&L upper and lower extremities.  Baxter skin type:i   Findings:  Red smooth well-defined macules on trunk and extremities.  Brown, stuck-on scaly appearing papules on trunk and extremities.  Inflamed brown, stuck-on scaly appearing papules on right flank  Well circumscribed macules with symmetric color distribution on trunk and extremities 2-3mm  Blue blanchable smooth papule on inferior lip  Assessment and Plan:  1) Lentigines, multiple benign nevi, cherry angiomas, venous lake, and seborrheic keratoses    Treatment of these lesions would be purely cosmetic and not medically neccessary.  Lesion may recur and/or may not completely resolve. May need additional treatment.  Different removal options including excision,  cryotherapy, cautery and /or laser.      2) ISK x 1  Discussed etiology and course.  LN2: Treated with LN2 for 5s for 1-2 cycles. Warned risks of blistering, pain, pigment change, scarring, and incomplete resolution.  Advised patient to return if lesions do not completely resolve within 2-3 months.  Wound care sheet given.    Signs and Symptoms of non-melanoma skin cancer and ABCDEs of melanoma reviewed with patient. Patient encouraged to perform monthly self skin exams and educated on how to perform them. UV precautions reviewed with patient. Patient was asked about new or changing moles/lesions on body.   Wear a sunscreen with at least SPF 30 on your face, ears, neck and V of the chest daily. Wear sunscreen on other areas of the body if those areas are exposed to the sun throughout the day. Sunscreens can contain physical and/or chemical blockers. Physical blockers are less likely to clog pores, these include zinc oxide and titanium dioxide. Reapply every two hour and after swimming. Sunscreen examples include Neutrogena, CeraVe, Blue Lizard, Elta MD and many others.    Proper skin care from [facility]:    -Eliminate harsh soaps as they strip the natural oils from the skin, often resulting in dry itchy skin ( i.e. Dial, Zest, Pili Spring)  -Use mild soaps such as Cetaphil or Dove Sensitive Skin in the shower. You do not need to use soap on arms, legs, and trunk every time you shower unless visibly soiled.   -Avoid hot or cold showers.  -After showering, lightly dry off and apply moisturizing within 2-3 minutes. This will help trap moisture in the skin.   -Aggressive use of a moisturizer at least 1-2 times a day to the entire body (including -Vanicream, Cetaphil, Aquaphor or Cerave) and moisturize hands after every washing.  -We recommend using moisturizers that come in a tub that needs to be scooped out, not a pump. This has more of an oil base. It will hold moisture in your skin much better than a  water base moisturizer. The above recommended are non-pore clogging.         Follow up in yearly FBE      Again, thank you for allowing me to participate in the care of your patient.        Sincerely,        Arina Siegel PA-C

## 2018-10-15 NOTE — MR AVS SNAPSHOT
After Visit Summary   10/15/2018    Lenora Hammonds    MRN: [id number]           Patient Information     Date Of Birth          1945        Visit Information        Provider Department      10/15/2018 3:00 PM Arina Siegel PA-C [facility]        Care Instructions    Proper skin care from [facility]:    -Eliminate harsh soaps as they strip the natural oils from the skin, often resulting in dry itchy skin ( i.e. Dial, Zest, Pili Spring)  -Use mild soaps such as Cetaphil or Dove Sensitive Skin in the shower. You do not need to use soap on arms, legs, and trunk every time you shower unless visibly soiled.   -Avoid hot or cold showers.  -After showering, lightly dry off and apply moisturizing within 2-3 minutes. This will help trap moisture in the skin.   -Aggressive use of a moisturizer at least 1-2 times a day to the entire body (including -Vanicream, Cetaphil, Aquaphor or Cerave) and moisturize hands after every washing.  -We recommend using moisturizers that come in a tub that needs to be scooped out, not a pump. This has more of an oil base. It will hold moisture in your skin much better than a water base moisturizer. The above recommended are non-pore clogging.           Wear a sunscreen with at least SPF 30 on your face, ears, neck and V of the chest daily. Wear sunscreen on other areas of the body if those areas are exposed to the sun throughout the day. Sunscreens can contain physical and/or chemical blockers. Physical blockers are less likely to clog pores, these include zinc oxide and titanium dioxide. Reapply every two hour and after swimming. Sunscreen examples include Neutrogena, CeraVe, Blue Lizard, Elta MD and many others.    UV radiation  UVA radiation remains constant throughout the day and throughout the year. It is a longer wavelength than UVB and therefore penetrates deeper into the skin leading to immediate and delayed tanning,  photoaging, and skin cancer. 70-80% of UVA and UVB radiation occurs between the hours of 10am-2pm.  UVB radiation  UVB radiation causes the most harmful effects and is more significant during the summer months. However, snow and ice can reflect UVB radiation leading to skin damage during the winter months as well. UVB radiation is responsible for tanning, burning, inflammation, delayed erythema (pinkness), pigmentation (brown spots), and skin cancer.   Just because you do not burn or are not developing a tan does not mean that you are not damaging your skin. A 15 minute drive to and from work for 30 years an lead to chronic sun damage of the skin. It is important to wear a broad spectrum (both UVA and UVB) sunscreen EVERY day with at least 30 SPF. Apply to face, ears, neck and v of the chest as this is where most of our sun exposure is. Reapply sunscreen every two hours if you plan on being outside.   Taj Purcell. Clinical Dermatology: A Color Guide to Diagnosis and Therapy. Elsevier, 2016.     WOUND CARE INSTRUCTIONS  FOR CRYOSURGERY        This area treated with liquid nitrogen will form a blister. You do not need to bandage the area until after the blister forms and breaks (which may be a few days).  When the blister breaks, begin daily dressing changes as follows:    1) Clean and dry the area with tap water using clean Q-tip or sterile gauze pad.    2) Apply Polysporin ointment or Bacitracin ointment over entire wound.  Do NOT use Neosporin ointment.    3) Cover the wound with a band-aid or sterile non-stick gauze pad and micropore paper tape.      REPEAT THESE INSTRUCTIONS AT LEAST ONCE A DAY UNTIL THE WOUND HAS COMPLETELY HEALED.        It is an old wives tale that a wound heals better when it is exposed to air and allowed to dry out. The wound will heal faster with a better cosmetic result if it is kept moist with ointment and covered with a bandage.  Do not let the wound dry out.      IMPORTANT  INFORMATION ON REVERSE SIDE    Supplies Needed:     *Cotton tipped applicators (Q-tips)   *Polysporin ointment or Bacitracin ointment (NOT NEOSPORIN)   *Band-aids, or non stick gauze pads and micropore paper tape                PATIENT INFORMATION    During the healing process you will notice a number of changes. All wounds develop a small halo of redness surrounding the wound.  This means healing is occurring. Severe itching with extensive redness usually indicates sensitivity to the ointment or bandage tape used to dress the wound.  You should call our office if this develops.      Swelling and/or discoloration around your surgical site is common, particularly when performed around the eye.    All wounds normally drain.  The larger the wound the more drainage there will be.  After 7-10 days, you will notice the wound beginning to shrink and new skin will begin to grow.  The wound is healed when you can see skin has formed over the entire area.  A healed wound has a healthy, shiny look to the surface and is red to dark pink in color to normalize.  Wounds may take approximately 4-6 weeks to heal.  Larger wounds may take 6-8 weeks.  After the wound is healed you may discontinue dressing changes.    You may experience a sensation of tightness as your wound heals. This is normal and will gradually subside.    Your healed wound may be sensitive to temperature changes. This sensitivity improves with time, but if you re having a lot of discomfort, try to avoid temperature extremes.    Patients frequently experience itching after their wound appears to have healed because of the continue healing under the skin.  Plain Vaseline will help relieve the itching.                Follow-ups after your visit        Who to contact     If you have questions or need follow up information about today's clinic visit or your schedule please contact [facility] directly at [phone].  Normal or non-critical lab  "and imaging results will be communicated to you by MyChart, letter or phone within 4 business days after the clinic has received the results. If you do not hear from us within 7 days, please contact the clinic through [employer] or phone. If you have a critical or abnormal lab result, we will notify you by phone as soon as possible.  Submit refill requests through [employer] or call your pharmacy and they will forward the refill request to us. Please allow 3 business days for your refill to be completed.          Additional Information About Your Visit        [employer] Information     [employer] lets you send messages to your doctor, view your test results, renew your prescriptions, schedule appointments and more. To sign up, go to [email or web address] . Click on \"Log in\" on the left side of the screen, which will take you to the Welcome page. Then click on \"Sign up Now\" on the right side of the page.     You will be asked to enter the access code listed below, as well as some personal information. Please follow the directions to create your username and password.     Your access code is: [email or web address]  Expires: 2018  9:58 AM     Your access code will  in 90 days. If you need help or a new code, please call your [facility] clinic or [phone].        Care EveryWhere ID     This is your Care EveryWhere ID. This could be used by other organizations to access your [facility] medical records  [id number]        Your Vitals Were     Pulse Pulse Oximetry Breastfeeding?             74 96% No          Blood Pressure from Last 3 Encounters:   10/15/18 107/62   18 105/59   18 131/65    Weight from Last 3 Encounters:   18 55.3 kg (122 lb)   18 54.4 kg (120 lb)   18 56.9 kg (125 lb 8 oz)              Today, you had the following     No orders found for display       Primary Care Provider Office Phone # Fax #    STACY Castro [phone]       [address]  [place] [address]        Equal Access to Services     MELY MAYNARD : Hadii piper ku alex Sojulian, waaxda luqadaha, qaybta kaalmabrent garciaandrewjuvencio morrison. So [facility] [place] [facility] [phone].    ATENCIÓN: Si habla español, tiene a gleason disposición servicios gratuitos de asistencia lingüística. Mike al [phone].    We comply with applicable federal civil rights laws and [place] laws. We do not discriminate on the basis of race, color, national origin, age, disability, sex, sexual orientation, or gender identity.            Thank you!     Thank you for choosing [facility]  for your care. Our goal is always to provide you with excellent care. Hearing back from our patients is one way we can continue to improve our services. Please take a few minutes to complete the written survey that you may receive in the mail after your visit with us. Thank you!             Your Updated Medication List - Protect others around you: Learn how to safely use, store and throw away your medicines at www.disposemymeds.org.          This list is accurate as of 10/15/18  3:07 PM.  Always use your most recent med list.                   Brand Name Dispense Instructions for use Diagnosis    albuterol 108 (90 Base) MCG/ACT inhaler    PROAIR HFA/PROVENTIL HFA/VENTOLIN HFA    1 Inhaler    Inhale 1-2 puffs into the lungs every 4 hours as needed for shortness of breath / dyspnea or wheezing    Medicare annual wellness visit, subsequent       amLODIPine 2.5 MG tablet    NORVASC    90 tablet    Take 1 tablet (2.5 mg) by mouth daily    Hypertension goal BP (blood pressure) < 140/90       aspirin 325 MG tablet      Take 325 mg by mouth daily        escitalopram 10 MG tablet    LEXAPRO    90 tablet    Take 1 tablet (10 mg) by mouth daily    Major depressive disorder, single episode, mild (H)       lisinopril-hydrochlorothiazide 20-25 MG per tablet    PRINZIDE/ZESTORETIC    90 tablet    Take 1  tablet by mouth daily    Hypertension goal BP (blood pressure) < 140/90       MIRALAX powder   Generic drug:  polyethylene glycol      Take 1 capful by mouth daily        VITAMIN D3 PO      Take 1,000 Units by mouth daily

## 2018-10-25 ENCOUNTER — OFFICE VISIT (OUTPATIENT)
Dept: URGENT CARE | Facility: URGENT CARE | Age: 73
End: 2018-10-25
Payer: COMMERCIAL

## 2018-10-25 VITALS
WEIGHT: 122 LBS | HEIGHT: 64 IN | DIASTOLIC BLOOD PRESSURE: 69 MMHG | SYSTOLIC BLOOD PRESSURE: 117 MMHG | BODY MASS INDEX: 20.83 KG/M2 | OXYGEN SATURATION: 97 % | HEART RATE: 87 BPM | TEMPERATURE: 97.7 F

## 2018-10-25 DIAGNOSIS — M26.622 TMJ TENDERNESS, LEFT: Primary | ICD-10-CM

## 2018-10-25 PROCEDURE — 99213 OFFICE O/P EST LOW 20 MIN: CPT | Mod: 24 | Performed by: INTERNAL MEDICINE

## 2018-10-25 NOTE — MR AVS SNAPSHOT
After Visit Summary   10/25/2018    Lenora Hammonds    MRN: [id number]           Patient Information     Date Of Birth          1945        Visit Information        Provider Department      10/25/2018 5:30 PM Ayah Roque MD [facility]        Today's Diagnoses     TMJ tenderness, left    -  1      Care Instructions    Tylenol or ibuprofen   Ice/cool compress, alternate heat  Easy to chew foods  No gum      Call or return to clinic if symptoms worsen or fail to improve as anticipated.        When You Have Temporomandibular Disorder (TMD)  The temporomandibular joint (TMJ) is a ball-and-socket joint located where the upper and lower jaws meet. The TMJ and its nearby muscles make up a complex, loosely connected system. Because of this, a problem in one part of the system can affect the other parts. This can cause you to have temporomandibular disorder (TMD).         How the temporomandibular joint works  You have 1 joint on each side of your mouth that together make up the TMJ. These joints are part of a large group of muscles, ligaments, and bones that work together as a system. When the system is healthy, you can talk, chew, and even yawn in comfort. Muscles contract and relax to open and close the joint. The disk is made of cartilage and is located between the condyle and the skull. It absorbs pressure in the joint and allows the jaws to open and close smoothly. Fluid (called synovial fluid) lubricates the joints. Ligaments connect the lower jaw bones to the skull. They also support the joint.  Common temporomandibular problems  When there is a problem with the TMJ and its related system, you can develop TMD. Common TMD problems include tight muscles, inflamed joints, and damaged joints.  In some cases, symptoms may be related to the teeth or bite.  Tight muscles  The muscles surrounding the TMJ can tighten (spasm) and cause pain.    Referred pain happens in a  part of the body separate from the source of the problem. For example, pain in the face or teeth could be coming from a problem in the TMJ.    Myofascial pain happens in soft tissues, like muscle. Trigger points in these pain areas often cause referred pain. You may feel jaw, neck, or shoulder pain.  Inflamed joints  Inflammation may include pain, redness, heat, swelling, or loss of function.    Synovitis happens when certain tissues surrounding the TMJ become inflamed. It causes pain that increases with jaw movement.    Inflamed ligaments can be caused by strain or injury. When this happens, the ligaments are unable to support the joint.    Rheumatoid arthritis is a joint disease. It leads to inflammation in the TMJ.  Damaged joints  Many people hear clicking when their jaw moves. If you feel pain along with the noise, the joint may be damaged.    Impingement happens when the disk slips out of place (displacement). This causes the jaw to catch. As the disk slips, you may hear a clicking sound.    Locked jaw happens when the disk gets stuck in one position. As a result, the jaw locks open or closed.    Osteoarthritis is a joint disease. It causes the TMJ to wear away (degenerate). This leads to pain during movement.  Other problems  The parts of the jaw and mouth make up a single unit. That s why a problem in 1 area can cause symptoms elsewhere. Teeth or bite problems linked to TMD include:    Grinding your teeth side to side (bruxism)    Biting down on your teeth (clenching)    When the teeth or bite is out of alignment (malocclusion)  Your healthcare provider will give you more information about these problems if needed.   Date Last Reviewed: 8/1/2017 2000-2017 [employer]. [address] [place] [address], [place] [address]. All rights reserved. This information is not intended as a substitute for professional medical care. Always follow your healthcare professional's instructions.        Helping Your  Temporomandibular Joint (TMJ) Heal  The temporomandibular joint (TMJ) is a ball-and-socket joint located where the upper and lower jaws meet. When the TMJ and related muscles are injured, they need time to heal. Self-care is very important. You can take steps to reduce pressure on the TMJ and speed healing.    Eating with care  Chewing strains the TMJ. When symptoms are bad, you may not be able to chew at all. To get you through times when your symptoms are at their worst, try these tips:    Choose soft foods. These include scrambled eggs, oatmeal, yogurt, quiche, tofu, soup, smoothies, pasta, fish, mashed potatoes, milkshakes, bananas, applesauce, gelatin, or ice cream.    Don t bite into hard foods. These include whole apples, carrots, and corn on the cob. Instead cut foods into bite-sized pieces.    Grind or finely chop meats and other tough foods. Try hamburger meat instead of steak.  Using ice and heat  Your healthcare provider may suggest using ice and heat. Ice helps reduce swelling and pain. Heat helps relax muscles, increasing blood flow.    Use a gel pack or cold pack for severe pain. Apply for 10 to 20 minutes. Repeat as needed. To make a cold pack, put ice cubes in a plastic bag that seals at the top. Wrap the bag in a clean, thin towel or cloth. Never put ice or a cold pack directly on the skin.    Use moist heat for mild to moderate muscle pain. Apply a moist, warm towel to the muscles for 10 to 20 minutes. Repeat as needed.  Staying away from triggers  Certain activities (called triggers) strain the TMJ, making symptoms worse. The tips below can help you avoid common triggers and limit strain.    Don t eat hard or chewy foods. These include nuts, pretzels, popcorn, chips, gum, caramel, gummy candies, carrots, whole apples, hard breads, and even ice.    Reschedule routine dental visits, like cleanings, if your jaw aches. If you have severe pain, call your healthcare provider.    Support your jaw when  yawning. When you feel a yawn coming on, put a fist under your jaw. Apply gentle pressure. This helps prevent wide, painful yawns.    Don t do any activity that hurts. This includes nail biting, yelling, and singing.  Maintaining good posture  Work at improving your posture during the day and when you sleep. Good posture can help your body heal. Try these tips:    Use a headset when on the phone. Don t cradle the phone with your shoulder.    Keep ergonomics in mind. This includes making sure your workstation fits your body. Support your lower back. Take breaks often to stretch and rest. If you use a computer, keep the monitor at eye level.    Keep your head in a neutral position.  Keep your ears in line with your shoulders. Don t slouch or crane your head forward.    Use an orthopedic pillow. Use this to support your head and neck during sleep.  Date Last Reviewed: 8/1/2017 2000-2017 [employer]. [address] [place] [address]. All rights reserved. This information is not intended as a substitute for professional medical care. Always follow your healthcare professional's instructions.                Follow-ups after your visit        Who to contact     If you have questions or need follow up information about today's clinic visit or your schedule please contact [facility] directly at [phone].  Normal or non-critical lab and imaging results will be communicated to you by MyChart, letter or phone within 4 business days after the clinic has received the results. If you do not hear from us within 7 days, please contact the clinic through MyChart or phone. If you have a critical or abnormal lab result, we will notify you by phone as soon as possible.  Submit refill requests through [employer] or call your pharmacy and they will forward the refill request to us. Please allow 3 business days for your refill to be completed.          Additional Information About Your  "Visit        [employer] Information     [employer] lets you send messages to your doctor, view your test results, renew your prescriptions, schedule appointments and more. To sign up, go to [email or web address] . Click on \"Log in\" on the left side of the screen, which will take you to the Welcome page. Then click on \"Sign up Now\" on the right side of the page.     You will be asked to enter the access code listed below, as well as some personal information. Please follow the directions to create your username and password.     Your access code is: [email or web address]  Expires: 2018  9:58 AM     Your access code will  in 90 days. If you need help or a new code, please call your [facility] clinic or [phone].        Care EveryWhere ID     This is your Care EveryWhere ID. This could be used by other organizations to access your [facility] medical records  [id number]        Your Vitals Were     Pulse Temperature Height Pulse Oximetry BMI (Body Mass Index)       87 97.7  F (36.5  C) (Tympanic) 5' 4\" (1.626 m) 97% 20.94 kg/m2        Blood Pressure from Last 3 Encounters:   10/25/18 117/69   10/15/18 107/62   18 105/59    Weight from Last 3 Encounters:   10/25/18 122 lb (55.3 kg)   18 122 lb (55.3 kg)   18 120 lb (54.4 kg)              Today, you had the following     No orders found for display       Primary Care Provider Office Phone # Fax #    Siobhan Pineda, STACY [facility] [phone] [address] [place] [address]        Equal Access to Services     MELY MAYNARD : Criss Alvarez, christine munoz, francesco kaalmada les, brent morrison. So [facility] [phone].    ATENCIÓN: Si habla español, tiene a gleason disposición servicios gratuitos de asistencia lingüística. Llame al [phone].    We comply with applicable federal civil rights laws and [place] laws. We do not discriminate on the basis of race, color, national " origin, age, disability, sex, sexual orientation, or gender identity.            Thank you!     Thank you for choosing [facility]  for your care. Our goal is always to provide you with excellent care. Hearing back from our patients is one way we can continue to improve our services. Please take a few minutes to complete the written survey that you may receive in the mail after your visit with us. Thank you!             Your Updated Medication List - Protect others around you: Learn how to safely use, store and throw away your medicines at www.disposemymeds.org.          This list is accurate as of 10/25/18  5:59 PM.  Always use your most recent med list.                   Brand Name Dispense Instructions for use Diagnosis    albuterol 108 (90 Base) MCG/ACT inhaler    PROAIR HFA/PROVENTIL HFA/VENTOLIN HFA    1 Inhaler    Inhale 1-2 puffs into the lungs every 4 hours as needed for shortness of breath / dyspnea or wheezing    Medicare annual wellness visit, subsequent       amLODIPine 2.5 MG tablet    NORVASC    90 tablet    Take 1 tablet (2.5 mg) by mouth daily    Hypertension goal BP (blood pressure) < 140/90       aspirin 325 MG tablet      Take 325 mg by mouth daily        escitalopram 10 MG tablet    LEXAPRO    90 tablet    Take 1 tablet (10 mg) by mouth daily    Major depressive disorder, single episode, mild (H)       lisinopril-hydrochlorothiazide 20-25 MG per tablet    PRINZIDE/ZESTORETIC    90 tablet    Take 1 tablet by mouth daily    Hypertension goal BP (blood pressure) < 140/90       MIRALAX powder   Generic drug:  polyethylene glycol      Take 1 capful by mouth daily        VITAMIN D3 PO      Take 1,000 Units by mouth daily

## 2018-10-25 NOTE — PATIENT INSTRUCTIONS
Tylenol or ibuprofen   Ice/cool compress, alternate heat  Easy to chew foods  No gum      Call or return to clinic if symptoms worsen or fail to improve as anticipated.        When You Have Temporomandibular Disorder (TMD)  The temporomandibular joint (TMJ) is a ball-and-socket joint located where the upper and lower jaws meet. The TMJ and its nearby muscles make up a complex, loosely connected system. Because of this, a problem in one part of the system can affect the other parts. This can cause you to have temporomandibular disorder (TMD).         How the temporomandibular joint works  You have 1 joint on each side of your mouth that together make up the TMJ. These joints are part of a large group of muscles, ligaments, and bones that work together as a system. When the system is healthy, you can talk, chew, and even yawn in comfort. Muscles contract and relax to open and close the joint. The disk is made of cartilage and is located between the condyle and the skull. It absorbs pressure in the joint and allows the jaws to open and close smoothly. Fluid (called synovial fluid) lubricates the joints. Ligaments connect the lower jaw bones to the skull. They also support the joint.  Common temporomandibular problems  When there is a problem with the TMJ and its related system, you can develop TMD. Common TMD problems include tight muscles, inflamed joints, and damaged joints.  In some cases, symptoms may be related to the teeth or bite.  Tight muscles  The muscles surrounding the TMJ can tighten (spasm) and cause pain.    Referred pain happens in a part of the body separate from the source of the problem. For example, pain in the face or teeth could be coming from a problem in the TMJ.    Myofascial pain happens in soft tissues, like muscle. Trigger points in these pain areas often cause referred pain. You may feel jaw, neck, or shoulder pain.  Inflamed joints  Inflammation may include pain, redness, heat, swelling,  or loss of function.    Synovitis happens when certain tissues surrounding the TMJ become inflamed. It causes pain that increases with jaw movement.    Inflamed ligaments can be caused by strain or injury. When this happens, the ligaments are unable to support the joint.    Rheumatoid arthritis is a joint disease. It leads to inflammation in the TMJ.  Damaged joints  Many people hear clicking when their jaw moves. If you feel pain along with the noise, the joint may be damaged.    Impingement happens when the disk slips out of place (displacement). This causes the jaw to catch. As the disk slips, you may hear a clicking sound.    Locked jaw happens when the disk gets stuck in one position. As a result, the jaw locks open or closed.    Osteoarthritis is a joint disease. It causes the TMJ to wear away (degenerate). This leads to pain during movement.  Other problems  The parts of the jaw and mouth make up a single unit. That s why a problem in 1 area can cause symptoms elsewhere. Teeth or bite problems linked to TMD include:    Grinding your teeth side to side (bruxism)    Biting down on your teeth (clenching)    When the teeth or bite is out of alignment (malocclusion)  Your healthcare provider will give you more information about these problems if needed.   Date Last Reviewed: 8/1/2017 2000-2017 [employer]. [address] [place] [address]. All rights reserved. This information is not intended as a substitute for professional medical care. Always follow your healthcare professional's instructions.        Helping Your Temporomandibular Joint (TMJ) Heal  The temporomandibular joint (TMJ) is a ball-and-socket joint located where the upper and lower jaws meet. When the TMJ and related muscles are injured, they need time to heal. Self-care is very important. You can take steps to reduce pressure on the TMJ and speed healing.    Eating with care  Chewing strains the TMJ. When symptoms are bad,  you may not be able to chew at all. To get you through times when your symptoms are at their worst, try these tips:    Choose soft foods. These include scrambled eggs, oatmeal, yogurt, quiche, tofu, soup, smoothies, pasta, fish, mashed potatoes, milkshakes, bananas, applesauce, gelatin, or ice cream.    Don t bite into hard foods. These include whole apples, carrots, and corn on the cob. Instead cut foods into bite-sized pieces.    Grind or finely chop meats and other tough foods. Try hamburger meat instead of steak.  Using ice and heat  Your healthcare provider may suggest using ice and heat. Ice helps reduce swelling and pain. Heat helps relax muscles, increasing blood flow.    Use a gel pack or cold pack for severe pain. Apply for 10 to 20 minutes. Repeat as needed. To make a cold pack, put ice cubes in a plastic bag that seals at the top. Wrap the bag in a clean, thin towel or cloth. Never put ice or a cold pack directly on the skin.    Use moist heat for mild to moderate muscle pain. Apply a moist, warm towel to the muscles for 10 to 20 minutes. Repeat as needed.  Staying away from triggers  Certain activities (called triggers) strain the TMJ, making symptoms worse. The tips below can help you avoid common triggers and limit strain.    Don t eat hard or chewy foods. These include nuts, pretzels, popcorn, chips, gum, caramel, gummy candies, carrots, whole apples, hard breads, and even ice.    Reschedule routine dental visits, like cleanings, if your jaw aches. If you have severe pain, call your healthcare provider.    Support your jaw when yawning. When you feel a yawn coming on, put a fist under your jaw. Apply gentle pressure. This helps prevent wide, painful yawns.    Don t do any activity that hurts. This includes nail biting, yelling, and singing.  Maintaining good posture  Work at improving your posture during the day and when you sleep. Good posture can help your body heal. Try these tips:    Use a  headset when on the phone. Don t cradle the phone with your shoulder.    Keep ergonomics in mind. This includes making sure your workstation fits your body. Support your lower back. Take breaks often to stretch and rest. If you use a computer, keep the monitor at eye level.    Keep your head in a neutral position.  Keep your ears in line with your shoulders. Don t slouch or crane your head forward.    Use an orthopedic pillow. Use this to support your head and neck during sleep.  Date Last Reviewed: 8/1/2017 2000-2017 [employer]. [address] [place] [id number]. All rights reserved. This information is not intended as a substitute for professional medical care. Always follow your healthcare professional's instructions.

## 2018-10-25 NOTE — PROGRESS NOTES
SUBJECTIVE:   Lenora Hammonds is a 73 year old female presenting with a chief complaint of   Chief Complaint   Patient presents with     Urgent Care     Ear Problem     c/o ear pain for 1 week       She is an established patient of [facility].    URI Adult    Onset of symptoms was 1 week(s) ago.  Course of illness is waxing and waning.    Current and Associated symptoms: ear pain left  Has tumor behind ear  Treatment measures tried include None tried.  Predisposing factors include recent illness mild cold.    Hx acoustic neuroma  Last seen this year for this.  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 voice was soft     Cancer Father      brain tumor;  at age 62 y rs     Cancer Sister      breast cancer     Cancer Brother      bladder cancer;      Current Outpatient Prescriptions   Medication Sig Dispense Refill     albuterol (PROAIR HFA/PROVENTIL HFA/VENTOLIN HFA) 108 (90 Base) MCG/ACT inhaler Inhale 1-2 puffs into the lungs every 4 hours as needed for shortness of breath / dyspnea or wheezing 1 Inhaler 5     amLODIPine (NORVASC) 2.5 MG tablet Take 1 tablet (2.5 mg) by mouth daily 90 tablet 3     aspirin 325 MG tablet Take 325 mg by mouth daily       Cholecalciferol (VITAMIN D3 PO) Take 1,000 Units by mouth daily       escitalopram (LEXAPRO) 10 MG tablet Take 1 tablet (10 mg) by mouth daily 90 tablet 3     lisinopril-hydrochlorothiazide (PRINZIDE/ZESTORETIC) 20-25 MG per tablet Take 1 tablet by mouth daily 90 tablet 3     polyethylene  "glycol (MIRALAX) powder Take 1 capful by mouth daily       Social History   Substance Use Topics     Smoking status: Former Smoker     Packs/day: 0.30     Years: 50.00     Types: Cigarettes     Smokeless tobacco: Never Used      Comment: quit 12/2014     Alcohol use Yes      Comment: occasionally        OBJECTIVE  /69  Pulse 87  Temp 97.7  F (36.5  C) (Tympanic)  Ht 5' 4\" (1.626 m)  Wt 122 lb (55.3 kg)  SpO2 97%  BMI 20.94 kg/m2    Physical Exam   Constitutional: She appears well-developed and well-nourished.   HENT:   TM nl B  No wax  No tragus pain  positive left TMJ pain  No right TMJ pain   Vitals reviewed.      Labs:  No results found for this or any previous visit (from the past 24 hour(s)).        ASSESSMENT:      ICD-10-CM    1. TMJ tenderness, left M26.622             Patient Instructions     Tylenol or ibuprofen   Ice/cool compress, alternate heat  Easy to chew foods  No gum      Call or return to clinic if symptoms worsen or fail to improve as anticipated.        When You Have Temporomandibular Disorder (TMD)  The temporomandibular joint (TMJ) is a ball-and-socket joint located where the upper and lower jaws meet. The TMJ and its nearby muscles make up a complex, loosely connected system. Because of this, a problem in one part of the system can affect the other parts. This can cause you to have temporomandibular disorder (TMD).         How the temporomandibular joint works  You have 1 joint on each side of your mouth that together make up the TMJ. These joints are part of a large group of muscles, ligaments, and bones that work together as a system. When the system is healthy, you can talk, chew, and even yawn in comfort. Muscles contract and relax to open and close the joint. The disk is made of cartilage and is located between the condyle and the skull. It absorbs pressure in the joint and allows the jaws to open and close smoothly. Fluid (called synovial fluid) lubricates the " joints. Ligaments connect the lower jaw bones to the skull. They also support the joint.  Common temporomandibular problems  When there is a problem with the TMJ and its related system, you can develop TMD. Common TMD problems include tight muscles, inflamed joints, and damaged joints.  In some cases, symptoms may be related to the teeth or bite.  Tight muscles  The muscles surrounding the TMJ can tighten (spasm) and cause pain.    Referred pain happens in a part of the body separate from the source of the problem. For example, pain in the face or teeth could be coming from a problem in the TMJ.    Myofascial pain happens in soft tissues, like muscle. Trigger points in these pain areas often cause referred pain. You may feel jaw, neck, or shoulder pain.  Inflamed joints  Inflammation may include pain, redness, heat, swelling, or loss of function.    Synovitis happens when certain tissues surrounding the TMJ become inflamed. It causes pain that increases with jaw movement.    Inflamed ligaments can be caused by strain or injury. When this happens, the ligaments are unable to support the joint.    Rheumatoid arthritis is a joint disease. It leads to inflammation in the TMJ.  Damaged joints  Many people hear clicking when their jaw moves. If you feel pain along with the noise, the joint may be damaged.    Impingement happens when the disk slips out of place (displacement). This causes the jaw to catch. As the disk slips, you may hear a clicking sound.    Locked jaw happens when the disk gets stuck in one position. As a result, the jaw locks open or closed.    Osteoarthritis is a joint disease. It causes the TMJ to wear away (degenerate). This leads to pain during movement.  Other problems  The parts of the jaw and mouth make up a single unit. That s why a problem in 1 area can cause symptoms elsewhere. Teeth or bite problems linked to TMD include:    Grinding your teeth side to side (bruxism)    Biting down on your  teeth (clenching)    When the teeth or bite is out of alignment (malocclusion)  Your healthcare provider will give you more information about these problems if needed.   Date Last Reviewed: 8/1/2017 2000-2017 [employer]. [address] [place] [id number]. All rights reserved. This information is not intended as a substitute for professional medical care. Always follow your healthcare professional's instructions.        Helping Your Temporomandibular Joint (TMJ) Heal  The temporomandibular joint (TMJ) is a ball-and-socket joint located where the upper and lower jaws meet. When the TMJ and related muscles are injured, they need time to heal. Self-care is very important. You can take steps to reduce pressure on the TMJ and speed healing.    Eating with care  Chewing strains the TMJ. When symptoms are bad, you may not be able to chew at all. To get you through times when your symptoms are at their worst, try these tips:    Choose soft foods. These include scrambled eggs, oatmeal, yogurt, quiche, tofu, soup, smoothies, pasta, fish, mashed potatoes, milkshakes, bananas, applesauce, gelatin, or ice cream.    Don t bite into hard foods. These include whole apples, carrots, and corn on the cob. Instead cut foods into bite-sized pieces.    Grind or finely chop meats and other tough foods. Try hamburger meat instead of steak.  Using ice and heat  Your healthcare provider may suggest using ice and heat. Ice helps reduce swelling and pain. Heat helps relax muscles, increasing blood flow.    Use a gel pack or cold pack for severe pain. Apply for 10 to 20 minutes. Repeat as needed. To make a cold pack, put ice cubes in a plastic bag that seals at the top. Wrap the bag in a clean, thin towel or cloth. Never put ice or a cold pack directly on the skin.    Use moist heat for mild to moderate muscle pain. Apply a moist, warm towel to the muscles for 10 to 20 minutes. Repeat as needed.  Staying away from  triggers  Certain activities (called triggers) strain the TMJ, making symptoms worse. The tips below can help you avoid common triggers and limit strain.    Don t eat hard or chewy foods. These include nuts, pretzels, popcorn, chips, gum, caramel, gummy candies, carrots, whole apples, hard breads, and even ice.    Reschedule routine dental visits, like cleanings, if your jaw aches. If you have severe pain, call your healthcare provider.    Support your jaw when yawning. When you feel a yawn coming on, put a fist under your jaw. Apply gentle pressure. This helps prevent wide, painful yawns.    Don t do any activity that hurts. This includes nail biting, yelling, and singing.  Maintaining good posture  Work at improving your posture during the day and when you sleep. Good posture can help your body heal. Try these tips:    Use a headset when on the phone. Don t cradle the phone with your shoulder.    Keep ergonomics in mind. This includes making sure your workstation fits your body. Support your lower back. Take breaks often to stretch and rest. If you use a computer, keep the monitor at eye level.    Keep your head in a neutral position.  Keep your ears in line with your shoulders. Don t slouch or crane your head forward.    Use an orthopedic pillow. Use this to support your head and neck during sleep.  Date Last Reviewed: 8/1/2017 2000-2017 [employer]. [address] [place] [address], [place] [address]. All rights reserved. This information is not intended as a substitute for professional medical care. Always follow your healthcare professional's instructions.

## 2018-11-08 ENCOUNTER — RADIANT APPOINTMENT (OUTPATIENT)
Dept: MAMMOGRAPHY | Facility: CLINIC | Age: 73
End: 2018-11-08
Attending: NURSE PRACTITIONER
Payer: COMMERCIAL

## 2018-11-08 DIAGNOSIS — Z12.31 SCREENING MAMMOGRAM, ENCOUNTER FOR: ICD-10-CM

## 2019-04-23 NOTE — MR AVS SNAPSHOT
After Visit Summary   9/18/2018    Lenora Hammonds    MRN: [id number]           Patient Information     Date Of Birth          1945        Visit Information        Provider Department      9/18/2018 9:40 AM Siobhan Pineda APRN [facility]        Today's Diagnoses     Medicare annual wellness visit, subsequent    -  1    Major depressive disorder, single episode, mild (H)        Acoustic neuroma (H)        Hypertension goal BP (blood pressure) < 140/90        Constipation, unspecified constipation type        Osteopenia, unspecified location        Advanced directives, counseling/discussion        CARDIOVASCULAR SCREENING; LDL GOAL LESS THAN 160          Care Instructions      Preventive Health Recommendations    Female Ages 65 +    Yearly exam:     See your health care provider every year in order to  o Review health changes.   o Discuss preventive care.    o Review your medicines if your doctor has prescribed any.      You no longer need a yearly Pap test unless you've had an abnormal Pap test in the past 10 years. If you have vaginal symptoms, such as bleeding or discharge, be sure to talk with your provider about a Pap test.      Every 1 to 2 years, have a mammogram.  If you are over 69, talk with your health care provider about whether or not you want to continue having screening mammograms.      Every 10 years, have a colonoscopy. Or, have a yearly FIT test (stool test). These exams will check for colon cancer.       Have a cholesterol test every 5 years, or more often if your doctor advises it.       Have a diabetes test (fasting glucose) every three years. If you are at risk for diabetes, you should have this test more often.       At age 65, have a bone density scan (DEXA) to check for osteoporosis (brittle bone disease).    Shots:    Get a flu shot each year.    Get a tetanus shot every 10 years.    Talk to your doctor about your pneumonia vaccines. There  are now two you should receive - Pneumovax (PPSV 23) and Prevnar (PCV 13).    Talk to your pharmacist about the shingles vaccine.    Talk to your doctor about the hepatitis B vaccine.    Nutrition:     Eat at least 5 servings of fruits and vegetables each day.      Eat whole-grain bread, whole-wheat pasta and brown rice instead of white grains and rice.      Get adequate Calcium and Vitamin D.     Lifestyle    Exercise at least 150 minutes a week (30 minutes a day, 5 days a week). This will help you control your weight and prevent disease.      Limit alcohol to one drink per day.      No smoking.       Wear sunscreen to prevent skin cancer.       See your dentist twice a year for an exam and cleaning.      See your eye doctor every 1 to 2 years to screen for conditions such as glaucoma, macular degeneration and cataracts.          Follow-ups after your visit        Additional Services     DERMATOLOGY REFERRAL       Your provider [place] you to: [facility] [facility] [phone]   [email or web address]  [facility] [facility] [phone]  [facility]: Dermatology Clinic [facility] [phone]   [email or web address]  N: [facility] [employer] [phone]   [email or web address]    Please be aware that coverage of these services is subject to the terms and limitations of your health insurance plan.  Call member services at your health plan with any benefit or coverage questions.      Please bring the following with you to your appointment:    (1) Any X-Rays, CTs or MRIs which have been performed.  Contact the facility where they were done to arrange for  prior to your scheduled appointment.    (2) List of current medications  (3) This referral request   (4) Any documents/labs given to you for this referral                  Who to contact     If you have questions  "or need follow up information about today's clinic visit or your schedule please contact [facility] directly at [phone].  Normal or non-critical lab and imaging results will be communicated to you by MyChart, letter or phone within 4 business days after the clinic has received the results. If you do not hear from us within 7 days, please contact the clinic through [employer] or phone. If you have a critical or abnormal lab result, we will notify you by phone as soon as possible.  Submit refill requests through [employer] or call your pharmacy and they will forward the refill request to us. Please allow 3 business days for your refill to be completed.          Additional Information About Your Visit        [employer] Information     [employer] lets you send messages to your doctor, view your test results, renew your prescriptions, schedule appointments and more. To sign up, go to [email or web address] . Click on \"Log in\" on the left side of the screen, which will take you to the Welcome page. Then click on \"Sign up Now\" on the right side of the page.     You will be asked to enter the access code listed below, as well as some personal information. Please follow the directions to create your username and password.     Your access code is: [email or web address]  Expires: 2018  9:58 AM     Your access code will  in 90 days. If you need help or a new code, please call your [place] clinic or [phone].        Care EveryWhere ID     This is your Care EveryWhere ID. This could be used by other organizations to access your [place] medical records  [id number]        Your Vitals Were     Pulse Temperature Respirations Height Pulse Oximetry Breastfeeding?    79 99  F (37.2  C) (Oral) 20 5' 4\" (1.626 m) 98% No    BMI (Body Mass Index)                   20.94 kg/m2            Blood Pressure from Last 3 Encounters:   18 105/59   18 131/65   18 110/63    Weight from Last 3 Encounters: "   09/18/18 122 lb (55.3 kg)   06/28/18 120 lb (54.4 kg)   02/12/18 125 lb 8 oz (56.9 kg)              We Performed the Following     Albumin Random Urine Quantitative with Creat Ratio     Basic metabolic panel     DERMATOLOGY REFERRAL     Lipid panel reflex to direct LDL Fasting          Today's Medication Changes          These changes are accurate as of 9/18/18 10:03 AM.  If you have any questions, ask your nurse or doctor.               These medicines have changed or have updated prescriptions.        Dose/Directions    albuterol 108 (90 Base) MCG/ACT inhaler   Commonly known as:  PROAIR HFA/PROVENTIL HFA/VENTOLIN HFA   This may have changed:  how much to take   Used for:  Medicare annual wellness visit, subsequent   Changed by:  Siobhan Pineda APRN CNP        Dose:  1-2 puff   Inhale 1-2 puffs into the lungs every 4 hours as needed for shortness of breath / dyspnea or wheezing   Quantity:  1 Inhaler   Refills:  5       amLODIPine 2.5 MG tablet   Commonly known as:  NORVASC   This may have changed:  See the new instructions.   Used for:  Hypertension goal BP (blood pressure) < 140/90   Changed by:  Siobhan Pineda APRN CNP        Dose:  2.5 mg   Take 1 tablet (2.5 mg) by mouth daily   Quantity:  90 tablet   Refills:  3       escitalopram 10 MG tablet   Commonly known as:  LEXAPRO   This may have changed:  See the new instructions.   Used for:  Major depressive disorder, single episode, mild (H)   Changed by:  Siobhan Pineda APRN CNP        Dose:  10 mg   Take 1 tablet (10 mg) by mouth daily   Quantity:  90 tablet   Refills:  3       lisinopril-hydrochlorothiazide 20-25 MG per tablet   Commonly known as:  PRINZIDE/ZESTORETIC   This may have changed:  See the new instructions.   Used for:  Hypertension goal BP (blood pressure) < 140/90   Changed by:  Siobhan Pineda APRN CNP        Dose:  1 tablet   Take 1 tablet by mouth daily   Quantity:  90 tablet   Refills:  3             Where to get your medicines      These medications were sent to [facility] [address] AT [address] & [address] [address]     Phone:  [phone]     albuterol 108 (90 Base) MCG/ACT inhaler    amLODIPine 2.5 MG tablet    escitalopram 10 MG tablet    lisinopril-hydrochlorothiazide 20-25 MG per tablet                Primary Care Provider Office Phone # Fax #    Siobhan RAZO Miriam, STACY MEDELLIN [phone] [address] [place] [address]        Equal Access to Services     LANE MAYNARD : Hadii aad ku hadasho Soomaali, waaxda luqadaha, qaybta kaalmada adeegyada, waxay idiin hayaan adebradley sin . So [facility] [phone].    ATENCIÓN: Si habla español, tiene a gleason disposición servicios gratuitos de asistencia lingüística. [facility] [phone].    We comply with applicable federal civil rights laws and [place] laws. We do not discriminate on the basis of race, color, national origin, age, disability, sex, sexual orientation, or gender identity.            Thank you!     Thank you for choosing [facility]  for your care. Our goal is always to provide you with excellent care. Hearing back from our patients is one way we can continue to improve our services. Please take a few minutes to complete the written survey that you may receive in the mail after your visit with us. Thank you!             Your Updated Medication List - Protect others around you: Learn how to safely use, store and throw away your medicines at www.disposemymeds.org.          This list is accurate as of 9/18/18 10:03 AM.  Always use your most recent med list.                   Brand Name Dispense Instructions for use Diagnosis    albuterol 108 (90 Base) MCG/ACT inhaler    PROAIR HFA/PROVENTIL HFA/VENTOLIN HFA    1 Inhaler    Inhale 1-2 puffs into the lungs every 4 hours as needed for shortness of breath / dyspnea  or wheezing    Medicare annual wellness visit, subsequent       amLODIPine 2.5 MG tablet    NORVASC    90 tablet    Take 1 tablet (2.5 mg) by mouth daily    Hypertension goal BP (blood pressure) < 140/90       aspirin 325 MG tablet      Take 325 mg by mouth daily        escitalopram 10 MG tablet    LEXAPRO    90 tablet    Take 1 tablet (10 mg) by mouth daily    Major depressive disorder, single episode, mild (H)       lisinopril-hydrochlorothiazide 20-25 MG per tablet    PRINZIDE/ZESTORETIC    90 tablet    Take 1 tablet by mouth daily    Hypertension goal BP (blood pressure) < 140/90       MIRALAX powder   Generic drug:  polyethylene glycol      Take 1 capful by mouth daily        VITAMIN D3 PO      Take 1,000 Units by mouth daily           Alert and oriented, no focal deficits, no motor or sensory deficits.

## 2019-09-17 ENCOUNTER — OFFICE VISIT (OUTPATIENT)
Dept: FAMILY MEDICINE | Facility: CLINIC | Age: 74
End: 2019-09-17
Payer: COMMERCIAL

## 2019-09-17 VITALS
HEIGHT: 64 IN | TEMPERATURE: 98.1 F | HEART RATE: 74 BPM | WEIGHT: 121 LBS | SYSTOLIC BLOOD PRESSURE: 110 MMHG | RESPIRATION RATE: 16 BRPM | BODY MASS INDEX: 20.66 KG/M2 | DIASTOLIC BLOOD PRESSURE: 58 MMHG | OXYGEN SATURATION: 98 %

## 2019-09-17 DIAGNOSIS — Z00.00 ENCOUNTER FOR MEDICARE ANNUAL WELLNESS EXAM: Primary | ICD-10-CM

## 2019-09-17 DIAGNOSIS — K55.9 ISCHEMIC COLITIS (H): ICD-10-CM

## 2019-09-17 DIAGNOSIS — Z13.1 SCREENING FOR DIABETES MELLITUS: ICD-10-CM

## 2019-09-17 DIAGNOSIS — H61.23 BILATERAL IMPACTED CERUMEN: ICD-10-CM

## 2019-09-17 DIAGNOSIS — R91.1 PULMONARY NODULE, RIGHT: ICD-10-CM

## 2019-09-17 DIAGNOSIS — Z13.220 SCREENING FOR HYPERLIPIDEMIA: ICD-10-CM

## 2019-09-17 DIAGNOSIS — D33.3 ACOUSTIC NEUROMA (H): ICD-10-CM

## 2019-09-17 DIAGNOSIS — R21 RASH: ICD-10-CM

## 2019-09-17 DIAGNOSIS — F32.0 MAJOR DEPRESSIVE DISORDER, SINGLE EPISODE, MILD (H): ICD-10-CM

## 2019-09-17 DIAGNOSIS — I10 HYPERTENSION GOAL BP (BLOOD PRESSURE) < 140/90: ICD-10-CM

## 2019-09-17 LAB
ALBUMIN SERPL-MCNC: 3.9 G/DL (ref 3.4–5)
ALP SERPL-CCNC: 69 U/L (ref 40–150)
ALT SERPL W P-5'-P-CCNC: 18 U/L (ref 0–50)
ANION GAP SERPL CALCULATED.3IONS-SCNC: 3 MMOL/L (ref 3–14)
AST SERPL W P-5'-P-CCNC: 17 U/L (ref 0–45)
BILIRUB SERPL-MCNC: 0.6 MG/DL (ref 0.2–1.3)
BUN SERPL-MCNC: 27 MG/DL (ref 7–30)
CALCIUM SERPL-MCNC: 9.6 MG/DL (ref 8.5–10.1)
CHLORIDE SERPL-SCNC: 102 MMOL/L (ref 94–109)
CHOLEST SERPL-MCNC: 178 MG/DL
CO2 SERPL-SCNC: 32 MMOL/L (ref 20–32)
CREAT SERPL-MCNC: 0.81 MG/DL (ref 0.52–1.04)
CREAT UR-MCNC: 210 MG/DL
GFR SERPL CREATININE-BSD FRML MDRD: 71 ML/MIN/{1.73_M2}
GLUCOSE SERPL-MCNC: 85 MG/DL (ref 70–99)
HDLC SERPL-MCNC: 51 MG/DL
LDLC SERPL CALC-MCNC: 110 MG/DL
MICROALBUMIN UR-MCNC: 23 MG/L
MICROALBUMIN/CREAT UR: 10.95 MG/G CR (ref 0–25)
NONHDLC SERPL-MCNC: 127 MG/DL
POTASSIUM SERPL-SCNC: 3.6 MMOL/L (ref 3.4–5.3)
PROT SERPL-MCNC: 7.3 G/DL (ref 6.8–8.8)
SODIUM SERPL-SCNC: 137 MMOL/L (ref 133–144)
TRIGL SERPL-MCNC: 87 MG/DL

## 2019-09-17 PROCEDURE — G0008 ADMIN INFLUENZA VIRUS VAC: HCPCS | Performed by: NURSE PRACTITIONER

## 2019-09-17 PROCEDURE — 36415 COLL VENOUS BLD VENIPUNCTURE: CPT | Performed by: NURSE PRACTITIONER

## 2019-09-17 PROCEDURE — 80061 LIPID PANEL: CPT | Performed by: NURSE PRACTITIONER

## 2019-09-17 PROCEDURE — 90662 IIV NO PRSV INCREASED AG IM: CPT | Performed by: NURSE PRACTITIONER

## 2019-09-17 PROCEDURE — 82043 UR ALBUMIN QUANTITATIVE: CPT | Performed by: NURSE PRACTITIONER

## 2019-09-17 PROCEDURE — 99397 PER PM REEVAL EST PAT 65+ YR: CPT | Mod: 25 | Performed by: NURSE PRACTITIONER

## 2019-09-17 PROCEDURE — 69209 REMOVE IMPACTED EAR WAX UNI: CPT | Mod: 50 | Performed by: NURSE PRACTITIONER

## 2019-09-17 PROCEDURE — 99213 OFFICE O/P EST LOW 20 MIN: CPT | Mod: 25 | Performed by: NURSE PRACTITIONER

## 2019-09-17 PROCEDURE — 80053 COMPREHEN METABOLIC PANEL: CPT | Performed by: NURSE PRACTITIONER

## 2019-09-17 PROCEDURE — 99207 C PAF COMPLETED  NO CHARGE: CPT | Performed by: NURSE PRACTITIONER

## 2019-09-17 RX ORDER — LISINOPRIL AND HYDROCHLOROTHIAZIDE 20; 25 MG/1; MG/1
1 TABLET ORAL DAILY
Qty: 90 TABLET | Refills: 3 | Status: SHIPPED | OUTPATIENT
Start: 2019-09-17 | End: 2020-09-24

## 2019-09-17 RX ORDER — DESOXIMETASONE 2.5 MG/G
CREAM TOPICAL 2 TIMES DAILY
Qty: 60 G | Refills: 0 | Status: SHIPPED | OUTPATIENT
Start: 2019-09-17 | End: 2019-09-18

## 2019-09-17 RX ORDER — AMLODIPINE BESYLATE 2.5 MG/1
2.5 TABLET ORAL DAILY
Qty: 90 TABLET | Refills: 3 | Status: SHIPPED | OUTPATIENT
Start: 2019-09-17 | End: 2020-09-24

## 2019-09-17 RX ORDER — ESCITALOPRAM OXALATE 10 MG/1
10 TABLET ORAL DAILY
Qty: 90 TABLET | Refills: 3 | Status: SHIPPED | OUTPATIENT
Start: 2019-09-17 | End: 2020-09-09

## 2019-09-17 ASSESSMENT — ENCOUNTER SYMPTOMS: BREAST MASS: 0

## 2019-09-17 ASSESSMENT — ANXIETY QUESTIONNAIRES
GAD7 TOTAL SCORE: 2
4. TROUBLE RELAXING: NOT AT ALL
GAD7 TOTAL SCORE: 2
3. WORRYING TOO MUCH ABOUT DIFFERENT THINGS: SEVERAL DAYS
6. BECOMING EASILY ANNOYED OR IRRITABLE: SEVERAL DAYS
7. FEELING AFRAID AS IF SOMETHING AWFUL MIGHT HAPPEN: NOT AT ALL
1. FEELING NERVOUS, ANXIOUS, OR ON EDGE: NOT AT ALL
5. BEING SO RESTLESS THAT IT IS HARD TO SIT STILL: NOT AT ALL
2. NOT BEING ABLE TO STOP OR CONTROL WORRYING: NOT AT ALL
7. FEELING AFRAID AS IF SOMETHING AWFUL MIGHT HAPPEN: NOT AT ALL
GAD7 TOTAL SCORE: 2

## 2019-09-17 ASSESSMENT — PATIENT HEALTH QUESTIONNAIRE - PHQ9
10. IF YOU CHECKED OFF ANY PROBLEMS, HOW DIFFICULT HAVE THESE PROBLEMS MADE IT FOR YOU TO DO YOUR WORK, TAKE CARE OF THINGS AT HOME, OR GET ALONG WITH OTHER PEOPLE: NOT DIFFICULT AT ALL
SUM OF ALL RESPONSES TO PHQ QUESTIONS 1-9: 1
SUM OF ALL RESPONSES TO PHQ QUESTIONS 1-9: 1

## 2019-09-17 ASSESSMENT — MIFFLIN-ST. JEOR: SCORE: 1020.91

## 2019-09-17 ASSESSMENT — ACTIVITIES OF DAILY LIVING (ADL): CURRENT_FUNCTION: NO ASSISTANCE NEEDED

## 2019-09-17 NOTE — NURSING NOTE
Patient identified using two patient identifiers.  Ear exam showing wax occlusion completed by provider.  Solution: warm water was placed in the bilateral ear(s) via irrigation tool: elephant ear.    Sandra Nguyen MA on 9/17/2019 at 12:02 PM

## 2019-09-17 NOTE — PATIENT INSTRUCTIONS
For the lung nodule follow up:  Call [phone] to schedule the CT scan of your chest for follow up.      Vitamin D3 7341-5202 Units per day          Patient Education   Personalized Prevention Plan  You are due for the preventive services outlined below.  Your care team is available to assist you in scheduling these services.  If you have already completed any of these items, please share that information with your care team to update in your medical record.  Health Maintenance Due   Topic Date Due     Zoster (Shingles) Vaccine (1 of 2) 09/28/1993     Discuss Advance Care Planning  08/15/2017     Diptheria Tetanus Pertussis (DTAP/TDAP/TD) Vaccine (2 - Td) 11/17/2018     Depression Assessment  12/11/2018     FALL RISK ASSESSMENT  02/12/2019     Flu Vaccine (1) 09/01/2019     Annual Wellness Visit  09/18/2019

## 2019-09-17 NOTE — PROGRESS NOTES
"SUBJECTIVE:   Lenora Hammonds is a 73 year old female who presents for Preventive Visit.  Are you in the first 12 months of your Medicare coverage?  No    Healthy Habits:     In general, how would you rate your overall health?  Good    Frequency of exercise:  2-3 days/week    Duration of exercise:  Less than 15 minutes    Do you usually eat at least 4 servings of fruit and vegetables a day, include whole grains    & fiber and avoid regularly eating high fat or \"junk\" foods?  No    Taking medications regularly:  Yes    Ability to successfully perform activities of daily living:  No assistance needed    Home Safety:  No safety concerns identified    Hearing Impairment:  Need to ask people to speak up or repeat themselves and no hearing concerns    In the past 6 months, have you been bothered by leaking of urine?  No    In general, how would you rate your overall mental or emotional health?  Good      PHQ-2 Total Score: 0    Additional concerns today:  No    Do you feel safe in your environment? Yes    Do you have a Health Care Directive? No: Advance care planning was reviewed with patient; patient declined at this time.    Fall risk     click delete button to remove this line now  Cognitive Screening   1) Repeat 3 items (Leader, Season, Table)    2) Clock draw: NORMAL  3) 3 item recall: Recalls 2 objects   Results: NORMAL clock, 1-2 items recalled: COGNITIVE IMPAIRMENT LESS LIKELY    Mini-CogTM Copyright DUNG Villarreal. Licensed by the author for use in [facility]; reprinted with permission (maura@.[email or web address]). All rights reserved.      Do you have sleep apnea, excessive snoring or daytime drowsiness?: no    Reviewed and updated as needed this visit by clinical staff  Tobacco  Allergies  Meds  Med Hx  Surg Hx  Fam Hx  Soc Hx        Reviewed and updated as needed this visit by Provider        Social History     Tobacco Use     Smoking status: Former Smoker     Packs/day: 0.30     Years: 50.00     Pack years: " 15.00     Types: Cigarettes     Smokeless tobacco: Never Used     Tobacco comment: quit 12/2014   Substance Use Topics     Alcohol use: Yes     Comment: occasionally      If you drink alcohol do you typically have >3 drinks per day or >7 drinks per week? No    Alcohol Use 9/17/2019   Prescreen: >3 drinks/day or >7 drinks/week? No   Prescreen: >3 drinks/day or >7 drinks/week? -   No flowsheet data found.        Current providers sharing in care for this patient include:  Patient Care Team:  Siobhan Pineda APRN CNP as PCP - General  Siobhan Pineda APRN CNP as Assigned PCP    The following health maintenance items are reviewed in Epic and correct as of today:  Health Maintenance   Topic Date Due     ZOSTER IMMUNIZATION (1 of 2) 09/28/1993     ADVANCE CARE PLANNING  08/15/2017     DTAP/TDAP/TD IMMUNIZATION (2 - Td) 11/17/2018     PHQ-9  12/11/2018     FALL RISK ASSESSMENT  02/12/2019     INFLUENZA VACCINE (1) 09/01/2019     MEDICARE ANNUAL WELLNESS VISIT  09/18/2019     MAMMO SCREENING  11/08/2020     LIPID  09/18/2023     COLONOSCOPY  06/28/2028     DEXA  Completed     DEPRESSION ACTION PLAN  Completed     PNEUMOCOCCAL IMMUNIZATION 65+ LOW/MEDIUM RISK  Completed     IPV IMMUNIZATION  Aged Out     MENINGITIS IMMUNIZATION  Aged Out     Lab work is in process  Labs reviewed in EPIC  BP Readings from Last 3 Encounters:   09/17/19 110/58   10/25/18 117/69   10/15/18 107/62    Wt Readings from Last 3 Encounters:   09/17/19 54.9 kg (121 lb)   10/25/18 55.3 kg (122 lb)   09/18/18 55.3 kg (122 lb)                  Patient Active Problem List   Diagnosis     Tremor     CARDIOVASCULAR SCREENING; LDL GOAL LESS THAN 160     Mild major depression (H)     Dyspepsia     Wears glasses     Urinary incontinence     Snores     Histoplasmosis     Advanced directives, counseling/discussion     Neoplasm of uncertain behavior of skin     Epigastric pain     Cervical pain     Benign neoplasm of cranial nerve (H)      Acoustic neuroma (H)     Hypertension goal BP (blood pressure) < 140/90     Acute bilateral low back pain without sciatica     Osteopenia     Pulmonary nodule, right     Stiffness of right shoulder joint     Shoulder pain     Past Surgical History:   Procedure Laterality Date     C THORACOTOMY,MAJOR,EXPLOR/BIOPSY      histoplasmosis, right lower lobe     COLONOSCOPY N/A 2017    Procedure: COMBINED COLONOSCOPY, SINGLE OR MULTIPLE BIOPSY/POLYPECTOMY BY BIOPSY;  colonoscopy;  Surgeon: Moise Vásquez MD;  Location:  GI     COLONOSCOPY N/A 2018    Procedure: COMBINED COLONOSCOPY, SINGLE OR MULTIPLE BIOPSY/POLYPECTOMY BY BIOPSY;  COLONOSCOPY;  Surgeon: Moise Vásquez MD;  Location:  GI     THORACIC SURGERY       TONSILLECTOMY         Social History     Tobacco Use     Smoking status: Former Smoker     Packs/day: 0.30     Years: 50.00     Pack years: 15.00     Types: Cigarettes     Smokeless tobacco: Never Used     Tobacco comment: quit 2014   Substance Use Topics     Alcohol use: Yes     Comment: occasionally      Family History   Problem Relation Age of Onset     Hypertension Mother          at age 84     Neurologic Disorder Mother         ?parkinsonism - she had a tremor; walked normally. voice was soft     Cancer Father         brain tumor;  at age 62 y rs     Cancer Sister         breast cancer     Cancer Brother         bladder cancer;          Current Outpatient Medications   Medication Sig Dispense Refill     albuterol (PROAIR HFA/PROVENTIL HFA/VENTOLIN HFA) 108 (90 Base) MCG/ACT inhaler Inhale 1-2 puffs into the lungs every 4 hours as needed for shortness of breath / dyspnea or wheezing 1 Inhaler 5     amLODIPine (NORVASC) 2.5 MG tablet Take 1 tablet (2.5 mg) by mouth daily 90 tablet 3     aspirin 325 MG tablet Take 325 mg by mouth daily       escitalopram (LEXAPRO) 10 MG tablet Take 1 tablet (10 mg) by mouth daily 90 tablet 3      "lisinopril-hydrochlorothiazide (PRINZIDE/ZESTORETIC) 20-25 MG per tablet Take 1 tablet by mouth daily 90 tablet 3     polyethylene glycol (MIRALAX) powder Take 1 capful by mouth daily       Cholecalciferol (VITAMIN D3 PO) Take 1,000 Units by mouth daily       No Known Allergies  Recent Labs   Lab Test 09/18/18  1018 07/19/18  1200 01/08/18  1435  12/07/17  0555  12/04/17 2026 04/04/17  1135  10/13/14  1352   *  --   --   --   --   --   --  122*  --  106   HDL 59  --   --   --   --   --   --  60  --  57   TRIG 74  --   --   --   --   --   --  63  --  95   ALT  --   --  16  --  10  --  19 14  --  22   CR 0.66  --  0.59   < > 0.53   < > 1.01 0.65   < > 0.59   GFRESTIMATED 87 70 >90   < > >90   < > 54* 89   < > >90  Non  GFR Calc     GFRESTBLACK >90 85 >90   < > >90   < > 65 >90   GFR Calc     < > >90   GFR Calc     POTASSIUM 3.6  --  3.6   < > 3.1*   < > 3.7 4.0   < > 3.6   TSH  --   --  0.41  --   --   --   --  0.42  --   --     < > = values in this interval not displayed.        Right upper buttock pain.  Started 2 weeks ago.  Using ice packs intermittently.  \"it feels superficial.\"  No new lotions, soaps, products.  \"I just for the life of me can't figure out what this is from.\"    Blood pressure:  On lisinopril/hctz and norvasc.  Going well.  Requesting refills today.    Lexapro:  For mood.  Going well.  Requesting refills today.    Colitis:  Hospitalized last year for a week.  \"It'll act up every once in a while.\"  3 nights ago suddenly she has a stomach ache that didn't progress.  When she has a \"colitis\" episode, it will cause nausea, vomiting, and diarrhea.    Review of Systems   HENT: Positive for hearing loss.    Breasts:  Negative for tenderness, breast mass and discharge.   Genitourinary: Negative for pelvic pain, vaginal bleeding and vaginal discharge.     Constitutional, HEENT, cardiovascular, pulmonary, GI, , musculoskeletal, neuro, skin, " "endocrine and psych systems are negative, except as otherwise noted.    OBJECTIVE:   Resp 16   Ht 1.613 m (5' 3.5\")   Wt 54.9 kg (121 lb)   BMI 21.10 kg/m   Estimated body mass index is 21.1 kg/m  as calculated from the following:    Height as of this encounter: 1.613 m (5' 3.5\").    Weight as of this encounter: 54.9 kg (121 lb).  Physical Exam  GENERAL: healthy, alert and no distress  EYES: Eyes grossly normal to inspection, PERRL and conjunctivae and sclerae normal  HENT: Bilateral EAC's blocked with cerumen impaction    nose and mouth without ulcers or lesions  NECK: no adenopathy, no asymmetry, masses, or scars and thyroid normal to palpation  RESP: lungs clear to auscultation - no rales, rhonchi or wheezes  CV: regular rate and rhythm, normal S1 S2, no S3 or S4, no murmur, click or rub, no peripheral edema and peripheral pulses strong  ABDOMEN: soft, nontender, no hepatosplenomegaly, no masses and bowel sounds normal  MS: no gross musculoskeletal defects noted, no edema  SKIN: She has an approximately 3 inch irregular flat erythematous patch/rash on her right upper outer buttock.  NEURO: Normal strength and tone, mentation intact and speech normal  PSYCH: mentation appears normal, affect normal/bright    Diagnostic Test Results:  Labs reviewed in Epic  Orders placed, results pending     ASSESSMENT / PLAN:   (Z00.00) Encounter for Medicare annual wellness exam  (primary encounter diagnosis)  Comment:   Plan: Lipid panel reflex to direct LDL Fasting,         Comprehensive metabolic panel, Albumin Random         Urine Quantitative with Creat Ratio            (F32.0) Major depressive disorder, single episode, mild (H)  Comment: Stable  Plan: escitalopram (LEXAPRO) 10 MG tablet,         OFFICE/OUTPT VISIT,EST,LEVL IV        I did go ahead and give her refills of her escitalopram today.  She is to continue take her medication daily as prescribed.  Yearly clinic appointments for rechecks and refills, sooner if any " problems.    (K55.9) Ischemic colitis (H)  Comment: History of  Plan: We had a long discussion with Pete today that I am worried about her history of a colitis.  I did tell her that this could be life-threatening in the event that she has a flareup and she is not treated appropriately.  I did ask her to follow-up with gastroenterology for consultation.  She agrees and understands and will do that.    (D33.3) Acoustic neuroma (H)  Comment: History of  Plan: No treatment changes today.    (R21) Rash  Comment: Acute  Plan: desoximetasone (TOPICORT) 0.25 % external         cream, OFFICE/OUTPT VISIT,EST,LEVL IV        I did tell Jennifer that I noticed a rash on the buttock in the area that she is having pain.  At first I thought this was going to be a shingles outbreak, but this does not appear herpetic.  This is flat without the noted blistering typical of shingles.  I did talk to her and I treated her with a topical steroid cream.  She will use this 2 times a day.  In the event that the rash gets worse or does not get better she will return to clinic or let me know.    (R91.1) Pulmonary nodule, right  Comment: History of  Plan: CT Chest w/o Contrast        She is due a CT scan of the chest for pulmonary nodule follow-up.  This was discussed with the patient and she will follow-up with a CT scan at her earliest convenience.    (I10) Hypertension goal BP (blood pressure) < 140/90  Comment: Controlled  Plan: amLODIPine (NORVASC) 2.5 MG tablet,         lisinopril-hydrochlorothiazide         (PRINZIDE/ZESTORETIC) 20-25 MG tablet,         Comprehensive metabolic panel, Albumin Random         Urine Quantitative with Creat Ratio,         OFFICE/OUTPT VISIT,EST,LEVL IV        Refills given    (H61.23) Bilateral impacted cerumen  Comment:   Plan: REMOVE IMPACTED CERUMEN       After bilateral EACs were irrigated by the Medical assistant, her EACs were clear and the cerumen was removed.    (Z13.220) Screening for  "hyperlipidemia  Comment: Routine  Plan: Lipid panel reflex to direct LDL Fasting        Done today    (Z13.1) Screening for diabetes mellitus  Comment:   Plan: Comprehensive metabolic panel        Done today    COUNSELING:  Reviewed preventive health counseling, as reflected in patient instructions    Estimated body mass index is 21.1 kg/m  as calculated from the following:    Height as of this encounter: 1.613 m (5' 3.5\").    Weight as of this encounter: 54.9 kg (121 lb).         reports that she has quit smoking. Her smoking use included cigarettes. She has a 15.00 pack-year smoking history. She has never used smokeless tobacco.      Appropriate preventive services were discussed with this patient, including applicable screening as appropriate for cardiovascular disease, diabetes, osteopenia/osteoporosis, and glaucoma.  As appropriate for age/gender, discussed screening for colorectal cancer, prostate cancer, breast cancer, and cervical cancer. Checklist reviewing preventive services available has been given to the patient.    Reviewed patients plan of care and provided an AVS. The Basic Care Plan (routine screening as documented in Health Maintenance) for Lenora meets the Care Plan requirement. This Care Plan has been established and reviewed with the Patient.    Counseling Resources:  ATP IV Guidelines  Pooled Cohorts Equation Calculator  Breast Cancer Risk Calculator  FRAX Risk Assessment  ICSI Preventive Guidelines  Dietary Guidelines for Americans, 2010  [employer] MyPlate  ASA Prophylaxis  Lung CA Screening    STACY Forman [facility]    Identified Health Risks:  Answers for HPI/ROS submitted by the patient on 9/17/2019   Annual Exam:  If you checked off any problems, how difficult have these problems made it for you to do your work, take care of things at home, or get along with other people?: Not difficult at all  PHQ9 TOTAL SCORE: 1  LISET 7 TOTAL SCORE: 2    "

## 2019-09-18 ENCOUNTER — TELEPHONE (OUTPATIENT)
Dept: FAMILY MEDICINE | Facility: CLINIC | Age: 74
End: 2019-09-18

## 2019-09-18 DIAGNOSIS — R21 RASH: ICD-10-CM

## 2019-09-18 RX ORDER — DESOXIMETASONE 2.5 MG/G
CREAM TOPICAL 2 TIMES DAILY
Qty: 60 G | Refills: 0 | Status: CANCELLED | OUTPATIENT
Start: 2019-09-18

## 2019-09-18 RX ORDER — TRIAMCINOLONE ACETONIDE 5 MG/G
CREAM TOPICAL 2 TIMES DAILY
Qty: 15 G | Refills: 1 | Status: SHIPPED | OUTPATIENT
Start: 2019-09-18 | End: 2021-04-14

## 2019-09-18 ASSESSMENT — PATIENT HEALTH QUESTIONNAIRE - PHQ9: SUM OF ALL RESPONSES TO PHQ QUESTIONS 1-9: 1

## 2019-09-18 ASSESSMENT — ANXIETY QUESTIONNAIRES: GAD7 TOTAL SCORE: 2

## 2019-09-18 NOTE — TELEPHONE ENCOUNTER
This Rx for desoximetasone (TOPICORT) 0.25 % external cream cannot be ordered per ins.  Please put in a new Rx for an alternative.  Suggested alternative/s is/are: Triamcinolon cre, Betamethdip cre, or augbetame cre.

## 2019-10-03 ENCOUNTER — ANCILLARY PROCEDURE (OUTPATIENT)
Dept: CT IMAGING | Facility: CLINIC | Age: 74
End: 2019-10-03
Attending: NURSE PRACTITIONER
Payer: COMMERCIAL

## 2019-10-03 DIAGNOSIS — R91.1 PULMONARY NODULE, RIGHT: ICD-10-CM

## 2019-10-24 DIAGNOSIS — I10 HYPERTENSION GOAL BP (BLOOD PRESSURE) < 140/90: ICD-10-CM

## 2019-10-24 RX ORDER — AMLODIPINE BESYLATE 2.5 MG/1
TABLET ORAL
Qty: 0.1 TABLET | Refills: 0 | OUTPATIENT
Start: 2019-10-24

## 2019-10-24 NOTE — TELEPHONE ENCOUNTER
"Requested Prescriptions   Pending Prescriptions Disp Refills     amLODIPine (NORVASC) 2.5 MG tablet [Pharmacy Med Name: AMLODIPINE BESYLATE 2.5MG TABLETS] 90 tablet 0     Sig: TAKE 1 TABLET(2.5 MG) BY MOUTH DAILY       Calcium Channel Blockers Protocol  Passed - 10/24/2019  7:03 AM        Passed - Blood pressure under 140/90 in past 12 months     BP Readings from Last 3 Encounters:   09/17/19 110/58   10/25/18 117/69   10/15/18 107/62                 Passed - Recent (12 mo) or future (30 days) visit within the authorizing provider's specialty     Patient has had an office visit with the authorizing provider or a provider within the authorizing providers department within the previous 12 mos or has a future within next 30 days. See \"Patient Info\" tab in inbasket, or \"Choose Columns\" in Meds & Orders section of the refill encounter.              Passed - Medication is active on med list        Passed - Patient is age 18 or older        Passed - No active pregnancy on record        Passed - Normal serum creatinine on file in past 12 months     Recent Labs   Lab Test 09/17/19  1150  07/19/18  1200   CR 0.81   < >  --    CREAT  --   --  0.8    < > = values in this interval not displayed.             Passed - No positive pregnancy test in past 12 months        Refused Prescriptions:                       Disp   Refills    amLODIPine (NORVASC) 2.5 MG tablet [Pharma*0.1 ta*0        Sig: TAKE 1 TABLET(2.5 MG) BY MOUTH DAILY  Refused By: ANGELITA SAINI  Reason for Refusal: Duplicate      "

## 2019-10-30 ENCOUNTER — OFFICE VISIT (OUTPATIENT)
Dept: FAMILY MEDICINE | Facility: CLINIC | Age: 74
End: 2019-10-30
Payer: COMMERCIAL

## 2019-10-30 VITALS
SYSTOLIC BLOOD PRESSURE: 96 MMHG | HEIGHT: 64 IN | HEART RATE: 82 BPM | BODY MASS INDEX: 20.66 KG/M2 | OXYGEN SATURATION: 98 % | TEMPERATURE: 97.5 F | DIASTOLIC BLOOD PRESSURE: 50 MMHG | RESPIRATION RATE: 16 BRPM | WEIGHT: 121 LBS

## 2019-10-30 DIAGNOSIS — I25.10 CORONARY ARTERY CALCIFICATION: Primary | ICD-10-CM

## 2019-10-30 DIAGNOSIS — N28.1 CYST OF LEFT KIDNEY: ICD-10-CM

## 2019-10-30 PROCEDURE — 99214 OFFICE O/P EST MOD 30 MIN: CPT | Performed by: NURSE PRACTITIONER

## 2019-10-30 ASSESSMENT — MIFFLIN-ST. JEOR: SCORE: 1015.91

## 2019-10-30 NOTE — PROGRESS NOTES
Subjective     Lenora Hammonds is a 74 year old female who presents to clinic today for the following health issues:  Chief Complaint   Patient presents with     Follow Up         Lenora is in the clinic today to follow up on her CT chest that was done for lung cancer screening.  The CT scan came back showed artery calcifications, and this is worrying her.  She had some concerns about the results showing inflammation as well (she had been getting over an URI at the time).  Today, Lenora feels healthy.  No fever, chills.  She denies any c/o chest pain or SOB.    Reviewed and updated as needed this visit by Provider         Patient Active Problem List   Diagnosis     Tremor     CARDIOVASCULAR SCREENING; LDL GOAL LESS THAN 160     Mild major depression (H)     Dyspepsia     Wears glasses     Urinary incontinence     Snores     Histoplasmosis     Advanced directives, counseling/discussion     Neoplasm of uncertain behavior of skin     Epigastric pain     Cervical pain     Benign neoplasm of cranial nerve (H)     Acoustic neuroma (H)     Hypertension goal BP (blood pressure) < 140/90     Acute bilateral low back pain without sciatica     Osteopenia     Pulmonary nodule, right     Stiffness of right shoulder joint     Shoulder pain     Ischemic colitis (H)     Past Surgical History:   Procedure Laterality Date     C THORACOTOMY,MAJOR,EXPLOR/BIOPSY  1983    histoplasmosis, right lower lobe     COLONOSCOPY N/A 12/6/2017    Procedure: COMBINED COLONOSCOPY, SINGLE OR MULTIPLE BIOPSY/POLYPECTOMY BY BIOPSY;  colonoscopy;  Surgeon: Moise Vásquez MD;  Location:  GI     COLONOSCOPY N/A 6/28/2018    Procedure: COMBINED COLONOSCOPY, SINGLE OR MULTIPLE BIOPSY/POLYPECTOMY BY BIOPSY;  COLONOSCOPY;  Surgeon: Moise Vásquez MD;  Location:  GI     THORACIC SURGERY       TONSILLECTOMY         Social History     Tobacco Use     Smoking status: Former Smoker     Packs/day: 0.30     Years: 50.00     Pack years: 15.00      Types: Cigarettes     Smokeless tobacco: Never Used     Tobacco comment: quit 2014   Substance Use Topics     Alcohol use: Yes     Comment: occasionally      Family History   Problem Relation Age of Onset     Hypertension Mother          at age 84     Neurologic Disorder Mother         ?parkinsonism - she had a tremor; walked normally. voice was soft     Cancer Father         brain tumor;  at age 62 y rs     Other - See Comments Sister         Homicide     Cancer Brother         Bladder     Breast Cancer Sister          Current Outpatient Medications   Medication Sig Dispense Refill     albuterol (PROAIR HFA/PROVENTIL HFA/VENTOLIN HFA) 108 (90 Base) MCG/ACT inhaler Inhale 1-2 puffs into the lungs every 4 hours as needed for shortness of breath / dyspnea or wheezing 1 Inhaler 5     amLODIPine (NORVASC) 2.5 MG tablet Take 1 tablet (2.5 mg) by mouth daily 90 tablet 3     aspirin 325 MG tablet Take 325 mg by mouth daily       Cholecalciferol (VITAMIN D3 PO) Take 1,000 Units by mouth daily       escitalopram (LEXAPRO) 10 MG tablet Take 1 tablet (10 mg) by mouth daily 90 tablet 3     lisinopril-hydrochlorothiazide (PRINZIDE/ZESTORETIC) 20-25 MG tablet Take 1 tablet by mouth daily 90 tablet 3     polyethylene glycol (MIRALAX) powder Take 1 capful by mouth daily       triamcinolone (ARISTOCORT HP) 0.5 % external cream Apply topically 2 times daily (Patient not taking: Reported on 10/30/2019) 15 g 1     No Known Allergies  Recent Labs   Lab Test 19  1150 18  1018  18  1435  17  0555  17  1135   * 110*  --   --   --   --   --  122*   HDL 51 59  --   --   --   --   --  60   TRIG 87 74  --   --   --   --   --  63   ALT 18  --   --  16  --  10   < > 14   CR 0.81 0.66  --  0.59   < > 0.53   < > 0.65   GFRESTIMATED 71 87   < > >90   < > >90   < > 89   GFRESTBLACK 82 >90   < > >90   < > >90   < > >90  African American GFR Calc     POTASSIUM 3.6 3.6  --  3.6   < > 3.1*   < > 4.0  "  TSH  --   --   --  0.41  --   --   --  0.42    < > = values in this interval not displayed.      BP Readings from Last 3 Encounters:   10/30/19 96/50   09/17/19 110/58   10/25/18 117/69    Wt Readings from Last 3 Encounters:   10/30/19 54.9 kg (121 lb)   09/17/19 54.9 kg (121 lb)   10/25/18 55.3 kg (122 lb)               Review of Systems   ROS COMP: Constitutional, HEENT, cardiovascular, pulmonary, GI, , musculoskeletal, neuro, skin, endocrine and psych systems are negative, except as otherwise noted.      Objective    BP 96/50 (BP Location: Right arm, Patient Position: Sitting, Cuff Size: Adult Regular)   Pulse 82   Temp 97.5  F (36.4  C) (Oral)   Resp 16   Ht 1.613 m (5' 3.5\")   Wt 54.9 kg (121 lb)   SpO2 98%   BMI 21.10 kg/m    Body mass index is 21.1 kg/m .  Physical Exam   GENERAL: healthy, alert and no distress  EYES: Eyes grossly normal to inspection, PERRL and conjunctivae and sclerae normal  HENT: ear canals and TM's normal, nose and mouth without ulcers or lesions  NECK: no adenopathy and thyroid normal to palpation  RESP: lungs clear to auscultation - no rales, rhonchi or wheezes  CV: regular rate and rhythm, normal S1 S2, no S3 or S4, no murmur, click or rub, no peripheral edema and peripheral pulses strong  MS: no gross musculoskeletal defects noted, no edema. Ambulatory with a steady gait.   SKIN: warm and dry  NEURO: Normal strength and tone, mentation intact and speech normal  PSYCH: mentation appears normal, affect normal/bright      Diagnostic Test Results:  Imaging results reviewed with the patient during this visit:  10/3/2019 CT chest results  7/19/2019 CTA Angiogram        Assessment & Plan     (I25.10,  I25.84) Coronary artery calcification  (primary encounter diagnosis)  Comment:   Plan: CARDIOLOGY EVAL ADULT REFERRAL  I reassured the patient today that her cholesterol panel have always been good.  I did tell her that at the family practice nurse practitioner I am not sure with " the results of the CT chest means exactly showing calcifications in the arteries.  Because of the calcifications, she does want additional information I talked with her about a consultation appointment with cardiology for additional discussion, risk reduction, etc.  Lolis was appreciative and will follow up with Dr. Horan.     (N28.1) Cyst of left kidney  Comment: Uncertain etiology  Plan: US Abdomen Complete       As I was reviewing her imaging studies today, I did see on her  CTA abdomen from 2018 that there was a cyst on her kidney.  Lolis reports that she was never told there was a cyst on her kidney and she was not told to do a follow-up.  I did go ahead and place order for an ultrasound of her abdomen/for kidney polyp today.  She will schedule that ultrasound at her earliest convenience and she is appreciative Today.  Questions were answered.    Return in about 3 months (around 1/30/2020) for Follow up regarding today's concerns.    STACY Forman [facility]

## 2019-11-04 ENCOUNTER — ANCILLARY PROCEDURE (OUTPATIENT)
Dept: ULTRASOUND IMAGING | Facility: CLINIC | Age: 74
End: 2019-11-04
Attending: NURSE PRACTITIONER
Payer: COMMERCIAL

## 2019-11-04 DIAGNOSIS — N28.1 CYST OF LEFT KIDNEY: ICD-10-CM

## 2019-11-04 LAB — RADIOLOGIST FLAGS: NORMAL

## 2019-11-05 ENCOUNTER — TELEPHONE (OUTPATIENT)
Dept: FAMILY MEDICINE | Facility: CLINIC | Age: 74
End: 2019-11-05

## 2019-11-05 DIAGNOSIS — N28.89 RENAL MASS, LEFT: Primary | ICD-10-CM

## 2019-11-05 NOTE — TELEPHONE ENCOUNTER
Siobhan Pineda [facility],  Please see phone message below. Imaging wanted to make sure you were aware of incidental finding on US completed yesterday    Impression Below:   Hyperechoic foci of the left renal cortex corresponding to finding on  previous CT which could represent angiomyolipoma versus other less  likely neoplastic process, consider dedicated MRI for further  Characterization.    MRI cued    Please advise    Thank You!  Komal Ravi, RN  Triage Nurse

## 2019-11-05 NOTE — TELEPHONE ENCOUNTER
Reason for call:  Other   Patient called regarding (reason for call): call back  Additional comments: [facility] called wanted see if the dr knows anout the findings in the ultrasound if they need to call back he name is aileen at [phone]    Phone number to reach patient:  Other phone number:  [phone]    Best Time:  any    Can we leave a detailed message on this number?  YES

## 2019-11-06 NOTE — TELEPHONE ENCOUNTER
Siobhan Pineda CNP    Your comments were relayed to pt. She will call to schedule the MRI but please place the order    (she does not need a call back, she will wait until tomorrow to schedule as she knows you have to sign an order)    Thanks    Shayy Guido, RN, BSN

## 2019-11-06 NOTE — TELEPHONE ENCOUNTER
I called Lenora to tell her about the CT abdomen and need for the MRI abd.  I left a message asking her to call the clinic back.  Feel free to give her the information about a MRI.  I am not worried about the CT result (it showed a spot on her left kidney). This spot is not much different from her last scan, but the MRI will tell us exactly what it is.

## 2019-11-07 ENCOUNTER — OFFICE VISIT (OUTPATIENT)
Dept: CARDIOLOGY | Facility: CLINIC | Age: 74
End: 2019-11-07
Payer: COMMERCIAL

## 2019-11-07 VITALS
TEMPERATURE: 98.2 F | DIASTOLIC BLOOD PRESSURE: 62 MMHG | RESPIRATION RATE: 17 BRPM | HEART RATE: 76 BPM | BODY MASS INDEX: 21.45 KG/M2 | WEIGHT: 123 LBS | OXYGEN SATURATION: 98 % | SYSTOLIC BLOOD PRESSURE: 100 MMHG

## 2019-11-07 DIAGNOSIS — I10 ESSENTIAL HYPERTENSION: ICD-10-CM

## 2019-11-07 DIAGNOSIS — I25.10 CORONARY ARTERY CALCIFICATION: Primary | ICD-10-CM

## 2019-11-07 DIAGNOSIS — Z72.0 TOBACCO ABUSE: ICD-10-CM

## 2019-11-07 PROCEDURE — 93000 ELECTROCARDIOGRAM COMPLETE: CPT | Performed by: INTERNAL MEDICINE

## 2019-11-07 PROCEDURE — 99204 OFFICE O/P NEW MOD 45 MIN: CPT | Performed by: INTERNAL MEDICINE

## 2019-11-07 NOTE — LETTER
2019      RE: Lenora Hammonds  [address] [facility] [address]       Dear Colleague,    Thank you for the opportunity to participate in the care of your patient, Lenora Hammodns, at the [facility] at [facility]. Please see a copy of my visit note below.    I am delighted to see Lenora Hammonds in consultation for coronary calcification.      History of Present Illness:  As you know, the patient is a 74 year old  Female recently had chest CT for lung CA screening which showed coronary calcification. She has no h/o CAD. Take meds for HTN, smokes. She is , walks her dog about 20 minutes a day, able to do all household activities including stairs without chest pressure, dizziness, palpitations, dyspnea. No orthopnea/PND.    The following portions of the patient's history were reviewed and updated as appropriate: allergies, current medications, past family history, past medical history, past social history, past surgical history, and the problem list.    Past Medical History:  Hypertension  Tobacco - 2-3 cigs/day; smoked x 20 years, max 7-8 cigs/day    Medications:   Amlodipine 2.5 every day  Aspirin 325 every day  Lisinopril-hydrochlorothiazide 20-25 every day  Vitamin D  Lexapro    Allergies:  No Known Allergies    Family History:   Family History   Problem Relation Age of Onset     Hypertension Mother          at age 84     Neurologic Disorder Mother         ?parkinsonism - she had a tremor; walked normally. voice was soft     Cancer Father         brain tumor;  at age 62 y rs     Other - See Comments Sister         Homicide     Cancer Brother         Bladder     Breast Cancer Sister        Psychosocial history:  reports that she has quit smoking. Her smoking use included cigarettes. She has a 15.00 pack-year smoking history. She has never used smokeless tobacco. She reports current alcohol use. She reports  that she does not use drugs.    Review of systems:   Cardiovascular: No palpitations, chest pain, shortness of breath at rest, dyspnea with exertion, orthopnea, paroxysmal nocturia dyspnea, nocturia, dizziness, syncope.    In addition,   Constitutional: No change in weight, sleep or appetite.  Normal energy.  No fever or chills  Eyes: Negative for vision changes or eye problems  ENT: No problems with ears, nose or throat.  No difficulty swallowing.  Resp: No coughing, wheezing or shortness of breath  GI: No nausea, vomiting,  heartburn, abdominal pain, diarrhea, constipation or change in bowel habits  : No urinary frequency or dysuria, bladder or kidney problems  Musculoskeletal: No significant muscle or joint pains  Neurologic: No headaches, numbness, tingling, weakness, problems with balance or coordination  Psychiatric: No problems with anxiety, depression or mental health  Heme/immune/allergy: No history of bleeding or clotting problems or anemia.  No allergies or immune system problems  Integumentary: No rashes,worrisome lesions or skin problems      Physical examination  Vitals: 100/62, 76 bpm  BMI= 21    Constitutional: In general, the patient is a pleasant female in no apparent distress.    Eyes: PERRLA.  EOMI.  Sclerae white, not injected.  ENT/mouth: Normiocephalic and atraumatic.  Nares clear.  Pharynx without erythema or exudate.  Dentition intact.  No adenopathy.  No thyromegaly. Carotids +2/2 bilaterally without bruits.  No jugular venous distension.   Card/Vasc: The PMI is in the 5th ICS in the midclavicular line. There is no heave. Regular rate and rhythm. Normal S1, S2. No murmur, rub, click, or gallop. Pulses are normal bilaterally throughout. No peripheral edema.  Respiratory: Clear to asculation.  No ronchi, wheezes, rales.  No dullness to percussion.   GI: Abdomen is soft, nontender, nondistended. No organomegaly. No AAA.  No bruits.   Integument: No significant bruises or  "rashes  Neurological: The neurological examination reveal a patient who was oriented to person, place, and time.    Psych: Normal  Heme/Lymph/Immun: no significant adenopathy      I have reviewed the following labs/imaging:  Labs: 9/17/19 - cholesterol 178, HDL 51, , TG 87, K 3.6, cr 0.81  Echo: 1/28/15: EF 55-60%, no valve disease  CT scan chest 10/3/19: \"moderate coronary calcification\"     I have personally and independently reviewed the following:  EKG: sinus, RAZIA, PVC    Assessment :  Coronary calcification seen on lung CT scan, not quantified. She is moderately active without any symptoms. Her current 10-year ASCVD risk is 20% - the major intervention to reduce her risk is to stop smoking, which can reduce her risk to 14%. She does not need daily aspirin.  Discussed the above with patient.    Plan:  Smoking cessation  OK to stop aspirin  Continue excellent BP management      The patient is to return as needed . The patient understood the treatment plan as outlined above.  There were no barriers to learning.      Caroline Horan MD   "

## 2019-11-07 NOTE — PROGRESS NOTES
I am delighted to see Lenora Hammonds in consultation for coronary calcification.      History of Present Illness:  As you know, the patient is a 74 year old  Female recently had chest CT for lung CA screening which showed coronary calcification. She has no h/o CAD. Take meds for HTN, smokes. She is , walks her dog about 20 minutes a day, able to do all household activities including stairs without chest pressure, dizziness, palpitations, dyspnea. No orthopnea/PND.    The following portions of the patient's history were reviewed and updated as appropriate: allergies, current medications, past family history, past medical history, past social history, past surgical history, and the problem list.    Past Medical History:  Hypertension  Tobacco - 2-3 cigs/day; smoked x 20 years, max 7-8 cigs/day    Medications:   Amlodipine 2.5 every day  Aspirin 325 every day  Lisinopril-hydrochlorothiazide 20-25 every day  Vitamin D  Lexapro    Allergies:  No Known Allergies    Family History:   Family History   Problem Relation Age of Onset     Hypertension Mother          at age 84     Neurologic Disorder Mother         ?parkinsonism - she had a tremor; walked normally. voice was soft     Cancer Father         brain tumor;  at age 62 y rs     Other - See Comments Sister         Homicide     Cancer Brother         Bladder     Breast Cancer Sister        Psychosocial history:  reports that she has quit smoking. Her smoking use included cigarettes. She has a 15.00 pack-year smoking history. She has never used smokeless tobacco. She reports current alcohol use. She reports that she does not use drugs.    Review of systems:   Cardiovascular: No palpitations, chest pain, shortness of breath at rest, dyspnea with exertion, orthopnea, paroxysmal nocturia dyspnea, nocturia, dizziness, syncope.    In addition,   Constitutional: No change in weight, sleep or appetite.  Normal energy.  No fever or chills  Eyes: Negative for  vision changes or eye problems  ENT: No problems with ears, nose or throat.  No difficulty swallowing.  Resp: No coughing, wheezing or shortness of breath  GI: No nausea, vomiting,  heartburn, abdominal pain, diarrhea, constipation or change in bowel habits  : No urinary frequency or dysuria, bladder or kidney problems  Musculoskeletal: No significant muscle or joint pains  Neurologic: No headaches, numbness, tingling, weakness, problems with balance or coordination  Psychiatric: No problems with anxiety, depression or mental health  Heme/immune/allergy: No history of bleeding or clotting problems or anemia.  No allergies or immune system problems  Integumentary: No rashes,worrisome lesions or skin problems      Physical examination  Vitals: 100/62, 76 bpm  BMI= 21    Constitutional: In general, the patient is a pleasant female in no apparent distress.    Eyes: PERRLA.  EOMI.  Sclerae white, not injected.  ENT/mouth: Normiocephalic and atraumatic.  Nares clear.  Pharynx without erythema or exudate.  Dentition intact.  No adenopathy.  No thyromegaly. Carotids +2/2 bilaterally without bruits.  No jugular venous distension.   Card/Vasc: The PMI is in the 5th ICS in the midclavicular line. There is no heave. Regular rate and rhythm. Normal S1, S2. No murmur, rub, click, or gallop. Pulses are normal bilaterally throughout. No peripheral edema.  Respiratory: Clear to asculation.  No ronchi, wheezes, rales.  No dullness to percussion.   GI: Abdomen is soft, nontender, nondistended. No organomegaly. No AAA.  No bruits.   Integument: No significant bruises or rashes  Neurological: The neurological examination reveal a patient who was oriented to person, place, and time.    Psych: Normal  Heme/Lymph/Immun: no significant adenopathy      I have reviewed the following labs/imaging:  Labs: 9/17/19 - cholesterol 178, HDL 51, , TG 87, K 3.6, cr 0.81  Echo: 1/28/15: EF 55-60%, no valve disease  CT scan chest 10/3/19:  "\"moderate coronary calcification\"     I have personally and independently reviewed the following:  EKG: sinus, RAZIA, PVC    Assessment :  Coronary calcification seen on lung CT scan, not quantified. She is moderately active without any symptoms. Her current 10-year ASCVD risk is 20% - the major intervention to reduce her risk is to stop smoking, which can reduce her risk to 14%. She does not need daily aspirin.  Discussed the above with patient.    Plan:  Smoking cessation  OK to stop aspirin  Continue excellent BP management      The patient is to return as needed . The patient understood the treatment plan as outlined above.  There were no barriers to learning.      Caorline Horan MD   "

## 2019-11-07 NOTE — PATIENT INSTRUCTIONS
You were seen today in the Cardiovascular Clinic at [facility].     Cardiology Providers you saw during your visit: Dr. Caroline Horan    Diagnosis:  hypertension    Results: discussed with patient      Orders:   none    Medication Changes:   none    Recommendations:   Smoking cessation    Follow-up:    As needed- For medication refills please contact your Primary Care Provider for future refills.     Please follow up with primary care provider for medication refills     Please feel free to call me with any questions or concerns.        Questions and [phone]      For Radiology / Imaging [phone]       On Call Cardiologist for after hours or on weekends: [phone]   option #4 and ask to speak to the on-call Cardiologist.          If you need a medication refill please contact your pharmacy.  Please allow 3 business days for your refill to be completed.

## 2019-11-12 ENCOUNTER — ANCILLARY PROCEDURE (OUTPATIENT)
Dept: MAMMOGRAPHY | Facility: CLINIC | Age: 74
End: 2019-11-12
Attending: NURSE PRACTITIONER
Payer: COMMERCIAL

## 2019-11-12 DIAGNOSIS — Z12.31 SCREENING MAMMOGRAM, ENCOUNTER FOR: ICD-10-CM

## 2019-11-13 ENCOUNTER — HOSPITAL ENCOUNTER (OUTPATIENT)
Dept: MRI IMAGING | Facility: CLINIC | Age: 74
Discharge: HOME | End: 2019-11-13
Attending: NURSE PRACTITIONER | Admitting: NURSE PRACTITIONER
Payer: COMMERCIAL

## 2019-11-13 DIAGNOSIS — N28.89 RENAL MASS, LEFT: ICD-10-CM

## 2019-11-13 LAB
CREAT BLD-MCNC: 0.7 MG/DL (ref 0.52–1.04)
GFR SERPL CREATININE-BSD FRML MDRD: 81 ML/MIN/{1.73_M2}

## 2019-11-13 PROCEDURE — 82565 ASSAY OF CREATININE: CPT

## 2019-11-13 PROCEDURE — 25500064 ZZH RX 255 OP 636: Performed by: NURSE PRACTITIONER

## 2019-11-13 PROCEDURE — 74183 MRI ABD W/O CNTR FLWD CNTR: CPT

## 2019-11-13 PROCEDURE — A9585 GADOBUTROL INJECTION: HCPCS | Performed by: NURSE PRACTITIONER

## 2019-11-13 RX ORDER — GADOBUTROL 604.72 MG/ML
5 INJECTION INTRAVENOUS ONCE
Status: COMPLETED | OUTPATIENT
Start: 2019-11-13 | End: 2019-11-13

## 2019-11-13 RX ADMIN — GADOBUTROL 5 ML: 604.72 INJECTION INTRAVENOUS at 11:49

## 2020-09-24 ENCOUNTER — VIRTUAL VISIT (OUTPATIENT)
Dept: FAMILY MEDICINE | Facility: CLINIC | Age: 75
End: 2020-09-24
Payer: COMMERCIAL

## 2020-09-24 DIAGNOSIS — F32.0 MAJOR DEPRESSIVE DISORDER, SINGLE EPISODE, MILD (H): Primary | ICD-10-CM

## 2020-09-24 DIAGNOSIS — Z13.220 SCREENING FOR HYPERLIPIDEMIA: ICD-10-CM

## 2020-09-24 DIAGNOSIS — Z12.39 SCREENING FOR BREAST CANCER: ICD-10-CM

## 2020-09-24 DIAGNOSIS — Z12.31 ENCOUNTER FOR SCREENING MAMMOGRAM FOR MALIGNANT NEOPLASM OF BREAST: ICD-10-CM

## 2020-09-24 DIAGNOSIS — I10 HYPERTENSION GOAL BP (BLOOD PRESSURE) < 140/90: ICD-10-CM

## 2020-09-24 PROCEDURE — 99214 OFFICE O/P EST MOD 30 MIN: CPT | Mod: TEL | Performed by: NURSE PRACTITIONER

## 2020-09-24 PROCEDURE — 96127 BRIEF EMOTIONAL/BEHAV ASSMT: CPT | Performed by: NURSE PRACTITIONER

## 2020-09-24 RX ORDER — LISINOPRIL AND HYDROCHLOROTHIAZIDE 20; 25 MG/1; MG/1
1 TABLET ORAL DAILY
Qty: 90 TABLET | Refills: 3 | Status: SHIPPED | OUTPATIENT
Start: 2020-09-24 | End: 2021-09-13

## 2020-09-24 RX ORDER — AMLODIPINE BESYLATE 2.5 MG/1
2.5 TABLET ORAL DAILY
Qty: 90 TABLET | Refills: 3 | Status: SHIPPED | OUTPATIENT
Start: 2020-09-24 | End: 2021-06-14

## 2020-09-24 RX ORDER — ESCITALOPRAM OXALATE 10 MG/1
10 TABLET ORAL DAILY
Qty: 90 TABLET | Refills: 3 | Status: SHIPPED | OUTPATIENT
Start: 2020-09-24 | End: 2020-10-12

## 2020-09-24 RX ORDER — LISINOPRIL AND HYDROCHLOROTHIAZIDE 20; 25 MG/1; MG/1
1 TABLET ORAL DAILY
Qty: 90 TABLET | Refills: 3 | OUTPATIENT
Start: 2020-09-24

## 2020-09-24 ASSESSMENT — PATIENT HEALTH QUESTIONNAIRE - PHQ9: SUM OF ALL RESPONSES TO PHQ QUESTIONS 1-9: 0

## 2020-09-24 NOTE — PROGRESS NOTES
"Lenora Hammonds is a 74 year old female who is being evaluated via a billable telephone visit.      The patient has been notified of following:     \"This telephone visit will be conducted via a call between you and your physician/provider. We have found that certain health care needs can be provided without the need for a physical exam.  This service lets us provide the care you need with a short phone conversation.  If a prescription is necessary we can send it directly to your pharmacy.  If lab work is needed we can place an order for that and you can then stop by our lab to have the test done at a later time.    Telephone visits are billed at different rates depending on your insurance coverage. During this emergency period, for some insurers they may be billed the same as an in-person visit.  Please reach out to your insurance provider with any questions.    If during the course of the call the physician/provider feels a telephone visit is not appropriate, you will not be charged for this service.\"    Patient has given verbal consent for Telephone visit?  Yes    What phone number would you like to be contacted at? [phone]     How would you like to obtain your AVS? MyChart    Subjective     Lenora Hammonds is a 74 year old female who presents via phone visit today for the following health issues:    HPI    Chief Complaint   Patient presents with     Depression     needing lexapro refills     Hypertension     Needing refills of her blood pressure medications       Lenora has been on her lexapro for years.  She has been great on the medication.  A few weeks ago she had some mood issues, but she is better now.  She is taking 10mg per day and she is requesting refills today.  Today, she denies any breakthrough anxiety or depression.  She is feeling healthy.    Blood pressure:  Lolis is taking her lisinopril/hydrochlorothiazide as well as amlodipine every day.  She denies any side effects or problems with the " "medication.  She tolerates the medications well.  She does not check her blood pressure in public.  She is due refills at this time.      Review of Systems   Constitutional, HEENT, cardiovascular, pulmonary, GI, , musculoskeletal, neuro, skin, endocrine and psych systems are negative, except as otherwise noted.       Objective   Vitals - Patient Reported  Weight (Patient Reported): 56.7 kg (125 lb)  Height (Patient Reported): 161.3 cm (5' 3.5\")  BMI (Based on Pt Reported Ht/Wt): 21.8      Vitals:  No vitals were obtained today due to virtual visit.    healthy, alert and no distress  PSYCH: Alert and oriented times 3; coherent speech, normal   rate and volume, able to articulate logical thoughts, able   to abstract reason, no tangential thoughts, no hallucinations   or delusions  Her affect is normal  RESP: No cough, no audible wheezing, able to talk in full sentences  Remainder of exam unable to be completed due to telephone visits    Diagnostics:  Reviewed in epic.  Orders placed for future labs today.        Assessment/Plan:    (F32.0) Major depressive disorder, single episode, mild (H)  (primary encounter diagnosis)  Comment: Controlled  Plan: escitalopram (LEXAPRO) 10 MG tablet        Lolis sound and bright and happy on the telephone today.  Her Lexapro was refilled and I gave her a year of refills today.  I encouraged her to take good care of her self with a healthy diet, fluids, and mental health care.  Follow-up with me in 1 year for next refills, sooner if any problems.    (I10) Hypertension goal BP (blood pressure) < 140/90  Comment: Uncertain  Plan: lisinopril-hydrochlorothiazide (ZESTORETIC)         20-25 MG tablet, amLODIPine (NORVASC) 2.5 MG         tablet, Albumin Random Urine Quantitative with         Creat Ratio, Comprehensive metabolic panel,         CANCELED: Albumin Random Urine Quantitative         with Creat Ratio, CANCELED: Comprehensive         metabolic panel, CANCELED: Lipid panel reflex "         to direct LDL Fasting        For today, I did go ahead and refill her medications.  She is to take her blood pressure medications as prescribed.  When she comes into the clinic to have her labs drawn, I did recommend that she have her blood pressure taken at that time.  Yearly clinic appointments for refills, sooner if any problems.    (Z13.220) Screening for hyperlipidemia  Comment: Uncertain  Plan: Lipid panel reflex to direct LDL Fasting        Cholesterol panel will be done with next lab studies    (Z12.39) Screening for breast cancer  Comment: Routine  Plan: *MA Screening Digital Bilateral        Due now    (Z12.31) Encounter for screening mammogram for malignant neoplasm of breast   Comment:   Plan: *MA Screening Digital Bilateral           Phone call duration:  14 minutes

## 2020-09-30 DIAGNOSIS — Z13.220 SCREENING FOR HYPERLIPIDEMIA: ICD-10-CM

## 2020-09-30 DIAGNOSIS — I10 HYPERTENSION GOAL BP (BLOOD PRESSURE) < 140/90: ICD-10-CM

## 2020-09-30 LAB
ALBUMIN SERPL-MCNC: 3.7 G/DL (ref 3.4–5)
ALP SERPL-CCNC: 76 U/L (ref 40–150)
ALT SERPL W P-5'-P-CCNC: 21 U/L (ref 0–50)
ANION GAP SERPL CALCULATED.3IONS-SCNC: 7 MMOL/L (ref 3–14)
AST SERPL W P-5'-P-CCNC: 18 U/L (ref 0–45)
BILIRUB SERPL-MCNC: 0.4 MG/DL (ref 0.2–1.3)
BUN SERPL-MCNC: 25 MG/DL (ref 7–30)
CALCIUM SERPL-MCNC: 9.9 MG/DL (ref 8.5–10.1)
CHLORIDE SERPL-SCNC: 103 MMOL/L (ref 94–109)
CHOLEST SERPL-MCNC: 177 MG/DL
CO2 SERPL-SCNC: 29 MMOL/L (ref 20–32)
CREAT SERPL-MCNC: 0.69 MG/DL (ref 0.52–1.04)
CREAT UR-MCNC: 118 MG/DL
GFR SERPL CREATININE-BSD FRML MDRD: 84 ML/MIN/{1.73_M2}
GLUCOSE SERPL-MCNC: 88 MG/DL (ref 70–99)
HDLC SERPL-MCNC: 55 MG/DL
LDLC SERPL CALC-MCNC: 108 MG/DL
MICROALBUMIN UR-MCNC: 8 MG/L
MICROALBUMIN/CREAT UR: 7.01 MG/G CR (ref 0–25)
NONHDLC SERPL-MCNC: 122 MG/DL
POTASSIUM SERPL-SCNC: 3.9 MMOL/L (ref 3.4–5.3)
PROT SERPL-MCNC: 7.6 G/DL (ref 6.8–8.8)
SODIUM SERPL-SCNC: 139 MMOL/L (ref 133–144)
TRIGL SERPL-MCNC: 72 MG/DL

## 2020-09-30 PROCEDURE — 82043 UR ALBUMIN QUANTITATIVE: CPT | Performed by: NURSE PRACTITIONER

## 2020-09-30 PROCEDURE — 80061 LIPID PANEL: CPT | Performed by: NURSE PRACTITIONER

## 2020-09-30 PROCEDURE — 36415 COLL VENOUS BLD VENIPUNCTURE: CPT | Performed by: NURSE PRACTITIONER

## 2020-09-30 PROCEDURE — 80053 COMPREHEN METABOLIC PANEL: CPT | Performed by: NURSE PRACTITIONER

## 2020-10-12 DIAGNOSIS — F32.0 MAJOR DEPRESSIVE DISORDER, SINGLE EPISODE, MILD (H): ICD-10-CM

## 2020-10-12 RX ORDER — ESCITALOPRAM OXALATE 10 MG/1
TABLET ORAL
Qty: 90 TABLET | Refills: 3 | Status: SHIPPED | OUTPATIENT
Start: 2020-10-12 | End: 2021-10-04

## 2020-11-17 ENCOUNTER — ANCILLARY PROCEDURE (OUTPATIENT)
Dept: MAMMOGRAPHY | Facility: CLINIC | Age: 75
End: 2020-11-17
Attending: NURSE PRACTITIONER
Payer: COMMERCIAL

## 2020-11-17 DIAGNOSIS — Z12.39 SCREENING FOR BREAST CANCER: ICD-10-CM

## 2020-11-17 DIAGNOSIS — Z12.31 ENCOUNTER FOR SCREENING MAMMOGRAM FOR MALIGNANT NEOPLASM OF BREAST: ICD-10-CM

## 2020-11-17 PROCEDURE — 77067 SCR MAMMO BI INCL CAD: CPT | Performed by: RADIOLOGY

## 2020-11-17 PROCEDURE — 77063 BREAST TOMOSYNTHESIS BI: CPT | Performed by: RADIOLOGY

## 2020-12-06 ENCOUNTER — OFFICE VISIT (OUTPATIENT)
Dept: URGENT CARE | Facility: URGENT CARE | Age: 75
End: 2020-12-06
Payer: COMMERCIAL

## 2020-12-06 VITALS
HEART RATE: 74 BPM | RESPIRATION RATE: 12 BRPM | TEMPERATURE: 97.9 F | WEIGHT: 125 LBS | DIASTOLIC BLOOD PRESSURE: 62 MMHG | HEIGHT: 64 IN | BODY MASS INDEX: 21.34 KG/M2 | SYSTOLIC BLOOD PRESSURE: 106 MMHG

## 2020-12-06 DIAGNOSIS — H61.23 BILATERAL IMPACTED CERUMEN: Primary | ICD-10-CM

## 2020-12-06 PROCEDURE — 99213 OFFICE O/P EST LOW 20 MIN: CPT | Performed by: PHYSICIAN ASSISTANT

## 2020-12-06 ASSESSMENT — MIFFLIN-ST. JEOR: SCORE: 1037

## 2020-12-06 NOTE — PROGRESS NOTES
"SUBJECTIVE:  Lenora Hammonds is a 75 year old female who presents with right ear hearing loss for 1 week(s).   Severity: moderate   Timing:gradual onset and still present  Additional symptoms include none.      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     Snores 10/6/2011     Current Outpatient Medications   Medication Sig Dispense Refill     amLODIPine (NORVASC) 2.5 MG tablet Take 1 tablet (2.5 mg) by mouth daily 90 tablet 3     Cholecalciferol (VITAMIN D3 PO) Take 1,000 Units by mouth daily       escitalopram (LEXAPRO) 10 MG tablet TAKE 1 TABLET(10 MG) BY MOUTH DAILY 90 tablet 3     lisinopril-hydrochlorothiazide (ZESTORETIC) 20-25 MG tablet Take 1 tablet by mouth daily 90 tablet 3     albuterol (PROAIR HFA/PROVENTIL HFA/VENTOLIN HFA) 108 (90 Base) MCG/ACT inhaler Inhale 1-2 puffs into the lungs every 4 hours as needed for shortness of breath / dyspnea or wheezing 1 Inhaler 5     aspirin 325 MG tablet Take 325 mg by mouth daily       polyethylene glycol (MIRALAX) powder Take 1 capful by mouth daily       triamcinolone (ARISTOCORT HP) 0.5 % external cream Apply topically 2 times daily 15 g 1     Social History     Tobacco Use     Smoking status: Former Smoker     Packs/day: 0.30     Years: 50.00     Pack years: 15.00     Types: Cigarettes     Smokeless tobacco: Never Used     Tobacco comment: quit 12/2014   Substance Use Topics     Alcohol use: Yes     Comment: occasionally        ROS:   Review of systems negative except as stated above.    OBJECTIVE:  /62   Pulse 74   Temp 97.9  F (36.6  C) (Oral)   Resp 12   Ht 1.626 m (5' 4\")   Wt 56.7 kg (125 lb)   BMI 21.46 kg/m     EXAM:  The right TM is normal: not visualized secondary to cerumen  The right auditory canal is obstructed with cerumen  The left TM is " not visualized secondary to cerumen  The left auditory canal is obstructed with cerumen  GENERAL: no acute distress  SKIN: no suspicious lesions or rashes     ASSESSMENT / PLAN:  1. Bilateral impacted cerumen  Removed in clinic by nurse without problem.   Patient tolerated procedure fine and endorses improvement.    Layla Jack PA-C

## 2021-02-16 ENCOUNTER — NURSE TRIAGE (OUTPATIENT)
Dept: FAMILY MEDICINE | Facility: CLINIC | Age: 76
End: 2021-02-16

## 2021-02-16 NOTE — TELEPHONE ENCOUNTER
"Pt is wanting an antibiotic.  Offered clinic appt for this afternoon.  Discussed hours of UC at .  Pt said she would go to UC.  Grateful for direction.  Severino RN      Additional Information    Negative: Shock suspected (e.g., cold/pale/clammy skin, too weak to stand, low BP, rapid pulse)    Negative: Sounds like a life-threatening emergency to the triager    Negative: Unable to urinate (or only a few drops) and bladder feels very full    Negative: Vomiting    Negative: Patient sounds very sick or weak to the triager    SEVERE pain with urination    Answer Assessment - Initial Assessment Questions  1. SEVERITY: \"How bad is the pain?\"  (e.g., Scale 1-10; mild, moderate, or severe)    - MILD (1-3): complains slightly about urination hurting    - MODERATE (4-7): interferes with normal activities      - SEVERE (8-10): excruciating, unwilling or unable to urinate because of the pain       7.10  2. FREQUENCY: \"How many times have you had painful urination today?\"       Since 2/14/21  3. PATTERN: \"Is pain present every time you urinate or just sometimes?\"       Yes.  4. ONSET: \"When did the painful urination start?\"       Started 2/14/21  5. FEVER: \"Do you have a fever?\" If so, ask: \"What is your temperature, how was it measured, and when did it start?\"      no  6. PAST UTI: \"Have you had a urine infection before?\" If so, ask: \"When was the last time?\" and \"What happened that time?\"       yes  7. CAUSE: \"What do you think is causing the painful urination?\"  (e.g., UTI, scratch, Herpes sore)      uti  8. OTHER SYMPTOMS: \"Do you have any other symptoms?\" (e.g., flank pain, vaginal discharge, genital sores, urgency, blood in urine)      No. Has urgency.  9. PREGNANCY: \"Is there any chance you are pregnant?\" \"When was your last menstrual period?\"      no    Protocols used: URINATION PAIN - FEMALE-A-OH      "

## 2021-02-24 ENCOUNTER — OFFICE VISIT (OUTPATIENT)
Dept: URGENT CARE | Facility: URGENT CARE | Age: 76
End: 2021-02-24
Payer: COMMERCIAL

## 2021-02-24 VITALS
HEIGHT: 64 IN | WEIGHT: 120 LBS | HEART RATE: 87 BPM | DIASTOLIC BLOOD PRESSURE: 80 MMHG | BODY MASS INDEX: 20.49 KG/M2 | OXYGEN SATURATION: 98 % | SYSTOLIC BLOOD PRESSURE: 118 MMHG | RESPIRATION RATE: 14 BRPM | TEMPERATURE: 97.4 F

## 2021-02-24 DIAGNOSIS — N30.00 ACUTE CYSTITIS WITHOUT HEMATURIA: ICD-10-CM

## 2021-02-24 DIAGNOSIS — R30.0 DYSURIA: Primary | ICD-10-CM

## 2021-02-24 DIAGNOSIS — S16.1XXA STRAIN OF NECK MUSCLE, INITIAL ENCOUNTER: ICD-10-CM

## 2021-02-24 LAB
ALBUMIN UR-MCNC: ABNORMAL MG/DL
APPEARANCE UR: CLEAR
BACTERIA #/AREA URNS HPF: ABNORMAL /HPF
BILIRUB UR QL STRIP: NEGATIVE
COLOR UR AUTO: YELLOW
GLUCOSE UR STRIP-MCNC: NEGATIVE MG/DL
HGB UR QL STRIP: ABNORMAL
KETONES UR STRIP-MCNC: NEGATIVE MG/DL
LEUKOCYTE ESTERASE UR QL STRIP: ABNORMAL
NITRATE UR QL: NEGATIVE
NON-SQ EPI CELLS #/AREA URNS LPF: ABNORMAL /LPF
PH UR STRIP: 6.5 PH (ref 5–7)
RBC #/AREA URNS AUTO: ABNORMAL /HPF
SOURCE: ABNORMAL
SP GR UR STRIP: 1.02 (ref 1–1.03)
UROBILINOGEN UR STRIP-ACNC: 0.2 EU/DL (ref 0.2–1)
WBC #/AREA URNS AUTO: ABNORMAL /HPF

## 2021-02-24 PROCEDURE — 81001 URINALYSIS AUTO W/SCOPE: CPT | Performed by: PHYSICIAN ASSISTANT

## 2021-02-24 PROCEDURE — 99214 OFFICE O/P EST MOD 30 MIN: CPT | Performed by: PHYSICIAN ASSISTANT

## 2021-02-24 RX ORDER — NITROFURANTOIN 25; 75 MG/1; MG/1
100 CAPSULE ORAL 2 TIMES DAILY
Qty: 10 CAPSULE | Refills: 0 | Status: SHIPPED | OUTPATIENT
Start: 2021-02-24 | End: 2021-03-01

## 2021-02-24 RX ORDER — ACETAMINOPHEN 500 MG
500-1000 TABLET ORAL EVERY 6 HOURS PRN
Qty: 30 TABLET | Refills: 0 | Status: SHIPPED | OUTPATIENT
Start: 2021-02-24

## 2021-02-24 RX ORDER — PHENAZOPYRIDINE HYDROCHLORIDE 95 MG/1
190 TABLET ORAL 3 TIMES DAILY
Qty: 30 TABLET | Refills: 0 | Status: SHIPPED | OUTPATIENT
Start: 2021-02-24 | End: 2021-03-01

## 2021-02-24 RX ORDER — CYCLOBENZAPRINE HCL 5 MG
5 TABLET ORAL 2 TIMES DAILY PRN
Qty: 14 TABLET | Refills: 0 | Status: SHIPPED | OUTPATIENT
Start: 2021-02-24 | End: 2021-03-03

## 2021-02-24 ASSESSMENT — ENCOUNTER SYMPTOMS
FLANK PAIN: 0
RESPIRATORY NEGATIVE: 1
DYSURIA: 1
PSYCHIATRIC NEGATIVE: 1
CONSTITUTIONAL NEGATIVE: 1
NEUROLOGICAL NEGATIVE: 1
DIFFICULTY URINATING: 0
FREQUENCY: 1
CARDIOVASCULAR NEGATIVE: 1
GASTROINTESTINAL NEGATIVE: 1
EYES NEGATIVE: 1

## 2021-02-24 ASSESSMENT — MIFFLIN-ST. JEOR: SCORE: 1014.32

## 2021-02-24 NOTE — PROGRESS NOTES
Dysuria  - *UA reflex to Microscopic and Culture ([place] and [facility] (except [place] and [place])  - nitroFURantoin macrocrystal-monohydrate (MACROBID) 100 MG capsule; Take 1 capsule (100 mg) by mouth 2 times daily for 5 days  - phenazopyridine (AZO URINARY PAIN RELIEF) 95 MG tablet; Take 2 tablets (190 mg) by mouth 3 times daily for 5 days  - acetaminophen (TYLENOL) 500 MG tablet; Take 1-2 tablets (500-1,000 mg) by mouth every 6 hours as needed for mild pain    Acute cystitis without hematuria  - nitroFURantoin macrocrystal-monohydrate (MACROBID) 100 MG capsule; Take 1 capsule (100 mg) by mouth 2 times daily for 5 days  - phenazopyridine (AZO URINARY PAIN RELIEF) 95 MG tablet; Take 2 tablets (190 mg) by mouth 3 times daily for 5 days  - acetaminophen (TYLENOL) 500 MG tablet; Take 1-2 tablets (500-1,000 mg) by mouth every 6 hours as needed for mild pain    Strain of neck muscle, initial encounter  - cyclobenzaprine (FLEXERIL) 5 MG tablet; Take 1 tablet (5 mg) by mouth 2 times daily as needed for muscle spasms  - acetaminophen (TYLENOL) 500 MG tablet; Take 1-2 tablets (500-1,000 mg) by mouth every 6 hours as needed for mild pain    Patient should drink 1.5-2 liters of water per day. Okay to use Azo over the counter and/or cranberry juice for symptomatic relief (Note that Azo will cause urine to become orange/red. If you are a contact lens-wearer, contacts may become discolored.This is normal). Okay to continue taking prescribed antibiotic for full course. Patient was advised to return to clinic if symptoms do not improve in the amount of time specified in the AVS or if symptoms worsen. Patient educated on red flag symptoms and asked to go directly to the ED if symptoms present themselves.      Patient given handout on how to prevent UTIs in the future.    20 minutes spent on the date of the encounter doing chart review, history and exam, documentation and further activities as noted above     See  Patient Instructions  Patient Instructions     Patient Education     Bladder Infection, Female (Adult)     Urine normally doesn't have any germs (bacteria) in it. But bacteria can get into the urinary tract from the skin around the rectum. Or they can travel in the blood from other parts of the body. Once they are in your urinary tract, they can cause infection in these areas:    The urethra (urethritis)    The bladder (cystitis)    The kidneys (pyelonephritis)  The most common place for an infection is in the bladder. This is called a bladder infection. This is one of the most common infections in women. Most bladder infections are easily treated. They are not serious unless the infection spreads to the kidney.  The terms bladder infection, UTI, and cystitis are often used to describe the same thing. But they are not always the same. Cystitis is an inflammation of the bladder. The most common cause of cystitis is an infection.  Symptoms  The infection causes inflammation in the urethra and bladder. This causes many of the symptoms. The most common symptoms of a bladder infection are:    Pain or burning when urinating    Having to urinate more often than normal    Urgent need to urinate    Only a small amount of urine comes out    Blood in urine    Belly (abdominal) discomfort. This is often in the lower belly above the pubic bone.    Cloudy urine    Strong- or bad-smelling urine    Unable to urinate (urinary retention)    Unable to hold urine in (urinary incontinence)    Fever    Loss of appetite    Confusion (in older adults)  Causes  Bladder infections are not contagious. You can't get one from someone else, from a toilet seat, or from sharing a bath.  The most common cause of bladder infections is bacteria from the bowels. The bacteria get onto the skin around the opening of the urethra. From there, they can get into the urine. Then they travel up to the bladder, causing inflammation and infection. This often  happens because of:    Wiping incorrectly after urinating. Always wipe from front to back.    Bowel incontinence    Pregnancy    Procedures such as having a catheter put in    Older age    Not emptying your bladder. This can give bacteria a chance to grow in your urine.    Fluid loss (dehydration)    Constipation    Having sex    Using a diaphragm for birth control   Treatment  Bladder infections are diagnosed by a urine test and urine culture. They are treated with antibiotics. They often clear up quickly without problems. Treatment helps prevent a more serious kidney infection.  Medicines  Medicines can help in the treatment of a bladder infection:    Take antibiotics until they are used up, even if you feel better. It's important to finish them to make sure the infection has cleared.    You can use acetaminophen or ibuprofen for pain, fever, or discomfort, unless another medicine was prescribed. If you have long-term (chronic) liver or kidney disease, talk with your healthcare provider before using these medicines. Also talk with your provider if you've ever had a stomach ulcer or GI (gastrointestinal) bleeding, or are taking blood-thinner medicines.    If you are given phenazopydridine to reduce burning with urination, it will make your urine a bright orange color. This can stain clothing.  Care and prevention  These self-care steps can help prevent future infections:    Drink plenty of fluids. This helps to prevent dehydration and flush out your bladder. Do this unless you must restrict fluids for other health reasons, or your healthcare provider told you not to.    Clean yourself correctly after going to the bathroom. Wipe from front to back after using the toilet. This helps prevent the spread of bacteria.    Urinate more often. Don't try to hold urine in for a long time.    Wear loose-fitting clothes and cotton underwear. Don't wear tight-fitting pants.    Improve your diet and prevent constipation. Eat more  fresh fruits and vegetables, and fiber. Eat less junk foods and fatty foods.    Don't have sex until your symptoms are gone.    Don't have caffeine, alcohol, and spicy foods. These can irritate your bladder.    Urinate right after you have sex to flush out your bladder.    If you use birth control pills and have frequent bladder infections, discuss it with your healthcare provider.  Follow-up care  Call your healthcare provider if all symptoms are not gone after 3 days of treatment. This is especially important if you have repeat infections.  If a culture was done, you will be told if your treatment needs to be changed. If directed, you can call to find out the results.  If X-rays were done, you will be told if the results will affect your treatment.  Call 911  Call 911 if any of the following occur:    Trouble breathing    Hard to wake up or confusion    Fainting (loss of consciousness)    Fast heart rate  When to get medical advice  Call your healthcare provider right away if any of these occur:    Fever of 100.4 F (38.0 C) or higher, or as directed by your healthcare provider    Symptoms are not better after 3 days of treatment    Back or belly pain that gets worse    Repeated vomiting, or unable to keep medicine down    Weakness or dizziness    Vaginal discharge    Pain, redness, or swelling in the outer vaginal area (labia)  [employer] last reviewed this educational content on 11/1/2019 2000-2020 [employer]. [address] [place] [address]. All rights reserved. This information is not intended as a substitute for professional medical care. Always follow your healthcare professional's instructions.               Kodi Ruff PA-C  [facility]    Subjective   75 year old year old who presents to clinic today for the following health issues:    Urgent Care and UTI       HPI     Genitourinary - Female  Onset/Duration: 1 week  Description:   Painful urination  (Dysuria): YES           Frequency: YES  Blood in urine (Hematuria): no  Delay in urine (Hesitency): no  Intensity: moderate  Progression of Symptoms:  worsening  Accompanying Signs & Symptoms:  Fever/chills: no  Flank pain: no  Nausea and vomiting: no  Vaginal symptoms: none  Abdominal/Pelvic Pain: no  History:   History of frequent UTI s: no  History of kidney stones: no  Sexually Active: no  Possibility of pregnancy: No  Precipitating or alleviating factors: None  Therapies tried and outcome: OTC advil or tylenol     Patient also visits today with bilateral neck pain that she woke up with this morning. She denies any injury to the area and states that she has been using a heating pack which has been offering moderate relief. Denies shooting pains down the upper extremities, numbness, or tingling.     Review of Systems   Review of Systems   Constitutional: Negative.    HENT: Negative.    Eyes: Negative.    Respiratory: Negative.    Cardiovascular: Negative.    Gastrointestinal: Negative.    Genitourinary: Positive for dysuria, frequency and urgency. Negative for difficulty urinating, dyspareunia, flank pain, pelvic pain, vaginal bleeding, vaginal discharge and vaginal pain.   Neurological: Negative.    Psychiatric/Behavioral: Negative.         Objective    Temp: 97.4  F (36.3  C) Temp src: Oral BP: 118/80 Pulse: 87   Resp: 14 SpO2: 98 %       Physical Exam   Physical Exam  Constitutional:       General: She is not in acute distress.     Appearance: Normal appearance. She is normal weight. She is not ill-appearing, toxic-appearing or diaphoretic.   HENT:      Head: Normocephalic and atraumatic.   Neck:      Musculoskeletal: Normal range of motion and neck supple.   Cardiovascular:      Rate and Rhythm: Normal rate and regular rhythm.      Pulses: Normal pulses.      Heart sounds: Normal heart sounds. No murmur. No friction rub. No gallop.    Pulmonary:      Effort: Pulmonary effort is normal. No respiratory  distress.      Breath sounds: Normal breath sounds.   Lymphadenopathy:      Cervical: No cervical adenopathy.   Neurological:      General: No focal deficit present.      Mental Status: She is alert and oriented to person, place, and time. Mental status is at baseline.   Psychiatric:         Mood and Affect: Mood normal.         Behavior: Behavior normal.         Thought Content: Thought content normal.         Judgment: Judgment normal.

## 2021-02-24 NOTE — PATIENT INSTRUCTIONS

## 2021-02-27 ENCOUNTER — HOSPITAL ENCOUNTER (EMERGENCY)
Facility: CLINIC | Age: 76
Discharge: HOME | End: 2021-02-27
Attending: EMERGENCY MEDICINE | Admitting: EMERGENCY MEDICINE
Payer: COMMERCIAL

## 2021-02-27 ENCOUNTER — APPOINTMENT (OUTPATIENT)
Dept: CT IMAGING | Facility: CLINIC | Age: 76
End: 2021-02-27
Attending: EMERGENCY MEDICINE
Payer: COMMERCIAL

## 2021-02-27 VITALS
OXYGEN SATURATION: 98 % | WEIGHT: 120 LBS | HEIGHT: 64 IN | RESPIRATION RATE: 16 BRPM | SYSTOLIC BLOOD PRESSURE: 160 MMHG | DIASTOLIC BLOOD PRESSURE: 56 MMHG | BODY MASS INDEX: 20.49 KG/M2 | TEMPERATURE: 96.4 F | HEART RATE: 88 BPM

## 2021-02-27 DIAGNOSIS — M54.2 NECK PAIN: ICD-10-CM

## 2021-02-27 PROCEDURE — 99284 EMERGENCY DEPT VISIT MOD MDM: CPT | Mod: 25

## 2021-02-27 PROCEDURE — 72125 CT NECK SPINE W/O DYE: CPT

## 2021-02-27 RX ORDER — TRAMADOL HYDROCHLORIDE 50 MG/1
50 TABLET ORAL EVERY 6 HOURS PRN
Qty: 12 TABLET | Refills: 0 | Status: SHIPPED | OUTPATIENT
Start: 2021-02-27 | End: 2021-04-14

## 2021-02-27 ASSESSMENT — ENCOUNTER SYMPTOMS
NECK STIFFNESS: 1
COUGH: 0
SHORTNESS OF BREATH: 0
FEVER: 0
WEAKNESS: 0
NECK PAIN: 1
NUMBNESS: 0

## 2021-02-27 ASSESSMENT — MIFFLIN-ST. JEOR: SCORE: 1014.32

## 2021-02-27 NOTE — ED PROVIDER NOTES
History   Chief Complaint:  Neck Pain     The history is provided by the patient.      Lenora Hammonds is a 75 year old female with history of acoustic neuroma, hypertension amongst others as noted below, who presents with her  for bilateral, atraumatic neck pain after waking up 5 days ago. She initially thought she had slept wrong and pain would resolve, however, it persisted, prompting her to present to her primary care the following day. She was prescribed Flexeril with no relief and has been taking Tylenol with only mild resolution of pain. Pain continued, prompting her presentation.     Here, patient endorses the pain is on both sides of her neck, beginning at the base of her head and radiating down and wrapping around to the base of the front of her neck. She states pain is particularly worse at night and has not taken any interventions for the pain this morning. She denies any neck swelling, weakness, numbness, fever, shortness of breath, chest pain or cough.     Review of Systems   Constitutional: Negative for fever.   Respiratory: Negative for cough and shortness of breath.    Cardiovascular: Negative for chest pain.   Musculoskeletal: Positive for neck pain and neck stiffness.        No neck swelling.   Neurological: Negative for weakness and numbness.   All other systems reviewed and are negative.      Allergies:  The patient has no known allergies.     Medications:  Tylenol  Albuterol inhaler  Norvasc  Aspirin 325 mg   Flexeril  Lexapro  Zestoretic  Macrobid  Azo urinary pain relief  Miralax    Past Medical History:    Acoustic neuroma  Depressive disorder  Histoplasmosis  Hypertension  Ischemic colitis   Benign neoplasm of cranial nerve  Tremor   Cyst of left kidney      Past Surgical History:    Thoracotomy, major explor/biopsy   Colonoscopy x2  Thoracici surgery  Tonsillectomy      Family History:    Mother - hypertension, neurologic disorder  Father - brain tumor, cancer  Sister - breast  "caner  Brother - bladder cancer    Social History:  The patient arrives with her  to the ED.   Patient is  (spouse's name is Brent).   She is retired.     Physical Exam     Patient Vitals for the past 24 hrs:   BP Temp Temp src Pulse Resp SpO2 Height Weight   02/27/21 0755 (!) 160/56 96.4  F (35.8  C) Temporal 88 16 98 % 1.626 m (5' 4\") 54.4 kg (120 lb)       Physical Exam  Nursing note and vitals reviewed.  Constitutional:  Appears well-developed and well-nourished.   HENT:    TMs clear.   Head:    Atraumatic.   Mouth/Throat:   Oropharynx is clear and moist. No oropharyngeal exudate.   Eyes:    Pupils are equal, round, and reactive to light.   Neck:    Full range of motion, however, she has pain turning her head to the left and right. No pain with flexion or extension. Neck supple.      No tracheal deviation present. No thyromegaly present.   Cardiovascular:  Normal rate, regular rhythm, no murmur   Pulmonary/Chest: Breath sounds are clear and equal without wheezes or crackles.  Abdominal:   Soft. Bowel sounds are normal. Exhibits no distension and      no mass. There is no tenderness.      There is no rebound and no guarding.   Musculoskeletal:  Exhibits no edema. Mild tenderness in the left sternocleidomastoid. No swelling, redness or warmth.   Lymphadenopathy:  No cervical adenopathy.   Neurological:   Alert and oriented to person, place, and time. GCS 15.  CN 2-12 intact.  and proximal upper extremity strength strong and equal.  Bilateral lower extremity strength strong and equal, including strong dorsiflexion and plantarflexion strength.  Sensation intact and equal to the face, arms and legs.  No facial droop or weakness. Normal speech.  Follows commands and answers questions normally.     Skin:    Skin is warm and dry. No rash noted. No pallor.      Emergency Department Course   Imaging:  Cervical Spine CT without Contrast:  IMPRESSION:   1. No evidence of acute fracture or subluxation in " the cervical spine.   2. Multilevel degenerative changes throughout the cervical spine as   described above.   3. Mild spinal canal narrowing at C4-C5, C5-C6, and C6-C7.   4. Multiple levels of neural foraminal narrowing, most pronounced on   the left at C3-C4 and bilaterally at C4-C5 and C5-C6.   Reading per radiology.     Emergency Department Course:    Reviewed:  I reviewed nursing notes, vitals and past medical history    Assessments:  0802 I obtained history and examined the patient as noted above.     0805 Patient declines any interventions, including Tylenol.     0930 I rechecked the patient and explained findings. Discussed plan of care and patient will be discharged.     Disposition:  The patient was discharged to home.       Impression & Plan   Medical Decision Making:  Lenora Hammonds is a 75 year old female with history of acoustic neuroma who presents for evaluation of atraumatic bilateral neck pain that began 5 days ago. There is no radiculopathy or neurologic complaints. Clinical examination is consistent with muscle spasm. Patient presented to her primary care and was prescribed Flexeril with no relief, prompting her presentation.     I doubt fracture, ligamentous instability, myelopathy, dissection, spinal tumor or abscess at this time. As she has tried other interventions including Flexeril and Tylenol with minimal to no resolution of pain, I did elect to perform cervical spine CT. Fortunately, this was negative for any acute fracture or subluxation. I discussed worrisome symptoms/signs, if they were to evolve, that should prompt the patient to follow up more quickly or return to the ED.  There are no red flag symptoms to suggest we need further workup or other advanced imaging at this point.   Supportive outpatient management is indicated.     Diagnosis:    ICD-10-CM    1. Neck pain  M54.2        Discharge Medications:  Discharge Medication List as of 2/27/2021  9:35 AM      START taking these  medications    Details   traMADol (ULTRAM) 50 MG tablet Take 1 tablet (50 mg) by mouth every 6 hours as needed for moderate pain or severe pain No driving a car or drinking alcohol for 8 hours after taking this medication., Disp-12 tablet, R-0, Local Print             Scribe Disclosure:  MILENA, Nargis Perea, am serving as a scribe at 7:56 AM on 2/27/2021 to document services personally performed by Lynne Rasmussen MD based on my observations and the provider's statements to me.           Lynne Rasmussen MD  02/27/21 [id number]

## 2021-02-27 NOTE — ED TRIAGE NOTES
Pt has had left sided neck pain for a week, went to urgent care Tuesday and got muscles relaxers that did not work. Pt. 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

## 2021-03-25 ENCOUNTER — TRANSFERRED RECORDS (OUTPATIENT)
Dept: HEALTH INFORMATION MANAGEMENT | Facility: CLINIC | Age: 76
End: 2021-03-25

## 2021-04-08 ENCOUNTER — TRANSFERRED RECORDS (OUTPATIENT)
Dept: HEALTH INFORMATION MANAGEMENT | Facility: CLINIC | Age: 76
End: 2021-04-08

## 2021-04-09 DIAGNOSIS — D33.3 BENIGN NEOPLASM OF CRANIAL NERVE (H): Primary | ICD-10-CM

## 2021-04-12 ENCOUNTER — PRE VISIT (OUTPATIENT)
Dept: RADIATION ONCOLOGY | Facility: CLINIC | Age: 76
End: 2021-04-12

## 2021-04-12 NOTE — TELEPHONE ENCOUNTER
Date: 2021   Age: 75 year old  Ethnicity:     Sex: female  : 1945   Lives In: [place], [place]       Diagnosis: Left Acoustic Neuroma     Prior radiation therapy:   Site Treated: left acoustic neuroma  Facility: [facility], Dr. Temo Jaramillo and Dr. Dell Smith  Dates: 3/26/14  Dose: 13 Gy     Site Treated: at  Facility: at  Dates: at  Dose: at     Prior chemotherapy:   Protocol: a6  Facility: at  Dates: at     Does pt have pain at time of consult:  Does pt feel safe in her home:  Does pt have an advanced directive:        Review Since Diagnosis:    ; sudden on set of left sided hearing loss, went to primary doctor as having neck pain also and physical therapy resolved that, thought hearing may be wax related so sent to ENT    14; pt saw Dr. 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 Douglas Hurley, presented various options     3/3/14; Dr. Hurley wanting Dr. Jaramillo to see pt to talk about Gamma Knife    3/26/14; Gamma Knife to left acoustic neuroma     ; saw Dr. Hurley,     18; Brain MRI, 1.8 x 1.5 x 1.3 cm lesion left acoustic neuroma     3/25/21; Brain MRI, 1.8 x 1.7 x 1.4 cm lesion left acoustic neuroma    21; pt saw Dr. Hurley, refer to Dr. Jaramillo due to increased size of neuroma to see if candidate for any further radiation.  Dr. Hurley feels lesion 20% volumetrically larger.  Word recognition 90+% right ear, no useable hearing on left     Chief Complaint: at consult

## 2021-04-14 ENCOUNTER — OFFICE VISIT (OUTPATIENT)
Dept: RADIATION ONCOLOGY | Facility: CLINIC | Age: 76
End: 2021-04-14
Attending: RADIOLOGY
Payer: COMMERCIAL

## 2021-04-14 VITALS
DIASTOLIC BLOOD PRESSURE: 52 MMHG | SYSTOLIC BLOOD PRESSURE: 123 MMHG | HEART RATE: 80 BPM | OXYGEN SATURATION: 98 % | HEIGHT: 64 IN | RESPIRATION RATE: 16 BRPM | WEIGHT: 120 LBS | BODY MASS INDEX: 20.49 KG/M2

## 2021-04-14 DIAGNOSIS — D33.3 ACOUSTIC NEUROMA (H): Primary | ICD-10-CM

## 2021-04-14 DIAGNOSIS — D33.3 BENIGN NEOPLASM OF CRANIAL NERVE (H): Primary | ICD-10-CM

## 2021-04-14 DIAGNOSIS — D33.3 BENIGN NEOPLASM OF CRANIAL NERVES (H): Primary | ICD-10-CM

## 2021-04-14 LAB
ANION GAP SERPL CALCULATED.3IONS-SCNC: 4 MMOL/L (ref 3–14)
CHLORIDE SERPL-SCNC: 103 MMOL/L (ref 94–109)
CO2 SERPL-SCNC: 32 MMOL/L (ref 20–32)
CREAT SERPL-MCNC: 0.77 MG/DL (ref 0.52–1.04)
GFR SERPL CREATININE-BSD FRML MDRD: 74 ML/MIN/{1.73_M2}
POTASSIUM SERPL-SCNC: 3.4 MMOL/L (ref 3.4–5.3)
SODIUM SERPL-SCNC: 139 MMOL/L (ref 133–144)

## 2021-04-14 PROCEDURE — 36415 COLL VENOUS BLD VENIPUNCTURE: CPT | Performed by: NEUROLOGICAL SURGERY

## 2021-04-14 PROCEDURE — 82565 ASSAY OF CREATININE: CPT | Performed by: NEUROLOGICAL SURGERY

## 2021-04-14 PROCEDURE — 80051 ELECTROLYTE PANEL: CPT | Performed by: NEUROLOGICAL SURGERY

## 2021-04-14 PROCEDURE — 77470 SPECIAL RADIATION TREATMENT: CPT | Performed by: RADIOLOGY

## 2021-04-14 PROCEDURE — G0463 HOSPITAL OUTPT CLINIC VISIT: HCPCS | Performed by: RADIOLOGY

## 2021-04-14 ASSESSMENT — ENCOUNTER SYMPTOMS
MUSCULOSKELETAL NEGATIVE: 1
PHOTOPHOBIA: 0
FEVER: 0
VOMITING: 0
NAUSEA: 0
BLURRED VISION: 0
DEPRESSION: 0
HEADACHES: 0
SEIZURES: 0
COUGH: 0
DOUBLE VISION: 0
WEIGHT LOSS: 0
BLOOD IN STOOL: 0
CONSTIPATION: 0
DIARRHEA: 0

## 2021-04-14 ASSESSMENT — MIFFLIN-ST. JEOR: SCORE: 1014.32

## 2021-04-14 NOTE — PROGRESS NOTES
"GAMMA KNIFE RADIOSURGERY TREATMENT PLANNING NOTE  DEPARTMENT OF RADIATION ONCOLOGY    Name: Lenora Hammonds    : 1945 Medical Record #: [id number]   Diagnosis: Recurrent Left Acoustic Neuroma   Date of Treatment Plannin21   Referring Physicians: Douglas Hurley MD  [facility] [place] [id number]     A thorough review of this patient's clinical and radiographic data was competed and I have examined the patient and recommended a stereotactic radiosurgery procedure using Gamma Knife technology as explained in my consult of 2021.  This course of treatment is medically necessary due to the location of the tumor, the planned dose required to treat it and the critical structures adjacent to the tumor.  Specifically this patient has a 1.8x1.7x1.4cm recurrent left acoustic neuroma.     Critical structures adjacent to the tumor are normal cerebellum and brainstem.  The planned dose will be 1300 cGy in a single fraction.  I chose Gamma Knife radiosurgery over 3D or IMRT radiation techniques because of better dose distribution and conformity to tumor shape.  The patient's status is as follows: Karnofsky score: 90.  The patient's other disease is stable.  The goal of the treatment is tumor control and preservation of neurologic function.    The Gamma Knife procedure is scheduled for 21.  At that time the patient will be brought to the Gamma Knife suite.  The Leksell head frame will be placed.  A stereotactic brain MRI will be performed.  The MRI images will be transferred electronically to [employer] Gamma Knife treatment planning computer.  The [employer] Gamma Knife treatment planning software will be used to design the optimal treatment plan.   procedures will be done prior to treatment.      Because of the extra time and effort involved in the Gamma Knife treatment planning, this represents a \"special treatment procedure.\"    I am ordering calculations to " be done to determine accurate dose delivery to each unique field.  I am also ordering a collimator to define each unique field, for beam shaping and accurate dose distribution, enabling us to block critical structures and sensitive tissues.    Temo Jaramillo MD, [facility], [facility]

## 2021-04-14 NOTE — LETTER
2021         RE: Lenora Hammonds  [address] [facility] [address]        Dear Colleague,    Thank you for referring your patient, Lenora Hammonds, to the [facility] RADIATION ONCOLOGY GAMMA KNIFE. Please see a copy of my visit note below.      HPI    Date: 2021   Age: 75 year old  Ethnicity:     Sex: female  : 1945   Lives In: [place], [place]       Diagnosis: Left Acoustic Neuroma     Prior radiation therapy:   Site Treated: left acoustic neuroma  Facility: [facility] Gamma Knife, Dr. Temo Jaramillo and Dr. Dell Smith  Dates: 3/26/14  Dose: 13 Gy       Prior chemotherapy:   None     Does pt have pain at time of consult: pt denies pain at time of consult  Is patient pregnant: Age 75:  Does pt have an advanced directive:pt does not have  Does pt have implanted cardiac device: no    RN time with patient: 50 minutes  Educated on Gamma Knife; yes and has had before    Has pt fallen in the past week; no  Does pt feel steady on her feet: yes    Doctors: Dr. [place] Hurley, Siobhan Gaviria-NP        Review Since Diagnosis:    ; sudden on set of left sided hearing loss, went to primary doctor as having neck pain also and physical therapy resolved that, thought hearing may be wax related so sent to ENT    14; pt saw Dr. 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 [place] Hurley, presented various options     3/3/14; Dr. Hurley wanting Dr. Jaramillo to see pt to talk about Gamma Knife    3/26/14; Gamma Knife to left acoustic neuroma     ; saw Dr. Hurley,     18; Brain MRI, 1.8 x 1.5 x 1.3 cm lesion left acoustic neuroma     3/25/21;  Brain MRI, 1.8 x 1.7 x 1.4 cm lesion left acoustic neuroma    21; pt saw Dr. Hurley, refer to Dr. Jaramillo due to increased size of neuroma to see if candidate for any further radiation.  Dr. Hurley feels lesion 20% volumetrically larger.  Word recognition 90+% right ear, no useable hearing on left     Chief Complaint: pt states she feels asymptomatic from the growth, she has no useable hearing on the left to begin with, no balance issues, no trouble swallowing, no headaches  Review of Systems   Constitutional: Negative for fever, malaise/fatigue and weight loss.   HENT: Positive for hearing loss.         Total hearing loss on left   Eyes: Negative for blurred vision, double vision and photophobia.   Respiratory: Negative for cough.    Cardiovascular: Negative for chest pain and leg swelling.   Gastrointestinal: Negative for blood in stool, constipation, diarrhea, nausea and vomiting.   Genitourinary: Negative.    Musculoskeletal: Negative.    Neurological: Negative for seizures and headaches.   Psychiatric/Behavioral: Negative for depression.                 GAMMA KNIFE RADIOSURGERY TREATMENT PLANNING NOTE  DEPARTMENT OF RADIATION ONCOLOGY    Name: Lenora Hammonds    : 1945 Medical Record #: [id number]   Diagnosis: Recurrent Left Acoustic Neuroma   Date of Treatment Plannin21   Referring Physicians: [place] Hurley MD  [facility]  [address] [place] [address]     A thorough review of this patient's clinical and radiographic data was competed and I have examined the patient and recommended a stereotactic radiosurgery procedure using Gamma Knife technology as explained in my consult of 2021.  This course of treatment is medically necessary due to the location of the tumor, the planned dose required to treat it and the critical structures adjacent to the tumor.  Specifically this patient has a 1.8x1.7x1.4cm recurrent left acoustic neuroma.     Critical structures  "adjacent to the tumor are normal cerebellum and brainstem.  The planned dose will be 1300 cGy in a single fraction.  I chose Gamma Knife radiosurgery over 3D or IMRT radiation techniques because of better dose distribution and conformity to tumor shape.  The patient's status is as follows: Karnofsky score: 90.  The patient's other disease is stable.  The goal of the treatment is tumor control and preservation of neurologic function.    The Gamma Knife procedure is scheduled for 4/28/21.  At that time the patient will be brought to the Gamma Knife suite.  The Le[place]ell head frame will be placed.  A stereotactic brain MRI will be performed.  The MRI images will be transferred electronically to [employer] Gamma Knife treatment planning computer.  The [employer] Gamma Knife treatment planning software will be used to design the optimal treatment plan.   procedures will be done prior to treatment.      Because of the extra time and effort involved in the Gamma Knife treatment planning, this represents a \"special treatment procedure.\"    I am ordering calculations to be done to determine accurate dose delivery to each unique field.  I am also ordering a collimator to define each unique field, for beam shaping and accurate dose distribution, enabling us to block critical structures and sensitive tissues.    Temo Jaramillo MD, [facility], [facility]      Service Date: 04/14/2021      DIAGNOSIS:  Recurrent left acoustic neuroma 7 years after Gamma Knife radiosurgery.      HISTORY OF PRESENT ILLNESS:  The patient is a 75-year-old white female who was well until 12/2013 when she noticed the sudden onset of left-sided hearing loss.  Audiometry on 01/31/2014 showed 0 word discrimination on the left.  Brain MRI 02/04/2014 showed a 2.0 x 1.3 x 1.1 cm enhancing mass in the left cerebellopontine angle extending into the left internal auditory canal consistent with an acoustic neuroma.        The patient was evaluated by Dr. Hurley " on 02/26/2014 and referred for consideration of Gamma Knife radiosurgery.  On 03/26/2014, she was treated with Gamma Knife radiosurgery (13 Gy in a single treatment to a volume of 1.9 cc).  She has remained clinically stable since that time.  Followup MRI scans showed the mass was stable on 03/16/2015 and slightly smaller on 03/26/2016 (1.8 x 0.3 x 1.1 cm).  Brain MRI 04/30/2018 showed the mass was 1.8 x 1.5 x 1.3 cm.  Continued observation was recommended.        Brain MRI 03/25/2021 showed slight growth of the mass, which now measures 1.8 x 1.7 x 1.4 cm.  The patient was evaluated by Dr. Hurley on 04/08/2021 and referred for consideration of her treatment options.  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  Her hearing is good on the right.  She denies headaches, balance difficulty, difficulty swallowing or facial pain.      PAST MEDICAL HISTORY:   1.  History of depression.   2.  History of histoplasmosis.   3.  Hypertension.   4.  Colitis.   5.  Left acoustic neuroma.      MEDICATIONS:   1.  Acetaminophen p.r.n.   2.  Albuterol inhaler.   3.  Amlodipine.   4.  Lexapro.     5.  Zestoretic.   6.  MiraLax.      ALLERGIES:  NO KNOWN DRUG ALLERGIES.      REVIEW OF SYSTEMS:  All systems were reviewed and found to be otherwise negative.      SOCIAL HISTORY:  The patient is  and has 2 children.  She lives with her  in [place].  They have been  for 59 years.  He accompanies her to today's consultation.      HABITS:  The patient smoked 0.3 pack of cigarettes per day for 50 years, but quit in 2014.  She will occasionally have an alcoholic beverage.      FAMILY HISTORY:  Her father had a primary brain tumor.  Her brother had bladder cancer.  There is no family history for acoustic neuroma.  Her sister had breast cancer.      PHYSICAL EXAMINATION:   GENERAL:  Petite white female in no acute distress.  She is alert and oriented, pleasant and cooperative.   VITAL SIGNS:  Afebrile,  vital signs stable.  Height 5 feet 4 inches, weight 120 pounds, blood pressure 123/52, heart rate 80, respirations 12, oxygen saturation on room air 98%.  BMI 20.1.   HEENT:  Reveals left-sided deafness.  There are no other abnormalities noted.   NEUROLOGIC:  Otherwise, stable.      IMPRESSION:  Recurrent left acoustic neuroma, 7 years after Gamma Knife radiosurgery.      RECOMMENDATIONS:  The diagnosis, prognosis and therapeutic options were reviewed with the patient and her .  The brain MRI images were reviewed with them and compared to the images from 2014.  I agree with Dr. Hurley that there is slight growth and increased volume in the left acoustic neuroma consistent with recurrent tumor.  She is not symptomatic at this time.        The management options would be continued observation versus repeat Gamma Knife.  I think either one is acceptable.  I did explain that it is easier to control less tumor than more tumor and easier to prevent symptoms than it is to recover function after new symptoms develop.  On the other hand, I also explained that repeat radiation is associated with an increased risk of side effects, which could include regional edema or cystic transformation, which could cause difficulty with balance or swallowing.        After a thorough discussion of the advantages and disadvantages of these options, the patient chose to proceed with repeat Gamma Knife.  That procedure is scheduled for 04/28/2021 at the Gamma Knife Center at the [facility].      Thank you very much for asking me to see this pleasant woman once again and to share in her evaluation.      I spent 30 minutes with the patient and 25 minutes in direct patient counseling, review of MRI images with them and coordination of care.  I spent an additional 10 minutes prior to seeing the patient reviewing the medical and imaging studies.      cc:   [place] Hurley MD   [facility]   [address] [place] [address]    [place] [address]      Siobhan Pineda NP   [facility]   [address] [place] [address]         EMILY LOYD MD             D: 2021   T: 2021   MT: AIDE      Name:     SEBASTIAN PORTILLO   MRN:      [id number]        Account:      [id number]   :      1945           Service Date: 2021      Document: [id number]

## 2021-04-14 NOTE — PROGRESS NOTES
HPI    Date: 2021   Age: 75 year old  Ethnicity:     Sex: female  : 1945   Lives In: [place], [place]       Diagnosis: Left Acoustic Neuroma     Prior radiation therapy:   Site Treated: left acoustic neuroma  [facility], Dr. Temo Jaramillo and Dr. [place] Smith  Dates: 3/26/14  Dose: 13 Gy       Prior chemotherapy:   None     Does pt have pain at time of consult: pt denies pain at time of consult  Is patient pregnant: Age 75:  Does pt have an advanced directive:pt does not have  Does pt have implanted cardiac device: no    RN time with patient: 50 minutes  Educated on Gamma Knife; yes and has had before    Has pt fallen in the past week; no  Does pt feel steady on her feet: yes    Doctors: Siobhan Acosta-NP        Review Since Diagnosis:    ; sudden on set of left sided hearing loss, went to primary doctor as having neck pain also and physical therapy resolved that, thought hearing may be wax related so sent to ENT    14; pt saw Dr. 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 [place] Hurley, presented various options     3/3/14; Dr. Hurley wanting Dr. Jaramillo to see pt to talk about Gamma Knife    3/26/14; Gamma Knife to left acoustic neuroma     ; saw Dr. Hurley,     18; Brain MRI, 1.8 x 1.5 x 1.3 cm lesion left acoustic neuroma     3/25/21; Brain MRI, 1.8 x 1.7 x 1.4 cm lesion left acoustic neuroma    21; pt saw Dr. Hurley, refer to Dr. Jaramillo due to increased size of neuroma to see if candidate for any further radiation.  Dr. Hurley feels lesion 20% volumetrically larger.  Word recognition 90+%  right ear, no useable hearing on left     Chief Complaint: pt states she feels asymptomatic from the growth, she has no useable hearing on the left to begin with, no balance issues, no trouble swallowing, no headaches  Review of Systems   Constitutional: Negative for fever, malaise/fatigue and weight loss.   HENT: Positive for hearing loss.         Total hearing loss on left   Eyes: Negative for blurred vision, double vision and photophobia.   Respiratory: Negative for cough.    Cardiovascular: Negative for chest pain and leg swelling.   Gastrointestinal: Negative for blood in stool, constipation, diarrhea, nausea and vomiting.   Genitourinary: Negative.    Musculoskeletal: Negative.    Neurological: Negative for seizures and headaches.   Psychiatric/Behavioral: Negative for depression.

## 2021-04-14 NOTE — PROGRESS NOTES
Service Date: 04/14/2021      DIAGNOSIS:  Recurrent left acoustic neuroma 7 years after Gamma Knife radiosurgery.      HISTORY OF PRESENT ILLNESS:  The patient is a 75-year-old white female who was well until 12/2013 when she noticed the sudden onset of left-sided hearing loss.  Audiometry on 01/31/2014 showed 0 word discrimination on the left.  Brain MRI 02/04/2014 showed a 2.0 x 1.3 x 1.1 cm enhancing mass in the left cerebellopontine angle extending into the left internal auditory canal consistent with an acoustic neuroma.        The patient was evaluated by Dr. Hurley on 02/26/2014 and referred for consideration of Gamma Knife radiosurgery.  On 03/26/2014, she was treated with Gamma Knife radiosurgery (13 Gy in a single treatment to a volume of 1.9 cc).  She has remained clinically stable since that time.  Followup MRI scans showed the mass was stable on 03/16/2015 and slightly smaller on 03/26/2016 (1.8 x 0.3 x 1.1 cm).  Brain MRI 04/30/2018 showed the mass was 1.8 x 1.5 x 1.3 cm.  Continued observation was recommended.        Brain MRI 03/25/2021 showed slight growth of the mass, which now measures 1.8 x 1.7 x 1.4 cm.  The patient was evaluated by Dr. Hurley on 04/08/2021 and referred for consideration of her treatment options.  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  Her hearing is good on the right.  She denies headaches, balance difficulty, difficulty swallowing or facial pain.      PAST MEDICAL HISTORY:   1.  History of depression.   2.  History of histoplasmosis.   3.  Hypertension.   4.  Colitis.   5.  Left acoustic neuroma.      MEDICATIONS:   1.  Acetaminophen p.r.n.   2.  Albuterol inhaler.   3.  Amlodipine.   4.  Lexapro.     5.  Zestoretic.   6.  MiraLax.      ALLERGIES:  NO KNOWN DRUG ALLERGIES.      REVIEW OF SYSTEMS:  All systems were reviewed and found to be otherwise negative.      SOCIAL HISTORY:  The patient is  and has 2 children.  She lives with her  in  [place].  They have been  for 59 years.  He accompanies her to today's consultation.      HABITS:  The patient smoked 0.3 pack of cigarettes per day for 50 years, but quit in 2014.  She will occasionally have an alcoholic beverage.      FAMILY HISTORY:  Her father had a primary brain tumor.  Her brother had bladder cancer.  There is no family history for acoustic neuroma.  Her sister had breast cancer.      PHYSICAL EXAMINATION:   GENERAL:  Petite white female in no acute distress.  She is alert and oriented, pleasant and cooperative.   VITAL SIGNS:  Afebrile, vital signs stable.  Height 5 feet 4 inches, weight 120 pounds, blood pressure 123/52, heart rate 80, respirations 12, oxygen saturation on room air 98%.  BMI 20.1.   HEENT:  Reveals left-sided deafness.  There are no other abnormalities noted.   NEUROLOGIC:  Otherwise, stable.      IMPRESSION:  Recurrent left acoustic neuroma, 7 years after Gamma Knife radiosurgery.      RECOMMENDATIONS:  The diagnosis, prognosis and therapeutic options were reviewed with the patient and her .  The brain MRI images were reviewed with them and compared to the images from 2014.  I agree with Dr. Hurley that there is slight growth and increased volume in the left acoustic neuroma consistent with recurrent tumor.  She is not symptomatic at this time.        The management options would be continued observation versus repeat Gamma Knife.  I think either one is acceptable.  I did explain that it is easier to control less tumor than more tumor and easier to prevent symptoms than it is to recover function after new symptoms develop.  On the other hand, I also explained that repeat radiation is associated with an increased risk of side effects, which could include regional edema or cystic transformation, which could cause difficulty with balance or swallowing.        After a thorough discussion of the advantages and disadvantages of these options, the patient chose  to proceed with repeat Gamma Knife.  That procedure is scheduled for 2021 at the Gamma Knife Center at the [facility].      Thank you very much for asking me to see this pleasant woman once again and to share in her evaluation.      I spent 30 minutes with the patient and 25 minutes in direct patient counseling, review of MRI images with them and coordination of care.  I spent an additional 10 minutes prior to seeing the patient reviewing the medical and imaging studies.      cc:   [place] Hurley MD   [facility]   [address] [place] [address]      Siobhan Pineda NP   [place] [phone]         EMILY LOYD MD             D: 2021   T: 2021   MT: AIDE      Name:     SEBASTIAN PORTILLO   MRN:      [id number]        Account:      [id number]   :      1945           Service Date: 2021      Document: [id number]

## 2021-04-25 DIAGNOSIS — D33.3 BENIGN NEOPLASM OF CRANIAL NERVES (H): ICD-10-CM

## 2021-04-25 LAB
SARS-COV-2 RNA RESP QL NAA+PROBE: NORMAL
SPECIMEN SOURCE: NORMAL

## 2021-04-25 PROCEDURE — U0003 INFECTIOUS AGENT DETECTION BY NUCLEIC ACID (DNA OR RNA); SEVERE ACUTE RESPIRATORY SYNDROME CORONAVIRUS 2 (SARS-COV-2) (CORONAVIRUS DISEASE [COVID-19]), AMPLIFIED PROBE TECHNIQUE, MAKING USE OF HIGH THROUGHPUT TECHNOLOGIES AS DESCRIBED BY CMS-2020-01-R: HCPCS | Performed by: NEUROLOGICAL SURGERY

## 2021-04-25 PROCEDURE — U0005 INFEC AGEN DETEC AMPLI PROBE: HCPCS | Performed by: NEUROLOGICAL SURGERY

## 2021-04-26 LAB
LABORATORY COMMENT REPORT: NORMAL
SARS-COV-2 RNA RESP QL NAA+PROBE: NEGATIVE
SPECIMEN SOURCE: NORMAL

## 2021-04-28 ENCOUNTER — OFFICE VISIT (OUTPATIENT)
Dept: RADIATION ONCOLOGY | Facility: CLINIC | Age: 76
End: 2021-04-28
Attending: RADIOLOGY
Payer: COMMERCIAL

## 2021-04-28 ENCOUNTER — HOSPITAL ENCOUNTER (OUTPATIENT)
Dept: MEDSURG UNIT | Facility: CLINIC | Age: 76
End: 2021-04-28
Attending: RADIOLOGY
Payer: COMMERCIAL

## 2021-04-28 ENCOUNTER — HOSPITAL ENCOUNTER (OUTPATIENT)
Dept: MRI IMAGING | Facility: CLINIC | Age: 76
End: 2021-04-28
Attending: NEUROLOGICAL SURGERY
Payer: COMMERCIAL

## 2021-04-28 VITALS
DIASTOLIC BLOOD PRESSURE: 62 MMHG | TEMPERATURE: 97.8 F | OXYGEN SATURATION: 96 % | SYSTOLIC BLOOD PRESSURE: 125 MMHG | HEART RATE: 95 BPM | RESPIRATION RATE: 16 BRPM

## 2021-04-28 VITALS
DIASTOLIC BLOOD PRESSURE: 63 MMHG | OXYGEN SATURATION: 97 % | SYSTOLIC BLOOD PRESSURE: 125 MMHG | RESPIRATION RATE: 16 BRPM | HEART RATE: 89 BPM

## 2021-04-28 DIAGNOSIS — D33.3 BENIGN NEOPLASM OF CRANIAL NERVE (H): Primary | ICD-10-CM

## 2021-04-28 DIAGNOSIS — D33.3 BENIGN NEOPLASM OF CRANIAL NERVE (H): ICD-10-CM

## 2021-04-28 PROCEDURE — 70552 MRI BRAIN STEM W/DYE: CPT | Mod: 26 | Performed by: RADIOLOGY

## 2021-04-28 PROCEDURE — 250N000013 HC RX MED GY IP 250 OP 250 PS 637: Performed by: NEUROLOGICAL SURGERY

## 2021-04-28 PROCEDURE — 250N000011 HC RX IP 250 OP 636: Performed by: NEUROLOGICAL SURGERY

## 2021-04-28 PROCEDURE — 255N000002 HC RX 255 OP 636: Performed by: NEUROLOGICAL SURGERY

## 2021-04-28 PROCEDURE — 77371 SRS MULTISOURCE: CPT | Performed by: RADIOLOGY

## 2021-04-28 PROCEDURE — 77295 3-D RADIOTHERAPY PLAN: CPT | Performed by: RADIOLOGY

## 2021-04-28 PROCEDURE — 77334 RADIATION TREATMENT AID(S): CPT | Performed by: RADIOLOGY

## 2021-04-28 PROCEDURE — 70552 MRI BRAIN STEM W/DYE: CPT

## 2021-04-28 PROCEDURE — 77300 RADIATION THERAPY DOSE PLAN: CPT | Performed by: RADIOLOGY

## 2021-04-28 PROCEDURE — 999N000142 HC STATISTIC PROCEDURE PREP ONLY

## 2021-04-28 PROCEDURE — 77370 RADIATION PHYSICS CONSULT: CPT | Performed by: RADIOLOGY

## 2021-04-28 PROCEDURE — A9585 GADOBUTROL INJECTION: HCPCS | Performed by: NEUROLOGICAL SURGERY

## 2021-04-28 PROCEDURE — 250N000012 HC RX MED GY IP 250 OP 636 PS 637: Performed by: NEUROLOGICAL SURGERY

## 2021-04-28 PROCEDURE — 250N000009 HC RX 250: Performed by: NEUROLOGICAL SURGERY

## 2021-04-28 RX ORDER — LORAZEPAM 0.5 MG/1
.5-1 TABLET ORAL
Status: DISCONTINUED | OUTPATIENT
Start: 2021-04-28 | End: 2021-05-06 | Stop reason: HOSPADM

## 2021-04-28 RX ORDER — ONDANSETRON 4 MG/1
8 TABLET, ORALLY DISINTEGRATING ORAL EVERY 6 HOURS PRN
Status: DISCONTINUED | OUTPATIENT
Start: 2021-04-28 | End: 2021-05-06 | Stop reason: HOSPADM

## 2021-04-28 RX ORDER — ACETAMINOPHEN 325 MG/1
650 TABLET ORAL EVERY 4 HOURS PRN
Status: DISCONTINUED | OUTPATIENT
Start: 2021-04-28 | End: 2021-05-06 | Stop reason: HOSPADM

## 2021-04-28 RX ORDER — GADOBUTROL 604.72 MG/ML
7.5 INJECTION INTRAVENOUS ONCE
Status: COMPLETED | OUTPATIENT
Start: 2021-04-28 | End: 2021-04-28

## 2021-04-28 RX ORDER — DEXAMETHASONE 4 MG/1
4 TABLET ORAL
Status: COMPLETED | OUTPATIENT
Start: 2021-04-28 | End: 2021-04-28

## 2021-04-28 RX ORDER — LIDOCAINE 40 MG/G
1 CREAM TOPICAL SEE ADMIN INSTRUCTIONS
Status: COMPLETED | OUTPATIENT
Start: 2021-04-28 | End: 2021-04-28

## 2021-04-28 RX ORDER — LORAZEPAM 0.5 MG/1
.5-2 TABLET ORAL
Status: COMPLETED | OUTPATIENT
Start: 2021-04-28 | End: 2021-04-28

## 2021-04-28 RX ORDER — ONDANSETRON 2 MG/ML
4 INJECTION INTRAMUSCULAR; INTRAVENOUS
Status: DISCONTINUED | OUTPATIENT
Start: 2021-04-28 | End: 2021-05-06 | Stop reason: HOSPADM

## 2021-04-28 RX ORDER — HYDROCODONE BITARTRATE AND ACETAMINOPHEN 5; 325 MG/1; MG/1
1-2 TABLET ORAL EVERY 6 HOURS PRN
Status: DISCONTINUED | OUTPATIENT
Start: 2021-04-28 | End: 2021-05-06 | Stop reason: HOSPADM

## 2021-04-28 RX ADMIN — LIDOCAINE 1 G: 40 CREAM TOPICAL at 05:26

## 2021-04-28 RX ADMIN — GADOBUTROL 5 ML: 604.72 INJECTION INTRAVENOUS at 07:20

## 2021-04-28 RX ADMIN — DEXAMETHASONE 4 MG: 4 TABLET ORAL at 09:02

## 2021-04-28 RX ADMIN — LORAZEPAM 2 MG: 0.5 TABLET ORAL at 05:26

## 2021-04-28 RX ADMIN — LIDOCAINE HYDROCHLORIDE,EPINEPHRINE BITARTRATE 30 ML: 20; .01 INJECTION, SOLUTION INFILTRATION; PERINEURAL at 05:26

## 2021-04-28 NOTE — LETTER
4/28/2021         RE: Lenora Hammonds  [address] [address]        Dear Colleague,    Thank you for referring your patient, Lenora Hammonds, to the [facility] RADIATION ONCOLOGY GAMMA KNIFE. Please see a copy of my visit note below.    PREOPERATIVE DIAGNOSIS:  Left vestibular schwannoma    POSTOPERATIVE DIAGNOSIS:  Same    OPERATIVE PROCEDURES:  1.  Gamma Knife radiosurgery for left vestibular schwannoma, complex  2.  Application of stereotactic head frame for stereotactic radiosurgery    SURGEON:  Livan Gibbs MD    ASSISTANT:  None    ANESTHESIA:  Local    INDICATIONS FOR THE PROCEDURE:  Ms. Lenora Hammonds is a 75 year-old female with a history of left vestibular schwannoma.  She underwent Gamma Knife treatment in March 2014.  The tumor initially responded to treatment.  Recent imaging has again shown progressive growth with the tumor.  Gamma Knife stereotactic radiosurgery was again recommended to treat this lesion.    DESCRIPTION OF THE PROCEDURE:  On the morning of the procedure, the patient was brought to the Gamma Knife radiosurgery area.  A pre-procedure pause was performed to confirm the identity and date of birth of the patient, as well as the procedure site.  The scalp was prepped with betadine and then anesthetized with a combination of marcaine, lidocaine, and epinephrine.  The Leksell G-frame was applied and the pins were fixed to hand tightness.  The patient tolerated the application of the frame well.    The patient was taken to the MRI scanner where an MRI with gadolinium contrast was obtained.  The patient returned from MRI.  The lesion was outlined on the planning software and we made a conformal dose outline for this lesion.  Dr. Jaramillo selected the radiation dose for the lesion.  Measurements were taken,  steps performed, and the patient was then brought into the treatment room.  Radiation was given according to the prescription.    The  patient was then taken out of the unit and out of the treatment room.  The stereotactic frame was removed and a dressing was applied.  After observation, the patient was discharged.  Follow up arrangements will be made for the patient.    I attest that I was present for the entirety of the case, including pre-procedure assessment, stereotactic head frame placement, treatment planning, treatment delivery, as well as frame removal at the end of the treatment.            GAMMA KNIFE RADIOSURGERY TREATMENT NOTE  DEPARTMENT OF RADIATION ONCOLOGY    Name: Lenora Hammonds    : 1945 Medical Record #: [id number]   Diagnosis: Recurrent Left Acoustic Neuroma   Date of Treatment: 21     The patient was assessed prior to treatment and wished to proceed with the treatment.  All imaging, accurate patient set-up and positioning was reviewed by myself.  Dose delivery and treatment parameters were also reviewed and are within treatment specifications.  GK radiosurgery was delivered as detailed in the treatment summary note.  The patient was assessed again after treatment with no change in status.    S: She tolerated GK well.  No new complaints or sx after treatment.  O: AVSS. No new neuro deficits.  A: stable  P: F/U brain MRI in 1 year and see Dr. Hurley after the MRI      Temo Jaramillo MD, [facility], [facility]    GAMMA KNIFE RADIOSURGERY TREATMENT SUMMARY  DEPARTMENT OF RADIATION ONCOLOGY    Name: Lenora Hammonds                                        : 1945                 Medical Record #: [id number]                       Diagnosis: Recurrent Left Acoustic Neuroma                            Date of Treatment: 2021                                       Referring Physicians:  Douglas Hurley, Siobhan Pineda,     BRIEF CLINICAL HISTORY:                                                                                                 The patient is a 75-year-old white female who was well until 2013 when she  noticed the sudden onset of left-sided hearing loss.  Audiometry on 01/31/2014 showed 0 word discrimination on the left.  Brain MRI 02/04/2014 showed a 2.0 x 1.3 x 1.1 cm enhancing mass in the left cerebellopontine angle extending into the left internal auditory canal consistent with an acoustic neuroma. The patient was evaluated by Dr. Hurley on 02/26/2014 and referred for consideration of Gamma Knife radiosurgery.  On 03/26/2014, she was treated with Gamma Knife radiosurgery (13 Gy in a single treatment to a volume of 1.9 cc).  She has remained clinically stable since that time.  Followup MRI scans showed the mass was stable on 03/16/2015 and slightly smaller on 03/26/2016 (1.8 x 0.3 x 1.1 cm).  Brain MRI 04/30/2018 showed the mass was 1.8 x 1.5 x 1.3 cm. Continued observation was recommended. Brain MRI 03/25/2021 showed slight growth of the mass, which now measures 1.8 x 1.7 x 1.4 cm.  The patient was evaluated by Dr. Hurley on 04/08/2021 and referred for consideration of her treatment options.  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  Her hearing is good on the right.  She denies headaches, balance difficulty, difficulty swallowing or facial pain.   TECHNICAL SUMMARY        Collimators    # Site Energy Min Tumor Dose (Gy) IDL (%) # Size (mm) Treatment Vol. (cc)   1 Recurrent Left Acoustic Neuroma [place] 60 13 50 6 4,8,16 3.09     DESCRIPTION OF PROCEDURE:                                                                              On 4/28/2021 the patient was brought to the Gamma Knife suite at [facility].  After sedation and topical anesthetic, the head frame was put on by Dr. Livan Gibbs.  The patient was then taken to the department of Radiology where a stereotactic brain MRI was performed.  The patient was admitted to the [facility] where she rested comfortably while treatment planning was completed.  The [employer] Gamma Plan software was  used to create a highly conformal dose distribution using the number and size collimators detailed above.  The patient was brought to the Gamma Knife suite.  The treatment was delivered using the [employer] Gamma Knife ICON without complication.  The head frame was removed and the patient was discharged home in stable condition.  FOLLOW-UP PLANS:                                                                                                        The Gamma Knife Nurse Coordinator will call the patient tomorrow for short-term follow up.  She should have a brain MRI in 12 months and every 12 months thereafter.  The patient will also follow-up with Dr. Hurley.  I would be happy to see her anytime.    Temo Jaramillo MD, [facility], [facility]

## 2021-04-28 NOTE — PROGRESS NOTES
PREOPERATIVE DIAGNOSIS:  Left vestibular schwannoma    POSTOPERATIVE DIAGNOSIS:  Same    OPERATIVE PROCEDURES:  1.  Gamma Knife radiosurgery for left vestibular schwannoma, complex  2.  Application of stereotactic head frame for stereotactic radiosurgery    SURGEON:  Livan Gibbs MD    ASSISTANT:  None    ANESTHESIA:  Local    INDICATIONS FOR THE PROCEDURE:  Ms. Lenora Hammonds is a 75 year-old female with a history of left vestibular schwannoma.  She underwent Gamma Knife treatment in March 2014.  The tumor initially responded to treatment.  Recent imaging has again shown progressive growth with the tumor.  Gamma Knife stereotactic radiosurgery was again recommended to treat this lesion.    DESCRIPTION OF THE PROCEDURE:  On the morning of the procedure, the patient was brought to the Gamma Knife radiosurgery area.  A pre-procedure pause was performed to confirm the identity and date of birth of the patient, as well as the procedure site.  The scalp was prepped with betadine and then anesthetized with a combination of marcaine, lidocaine, and epinephrine.  The [employer] G-frame was applied and the pins were fixed to hand tightness.  The patient tolerated the application of the frame well.    The patient was taken to the MRI scanner where an MRI with gadolinium contrast was obtained.  The patient returned from MRI.  The lesion was outlined on the planning software and we made a conformal dose outline for this lesion.  Dr. Jaramillo selected the radiation dose for the lesion.  Measurements were taken,  steps performed, and the patient was then brought into the treatment room.  Radiation was given according to the prescription.    The patient was then taken out of the unit and out of the treatment room.  The stereotactic frame was removed and a dressing was applied.  After observation, the patient was discharged.  Follow up arrangements will be made for the patient.    I attest that I was present for  the entirety of the case, including pre-procedure assessment, stereotactic head frame placement, treatment planning, treatment delivery, as well as frame removal at the end of the treatment.

## 2021-04-28 NOTE — PROGRESS NOTES
Patient arrived on 2A for gamma knife procedure.  VSS.  Denies pain.  Halo intact, emergency removal kit at bedside.  PO food and fluids at bedside.  Patient instructed to call and wait for assistance prior to getting out of bed.

## 2021-04-28 NOTE — PROGRESS NOTES
GAMMA KNIFE RADIOSURGERY TREATMENT SUMMARY  DEPARTMENT OF RADIATION ONCOLOGY    Name: Lenora Hammonds                                        : 1945                 Medical Record #: [id number]                       Diagnosis: Recurrent Left Acoustic Neuroma                            Date of Treatment: 2021                                       Referring Physicians:  Douglas Hurley, Siobhan Pineda,     BRIEF CLINICAL HISTORY:                                                                                                 The patient is a 75-year-old white female who was well until 2013 when she noticed the sudden onset of left-sided hearing loss.  Audiometry on 2014 showed 0 word discrimination on the left.  Brain MRI 2014 showed a 2.0 x 1.3 x 1.1 cm enhancing mass in the left cerebellopontine angle extending into the left internal auditory canal consistent with an acoustic neuroma. The patient was evaluated by Dr. Hurley on 2014 and referred for consideration of Gamma Knife radiosurgery.  On 2014, she was treated with Gamma Knife radiosurgery (13 Gy in a single treatment to a volume of 1.9 cc).  She has remained clinically stable since that time.  Followup MRI scans showed the mass was stable on 2015 and slightly smaller on 2016 (1.8 x 0.3 x 1.1 cm).  Brain MRI 2018 showed the mass was 1.8 x 1.5 x 1.3 cm. Continued observation was recommended. Brain MRI 2021 showed slight growth of the mass, which now measures 1.8 x 1.7 x 1.4 cm.  The patient was evaluated by Dr. Hurley on 2021 and referred for consideration of her treatment options.  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  Her hearing is good on the right.  She denies headaches, balance difficulty, difficulty swallowing or facial pain.   TECHNICAL SUMMARY        Collimators    # Site Energy Min Tumor Dose (Gy) IDL (%) # Size (mm) Treatment Vol. (cc)   1 Recurrent Left  Acoustic Neuroma [place] 60 13 50 6 4,8,16 3.09     DESCRIPTION OF PROCEDURE:                                                                              On 4/28/2021 the patient was brought to the Gamma Knife suite at [facility].  After sedation and topical anesthetic, the head frame was put on by Dr. Livan Gibbs.  The patient was then taken to the department of Radiology where a stereotactic brain MRI was performed.  The patient was admitted to the [facility] where she rested comfortably while treatment planning was completed.  The [employer] Gamma Plan software was used to create a highly conformal dose distribution using the number and size collimators detailed above.  The patient was brought to the Gamma Knife suite.  The treatment was delivered using the [employer] Gamma Knife ICON without complication.  The head frame was removed and the patient was discharged home in stable condition.  FOLLOW-UP PLANS:                                                                                                        The Gamma Knife Nurse Coordinator will call the patient tomorrow for short-term follow up.  She should have a brain MRI in 12 months and every 12 months thereafter.  The patient will also follow-up with Dr. Hurley.  I would be happy to see her anytime.    Temo Jaramillo MD, [facility], [facility]

## 2021-04-28 NOTE — PROGRESS NOTES
GAMMA KNIFE RADIOSURGERY TREATMENT NOTE  DEPARTMENT OF RADIATION ONCOLOGY    Name: Lenora Hammonds    : 1945 Medical Record #: [id number]   Diagnosis: Recurrent Left Acoustic Neuroma   Date of Treatment: 21     The patient was assessed prior to treatment and wished to proceed with the treatment.  All imaging, accurate patient set-up and positioning was reviewed by myself.  Dose delivery and treatment parameters were also reviewed and are within treatment specifications.  GK radiosurgery was delivered as detailed in the treatment summary note.  The patient was assessed again after treatment with no change in status.    S: She tolerated GK well.  No new complaints or sx after treatment.  O: AVSS. No new neuro deficits.  A: stable  P: F/U brain MRI in 1 year and see Dr. Hurley after the MRI      Temo Jaramillo MD, [facility], [facility]

## 2021-04-29 ENCOUNTER — TELEPHONE (OUTPATIENT)
Dept: RADIATION ONCOLOGY | Facility: CLINIC | Age: 76
End: 2021-04-29

## 2021-04-29 NOTE — TELEPHONE ENCOUNTER
Follow-up phone call made to the patient to check status. States she is doing well. Had slight headache last evening. Took Tylenol with good relief. Appetite good. Head-wrap removed earlier today. Pin sites look good. No concerns. Patient has our number to call if needed.

## 2021-06-01 ENCOUNTER — OFFICE VISIT (OUTPATIENT)
Dept: FAMILY MEDICINE | Facility: CLINIC | Age: 76
End: 2021-06-01
Payer: COMMERCIAL

## 2021-06-01 VITALS
TEMPERATURE: 97.5 F | HEART RATE: 72 BPM | BODY MASS INDEX: 20.08 KG/M2 | WEIGHT: 117 LBS | OXYGEN SATURATION: 97 % | DIASTOLIC BLOOD PRESSURE: 62 MMHG | SYSTOLIC BLOOD PRESSURE: 102 MMHG

## 2021-06-01 DIAGNOSIS — F33.42 RECURRENT MAJOR DEPRESSIVE DISORDER, IN FULL REMISSION (H): ICD-10-CM

## 2021-06-01 DIAGNOSIS — H61.23 BILATERAL IMPACTED CERUMEN: Primary | ICD-10-CM

## 2021-06-01 DIAGNOSIS — I10 HYPERTENSION GOAL BP (BLOOD PRESSURE) < 140/90: ICD-10-CM

## 2021-06-01 DIAGNOSIS — D33.3 ACOUSTIC NEUROMA (H): ICD-10-CM

## 2021-06-01 PROCEDURE — 69210 REMOVE IMPACTED EAR WAX UNI: CPT | Performed by: STUDENT IN AN ORGANIZED HEALTH CARE EDUCATION/TRAINING PROGRAM

## 2021-06-01 PROCEDURE — 96127 BRIEF EMOTIONAL/BEHAV ASSMT: CPT | Performed by: STUDENT IN AN ORGANIZED HEALTH CARE EDUCATION/TRAINING PROGRAM

## 2021-06-01 PROCEDURE — 99213 OFFICE O/P EST LOW 20 MIN: CPT | Mod: 25 | Performed by: STUDENT IN AN ORGANIZED HEALTH CARE EDUCATION/TRAINING PROGRAM

## 2021-06-01 ASSESSMENT — PATIENT HEALTH QUESTIONNAIRE - PHQ9
5. POOR APPETITE OR OVEREATING: NOT AT ALL
SUM OF ALL RESPONSES TO PHQ QUESTIONS 1-9: 0

## 2021-06-01 ASSESSMENT — ANXIETY QUESTIONNAIRES
2. NOT BEING ABLE TO STOP OR CONTROL WORRYING: NOT AT ALL
7. FEELING AFRAID AS IF SOMETHING AWFUL MIGHT HAPPEN: SEVERAL DAYS
1. FEELING NERVOUS, ANXIOUS, OR ON EDGE: SEVERAL DAYS
3. WORRYING TOO MUCH ABOUT DIFFERENT THINGS: NOT AT ALL
6. BECOMING EASILY ANNOYED OR IRRITABLE: SEVERAL DAYS
GAD7 TOTAL SCORE: 3
IF YOU CHECKED OFF ANY PROBLEMS ON THIS QUESTIONNAIRE, HOW DIFFICULT HAVE THESE PROBLEMS MADE IT FOR YOU TO DO YOUR WORK, TAKE CARE OF THINGS AT HOME, OR GET ALONG WITH OTHER PEOPLE: NOT DIFFICULT AT ALL
5. BEING SO RESTLESS THAT IT IS HARD TO SIT STILL: NOT AT ALL

## 2021-06-01 NOTE — PROGRESS NOTES
Assessment & Plan     Bilateral impacted cerumen  Cerumen removed with curette in office. Return as needed.   - REMOVE IMPACTED CERUMEN    Recurrent major depressive disorder, in full remission (H)  Doing well. Stable on Lexapro.     Hypertension goal BP (blood pressure) < 140/90  Blood pressures at goal with current antihypertensive dosing. Up to date on labs. Return for physical in September.     Acoustic neuroma (H)  Due for recheck in April 2022.       Phyllis Drew MD  [facility]    Clemente Cooley is a 75 year old who presents for the following health issues     HPI     She is here to establish care due to [facility]FP of her PCP.     Depression, anxiety - Her  was being treated for chemotherapy, radiation last winter. He is doing well now.   She is currently taking Lexapro 20 mg daily. Had to put her dog down last winter.  Primary issue with depression is feeling irritable.   Otherwise, depression is well controlled.     PHQ 9/17/2019 9/24/2020 6/1/2021   PHQ-9 Total Score 1 0 0   Q9: Thoughts of better off dead/self-harm past 2 weeks Not at all Not at all Not at all     LISET-7 SCORE 9/17/2019 6/1/2021   Total Score 2 (minimal anxiety) -   Total Score 2 3       HTN: Currently taking amlodipine and lisinopril-hydrochlorothiazide. She hasn't been checking her home blood pressures. Denies feeling light headed, dizzy. She is walking for exercise on most days.     Acoustic neuroma: She had a gamma knife treatment for left sided acoustic neuroma on 4/[facility] She has no hearing in this ear. Recommended to have a brain MRI every year (next due 4/2022 and see Dr. Hurley afterwards).     She is wondering if she has any wax in her ears and if this can be removed. No ear concerns otherwise.     Last routine physical was 9/2020.     She lives with her . They have two sons--they live in [place]. She has two grandchildren and one great grandchild.        Social History     Tobacco Use     Smoking status: Former Smoker     Packs/day: 0.30     Years: 50.00     Pack years: 15.00     Types: Cigarettes     Smokeless tobacco: Never Used     Tobacco comment: quit 12/2014   Substance Use Topics     Alcohol use: Yes     Comment: occasionally      Drug use: No       Objective    /62   Pulse 72   Temp 97.5  F (36.4  C) (Tympanic)   Wt 53.1 kg (117 lb)   SpO2 97%   Breastfeeding No   BMI 20.08 kg/m    Body mass index is 20.08 kg/m .  Physical Exam   GENERAL: healthy, alert and no distress  EYES: Eyes grossly normal to inspection, PERRL and conjunctivae and sclerae normal  HENT: ear canals with small amount of cerumen bilaterally, TM's normal  RESP: lungs clear to auscultation - no rales, rhonchi or wheezes  CV: regular rate and rhythm, normal S1 S2, no S3 or S4, no murmur, click or rub, no peripheral edema and peripheral pulses strong  MS: no gross musculoskeletal defects noted, no edema  PSYCH: mentation appears normal, affect normal/bright    Diagnostics reviewed from 4/2021: normal CBC. Labs up to date.

## 2021-06-02 ASSESSMENT — ANXIETY QUESTIONNAIRES: GAD7 TOTAL SCORE: 3

## 2021-06-28 ENCOUNTER — TELEPHONE (OUTPATIENT)
Dept: FAMILY MEDICINE | Facility: CLINIC | Age: 76
End: 2021-06-28

## 2021-06-28 ENCOUNTER — OFFICE VISIT (OUTPATIENT)
Dept: FAMILY MEDICINE | Facility: CLINIC | Age: 76
End: 2021-06-28
Payer: COMMERCIAL

## 2021-06-28 VITALS
SYSTOLIC BLOOD PRESSURE: 100 MMHG | HEIGHT: 63 IN | DIASTOLIC BLOOD PRESSURE: 61 MMHG | OXYGEN SATURATION: 94 % | TEMPERATURE: 97 F | HEART RATE: 75 BPM | BODY MASS INDEX: 20.73 KG/M2 | RESPIRATION RATE: 16 BRPM | WEIGHT: 117 LBS

## 2021-06-28 DIAGNOSIS — M54.9 ACUTE BILATERAL BACK PAIN, UNSPECIFIED BACK LOCATION: Primary | ICD-10-CM

## 2021-06-28 PROCEDURE — 99213 OFFICE O/P EST LOW 20 MIN: CPT | Performed by: FAMILY MEDICINE

## 2021-06-28 ASSESSMENT — MIFFLIN-ST. JEOR: SCORE: 984.84

## 2021-06-28 NOTE — TELEPHONE ENCOUNTER
C/o back spasms, started this morning   Put heat on it but not helping  Has had same in the past  Spasm is to the point she is having a hard time with her daily activities due to the spasms.  Is in the market for a new mattress and wonders if that may be contributing.  Appointment scheduled for this morning with Dr Alcala.  Rachel Jernigan RN  [facility]  Rachel Jernigan RN  [facility]

## 2021-06-28 NOTE — PATIENT INSTRUCTIONS
When You Have Low Back Pain    Caring for Your Back  You are not alone.    Low back pain is very common. Nearly half of all adults have low back pain in any given year. The good news is that back pain is rarely a danger to your health. Most people can manage their back pain on their own and about half of them start feeling better within 2 weeks. In 9 out of 10 cases, low back pain goes away or no longer limits daily activity within 6 weeks.     Your outlook is good!    Your symptoms tell us that your low back pain is most likely not a danger to you. Most of the time we do not know the exact cause of low back pain, even if you see a doctor or have an MRI. However, treatment can still work without knowing the cause of the pain. Less than 1 in 100 people need surgery for their back pain.     What can I do about my low back pain?     There are three things you can do to ease low back pain and help it go away.    Use heat or cold packs.    Take medicine as directed.    Use positions, movements and exercises.     Using heat or cold packs    Try cold packs or gentle heat to ease your pain. Use whichever gives you the most relief. Apply the cold pack or heat for 15 minutes at a time, as often as needed.    Taking medicine      If your doctor has prescribed medicine, be sure to follow the directions.    If you take over-the-counter medicine, read and follow the directions.    Talk to your doctor if you have any questions.     Using positions, movements and exercises    Research tells us that moving your joints and muscles can help you recover from back pain. Such activity should be simple and gentle. Use the positions in the photos as well as walking to help relieve your pain. Try taking a short walk every 3 to 4 hours during the day. Walk for a few minutes inside your home or take longer walks outside, on a treadmill or at a mall. Slowly increase the amount of time you walk. Expect discomfort when you begin, but it should  lessen as your back starts to heal. When your back feels better, walk daily to keep your back and body healthy.    Finding a comfortable position    When your back pain is new, certain positions will ease your pain. Gently try each of the positions below until you find one that is helpful. Once you find a position of comfort, use it as often as you like when you are resting. You will recover faster if you combine rest with activity.         Lie on your back with your legs bent. You can do this by placing a pillow under your knees. Or you may lie on the floor and rest your lower legs on the seat of a chair.       Lie on your side with your knees bent, and place a pillow between your knees.       Lie on your stomach over pillows.      When should I call my doctor?    Your back pain should improve over the first couple of weeks. As it improves, you should be able to return to your normal activities. But call your doctor if:    You have a sudden change in your ability to control your bladder or bowels.    You feel tingling in your groin or legs.    The pain spreads down your leg and into your foot.    Your toes, feet or leg muscles feel weak.    You feel generally unwell or sick.    Your pain does not get better or gets worse.      If you are deaf or hard of hearing, please let us know. We provide many free services including sign language interpreters,oral interpreters, TTYs, telephone amplifiers, note takers and written materials.    For informational purposes only. Not to replace the advice of your health care provider. Copyright   2013 [facility]. All rights reserved. [employer] 943844 - Rev 06/14.

## 2021-06-28 NOTE — PROGRESS NOTES
Assessment & Plan     Acute bilateral back pain, unspecified back location  Recommended tylenol and/or ibuprofen (4/21 Cr 0.77, ) as needed for pain, heat pack. Gentle activity (see pt instructions). If worsening or if not improving over the next week, let us know. If not improving, consider PT. May also try OTC lidocaine patch (has one at home).         20  minutes spent on the date of the encounter doing chart review, history and exam, documentation and further activities per the note       Patient Instructions   When You Have Low Back Pain    Caring for Your Back  You are not alone.    Low back pain is very common. Nearly half of all adults have low back pain in any given year. The good news is that back pain is rarely a danger to your health. Most people can manage their back pain on their own and about half of them start feeling better within 2 weeks. In 9 out of 10 cases, low back pain goes away or no longer limits daily activity within 6 weeks.     Your outlook is good!    Your symptoms tell us that your low back pain is most likely not a danger to you. Most of the time we do not know the exact cause of low back pain, even if you see a doctor or have an MRI. However, treatment can still work without knowing the cause of the pain. Less than 1 in 100 people need surgery for their back pain.     What can I do about my low back pain?     There are three things you can do to ease low back pain and help it go away.    Use heat or cold packs.    Take medicine as directed.    Use positions, movements and exercises.     Using heat or cold packs    Try cold packs or gentle heat to ease your pain. Use whichever gives you the most relief. Apply the cold pack or heat for 15 minutes at a time, as often as needed.    Taking medicine      If your doctor has prescribed medicine, be sure to follow the directions.    If you take over-the-counter medicine, read and follow the directions.    Talk to your doctor if you  have any questions.     Using positions, movements and exercises    Research tells us that moving your joints and muscles can help you recover from back pain. Such activity should be simple and gentle. Use the positions in the photos as well as walking to help relieve your pain. Try taking a short walk every 3 to 4 hours during the day. Walk for a few minutes inside your home or take longer walks outside, on a treadmill or at a mall. Slowly increase the amount of time you walk. Expect discomfort when you begin, but it should lessen as your back starts to heal. When your back feels better, walk daily to keep your back and body healthy.    Finding a comfortable position    When your back pain is new, certain positions will ease your pain. Gently try each of the positions below until you find one that is helpful. Once you find a position of comfort, use it as often as you like when you are resting. You will recover faster if you combine rest with activity.         Lie on your back with your legs bent. You can do this by placing a pillow under your knees. Or you may lie on the floor and rest your lower legs on the seat of a chair.       Lie on your side with your knees bent, and place a pillow between your knees.       Lie on your stomach over pillows.      When should I call my doctor?    Your back pain should improve over the first couple of weeks. As it improves, you should be able to return to your normal activities. But call your doctor if:    You have a sudden change in your ability to control your bladder or bowels.    You feel tingling in your groin or legs.    The pain spreads down your leg and into your foot.    Your toes, feet or leg muscles feel weak.    You feel generally unwell or sick.    Your pain does not get better or gets worse.      If you are deaf or hard of hearing, please let us know. We provide many free services including sign language interpreters,oral interpreters, TTYs, telephone amplifiers,  "note takers and written materials.    For informational purposes only. Not to replace the advice of your health care provider. Copyright   2013 [facility]. All rights reserved. [employer] 601480 - Rev 06/14.      No follow-ups on file.    Silvia Alcala MD, MD [facility] MELITA Cooley is a 75 year old who presents for the following health issues     HPI     Back spasms  Onset/Duration: this morning   Description:   Location of pain: middle of back both sides  Character of pain: cramping and intermittent  Pain radiation: none  New numbness or weakness in legs, not attributed to pain: no   Intensity: Currently 9/10, moderate  Progression of Symptoms: same unchanged  History:   Specific cause: none  Pain interferes with job: no  History of back problems: no prior major back problems, but has history of similar muscle spasms.   Any previous MRI or X-rays: None  Sees a specialist for back pain: No  Alleviating factors:   Improved by: none    Precipitating factors:  Worsened by: Nothing  Therapies tried and outcome: heat    Accompanying Signs & Symptoms:  Risk of Fracture: Age >64  Risk of Cauda Equina: None  Risk of Infection: None  Risk of Cancer: None  Risk of Ankylosing Spondylitis: Onset at age <35, male, AND morning back stiffness  no              It started this morning when she woke up. She had some gripping sensation in her back, then it lets go. It does not hurt all of the time. It does not hurt all of the time, just sometimes \"grabs\". Right in the middle of her back. Se has had it before. She tried heat on it this morning, but it did not seem to help. In the past, when she had the pain, she does not recall what helped. She has not taken any tylenol or ibuprofen. Denies a fall or trauma to the back. Did not do anyything different in the past few days for activities notes she has had right lateral hip pain and then can hardly stand on it, but ibuprofen helps " immensely (not an issue right now).     If she eats something greasy or oily, she has diarrhea. Had some cheese and crackers two weeks ago and had vomiting and diarrhea. Has had this before. It resolved completely. More on the constipated side since then.     Note     C/o back spasms, started this morning   Put heat on it but not helping  Has had same in the past  Spasm is to the point she is having a hard time with her daily activities due to the spasms.  Is in the market for a new mattress and wonders if that may be contributing.  Appointment scheduled for this morning with Dr Alcala.  Rachel Jernigan RN  [facility]  Rachel Jernigan RN  [facility]          Review of Systems          Objective    There were no vitals taken for this visit.  There is no height or weight on file to calculate BMI.  Physical Exam   GENERAL: healthy, alert and no distress  ABDOMEN: soft, nontender, no hepatosplenomegaly, no masses and bowel sounds normal  Comprehensive back pain exam:  No tenderness, Pain limits the following motions: side bending bilaterally mildly limited due to discomfort, Lower extremity strength functional and equal on both sides, Lower extremity reflexes within normal limits bilaterally, Lower extremity sensation normal and equal on both sides and Straight leg raise negative bilaterally

## 2021-09-10 DIAGNOSIS — I10 HYPERTENSION GOAL BP (BLOOD PRESSURE) < 140/90: ICD-10-CM

## 2021-09-13 RX ORDER — LISINOPRIL AND HYDROCHLOROTHIAZIDE 20; 25 MG/1; MG/1
1 TABLET ORAL DAILY
Qty: 90 TABLET | Refills: 1 | Status: SHIPPED | OUTPATIENT
Start: 2021-09-13 | End: 2021-10-04

## 2021-09-13 NOTE — TELEPHONE ENCOUNTER
Diuretics (Including Combos) Protocol Ydletf03/10/2021 05:30 AM   Blood pressure under 140/90 in past 12 months Protocol Details    Recent (12 mo) or future (30 days) visit within the authorizing provider's specialty     Medication is active on med list     Patient is age 18 or older     No active pregancy on record     Normal serum creatinine on file in past 12 months     Normal serum potassium on file in past 12 months     Normal serum sodium on file in past 12 months     No positive pregnancy test in past 12 months    ACE Inhibitors (Including Combos) Protocol Passed   Blood pressure under 140/90 in past 12 months Protocol Details    Recent (12 mo) or future (30 days) visit within the authorizing provider's specialty     Medication is active on med list     Patient is age 18 or older     No active pregnancy on record     Normal serum creatinine on file in past 12 months     Normal serum potassium on file in past 12 months     No positive pregnancy test within past 12 months

## 2021-10-04 ENCOUNTER — OFFICE VISIT (OUTPATIENT)
Dept: FAMILY MEDICINE | Facility: CLINIC | Age: 76
End: 2021-10-04
Payer: COMMERCIAL

## 2021-10-04 VITALS
HEIGHT: 63 IN | WEIGHT: 118 LBS | RESPIRATION RATE: 15 BRPM | SYSTOLIC BLOOD PRESSURE: 110 MMHG | TEMPERATURE: 98.1 F | OXYGEN SATURATION: 97 % | DIASTOLIC BLOOD PRESSURE: 60 MMHG | BODY MASS INDEX: 20.91 KG/M2 | HEART RATE: 73 BPM

## 2021-10-04 DIAGNOSIS — Z00.00 ENCOUNTER FOR MEDICARE ANNUAL WELLNESS EXAM: Primary | ICD-10-CM

## 2021-10-04 DIAGNOSIS — K55.9 ISCHEMIC COLITIS (H): ICD-10-CM

## 2021-10-04 DIAGNOSIS — I10 HYPERTENSION GOAL BP (BLOOD PRESSURE) < 140/90: ICD-10-CM

## 2021-10-04 DIAGNOSIS — F32.0 MAJOR DEPRESSIVE DISORDER, SINGLE EPISODE, MILD (H): ICD-10-CM

## 2021-10-04 LAB
ALBUMIN SERPL-MCNC: 3.5 G/DL (ref 3.4–5)
ALP SERPL-CCNC: 67 U/L (ref 40–150)
ALT SERPL W P-5'-P-CCNC: 22 U/L (ref 0–50)
ANION GAP SERPL CALCULATED.3IONS-SCNC: 4 MMOL/L (ref 3–14)
AST SERPL W P-5'-P-CCNC: 17 U/L (ref 0–45)
BILIRUB SERPL-MCNC: 0.5 MG/DL (ref 0.2–1.3)
BUN SERPL-MCNC: 25 MG/DL (ref 7–30)
CALCIUM SERPL-MCNC: 9.3 MG/DL (ref 8.5–10.1)
CHLORIDE BLD-SCNC: 103 MMOL/L (ref 94–109)
CHOLEST SERPL-MCNC: 179 MG/DL
CO2 SERPL-SCNC: 31 MMOL/L (ref 20–32)
CREAT SERPL-MCNC: 0.77 MG/DL (ref 0.52–1.04)
CREAT UR-MCNC: 131 MG/DL
FASTING STATUS PATIENT QL REPORTED: YES
GFR SERPL CREATININE-BSD FRML MDRD: 74 ML/MIN/1.73M2
GLUCOSE BLD-MCNC: 97 MG/DL (ref 70–99)
HDLC SERPL-MCNC: 50 MG/DL
LDLC SERPL CALC-MCNC: 114 MG/DL
MICROALBUMIN UR-MCNC: 8 MG/L
MICROALBUMIN/CREAT UR: 6.11 MG/G CR (ref 0–25)
NONHDLC SERPL-MCNC: 129 MG/DL
POTASSIUM BLD-SCNC: 3.7 MMOL/L (ref 3.4–5.3)
PROT SERPL-MCNC: 6.9 G/DL (ref 6.8–8.8)
SODIUM SERPL-SCNC: 138 MMOL/L (ref 133–144)
TRIGL SERPL-MCNC: 73 MG/DL

## 2021-10-04 PROCEDURE — 80053 COMPREHEN METABOLIC PANEL: CPT | Performed by: NURSE PRACTITIONER

## 2021-10-04 PROCEDURE — 90472 IMMUNIZATION ADMIN EACH ADD: CPT | Performed by: NURSE PRACTITIONER

## 2021-10-04 PROCEDURE — 99397 PER PM REEVAL EST PAT 65+ YR: CPT | Mod: 25 | Performed by: NURSE PRACTITIONER

## 2021-10-04 PROCEDURE — 36415 COLL VENOUS BLD VENIPUNCTURE: CPT | Performed by: NURSE PRACTITIONER

## 2021-10-04 PROCEDURE — 90750 HZV VACC RECOMBINANT IM: CPT | Performed by: NURSE PRACTITIONER

## 2021-10-04 PROCEDURE — G0008 ADMIN INFLUENZA VIRUS VAC: HCPCS | Mod: 59 | Performed by: NURSE PRACTITIONER

## 2021-10-04 PROCEDURE — 99214 OFFICE O/P EST MOD 30 MIN: CPT | Mod: 25 | Performed by: NURSE PRACTITIONER

## 2021-10-04 PROCEDURE — 82043 UR ALBUMIN QUANTITATIVE: CPT | Performed by: NURSE PRACTITIONER

## 2021-10-04 PROCEDURE — 90662 IIV NO PRSV INCREASED AG IM: CPT | Performed by: NURSE PRACTITIONER

## 2021-10-04 PROCEDURE — 80061 LIPID PANEL: CPT | Performed by: NURSE PRACTITIONER

## 2021-10-04 RX ORDER — LISINOPRIL AND HYDROCHLOROTHIAZIDE 20; 25 MG/1; MG/1
1 TABLET ORAL DAILY
Qty: 90 TABLET | Refills: 1 | Status: SHIPPED | OUTPATIENT
Start: 2021-10-04 | End: 2022-06-13 | Stop reason: DRUGHIGH

## 2021-10-04 RX ORDER — AMLODIPINE BESYLATE 2.5 MG/1
2.5 TABLET ORAL DAILY
Qty: 90 TABLET | Refills: 1 | Status: SHIPPED | OUTPATIENT
Start: 2021-10-04 | End: 2022-06-13 | Stop reason: ALTCHOICE

## 2021-10-04 RX ORDER — ESCITALOPRAM OXALATE 10 MG/1
10 TABLET ORAL DAILY
Qty: 90 TABLET | Refills: 3 | Status: SHIPPED | OUTPATIENT
Start: 2021-10-04 | End: 2022-12-06

## 2021-10-04 RX ORDER — ALBUTEROL SULFATE 90 UG/1
1-2 AEROSOL, METERED RESPIRATORY (INHALATION) EVERY 4 HOURS PRN
Qty: 18 G | Refills: 1 | Status: SHIPPED | OUTPATIENT
Start: 2021-10-04

## 2021-10-04 ASSESSMENT — MIFFLIN-ST. JEOR: SCORE: 984.37

## 2021-10-04 NOTE — LETTER
October 5, 2021      Lenora MALKA Cassius  [address] [id number]        Dear ,    We are writing to inform you of your test results.    Your test results fall within the expected range(s) or remain unchanged from previous results.  Please continue with current treatment plan.    Resulted Orders   Lipid panel reflex to direct LDL Fasting   Result Value Ref Range    Cholesterol 179 <200 mg/dL    Triglycerides 73 <150 mg/dL    Direct Measure HDL 50 >=50 mg/dL    LDL Cholesterol Calculated 114 (H) <=100 mg/dL    Non HDL Cholesterol 129 <130 mg/dL    Patient Fasting > 8hrs? Yes     Narrative    Cholesterol  Desirable:  <200 mg/dL    Triglycerides  Normal:  Less than 150 mg/dL  Borderline High:  150-199 mg/dL  High:  200-499 mg/dL  Very High:  Greater than or equal to 500 mg/dL    Direct Measure HDL  Female:  Greater than or equal to 50 mg/dL   Male:  Greater than or equal to 40 mg/dL    LDL Cholesterol  Desirable:  <100mg/dL  Above Desirable:  100-129 mg/dL   Borderline High:  130-159 mg/dL   High:  160-189 mg/dL   Very High:  >= 190 mg/dL    Non HDL Cholesterol  Desirable:  130 mg/dL  Above Desirable:  130-159 mg/dL  Borderline High:  160-189 mg/dL  High:  190-219 mg/dL  Very High:  Greater than or equal to 220 mg/dL   Comprehensive metabolic panel (BMP + Alb, Alk Phos, ALT, AST, Total. Bili, TP)   Result Value Ref Range    Sodium 138 133 - 144 mmol/L    Potassium 3.7 3.4 - 5.3 mmol/L    Chloride 103 94 - 109 mmol/L    Carbon Dioxide (CO2) 31 20 - 32 mmol/L    Anion Gap 4 3 - 14 mmol/L    Urea Nitrogen 25 7 - 30 mg/dL    Creatinine 0.77 0.52 - 1.04 mg/dL    Calcium 9.3 8.5 - 10.1 mg/dL    Glucose 97 70 - 99 mg/dL    Alkaline Phosphatase 67 40 - 150 U/L    AST 17 0 - 45 U/L    ALT 22 0 - 50 U/L    Protein Total 6.9 6.8 - 8.8 g/dL    Albumin 3.5 3.4 - 5.0 g/dL    Bilirubin Total 0.5 0.2 - 1.3 mg/dL    GFR Estimate 74 >60 mL/min/1.73m2      Comment:      As of July 11, 2021, eGFR is calculated by  the CKD-EPI creatinine equation, without race adjustment. eGFR can be influenced by muscle mass, exercise, and diet. The reported eGFR is an estimation only and is only applicable if the renal function is stable.   Albumin Random Urine Quantitative with Creat Ratio   Result Value Ref Range    Creatinine Urine mg/dL 131 mg/dL    Albumin Urine mg/L 8 mg/L    Albumin Urine mg/g Cr 6.11 0.00 - 25.00 mg/g Cr       If you have any questions or concerns, please call the clinic at the number listed above.       Sincerely,      Silvia Eason MD for Phyllis Drew MD

## 2021-10-04 NOTE — NURSING NOTE

## 2021-10-04 NOTE — PROGRESS NOTES
"      SUBJECTIVE:   Lenora Hammonds is a 76 year old female who presents for Preventive Visit.      Patient has been advised of split billing requirements and indicates understanding: Yes  Are you in the first 12 months of your Medicare Part B coverage?  No    Physical Health:    In general, how would you rate your overall physical health? good    Outside of work, how many days during the week do you exercise? 6-7 days/week    Outside of work, approximately how many minutes a day do you exercise?15-30 minutes    If you drink alcohol do you typically have >3 drinks per day or >7 drinks per week? No    Do you usually eat at least 4 servings of fruit and vegetables a day, include whole grains & fiber and avoid regularly eating high fat or \"junk\" foods? NO    Do you have any problems taking medications regularly?  No    Do you have any side effects from medications? none    Needs assistance for the following daily activities: no assistance needed    Which of the following safety concerns are present in your home?  none identified     Hearing impairment: Yes, Difficulty following a conversation in a noisy restaurant or crowded room.    Difficult to understand a speaker at a public meeting or Jainism service.    Need to ask people to speak up or repeat themselves.    Difficulty understanding soft or whispered speech.    In the past 6 months, have you been bothered by leaking of urine? no    Mental Health:    In general, how would you rate your overall mental or emotional health? good  PHQ-2 Score:      Do you feel safe in your environment? Yes    Have you ever done Advance Care Planning? (For example, a Health Directive, POLST, or a discussion with a medical provider or your loved ones about your wishes): No, advance care planning information given to patient to review.  Patient plans to discuss their wishes with loved ones or provider.      Additional concerns to address?      Hypertension Follow-up      Do you check " your blood pressure regularly outside of the clinic? No     Are you following a low salt diet? No    Are your blood pressures ever more than 140 on the top number (systolic) OR more   than 90 on the bottom number (diastolic), for example 140/90? No- not checking     Feels off balance when she walks  Maybe related to her ear - tumor    HTN, needs refills  Due for labs  Has been out of amlodipine for over a months      Fall risk:  Fallen 2 or more times in the past year?: No  Any fall with injury in the past year?: No    Cognitive Screenin) Repeat 3 items (Leader, Season, Table)    2) Clock draw: NORMAL  3) 3 item recall: Recalls 3 objects  Results: 3 items recalled: COGNITIVE IMPAIRMENT LESS LIKELY    Mini-CogTM Copyright DUNG Villarreal. Licensed by the author for use in [employer]; reprinted with permission (maura@[email or web address]). All rights reserved.      Do you have sleep apnea, excessive snoring or daytime drowsiness?: no          Reviewed and updated as needed this visit by clinical staff  Tobacco  Allergies  Meds  Problems  Med Hx  Surg Hx  Fam Hx  Soc Hx          Reviewed and updated as needed this visit by Provider  Tobacco  Allergies  Meds  Problems  Med Hx  Surg Hx  Fam Hx         Social History     Tobacco Use     Smoking status: Former Smoker     Packs/day: 0.30     Years: 50.00     Pack years: 15.00     Types: Cigarettes     Smokeless tobacco: Never Used     Tobacco comment: quit 2014   Substance Use Topics     Alcohol use: Yes     Comment: occasionally                            Current providers sharing in care for this patient include:   Patient Care Team:  Phyllis Drew MD as PCP - General (Family Medicine)  Temo Jaramillo MD as Assigned Cancer Care Provider  Phyllis Drew MD as Assigned PCP    The following health maintenance items are reviewed in Epic and correct as of today:  Health Maintenance   Topic Date Due     HEPATITIS C SCREENING  Never done      "DTAP/TDAP/TD IMMUNIZATION (2 - Td or Tdap) 11/17/2018     FALL RISK ASSESSMENT  09/24/2021     ZOSTER IMMUNIZATION (2 of 2) 11/29/2021     PHQ-9  12/01/2021     MEDICARE ANNUAL WELLNESS VISIT  10/04/2022     ANNUAL REVIEW OF HM ORDERS  10/04/2022     LIPID  09/30/2025     ADVANCE CARE PLANNING  10/04/2026     COLORECTAL CANCER SCREENING  06/28/2028     DEXA  04/06/2032     DEPRESSION ACTION PLAN  Completed     INFLUENZA VACCINE  Completed     Pneumococcal Vaccine: 65+ Years  Completed     COVID-19 Vaccine  Completed     IPV IMMUNIZATION  Aged Out     MENINGITIS IMMUNIZATION  Aged Out     HEPATITIS B IMMUNIZATION  Aged Out     Lab work is in process  Mammogram Screening: Mammogram Screening - Patient over age 75, has elected to discontinue screenings.    ROS:  Constitutional, HEENT, cardiovascular, pulmonary, GI, , musculoskeletal, neuro, skin, endocrine and psych systems are negative, except as otherwise noted.    OBJECTIVE:   /60 (BP Location: Left arm, Patient Position: Chair, Cuff Size: Adult Regular)   Pulse 73   Temp 98.1  F (36.7  C) (Oral)   Resp 15   Ht 1.6 m (5' 3\")   Wt 53.5 kg (118 lb)   LMP  (LMP Unknown)   SpO2 97%   BMI 20.90 kg/m   Estimated body mass index is 20.9 kg/m  as calculated from the following:    Height as of this encounter: 1.6 m (5' 3\").    Weight as of this encounter: 53.5 kg (118 lb).  EXAM:   GENERAL APPEARANCE: healthy, alert and no distress  EYES: Eyes grossly normal to inspection, PERRL and conjunctivae and sclerae normal  HENT: ear canals and TM's normal, nose and mouth without ulcers or lesions, oropharynx clear and oral mucous membranes moist  NECK: no adenopathy, no asymmetry, masses, or scars and thyroid normal to palpation  RESP: lungs clear to auscultation - no rales, rhonchi or wheezes  CV: regular rate and rhythm, normal S1 S2, no S3 or S4, no murmur, click or rub, no peripheral edema and peripheral pulses strong  ABDOMEN: soft, nontender, no " "hepatosplenomegaly, no masses and bowel sounds normal  MS: no musculoskeletal defects are noted and gait is age appropriate without ataxia  SKIN: no suspicious lesions or rashes  NEURO: Normal strength and tone, sensory exam grossly normal, mentation intact and speech normal  PSYCH: mentation appears normal and affect normal/bright      ASSESSMENT / PLAN:       ICD-10-CM    1. Encounter for Medicare annual wellness exam  Z00.00    2. Ischemic colitis (H)  K55.9    3. Medicare annual wellness visit, subsequent  Z00.00 albuterol (PROAIR HFA/PROVENTIL HFA/VENTOLIN HFA) 108 (90 Base) MCG/ACT inhaler   4. Hypertension goal BP (blood pressure) < 140/90  I10 amLODIPine (NORVASC) 2.5 MG tablet     lisinopril-hydrochlorothiazide (ZESTORETIC) 20-25 MG tablet     OFFICE/OUTPT VISIT,EST,LEVL IV     Lipid panel reflex to direct LDL Fasting     Comprehensive metabolic panel (BMP + Alb, Alk Phos, ALT, AST, Total. Bili, TP)     Albumin Random Urine Quantitative with Creat Ratio     Lipid panel reflex to direct LDL Fasting     Comprehensive metabolic panel (BMP + Alb, Alk Phos, ALT, AST, Total. Bili, TP)     Albumin Random Urine Quantitative with Creat Ratio   5. Major depressive disorder, single episode, mild (H)  F32.0 escitalopram (LEXAPRO) 10 MG tablet     OFFICE/OUTPT VISIT,EST,LEVL IV    well female, not fasting for labs.  Encouraged to continue exercise and healthy diet choices.      Colitis stable, monitoring.      BP stable and well controlled.      Depression stable and well controlled on lexapro.        Patient has been advised of split billing requirements and indicates understanding: Yes    COUNSELING:  Reviewed preventive health counseling, as reflected in patient instructions    Estimated body mass index is 20.9 kg/m  as calculated from the following:    Height as of this encounter: 1.6 m (5' 3\").    Weight as of this encounter: 53.5 kg (118 lb).        She reports that she has quit smoking. Her smoking use included " cigarettes. She has a 15.00 pack-year smoking history. She has never used smokeless tobacco.    Appropriate preventive services were discussed with this patient, including applicable screening as appropriate for cardiovascular disease, diabetes, osteopenia/osteoporosis, and glaucoma.  As appropriate for age/gender, discussed screening for colorectal cancer, prostate cancer, breast cancer, and cervical cancer. Checklist reviewing preventive services available has been given to the patient.    Reviewed patients plan of care and provided an AVS. The Intermediate Care Plan ( asthma action plan, low back pain action plan, and migraine action plan) for Lenora meets the Care Plan requirement. This Care Plan has been established and reviewed with the Patient    Counseling Resources:  ATP IV Guidelines  Pooled Cohorts Equation Calculator  Breast Cancer Risk Calculator  BRCA-Related Cancer Risk Assessment: FHS-7 Tool  FRAX Risk Assessment  ICSI Preventive Guidelines  Dietary Guidelines for Americans, 2010  USDA's MyPlate  ASA Prophylaxis  Lung CA Screening    STACY Witt CNP  [facility]

## 2021-10-04 NOTE — PATIENT INSTRUCTIONS
Patient Education   Personalized Prevention Plan  You are due for the preventive services outlined below.  Your care team is available to assist you in scheduling these services.  If you have already completed any of these items, please share that information with your care team to update in your medical record.  Health Maintenance Due   Topic Date Due     ANNUAL REVIEW OF HM ORDERS  Never done     Hepatitis C Screening  Never done     Zoster (Shingles) Vaccine (1 of 2) Never done     Discuss Advance Care Planning  08/15/2017     Diptheria Tetanus Pertussis (DTAP/TDAP/TD) Vaccine (2 - Td or Tdap) 11/17/2018     Flu Vaccine (1) 09/01/2021     FALL RISK ASSESSMENT  09/24/2021

## 2021-10-05 NOTE — RESULT ENCOUNTER NOTE
Letter done.  Sent to [facility] Admin printer ([facility] [employer]) to be mailed:    Your test results fall within the expected range(s) or remain unchanged from previous results.  Please continue with current treatment plan.  Silvia Eason MD

## 2022-01-26 ENCOUNTER — OFFICE VISIT (OUTPATIENT)
Dept: URGENT CARE | Facility: URGENT CARE | Age: 77
End: 2022-01-26
Payer: COMMERCIAL

## 2022-01-26 ENCOUNTER — ANCILLARY PROCEDURE (OUTPATIENT)
Dept: GENERAL RADIOLOGY | Facility: CLINIC | Age: 77
End: 2022-01-26
Attending: NURSE PRACTITIONER
Payer: COMMERCIAL

## 2022-01-26 VITALS
SYSTOLIC BLOOD PRESSURE: 159 MMHG | DIASTOLIC BLOOD PRESSURE: 70 MMHG | HEART RATE: 76 BPM | TEMPERATURE: 97.3 F | OXYGEN SATURATION: 96 %

## 2022-01-26 DIAGNOSIS — M54.2 NECK PAIN: ICD-10-CM

## 2022-01-26 DIAGNOSIS — S16.1XXA STRAIN OF NECK MUSCLE, INITIAL ENCOUNTER: ICD-10-CM

## 2022-01-26 DIAGNOSIS — M62.838 MUSCLE SPASM: ICD-10-CM

## 2022-01-26 DIAGNOSIS — M54.2 NECK PAIN: Primary | ICD-10-CM

## 2022-01-26 PROCEDURE — 72040 X-RAY EXAM NECK SPINE 2-3 VW: CPT | Performed by: RADIOLOGY

## 2022-01-26 PROCEDURE — 99214 OFFICE O/P EST MOD 30 MIN: CPT | Performed by: NURSE PRACTITIONER

## 2022-01-26 RX ORDER — TRAMADOL HYDROCHLORIDE 50 MG/1
50 TABLET ORAL
Qty: 10 TABLET | Refills: 0 | Status: SHIPPED | OUTPATIENT
Start: 2022-01-26 | End: 2022-02-24

## 2022-01-26 NOTE — PROGRESS NOTES
Chief Complaint   Patient presents with     Tumor Board     Pt. is experiencing discomfort and pain on the back of her neck. It hurts when turning. Says that it could possibly be from sleeping funny. Pain onset for a week now.      SUBJECTIVE:  Lenora Hammonds is a 76 year old female who presents to the clinic today with neck pain for 1 week, worsening. She has had severe left sided paraspinal cervical neck pain, stiffness, tender to touch, giving her a headache. Felt like symptoms started after sleeping on it wrong. Heat helps a little. No known injury, fever, nausea, vomiting. Was given flexeril for similar presentation a year ago without relief. CT neck 02/27/21 positive for narrowing and DJD, was given tramadol at the ER. [place] PDMP checked and last script was 02/27/21 tramadol #12.    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     Snores 10/6/2011     acetaminophen (TYLENOL) 500 MG tablet, Take 1-2 tablets (500-1,000 mg) by mouth every 6 hours as needed for mild pain  albuterol (PROAIR HFA/PROVENTIL HFA/VENTOLIN HFA) 108 (90 Base) MCG/ACT inhaler, Inhale 1-2 puffs into the lungs every 4 hours as needed for shortness of breath / dyspnea or wheezing  amLODIPine (NORVASC) 2.5 MG tablet, Take 1 tablet (2.5 mg) by mouth daily  escitalopram (LEXAPRO) 10 MG tablet, Take 1 tablet (10 mg) by mouth daily  lisinopril-hydrochlorothiazide (ZESTORETIC) 20-25 MG tablet, Take 1 tablet by mouth daily  polyethylene glycol (MIRALAX) powder, Take 1 capful by mouth daily    No current facility-administered medications on file prior to visit.    Social History     Tobacco Use     Smoking status: Former Smoker     Packs/day: 0.30     Years: 50.00     Pack years: 15.00     Types: Cigarettes     Smokeless tobacco: Never Used     Tobacco  comment: quit 12/2014   Substance Use Topics     Alcohol use: Yes     Comment: occasionally      No Known Allergies    Review of Systems   All systems negative except for those listed above in HPI.    EXAM:   BP (!) 159/70 (BP Location: Left arm, Patient Position: Sitting, Cuff Size: Adult Regular)   Pulse 76   Temp 97.3  F (36.3  C) (Tympanic)   LMP  (LMP Unknown)   SpO2 96%     Physical Exam  Vitals reviewed.   Constitutional:       Appearance: Normal appearance.   HENT:      Head: Normocephalic and atraumatic.   Neck:      Comments: Severe left cervical neck tenderness, hardened muscle tissue, stiff ROM.  Cardiovascular:      Rate and Rhythm: Normal rate.   Pulmonary:      Effort: Pulmonary effort is normal.   Musculoskeletal:         General: Tenderness present.      Cervical back: Rigidity and tenderness present.   Skin:     General: Skin is warm and dry.      Findings: No erythema or rash.   Neurological:      General: No focal deficit present.      Mental Status: She is alert and oriented to person, place, and time.   Psychiatric:         Mood and Affect: Mood normal.         Behavior: Behavior normal.       Xray done in clinic read by me as negative for fracture.  Results for orders placed or performed in visit on 01/26/22   XR Cervical Spine 2/3 Views     Status: None (Preliminary result)    Narrative    CERVICAL SPINE TWO TO THREE VIEWS January 26, 2022 12:27 PM    INDICATION: Severe left-sided paraspinal cervical neck pain,  stiffness, tender, giving her a headache. Neck pain.    COMPARISON: Cervical spine CT 2/27/2021.      Impression    IMPRESSION: Straightening of the usual cervical lordosis with  low-grade retrolisthesis of C4. No prevertebral soft tissue swelling.  Normal cervical vertebral body heights. Moderate to advanced  degenerative interspace narrowing C3-C4 and C4-C5 with more mild  interspace degeneration at C5-C6 and C6-C7. Appearance is similar to  the prior CT. Mild facet arthropathy  most pronounced in the upper  cervical spine on the left.     ASSESSMENT:    ICD-10-CM    1. Neck pain  M54.2 XR Cervical Spine 2/3 Views     traMADol (ULTRAM) 50 MG tablet   2. Strain of neck muscle, initial encounter  S16.1XXA    3. Muscle spasm  M62.838      PLAN:    Most likely neck muscle strain with spasm  Rest, heat, massage, icy, hot, stretch, tylenol  Tramadol, use sparingly at bedtime, use with caution due to sedation and potential serotonin syndrome  Pt prefers tramadol over muscle relaxer  Declined PT    Follow up with primary care provider with any problems, questions or concerns or if symptoms worsen or fail to improve. Patient agreed to plan and verbalized understanding.    MARGARITA Sparks-BC  [facility]

## 2022-02-13 ENCOUNTER — ANCILLARY PROCEDURE (OUTPATIENT)
Dept: GENERAL RADIOLOGY | Facility: CLINIC | Age: 77
End: 2022-02-13
Attending: PHYSICIAN ASSISTANT
Payer: COMMERCIAL

## 2022-02-13 ENCOUNTER — OFFICE VISIT (OUTPATIENT)
Dept: URGENT CARE | Facility: URGENT CARE | Age: 77
End: 2022-02-13
Payer: COMMERCIAL

## 2022-02-13 VITALS
OXYGEN SATURATION: 96 % | WEIGHT: 110 LBS | BODY MASS INDEX: 18.78 KG/M2 | HEART RATE: 101 BPM | DIASTOLIC BLOOD PRESSURE: 58 MMHG | HEIGHT: 64 IN | TEMPERATURE: 97.6 F | SYSTOLIC BLOOD PRESSURE: 102 MMHG

## 2022-02-13 DIAGNOSIS — R06.02 SOB (SHORTNESS OF BREATH): ICD-10-CM

## 2022-02-13 DIAGNOSIS — J18.9 PNEUMONIA DUE TO INFECTIOUS ORGANISM, UNSPECIFIED LATERALITY, UNSPECIFIED PART OF LUNG: Primary | ICD-10-CM

## 2022-02-13 PROCEDURE — 71046 X-RAY EXAM CHEST 2 VIEWS: CPT | Performed by: RADIOLOGY

## 2022-02-13 PROCEDURE — 99214 OFFICE O/P EST MOD 30 MIN: CPT | Performed by: PHYSICIAN ASSISTANT

## 2022-02-13 RX ORDER — LEVOFLOXACIN 500 MG/1
500 TABLET, FILM COATED ORAL DAILY
Qty: 14 TABLET | Refills: 0 | Status: SHIPPED | OUTPATIENT
Start: 2022-02-13 | End: 2022-05-19

## 2022-02-13 ASSESSMENT — MIFFLIN-ST. JEOR: SCORE: 963.96

## 2022-02-13 NOTE — PROGRESS NOTES
SOB (shortness of breath)  - XR Chest 2 Views  - levofloxacin (LEVAQUIN) 500 MG tablet; Take 1 tablet (500 mg) by mouth daily    Pneumonia due to infectious organism, unspecified laterality, unspecified part of lung  - levofloxacin (LEVAQUIN) 500 MG tablet; Take 1 tablet (500 mg) by mouth daily    Radiology recommends follow up CT- I've asked the patient to make an appointment with her PCP in the next 1-2 weeks for this. Starting antibiotic today.     30 minutes spent on the date of the encounter doing chart review, history and exam, documentation and further activities per the note     See Patient Instructions  Patient Instructions     Patient Education     Treating Pneumonia   Pneumonia is an infection of one or both of the lungs. Pneumonia:    Is often caused by either a virus, fungus, or bacteria    Can be very serious, especially in babies, young children, and older adults. It s also serious for those with other long-term health problems or weakened immune systems.    Is sometimes treated at home and sometimes in the hospital    Antibiotic medicines  Antibiotics may be prescribed for pneumonia caused by bacteria. They may be pills (oral medicines) or shots (injections). Or they may be given by IV (intravenously) into a vein. If you are taking pills at home:    Fill your prescription and start taking your medicine as soon as you can.    You will likely start to feel better in a day or 2, but don t stop taking the antibiotic.    Use a pill organizer to help you remember to take your medicine.    Let your healthcare provider know if you have side effects.    Take your medicine exactly as directed on the label. Talk with your provider or pharmacist if you have any questions.  Antiviral medicines  Antiviral medicine may be prescribed for pneumonia caused by a virus. For example, antiviral medicine may be prescribed for pneumonia caused by the flu virus. Antibiotics don't work against viruses. If you are taking  antiviral medicine at home:    Fill your prescription and start taking your medicine as soon as you can.    Talk with your provider or pharmacist about possible side effects.    Take the medicine exactly as instructed.  To relieve symptoms  There are many medicines that can help relieve symptoms of pneumonia. Some are prescription and some are over the counter.  Your healthcare provider may advise:    Acetaminophen or ibuprofen to lower your fever and to reduce headache or other pain    Cough medicine to loosen mucus or to reduce coughing  Check with your healthcare provider or pharmacist before taking any over-the-counter medicines. Some over-the-counter medicines should not be used if you have certain health conditions.  Special treatments  If you are hospitalized for pneumonia, you may have other therapies, including:    Inhaled medicines to help with breathing or chest congestion    Supplemental oxygen to increase low oxygen levels  Drink fluids and eat healthy  You should eat healthy to help your body fight the infection. Drinking a lot of fluids helps to replace fluids lost from fever and to loosen mucus in your chest.    Diet. Make healthy food choices, including fruits and vegetables, lean meats and other proteins, 100% whole grain, and low-fat or no-fat dairy products.    Fluids. Drink at least 6 to 8 tall glasses a day. Water and 100% fruit or vegetable juice are best.  Get plenty of rest and sleep  You may be more tired than normal for a while. It's important to get enough sleep at night. It s also important to rest during the day. Talk with your healthcare provider if coughing or other symptoms are interfering with your sleep.  Preventing the spread of germs  The best thing you can do to prevent spreading germs is to wash your hands often. You should:    Scrub your hands with soap and warm water for 15 to 20 seconds.    Clean in between your fingers, the backs of your hands, and around your nails.    Dry  your hands on a separate towel or use paper towels.  You should also:    Keep alcohol-based hand  nearby.    Make sure you also clean surfaces that you touch. Use a product that kills all types of germs.    Stay away from others until you are feeling better.  When to call your healthcare provider  Call your healthcare provider if you have any of these:    Symptoms get worse or new symptoms develop    Fever continues    Shortness of breath with normal daily activities    Side effects from your medicine    Increased mucus or mucus that is darker in color    Coughing gets worse    Chest pain when coughing or breathing  Call 911  Call 911if any of these occur:    Lips or fingers are bluish, purple, or gray in color    Trouble breathing or wheezing    Shortness of breath that gets worse and doesn't improve with treatment    Feeling faint or dizzy    Loss of consciousness    Trouble talking or swallowing    Feeling of doom  Radha last reviewed this educational content on 12/1/2019 2000-2021 [employer]. All rights reserved. This information is not intended as a substitute for professional medical care. Always follow your healthcare professional's instructions.               Kodi Ruff PA-C  [facility]    Subjective   76 year old who presents to clinic today for the following health issues:    Urgent Care and Respiratory Problems       HPI     Acute Illness  Acute illness concerns: Cough and shortness of breath- Chest tightness  Onset/Duration: Since thursday  Symptoms:  Fever: no  Chills/Sweats: no  Headache (location?): no  Sinus Pressure: no  Conjunctivitis:  no  Ear Pain: no  Rhinorrhea: no  Congestion: Chest congestion  Sore Throat: no  Cough: YES  Wheeze: YES  Decreased Appetite: no  Nausea: no  Vomiting: no  Diarrhea: no  Dysuria/Freq.: no  Dysuria or Hematuria: no  Fatigue/Achiness: YES  Sick/Strep Exposure: no  Therapies tried and outcome: None    Patient tested  negative for covid-19 yesterday.     Review of Systems   Review of Systems   See HPI     Objective    Temp: 97.6  F (36.4  C) Temp src: Temporal BP: 102/58 Pulse: 101     SpO2: 96 %       Physical Exam   Physical Exam  Constitutional:       General: She is not in acute distress.     Appearance: Normal appearance. She is normal weight. She is not ill-appearing, toxic-appearing or diaphoretic.   HENT:      Head: Normocephalic and atraumatic.      Nose: Nose normal. No congestion or rhinorrhea.      Mouth/Throat:      Mouth: Mucous membranes are moist.      Pharynx: Oropharynx is clear. No oropharyngeal exudate or posterior oropharyngeal erythema.   Cardiovascular:      Rate and Rhythm: Normal rate and regular rhythm.      Pulses: Normal pulses.      Heart sounds: Normal heart sounds. No murmur heard.  No friction rub. No gallop.    Pulmonary:      Effort: Pulmonary effort is normal. No respiratory distress.      Breath sounds: No stridor. Wheezing present. No rales.   Chest:      Chest wall: No tenderness.   Musculoskeletal:      Cervical back: Normal range of motion and neck supple. No tenderness.   Lymphadenopathy:      Cervical: No cervical adenopathy.   Neurological:      General: No focal deficit present.      Mental Status: She is alert and oriented to person, place, and time. Mental status is at baseline.      Gait: Gait normal.   Psychiatric:         Mood and Affect: Mood normal.         Behavior: Behavior normal.         Thought Content: Thought content normal.         Judgment: Judgment normal.          No results found for this or any previous visit (from the past 24 hour(s)).

## 2022-02-13 NOTE — PATIENT INSTRUCTIONS
Patient Education     Treating Pneumonia   Pneumonia is an infection of one or both of the lungs. Pneumonia:    Is often caused by either a virus, fungus, or bacteria    Can be very serious, especially in babies, young children, and older adults. It s also serious for those with other long-term health problems or weakened immune systems.    Is sometimes treated at home and sometimes in the hospital    Antibiotic medicines  Antibiotics may be prescribed for pneumonia caused by bacteria. They may be pills (oral medicines) or shots (injections). Or they may be given by IV (intravenously) into a vein. If you are taking pills at home:    Fill your prescription and start taking your medicine as soon as you can.    You will likely start to feel better in a day or 2, but don t stop taking the antibiotic.    Use a pill organizer to help you remember to take your medicine.    Let your healthcare provider know if you have side effects.    Take your medicine exactly as directed on the label. Talk with your provider or pharmacist if you have any questions.  Antiviral medicines  Antiviral medicine may be prescribed for pneumonia caused by a virus. For example, antiviral medicine may be prescribed for pneumonia caused by the flu virus. Antibiotics don't work against viruses. If you are taking antiviral medicine at home:    Fill your prescription and start taking your medicine as soon as you can.    Talk with your provider or pharmacist about possible side effects.    Take the medicine exactly as instructed.  To relieve symptoms  There are many medicines that can help relieve symptoms of pneumonia. Some are prescription and some are over the counter.  Your healthcare provider may advise:    Acetaminophen or ibuprofen to lower your fever and to reduce headache or other pain    Cough medicine to loosen mucus or to reduce coughing  Check with your healthcare provider or pharmacist before taking any over-the-counter medicines. Some  over-the-counter medicines should not be used if you have certain health conditions.  Special treatments  If you are hospitalized for pneumonia, you may have other therapies, including:    Inhaled medicines to help with breathing or chest congestion    Supplemental oxygen to increase low oxygen levels  Drink fluids and eat healthy  You should eat healthy to help your body fight the infection. Drinking a lot of fluids helps to replace fluids lost from fever and to loosen mucus in your chest.    Diet. Make healthy food choices, including fruits and vegetables, lean meats and other proteins, 100% whole grain, and low-fat or no-fat dairy products.    Fluids. Drink at least 6 to 8 tall glasses a day. Water and 100% fruit or vegetable juice are best.  Get plenty of rest and sleep  You may be more tired than normal for a while. It's important to get enough sleep at night. It s also important to rest during the day. Talk with your healthcare provider if coughing or other symptoms are interfering with your sleep.  Preventing the spread of germs  The best thing you can do to prevent spreading germs is to wash your hands often. You should:    Scrub your hands with soap and warm water for 15 to 20 seconds.    Clean in between your fingers, the backs of your hands, and around your nails.    Dry your hands on a separate towel or use paper towels.  You should also:    Keep alcohol-based hand  nearby.    Make sure you also clean surfaces that you touch. Use a product that kills all types of germs.    Stay away from others until you are feeling better.  When to call your healthcare provider  Call your healthcare provider if you have any of these:    Symptoms get worse or new symptoms develop    Fever continues    Shortness of breath with normal daily activities    Side effects from your medicine    Increased mucus or mucus that is darker in color    Coughing gets worse    Chest pain when coughing or breathing  Call 911  Call  911if any of these occur:    Lips or fingers are bluish, purple, or gray in color    Trouble breathing or wheezing    Shortness of breath that gets worse and doesn't improve with treatment    Feeling faint or dizzy    Loss of consciousness    Trouble talking or swallowing    Feeling of doom  Radha last reviewed this educational content on 12/1/2019 2000-2021 [employer]. All rights reserved. This information is not intended as a substitute for professional medical care. Always follow your healthcare professional's instructions.

## 2022-02-14 ENCOUNTER — TELEPHONE (OUTPATIENT)
Dept: FAMILY MEDICINE | Facility: CLINIC | Age: 77
End: 2022-02-14
Payer: COMMERCIAL

## 2022-02-14 NOTE — TELEPHONE ENCOUNTER
Patient informed and appointment scheduled for 2/24/22 with Dr Drew.  Rachel Jernigan, SHASHI  [facility]

## 2022-02-14 NOTE — TELEPHONE ENCOUNTER
----- Message from Yoanna Plascencia MD sent at 2/14/2022  7:39 AM CST -----  Abnormal cxr, seen in uc, needs follow up with PCP Dr Drew

## 2022-02-22 NOTE — PROGRESS NOTES
"  Assessment & Plan     Abnormal chest x-ray  Pneumonia due to infectious organism, unspecified laterality, unspecified part of lung  Symptoms have significantly improved. CT ordered due to abnormal imaging findings. Discussed with Lenora that I will contact her when CT results have come back. If she has persistent changes, we will plan on pulmonology referral. She is in agreement with the plan.  - CT Chest w/o Contrast    Tremor  Longstanding h/o tremors with previous evaluation by neurology in 2017. Tremors have been worsening since then. She is interested in medication. There was also a question of Parkinson's. Refer back to neurology for re evaluation.    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 She agrees to go back to Lexapro 10 mg daily.    Phyllis Drew MD  [facility]    Clemente Cooley is a 76 year old with PMH of HTN, MDD, acoustic neuroma who presents for the following health issues     HPI     She was seen in  on 2/13 for infectious pneumonia and treated with Levaquin. She has two days of antibiotics left. Her symptoms have improved. She was advised to follow up due to abnormal CXR findings.      CXR showed patchy opacities in the RUL and RML and it was recommended to follow up with a CT study to rule out neoplasm v infection v other.     Chest CT 10/2019:  IMPRESSION:   1. Bilateral upper lobe micronodules and focal areas of  bronchiectasis. Acute/subacute infection/inflammatory process  including TRIP.  2. Moderate coronary artery calcifications.    Other concerns:  Her main concern is her balance due to tremors. She can hardly drink a cup of coffee without bad tremors. The tremors affect her balance.  Feels like she might fall over. Feels like her body cannot relax. \"I have had tremors forever.\" Tremors started after starting SSRIs. States she discussed it with her doctor and they decided to watch and wait. She is " currently taking 20 mg (she decided to double it due to worsening symptoms in recent months). Lost her  in November and depression has been fluctuating sine then. Thinks that had a lot to do with it. Has a good support system--many friends. Doesn't feel she needs a therapist at this tiime. Tries to get out.     Seen by neurology in 2017 for tremors. At that time, she had bilateral upper extremity and lower extremity intention tremor. It was not noted to occur at rest. Thought to be due to essential tremor with potential contributions from Lexapro and albuterol but Parkinson's could not be fully excluded. Denies memory concerns. Currently, she has tremors in her legs at rest.         Objective    /60   Pulse 75   Temp 97.8  F (36.6  C)   Wt 50.8 kg (112 lb)   LMP  (LMP Unknown)   SpO2 97%   BMI 19.22 kg/m    Body mass index is 19.22 kg/m .  Physical Exam   GENERAL: healthy, alert and no distress  EYES: Eyes grossly normal to inspection, PERRL and conjunctivae and sclerae normal  RESP: lungs clear to auscultation - no rales, rhonchi or wheezes  CV: regular rate and rhythm, normal S1 S2, no S3 or S4, no murmur, click or rub, no peripheral edema  MS: no gross musculoskeletal defects noted, no edema

## 2022-02-24 ENCOUNTER — OFFICE VISIT (OUTPATIENT)
Dept: FAMILY MEDICINE | Facility: CLINIC | Age: 77
End: 2022-02-24
Payer: COMMERCIAL

## 2022-02-24 VITALS
HEART RATE: 75 BPM | SYSTOLIC BLOOD PRESSURE: 108 MMHG | WEIGHT: 112 LBS | TEMPERATURE: 97.8 F | OXYGEN SATURATION: 97 % | DIASTOLIC BLOOD PRESSURE: 60 MMHG | BODY MASS INDEX: 19.22 KG/M2

## 2022-02-24 DIAGNOSIS — J18.9 PNEUMONIA DUE TO INFECTIOUS ORGANISM, UNSPECIFIED LATERALITY, UNSPECIFIED PART OF LUNG: ICD-10-CM

## 2022-02-24 DIAGNOSIS — R25.1 TREMOR: ICD-10-CM

## 2022-02-24 DIAGNOSIS — R93.89 ABNORMAL CHEST X-RAY: Primary | ICD-10-CM

## 2022-02-24 DIAGNOSIS — F33.41 RECURRENT MAJOR DEPRESSIVE DISORDER, IN PARTIAL REMISSION (H): ICD-10-CM

## 2022-02-24 PROCEDURE — 90472 IMMUNIZATION ADMIN EACH ADD: CPT | Performed by: STUDENT IN AN ORGANIZED HEALTH CARE EDUCATION/TRAINING PROGRAM

## 2022-02-24 PROCEDURE — 90714 TD VACC NO PRESV 7 YRS+ IM: CPT | Performed by: STUDENT IN AN ORGANIZED HEALTH CARE EDUCATION/TRAINING PROGRAM

## 2022-02-24 PROCEDURE — 99214 OFFICE O/P EST MOD 30 MIN: CPT | Mod: 25 | Performed by: STUDENT IN AN ORGANIZED HEALTH CARE EDUCATION/TRAINING PROGRAM

## 2022-02-24 PROCEDURE — 90750 HZV VACC RECOMBINANT IM: CPT | Performed by: STUDENT IN AN ORGANIZED HEALTH CARE EDUCATION/TRAINING PROGRAM

## 2022-02-24 PROCEDURE — 90471 IMMUNIZATION ADMIN: CPT | Performed by: STUDENT IN AN ORGANIZED HEALTH CARE EDUCATION/TRAINING PROGRAM

## 2022-02-24 NOTE — NURSING NOTE
Prior to immunization administration, verified patients identity using patient s name and date of birth. Please see Immunization Activity for additional information.     Screening Questionnaire for Adult Immunization    Are you sick today?   No   Do you have allergies to medications, food, a vaccine component or latex?   No   Have you ever had a serious reaction after receiving a vaccination?   No   Do you have a long-term health problem with heart, lung, kidney, or metabolic disease (e.g., diabetes), asthma, a blood disorder, no spleen, complement component deficiency, a cochlear implant, or a spinal fluid leak?  Are you on long-term aspirin therapy?   No   Do you have cancer, leukemia, HIV/AIDS, or any other immune system problem?   No   Do you have a parent, brother, or sister with an immune system problem?   No   In the past 3 months, have you taken medications that affect  your immune system, such as prednisone, other steroids, or anticancer drugs; drugs for the treatment of rheumatoid arthritis, Crohn s disease, or psoriasis; or have you had radiation treatments?   No   Have you had a seizure, or a brain or other nervous system problem?   No   During the past year, have you received a transfusion of blood or blood    products, or been given immune (gamma) globulin or antiviral drug?   No   For women: Are you pregnant or is there a chance you could become       pregnant during the next month?   No   Have you received any vaccinations in the past 4 weeks?   No     Immunization questionnaire answers were all negative.        Per orders of Dr. Drew, injection of shingrix and TD given by Mitul Elizabeth. Patient instructed to remain in clinic for 15 minutes afterwards, and to report any adverse reaction to me immediately.       Screening performed by Mitul Elizabeth on 2/24/2022 at 3:03 PM.

## 2022-02-24 NOTE — PATIENT INSTRUCTIONS
Please call [facility] at [phone] to schedule your CT scan  In the next 1weeks.    Locations: [facility], [facility] [place] [address]    OR    Please call [facility] Radiology Department to schedule an outpatient appointment at [phone].    Please go back to one tablet per day of the anti depressant.    I am referring you to neurology for the tremors. They will call you.     I will call you with your CT scan results.

## 2022-03-03 ENCOUNTER — HOSPITAL ENCOUNTER (OUTPATIENT)
Dept: CT IMAGING | Facility: CLINIC | Age: 77
Discharge: HOME | End: 2022-03-03
Attending: STUDENT IN AN ORGANIZED HEALTH CARE EDUCATION/TRAINING PROGRAM | Admitting: STUDENT IN AN ORGANIZED HEALTH CARE EDUCATION/TRAINING PROGRAM
Payer: COMMERCIAL

## 2022-03-03 DIAGNOSIS — R93.89 ABNORMAL CHEST X-RAY: ICD-10-CM

## 2022-03-03 PROCEDURE — 71250 CT THORAX DX C-: CPT

## 2022-03-05 NOTE — RESULT ENCOUNTER NOTE
Please call patient with the following message:    Denis Cooley,    Your CT scan did not show any signs of lung cancer. There are changes associated with the pneumonia you had recently. I do not feel we need any further work up at this point unless you are experiencing respiratory symptoms such as chronic cough or shortness of breath. They also noted that you have extensive coronary artery calcification, and I see that you are not taking a statin. Have you been on a statin in the past? I would like you to start rosuvastatin. Furthermore, if you are experiencing any chest discomfort or more shortness of breath than usual with activities, then we should have you see a cardiologist. Please let me know re the statin and schedule a follow up visit with me (virtual visit is fine) to check in.    Phyllis Drew MD  [facility]  [phone]

## 2022-03-07 ENCOUNTER — TELEPHONE (OUTPATIENT)
Dept: FAMILY MEDICINE | Facility: CLINIC | Age: 77
End: 2022-03-07
Payer: COMMERCIAL

## 2022-03-07 DIAGNOSIS — E78.2 MIXED HYPERLIPIDEMIA: Primary | ICD-10-CM

## 2022-03-07 NOTE — TELEPHONE ENCOUNTER
Left message to call back and ask to speak with an available triage nurse.  MARIA ANTONIA SmithN, RN  [facility]

## 2022-03-07 NOTE — TELEPHONE ENCOUNTER
----- Message from Phyllis Drew MD sent at 3/5/2022 11:48 AM CST -----  Please call patient with the following message:    Denis Cooley,    Your CT scan did not show any signs of lung cancer. There are changes associated with the pneumonia you had recently. I do not feel we need any further work up at this point unless you are experiencing respiratory symptoms such as chronic cough or shortness of breath. They also noted that you have extensive coronary artery calcification, and I see that you are not taking a statin. Have you been on a statin in the past? I would like you to start rosuvastatin. Furthermore, if you are experiencing any chest discomfort or more shortness of breath than usual with activities, then we should have you see a cardiologist. Please let me know re the statin and schedule a follow up visit with me (virtual visit is fine) to check in.    Phyllis Drew MD  [facility]  [phone]

## 2022-03-07 NOTE — LETTER
[facility]  [address] [place] [id number] [phone]          March 9, 2022    Lenora Hammonds                                                                                                                     [address] [facility] [address]            Dear Lenora,    We tried contacting you and were not able to reach you.      Dr. Drew wanted to inform you of the following:    Denis Cooley,     Your CT scan did not show any signs of lung cancer. There are changes associated with the pneumonia you had recently. I do not feel we need any further work up at this point unless you are experiencing respiratory symptoms such as chronic cough or shortness of breath. They also noted that you have extensive coronary artery calcification, and I see that you are not taking a statin. Have you been on a statin in the past? I would like you to start rosuvastatin. Furthermore, if you are experiencing any chest discomfort or more shortness of breath than usual with activities, then we should have you see a cardiologist. Please let me know re the statin and schedule a follow up visit with me (virtual visit is fine) to check in.     Pyhllis Drew MD  [facility]  [phone]           Sincerely,         Your [facility] Care Team

## 2022-03-08 NOTE — TELEPHONE ENCOUNTER
Left message to call back and ask to speak with an available triage nurse.  MARIA ANTONIA SmithN, RN  [facility]

## 2022-03-14 RX ORDER — ROSUVASTATIN CALCIUM 5 MG/1
5 TABLET, COATED ORAL DAILY
Qty: 90 TABLET | Refills: 1 | Status: SHIPPED | OUTPATIENT
Start: 2022-03-14 | End: 2022-04-06

## 2022-03-14 NOTE — TELEPHONE ENCOUNTER
Orders signed. I recommend taking CoQ10 100 mg per day with the statin. She can take anytime of day with a meal.     Phyllis Drew MD  [facility]  [phone]

## 2022-03-14 NOTE — TELEPHONE ENCOUNTER
Lenora calling back, she would like to try the rosuvastatin, please sign off on script if correct.    thank you

## 2022-03-14 NOTE — TELEPHONE ENCOUNTER
Called patient and relayed this message      MARIA ANTONIA FinneganN RN  [facility]

## 2022-04-05 ENCOUNTER — TELEPHONE (OUTPATIENT)
Dept: FAMILY MEDICINE | Facility: CLINIC | Age: 77
End: 2022-04-05
Payer: COMMERCIAL

## 2022-04-06 NOTE — TELEPHONE ENCOUNTER
"TO PCP:     Pt called     c/o rash and itching started 2 days after starting Rosuvastatin about 1.5 weeks ago     Had hives - Stopped taking medication Saturday, now improving - 80% improved now     Started 2 days after taking - \"was horrible\"     Putting lotion on the area helps      Benadryl and hydrocortisone did \"nothing\" even though it's better now    Removed from med list as patient has discontinued this, and updated allergy list     Pt asking if PCP recommends an alternative med instead?     Rina ARAYA, Triage RN  [facility]       "

## 2022-04-06 NOTE — TELEPHONE ENCOUNTER
Reason for Call: Other    Detailed comments: Patient states her RX for rosuvastatin (CRESTOR) 5 MG tablet causes itching so she has stopped taking it. Would like to know what she should do / take instead. Transferred to RN. Thanks!    Phone Number Patient can be reached at: Home number on file [phone] (home)    Best Time: Any    Can we leave a detailed message on this number? YES    Call taken on 4/6/2022 at 3:30 PM by Esthela Romero

## 2022-04-06 NOTE — TELEPHONE ENCOUNTER
Any other statin is likely to cause the same side effect. We could try plant sterols to help lower her LDL cholesterol. I would recommend Plant Sterols by [employer]. This is a supplement purchased over the counter. The dose is 1 tablet per day with a meal to start and if tolerated she can increase to 2 tablets per day. Then plan to check lipids in 3-4 months.     Phyllis Drew MD  [facility]  [phone]

## 2022-04-07 NOTE — TELEPHONE ENCOUNTER
Writer called patient and reviewed message per Dr. Drew.    Patient verbalized understanding and in agreement with plan.    GUS Smith, RN  [facility]

## 2022-05-19 ENCOUNTER — OFFICE VISIT (OUTPATIENT)
Dept: FAMILY MEDICINE | Facility: CLINIC | Age: 77
End: 2022-05-19
Payer: COMMERCIAL

## 2022-05-19 VITALS
BODY MASS INDEX: 18.02 KG/M2 | TEMPERATURE: 98.1 F | WEIGHT: 105 LBS | RESPIRATION RATE: 16 BRPM | DIASTOLIC BLOOD PRESSURE: 67 MMHG | HEART RATE: 74 BPM | OXYGEN SATURATION: 97 % | SYSTOLIC BLOOD PRESSURE: 109 MMHG

## 2022-05-19 DIAGNOSIS — E78.2 MIXED HYPERLIPIDEMIA: ICD-10-CM

## 2022-05-19 DIAGNOSIS — Z01.818 PREOP GENERAL PHYSICAL EXAM: Primary | ICD-10-CM

## 2022-05-19 DIAGNOSIS — I10 HYPERTENSION GOAL BP (BLOOD PRESSURE) < 140/90: ICD-10-CM

## 2022-05-19 DIAGNOSIS — F33.41 RECURRENT MAJOR DEPRESSIVE DISORDER, IN PARTIAL REMISSION (H): ICD-10-CM

## 2022-05-19 DIAGNOSIS — H25.9 AGE-RELATED CATARACT OF BOTH EYES, UNSPECIFIED AGE-RELATED CATARACT TYPE: ICD-10-CM

## 2022-05-19 PROBLEM — K55.9 ISCHEMIC COLITIS (H): Status: RESOLVED | Noted: 2019-09-17 | Resolved: 2022-05-19

## 2022-05-19 PROCEDURE — 99213 OFFICE O/P EST LOW 20 MIN: CPT | Performed by: FAMILY MEDICINE

## 2022-05-19 ASSESSMENT — PATIENT HEALTH QUESTIONNAIRE - PHQ9
SUM OF ALL RESPONSES TO PHQ QUESTIONS 1-9: 0
SUM OF ALL RESPONSES TO PHQ QUESTIONS 1-9: 0

## 2022-05-19 NOTE — PATIENT INSTRUCTIONS
Preparing for Your Surgery  Getting started  A nurse will call you to review your health history and instructions. They will give you an arrival time based on your scheduled surgery time. Please be ready to share:    Your doctor's clinic name and phone number    Your medical, surgical and anesthesia history    A list of allergies and sensitivities    A list of medicines, including herbal treatments and over-the-counter drugs    Whether the patient has a legal guardian (ask how to send us the papers in advance)  Please tell us if you're pregnant--or if there's any chance you might be pregnant. Some surgeries may injure a fetus (unborn baby), so they require a pregnancy test. Surgeries that are safe for a fetus don't always need a test, and you can choose whether to have one.   If you have a child who's having surgery, please ask for a copy of Preparing for Your Child's Surgery.    Preparing for surgery    Within 30 days of surgery: Have a pre-op exam (sometimes called an H&P, or History and Physical). This can be done at a clinic or pre-operative center.  ? If you're having a , you may not need this exam. Talk to your care team.    At your pre-op exam, talk to your care team about all medicines you take. If you need to stop any medicines before surgery, ask when to start taking them again.  ? We do this for your safety. Many medicines can make you bleed too much during surgery. Some change how well surgery (anesthesia) drugs work.    Call your insurance company to let them know you're having surgery. (If you don't have insurance, call [phone].)    Call your clinic if there's any change in your health. This includes signs of a cold or flu (sore throat, runny nose, cough, rash, fever). It also includes a scrape or scratch near the surgery site.    If you have questions on the day of surgery, call your hospital or surgery center.  COVID testing  You may need to be tested for COVID-19 before having  surgery. If so, we will give you instructions.  Eating and drinking guidelines  For your safety: Unless your surgeon tells you otherwise, follow the guidelines below.    Eat and drink as usual until 8 hours before surgery. After that, no food or milk.    Drink clear liquids until 2 hours before surgery. These are liquids you can see through, like water, Gatorade and Propel Water. You may also have black coffee and tea (no cream or milk).    Nothing by mouth within 2 hours of surgery. This includes gum, candy and breath mints.    If you drink alcohol: Stop drinking it the night before surgery.    If your care team tells you to take medicine on the morning of surgery, it's okay to take it with a sip of water.  Preventing infection    Shower or bathe the night before and morning of your surgery. Follow the instructions your clinic gave you. (If no instructions, use regular soap.)    Don't shave or clip hair near your surgery site. We'll remove the hair if needed.    Don't smoke or vape the morning of surgery. You may chew nicotine gum up to 2 hours before surgery. A nicotine patch is okay.  ? Note: Some surgeries require you to completely quit smoking and nicotine. Check with your surgeon.    Your care team will make every effort to keep you safe from infection. We will:  ? Clean our hands often with soap and water (or an alcohol-based hand rub).  ? Clean the skin at your surgery site with a special soap that kills germs.  ? Give you a special gown to keep you warm. (Cold raises the risk of infection.)  ? Wear special hair covers, masks, gowns and gloves during surgery.  ? Give antibiotic medicine, if prescribed. Not all surgeries need antibiotics.  What to bring on the day of surgery    Photo ID and insurance card    Copy of your health care directive, if you have one    Glasses and hearing aides (bring cases)  ? You can't wear contacts during surgery    Inhaler and eye drops, if you use them (tell us about these when  you arrive)    CPAP machine or breathing device, if you use them    A few personal items, if spending the night    If you have . . .  ? A pacemaker, ICD (cardiac defibrillator) or other implant: Bring the ID card.  ? An implanted stimulator: Bring the remote control.  ? A legal guardian: Bring a copy of the certified (court-stamped) guardianship papers.  Please remove any jewelry, including body piercings. Leave jewelry and other valuables at home.  If you're going home the day of surgery    You must have a responsible adult drive you home. They should stay with you overnight as well.    If you don't have someone to stay with you, and you aren't safe to go home alone, we may keep you overnight. Insurance often won't pay for this.  After surgery  If it's hard to control your pain or you need more pain medicine, please call your surgeon's office.  Questions?   If you have any questions for your care team, list them here: _________________________________________________________________________________________________________________________________________________________________________ ____________________________________ ____________________________________ ____________________________________  For informational purposes only. Not to replace the advice of your health care provider. Copyright   2003, 2019 [facility]. All rights reserved. Clinically reviewed by Dilma Arellano MD. Press About Us 468136 - REV 07/21.

## 2022-05-19 NOTE — PROGRESS NOTES
[facility] [address] [place] [address]  Phone: [phone]  Primary Provider: Phyllis Drew  Pre-op Performing Provider: MARI JIMENEZ      PREOPERATIVE EVALUATION:  Today's date: 5/19/2022    Lenora Hammonds is a 76 year old female who presents for a preoperative evaluation.    Surgical Information:  Surgery/Procedure: Cataract Surgery  Surgery Location: [facility]  Surgeon: Steffanie Dhaliwal MD  Surgery Date: 06/06/2022 (left eye), 6/20/22 (right eye)  Time of Surgery: Tbd   Where patient plans to recover: At home with family  Fax number for surgical facility: [phone], [phone]    Type of Anesthesia Anticipated: to be determined    Assessment & Plan     The proposed surgical procedure is considered LOW risk.    Preop general physical exam  Age-related cataract of both eyes, unspecified age-related cataract type  -No labs or EKG indicated today    Other chronic conditions:  Mixed hyperlipidemia  Recurrent major depressive disorder, in partial remission (H)  Hypertension goal BP (blood pressure) < 140/90    Risks and Recommendations:  The patient has the following additional risks and recommendations for perioperative complications:   - No identified additional risk factors other than previously addressed    Medication Instructions:  Patient is to take all scheduled medications on the day of surgery    RECOMMENDATION:  APPROVAL GIVEN to proceed with proposed procedure, without further diagnostic evaluation.      Subjective     HPI related to upcoming procedure: Age related cataract bilaterally. Plans to have cataract surgery. Follows with Dr. Dhaliwal at [facility].    Preop Questions 5/19/2022   1. Have you ever had a heart attack or stroke? No   2. Have you ever had surgery on your heart or blood vessels, such as a stent placement, a coronary artery bypass, or surgery on an artery in your head, neck, heart, or legs? No    3. Do you have chest pain with activity? No   4. Do you have a history of  heart failure? No   5. Do you currently have a cold, bronchitis or symptoms of other infection? No   6. Do you have a cough, shortness of breath, or wheezing? No   7. Do you or anyone in your family have previous history of blood clots? No   8. Do you or does anyone in your family have a serious bleeding problem such as prolonged bleeding following surgeries or cuts? No   9. Have you ever had problems with anemia or been told to take iron pills? No   10. Have you had any abnormal blood loss such as black, tarry or bloody stools, or abnormal vaginal bleeding? No   11. Have you ever had a blood transfusion? No   12. Are you willing to have a blood transfusion if it is medically needed before, during, or after your surgery? Yes   13. Have you or any of your relatives ever had problems with anesthesia? No   14. Do you have sleep apnea, excessive snoring or daytime drowsiness? No   15. Do you have any artifical heart valves or other implanted medical devices like a pacemaker, defibrillator, or continuous glucose monitor? No   16. Do you have artificial joints? No   17. Are you allergic to latex? No       Health Care Directive:  Patient does not have a Health Care Directive or Living Will: Discussed advance care planning with patient; information given to patient to review.    Preoperative Review of :   reviewed - no record of controlled substances prescribed.      Review of Systems  CONSTITUTIONAL: NEGATIVE for fever, chills, change in weight  INTEGUMENTARY/SKIN: NEGATIVE for worrisome rashes, moles or lesions  EYES: NEGATIVE for vision changes or irritation  ENT/MOUTH: NEGATIVE for ear, mouth and throat problems  RESP: NEGATIVE for significant cough or SOB  CV: NEGATIVE for chest pain, palpitations or peripheral edema  GI: NEGATIVE for nausea, abdominal pain, heartburn, or change in bowel habits  : NEGATIVE for frequency, dysuria, or  hematuria  MUSCULOSKELETAL: NEGATIVE for significant arthralgias or myalgia  NEURO: NEGATIVE for weakness, dizziness or paresthesias  ENDOCRINE: NEGATIVE for temperature intolerance, skin/hair changes  HEME: NEGATIVE for bleeding problems  PSYCHIATRIC: NEGATIVE for changes in mood or affect    Patient Active Problem List    Diagnosis Date Noted     Cyst of left kidney 10/30/2019     Priority: Medium     Stiffness of right shoulder joint 05/02/2018     Priority: Medium     Shoulder pain 05/02/2018     Priority: Medium     Pulmonary nodule, right 11/22/2017     Priority: Medium     Osteopenia 04/12/2017     Priority: Medium     Acute bilateral low back pain without sciatica 07/17/2016     Priority: Medium     Hypertension goal BP (blood pressure) < 140/90 10/29/2014     Priority: Medium     Acoustic neuroma (H) 10/13/2014     Priority: Medium     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 Provider to review       Epigastric pain 08/21/2012     Priority: Medium     Advanced directives, counseling/discussion 08/15/2012     Priority: Medium     Discussed advance care planning with patient; however, patient declined at this time. 8/15/2012          Neoplasm of uncertain behavior of skin 08/15/2012     Priority: Medium     right lower abdomen       Wears glasses 10/06/2011     Priority: Medium     Reading glasses       Urinary incontinence 10/06/2011     Priority: Medium     Minimal dribbling; related to coughing       Snores 10/06/2011     Priority: Medium     Histoplasmosis 10/06/2011     Priority: Medium     Dyspepsia 08/16/2011     Priority: Medium     Mild major depression (H) 06/09/2010     Priority: Medium     Present since 1989; about the time her mother-in-law passed away. On medication presently: celexa.        CARDIOVASCULAR SCREENING; LDL GOAL LESS THAN 160 05/09/2010     Priority: Medium     Tremor 06/05/2008     Priority: Medium      Present for a long 20 or more years.        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     Snores 10/6/2011     Past Surgical History:   Procedure Laterality Date     COLONOSCOPY N/A 12/6/2017    Procedure: COMBINED COLONOSCOPY, SINGLE OR MULTIPLE BIOPSY/POLYPECTOMY BY BIOPSY;  colonoscopy;  Surgeon: Moise Vásquez MD;  Location:  GI     COLONOSCOPY N/A 6/28/2018    Procedure: COMBINED COLONOSCOPY, SINGLE OR MULTIPLE BIOPSY/POLYPECTOMY BY BIOPSY;  COLONOSCOPY;  Surgeon: Moise Vásquez MD;  Location:  GI     THORACIC SURGERY       TONSILLECTOMY       ZZC THORACOTOMY,MAJOR,EXPLOR/BIOPSY  1983    histoplasmosis, right lower lobe     Current Outpatient Medications   Medication Sig Dispense Refill     acetaminophen (TYLENOL) 500 MG tablet Take 1-2 tablets (500-1,000 mg) by mouth every 6 hours as needed for mild pain 30 tablet 0     albuterol (PROAIR HFA/PROVENTIL HFA/VENTOLIN HFA) 108 (90 Base) MCG/ACT inhaler Inhale 1-2 puffs into the lungs every 4 hours as needed for shortness of breath / dyspnea or wheezing 18 g 1     amLODIPine (NORVASC) 2.5 MG tablet Take 1 tablet (2.5 mg) by mouth daily 90 tablet 1     escitalopram (LEXAPRO) 10 MG tablet Take 1 tablet (10 mg) by mouth daily 90 tablet 3     lisinopril-hydrochlorothiazide (ZESTORETIC) 20-25 MG tablet Take 1 tablet by mouth daily 90 tablet 1       Allergies   Allergen Reactions     Rosuvastatin Hives, Itching and Rash        Social History     Tobacco Use     Smoking status: Former Smoker     Packs/day: 0.30     Years: 50.00     Pack years: 15.00     Types: Cigarettes     Smokeless tobacco: Never Used     Tobacco comment: quit 12/2014   Substance Use  Topics     Alcohol use: Yes     Comment: occasionally      History   Drug Use No         Objective     /67 (BP Location: Right arm, Patient Position: Sitting, Cuff Size: Adult Regular)   Pulse 74   Temp 98.1  F (36.7  C) (Temporal)   Resp 16   Wt 47.6 kg (105 lb)   LMP  (LMP Unknown)   SpO2 97%   BMI 18.02 kg/m      Physical Exam    GENERAL APPEARANCE: healthy, alert and no distress     EYES: EOMI, PERRL     HENT: nose and mouth without ulcers or lesions     NECK: no adenopathy, no asymmetry, masses, or scars and thyroid normal to palpation     RESP: lungs clear to auscultation - no rales, rhonchi or wheezes     CV: regular rates and rhythm, normal S1 S2, no S3 or S4 and no murmur, click or rub     ABDOMEN:  soft, nontender, no HSM or masses and bowel sounds normal     MS: extremities normal- no gross deformities noted, no evidence of inflammation in joints, FROM in all extremities.     SKIN: no suspicious lesions or rashes     NEURO: Normal strength and tone, sensory exam grossly normal, mentation intact and speech normal     PSYCH: mentation appears normal. and affect normal/bright     LYMPHATICS: No cervical adenopathy    Diagnostics:  No labs were ordered during this visit.   No EKG required for low risk surgery (cataract, skin procedure, breast biopsy, etc).    Revised Cardiac Risk Index (RCRI):  The patient has the following serious cardiovascular risks for perioperative complications:   - No serious cardiac risks = 0 points     RCRI Interpretation: 0 points: Class I (very low risk - 0.4% complication rate)       Signed Electronically by: Hugo Miller DO  Copy of this evaluation report is provided to requesting physician.

## 2022-06-07 ENCOUNTER — OFFICE VISIT (OUTPATIENT)
Dept: NEUROLOGY | Facility: CLINIC | Age: 77
End: 2022-06-07
Attending: STUDENT IN AN ORGANIZED HEALTH CARE EDUCATION/TRAINING PROGRAM
Payer: COMMERCIAL

## 2022-06-07 VITALS
HEIGHT: 64 IN | HEART RATE: 88 BPM | OXYGEN SATURATION: 97 % | SYSTOLIC BLOOD PRESSURE: 101 MMHG | BODY MASS INDEX: 17.93 KG/M2 | DIASTOLIC BLOOD PRESSURE: 64 MMHG | WEIGHT: 105 LBS

## 2022-06-07 DIAGNOSIS — R25.1 TREMOR: ICD-10-CM

## 2022-06-07 DIAGNOSIS — R26.89 BALANCE PROBLEMS: Primary | ICD-10-CM

## 2022-06-07 PROCEDURE — 99204 OFFICE O/P NEW MOD 45 MIN: CPT | Performed by: STUDENT IN AN ORGANIZED HEALTH CARE EDUCATION/TRAINING PROGRAM

## 2022-06-07 RX ORDER — PROPRANOLOL HCL 60 MG
60 CAPSULE, EXTENDED RELEASE 24HR ORAL DAILY
Qty: 90 CAPSULE | Refills: 1 | Status: SHIPPED | OUTPATIENT
Start: 2022-06-07 | End: 2022-12-13

## 2022-06-07 NOTE — PROGRESS NOTES
"[facility]  Section of General Neurology  New Patient Visit      Lenora Hammonds MRN# [id number]   Age: 76 year old YOB: 1945     Requesting physician: Phyllis Dalal     Reason for Consultation: Tremor, balance problems.          History of Presenting Symptoms:   Lenora Hammonds is a 76 year old female who presents today for evaluation of tremor, balance problems.    She was seen by Dr. Trejo in this regard in 2011 and later Dr. Pastrana in 2017.  It was thought there could be essential vs orthostatic tremor but Parkinsons disease was not felt to be fully excluded at those instances.  She did not want to try a medication at those appointments per that documentation    Motor ROS:  Falls: none  Micrographia: none  Voice: no changes  Freezing: none  Drooling: none  Trouble turning in bed:   ADLs:     Non-motor ROS:  Autonomic: No lightheadedness  Sleep/Snore/RBD symptoms: unclear, no sleeping partner.  Doesn't remember her dreams.  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   Had cataract surgery on the left yesterday.      Handwriting still the same  Voice has not changed still.   She feels like she is going to fall forward when she walks/poor balance.  Tries to catch herself, feels very unsteady on her feet overall.   Tremor is worse when tries to drink coffee e.g.   Hand tremor   Her mother Parkinsons   Coffee doesn't necessarily worsen tremor  Alcohol doesn't necessarily improve tremor  + Some degree of head tremulousness      Past Medical History:   Dr. Trejo 2011 Note:  \"Consultation for tremor.      She is left-handed. She does not have shaking when she writes. The size of the hand writing has not changed. Her voice has not changed. Walking has not changed. She may snore but does not have not unusual behaviors at night. " "Normal sense of smell.      Family history of tremor in mother. She  age 86 y rs old. Had tremor 20 yrs. No vocal tremor. Hand tremor. Hand writing was not shakey. Had soft voice. No shuffling. Was on medications for hypertension. Was told she had parkinson but patient not sure she had parkinson. Was given an antidepressant and was not taking a parkinson medication. She  of natural causes at age 84 yrs; was frail. Did not have dementia.      Social history:    50 yrs. She worked outside the home; [email or web address] to cabinet makers.  Lives in [facility]. Her children live in [facility].  Still smoking. Rare alcohol. No clear tremor benefit from the alcohol.\"  Plan at that time:  \"Tremor:     There is a component of orthostatic tremor _ present when standing  This may be related to essential tremor vs. Parkinsonism     Test  DatSCAN        Approaches  1. Consider reducing the celexa to 20mg daily and see if tremor gets better, and possibly go off the medication if willing to do this. May consider doing this in the summer rather then fall or winter.      Medication trials can be considered.      1. Clonazepam (Klonopin)     2. Parkinson medications can be tried     3. Propranolol     4. Mysoline     5. Gabapentin         Return as needed - she was not keen to undergo the test or to try medication.     Temo Trejo MD\"  Patient Active Problem List   Diagnosis     Tremor     CARDIOVASCULAR SCREENING; LDL GOAL LESS THAN 160     Mild major depression (H)     Dyspepsia     Wears glasses     Urinary incontinence     Snores     Histoplasmosis     Advanced directives, counseling/discussion     Neoplasm of uncertain behavior of skin     Epigastric pain     Benign neoplasm of cranial nerve (H)     Acoustic neuroma (H)     Hypertension goal BP (blood pressure) < 140/90     Acute bilateral low back pain without sciatica     Osteopenia     Pulmonary nodule, right     Stiffness of right " shoulder joint     Shoulder pain     Cyst of left kidney     Major depressive disorder, recurrent episode, in full remission (H)     Past Medical History:   Diagnosis Date     Acoustic neuroma (H)     left     Depressive disorder      histoplasmosis      Histoplasmosis 10/6/2011     Hypertension      Pancolitis (H) 2017     Shaking     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     Snores 10/6/2011        Past Surgical History:     Past Surgical History:   Procedure Laterality Date     COLONOSCOPY N/A 2017    Procedure: COMBINED COLONOSCOPY, SINGLE OR MULTIPLE BIOPSY/POLYPECTOMY BY BIOPSY;  colonoscopy;  Surgeon: Moise Vásquez MD;  Location:  GI     COLONOSCOPY N/A 2018    Procedure: COMBINED COLONOSCOPY, SINGLE OR MULTIPLE BIOPSY/POLYPECTOMY BY BIOPSY;  COLONOSCOPY;  Surgeon: Moise Vásquez MD;  Location:  GI     THORACIC SURGERY       TONSILLECTOMY       ZZC THORACOTOMY,MAJOR,EXPLOR/BIOPSY  1983    histoplasmosis, right lower lobe        Social History:     Social History     Tobacco Use     Smoking status: Former Smoker     Packs/day: 0.30     Years: 50.00     Pack years: 15.00     Types: Cigarettes     Smokeless tobacco: Never Used     Tobacco comment: quit 2014   Substance Use Topics     Alcohol use: Yes     Comment: occasionally      Drug use: No        Family History:     Family History   Problem Relation Age of Onset     Hypertension Mother          at age 84     Neurologic Disorder Mother         ?parkinsonism - she had a tremor; walked normally. voice was soft     Cancer Father         brain tumor;  at age 62 y rs     Other - See Comments Sister         Homicide     Cancer Brother         Bladder     Breast Cancer Sister         Medications:     Current Outpatient Medications   Medication Sig     acetaminophen (TYLENOL) 500 MG tablet Take 1-2 tablets (500-1,000 mg) by mouth every 6 hours as needed for mild pain     albuterol (PROAIR HFA/PROVENTIL HFA/VENTOLIN HFA)  "108 (90 Base) MCG/ACT inhaler Inhale 1-2 puffs into the lungs every 4 hours as needed for shortness of breath / dyspnea or wheezing     amLODIPine (NORVASC) 2.5 MG tablet Take 1 tablet (2.5 mg) by mouth daily     escitalopram (LEXAPRO) 10 MG tablet Take 1 tablet (10 mg) by mouth daily     lisinopril-hydrochlorothiazide (ZESTORETIC) 20-25 MG tablet Take 1 tablet by mouth daily     No current facility-administered medications for this visit.        Allergies:     Allergies   Allergen Reactions     Rosuvastatin Hives, Itching and Rash        Review of Systems:   As noted above     Physical Exam:   Vitals: /64   Pulse 88   Ht 1.626 m (5' 4\")   Wt 47.6 kg (105 lb)   LMP  (LMP Unknown)   SpO2 97%   BMI 18.02 kg/m    CV: peripheral pulse appreciated  Lungs: breathing comfortably  Extremities: no edema  Neuro:   General Appearance: No apparent distress, well-nourished, well-groomed, pleasant     Mental Status: Alert and oriented to person, place, and time. Speech fluent and comprehension intact. No dysarthria.     Cranial Nerves:   II: Visual fields: obscured by recent cataract surgery  III: Pupils: obscured by recent cataract surgery  III,IV,VI: Extraocular Movements: intact   VII: Facial strength: intact without asymmetry  VIII: Hearing: intact grossly  IX: Palate: intact   XII: Tongue movement: normal     Motor Exam:   5/5 Diffusely    Variable high frequency tremor predominantly in b/l UE, mostly action, occasionally spilling over into rest.  Can do ANTIONETTE with relative ease, no clear increased tone.    Sensory: intact to light touch, vibration    Coordination: no dysmetria with finger-to-nose bilaterally    Reflexes: biceps, triceps 1+, patellar 2+, and ankle jerks 1+ and symmetric. Toes are downgoing bilaterally    Gait: reasonable stride length and arm swing, gets out a bit ahead of herself with upper body         Data: Pertinent prior to visit   Imaging:  Brain MRI without and with intravenous contrast " primarily for the  purposes of stereotactic evaluation     History: Benign neoplasm of cranial nerve (H)  ICD-10: Benign neoplasm of cranial nerve (H)     Comparison: 3/26/2014 dated MR     Technique: MR imaging performed with 3-dimensonally acquired axial  T1-weighted sequences performed with intravenous contrast.     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 No significant  change in 5 mm right frontal parafalcine meningioma. No extra-axial  collection or midline shift. The major intracranial arterial  structures are grossly patent.                                                                      Impression: Increase in the size of presumed left vestibular  schwannoma since 3/26/2014. Limited imaging primarily for the purposes  of stereotactic localization.     Procedures:      Laboratory:  TSH   Date Value Ref Range Status   01/08/2018 0.41 0.40 - 4.00 mU/L Final              Assessment and Plan:   Assessment:  Lenora Hammonds is a pleasant 76 year old female who presents in consulation for tremor and balance problems.  I suspect her balance problems are multifactorial but in some way related to her left vestibular schwannoma for which  had gamma knife surgery x 2.    Regarding her tremor:  Would favor essential tremor, no clear signs of parkinsonism to history or exam.  She has not progressed a great deal since initial presentation in 2011 or developed clear Parkinsonian signs/features.  Would treat as a non Parkinsonian tremor for now as below.  Tone is specifically normal, and to history I detect no non motor Parkinsonian features either.  Her tremor can spill over into rest and times but variably and not reliably.      Plan:  --Will discuss addition of propranolol with her primary/if she could consider changing a different blood  pressure medication if so, script for propranolol 60 mg daily given.  Other ET medication treatments discussed as well.  --PT for balance training, fall prevention.  No falls noted of yet, good news.  --Follow up in 6 months, she will reach out with questions in the meanwhile.           Alexander Whitney MD   of Neurology   [facility]      The total time of this encounter today amounted to 48 minutes. This time included time spent with the patient, prep work, ordering tests, and performing post visit documentation.

## 2022-06-07 NOTE — NURSING NOTE
"Lenora Hammonds is a 76 year old female who presents for:  Chief Complaint   Patient presents with     Tremors        Initial Vitals:  /64   Pulse 88   Ht 1.626 m (5' 4\")   Wt 47.6 kg (105 lb)   LMP  (LMP Unknown)   SpO2 97%   BMI 18.02 kg/m   Estimated body mass index is 18.02 kg/m  as calculated from the following:    Height as of this encounter: 1.626 m (5' 4\").    Weight as of this encounter: 47.6 kg (105 lb).. Body surface area is 1.47 meters squared. BP completed using cuff size: regular    Nursing Comments:     Otilio Uriarte    "

## 2022-06-07 NOTE — LETTER
6/7/2022         RE: Lenora Hammonds  [address] [id number]        Dear Colleague,    Thank you for referring your patient, Lenora Hammonds, to the [facility]. Please see a copy of my visit note below.    [facility]  Section of General Neurology  New Patient Visit      Lenora Hammonds MRN# [id number]   Age: 76 year old YOB: 1945     Requesting physician: Phyllis Dalal     Reason for Consultation: Tremor, balance problems.          History of Presenting Symptoms:   Lenora Hammonds is a 76 year old female who presents today for evaluation of tremor, balance problems.    She was seen by Dr. Trejo in this regard in 2011 and later Dr. Pastrana in 2017.  It was thought there could be essential vs orthostatic tremor but Parkinsons disease was not felt to be fully excluded at those instances.  She did not want to try a medication at those appointments per that documentation    Motor ROS:  Falls: none  Micrographia: none  Voice: no changes  Freezing: none  Drooling: none  Trouble turning in bed:   ADLs:     Non-motor ROS:  Autonomic: No lightheadedness  Sleep/Snore/RBD symptoms: unclear, no sleeping partner.  Doesn't remember her dreams.  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   Had cataract surgery on the left yesterday.      Handwriting still the same  Voice has not changed still.   She feels like she is going to fall forward when she walks/poor balance.  Tries to catch herself, feels very unsteady on her feet overall.   Tremor is worse when tries to drink coffee e.g.   Hand tremor   Her mother Parkinsons   Coffee doesn't necessarily worsen tremor  Alcohol doesn't necessarily improve tremor  + Some degree of head tremulousness      Past Medical History:   Dr. Trejo 2011  "Note:  \"Consultation for tremor.      She is left-handed. She does not have shaking when she writes. The size of the hand writing has not changed. Her voice has not changed. Walking has not changed. She may snore but does not have not unusual behaviors at night. Normal sense of smell.      Family history of tremor in mother. She  age 86 y rs old. Had tremor 20 yrs. No vocal tremor. Hand tremor. Hand writing was not shakey. Had soft voice. No shuffling. Was on medications for hypertension. Was told she had parkinson but patient not sure she had parkinson. Was given an antidepressant and was not taking a parkinson medication. She  of natural causes at age 84 yrs; was frail. Did not have dementia.      Social history:    50 yrs. She worked outside the home; [employer] to cabinet makers.  Lives in [facility]. Her children live in [facility].  Still smoking. Rare alcohol. No clear tremor benefit from the alcohol.\"  Plan at that time:  \"Tremor:     There is a component of orthostatic tremor _ present when standing  This may be related to essential tremor vs. Parkinsonism     Test  DatSCAN        Approaches  1. Consider reducing the celexa to 20mg daily and see if tremor gets better, and possibly go off the medication if willing to do this. May consider doing this in the summer rather then fall or winter.      Medication trials can be considered.      1. Clonazepam (Klonopin)     2. Parkinson medications can be tried     3. Propranolol     4. Mysoline     5. Gabapentin         Return as needed - she was not keen to undergo the test or to try medication.     Temo Trejo MD\"  Patient Active Problem List   Diagnosis     Tremor     CARDIOVASCULAR SCREENING; LDL GOAL LESS THAN 160     Mild major depression (H)     Dyspepsia     Wears glasses     Urinary incontinence     Snores     Histoplasmosis     Advanced directives, counseling/discussion     Neoplasm of uncertain behavior " of skin     Epigastric pain     Benign neoplasm of cranial nerve (H)     Acoustic neuroma (H)     Hypertension goal BP (blood pressure) < 140/90     Acute bilateral low back pain without sciatica     Osteopenia     Pulmonary nodule, right     Stiffness of right shoulder joint     Shoulder pain     Cyst of left kidney     Major depressive disorder, recurrent episode, in full remission (H)     Past Medical History:   Diagnosis Date     Acoustic neuroma (H)     left     Depressive disorder      histoplasmosis      Histoplasmosis 10/6/2011     Hypertension      Pancolitis (H) 2017     Shaking     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     Snores 10/6/2011        Past Surgical History:     Past Surgical History:   Procedure Laterality Date     COLONOSCOPY N/A 2017    Procedure: COMBINED COLONOSCOPY, SINGLE OR MULTIPLE BIOPSY/POLYPECTOMY BY BIOPSY;  colonoscopy;  Surgeon: Moise Vásquez MD;  Location:  GI     COLONOSCOPY N/A 2018    Procedure: COMBINED COLONOSCOPY, SINGLE OR MULTIPLE BIOPSY/POLYPECTOMY BY BIOPSY;  COLONOSCOPY;  Surgeon: Moise Vásquez MD;  Location:  GI     THORACIC SURGERY       TONSILLECTOMY       ZZC THORACOTOMY,MAJOR,EXPLOR/BIOPSY  1983    histoplasmosis, right lower lobe        Social History:     Social History     Tobacco Use     Smoking status: Former Smoker     Packs/day: 0.30     Years: 50.00     Pack years: 15.00     Types: Cigarettes     Smokeless tobacco: Never Used     Tobacco comment: quit 2014   Substance Use Topics     Alcohol use: Yes     Comment: occasionally      Drug use: No        Family History:     Family History   Problem Relation Age of Onset     Hypertension Mother          at age 84     Neurologic Disorder Mother         ?parkinsonism - she had a tremor; walked normally. voice was soft     Cancer Father         brain tumor;  at age 62 y rs     Other - See Comments Sister         Homicide     Cancer Brother         Bladder     Breast  "Cancer Sister         Medications:     Current Outpatient Medications   Medication Sig     acetaminophen (TYLENOL) 500 MG tablet Take 1-2 tablets (500-1,000 mg) by mouth every 6 hours as needed for mild pain     albuterol (PROAIR HFA/PROVENTIL HFA/VENTOLIN HFA) 108 (90 Base) MCG/ACT inhaler Inhale 1-2 puffs into the lungs every 4 hours as needed for shortness of breath / dyspnea or wheezing     amLODIPine (NORVASC) 2.5 MG tablet Take 1 tablet (2.5 mg) by mouth daily     escitalopram (LEXAPRO) 10 MG tablet Take 1 tablet (10 mg) by mouth daily     lisinopril-hydrochlorothiazide (ZESTORETIC) 20-25 MG tablet Take 1 tablet by mouth daily     No current facility-administered medications for this visit.        Allergies:     Allergies   Allergen Reactions     Rosuvastatin Hives, Itching and Rash        Review of Systems:   As noted above     Physical Exam:   Vitals: /64   Pulse 88   Ht 1.626 m (5' 4\")   Wt 47.6 kg (105 lb)   LMP  (LMP Unknown)   SpO2 97%   BMI 18.02 kg/m    CV: peripheral pulse appreciated  Lungs: breathing comfortably  Extremities: no edema  Neuro:   General Appearance: No apparent distress, well-nourished, well-groomed, pleasant     Mental Status: Alert and oriented to person, place, and time. Speech fluent and comprehension intact. No dysarthria.     Cranial Nerves:   II: Visual fields: obscured by recent cataract surgery  III: Pupils: obscured by recent cataract surgery  III,IV,VI: Extraocular Movements: intact   VII: Facial strength: intact without asymmetry  VIII: Hearing: intact grossly  IX: Palate: intact   XII: Tongue movement: normal     Motor Exam:   5/5 Diffusely    Variable high frequency tremor predominantly in b/l UE, mostly action, occasionally spilling over into rest.  Can do ANTIONETTE with relative ease, no clear increased tone.    Sensory: intact to light touch, vibration    Coordination: no dysmetria with finger-to-nose bilaterally    Reflexes: biceps, triceps 1+, patellar 2+, and " ankle jerks 1+ and symmetric. Toes are downgoing bilaterally    Gait: reasonable stride length and arm swing, gets out a bit ahead of herself with upper body         Data: Pertinent prior to visit   Imaging:  Brain MRI without and with intravenous contrast primarily for the  purposes of stereotactic evaluation     History: Benign neoplasm of cranial nerve (H)  ICD-10: Benign neoplasm of cranial nerve (H)     Comparison: 3/26/2014 dated MR     Technique: MR imaging performed with 3-dimensonally acquired axial  T1-weighted sequences performed with intravenous contrast.     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 No significant  change in 5 mm right frontal parafalcine meningioma. No extra-axial  collection or midline shift. The major intracranial arterial  structures are grossly patent.                                                                      Impression: Increase in the size of presumed left vestibular  schwannoma since 3/26/2014. Limited imaging primarily for the purposes  of stereotactic localization.     Procedures:      Laboratory:  TSH   Date Value Ref Range Status   01/08/2018 0.41 0.40 - 4.00 mU/L Final              Assessment and Plan:   Assessment:  Lenora Hammonds is a pleasant 76 year old female who presents in consulation for tremor and balance problems.  I suspect her balance problems are multifactorial but in some way related to her left vestibular schwannoma for which  had gamma knife surgery x 2.    Regarding her tremor:  Would favor essential tremor, no clear signs of parkinsonism to history or exam.  She has not progressed a great deal since initial presentation in 2011 or developed clear Parkinsonian signs/features.  Would treat as a non Parkinsonian tremor for now as below.  Tone is specifically normal, and to  history I detect no non motor Parkinsonian features either.  Her tremor can spill over into rest and times but variably and not reliably.      Plan:  --Will discuss addition of propranolol with her primary/if she could consider changing a different blood pressure medication if so, script for propranolol 60 mg daily given.  Other ET medication treatments discussed as well.  --PT for balance training, fall prevention.  No falls noted of yet, good news.  --Follow up in 6 months, she will reach out with questions in the meanwhile.           Alexander Whitney MD   of Neurology   [facility]      The total time of this encounter today amounted to 48 minutes. This time included time spent with the patient, prep work, ordering tests, and performing post visit documentation.        Again, thank you for allowing me to participate in the care of your patient.        Sincerely,        Guevara Whitney MD

## 2022-06-07 NOTE — PATIENT INSTRUCTIONS
Start propranolol 60 mg daily--common side effect, dizziness/lightheadness  PT for balance training  I would favor a non Parkinsonian tremor as most likely.  We can see how symptoms evolve over time.   See me back in 6 months to see how things are going.  If we can get to you to a good place we can space you out further or move you to as needed potentially in the future.

## 2022-06-08 ENCOUNTER — TELEPHONE (OUTPATIENT)
Dept: FAMILY MEDICINE | Facility: CLINIC | Age: 77
End: 2022-06-08
Payer: COMMERCIAL

## 2022-06-08 ENCOUNTER — TELEPHONE (OUTPATIENT)
Dept: NEUROLOGY | Facility: CLINIC | Age: 77
End: 2022-06-08
Payer: COMMERCIAL

## 2022-06-08 NOTE — TELEPHONE ENCOUNTER
Called and left VM re updated plan:  Dr. Drew's office will reach out to her as well --stop amlodipine, start propranolol, recheck BP and pulse next week.

## 2022-06-08 NOTE — TELEPHONE ENCOUNTER
----- Message from Phyllis Drew MD sent at 6/8/2022  1:05 PM CDT -----  Yes--let's start with just stopping amlodipine and starting propranolol.    Triage - can you reach out to Lenora and let her know that she should monitor for any hypotensive symptoms with the blood pressure medication changes, and can she please come in for a blood pressure check early next week with MA?     Thank you.    Phyllis Drew MD  [facility]  [phone]      ----- Message -----  From: Guevara Whitney MD  Sent: 6/8/2022  12:52 PM CDT  To: Phyllis Drew MD    I was hoping to start with 60 mg daily and go up from there if she tolerates it.  I do think her tremor would benefit.     I can reach out to her.  Just start with stopping amlodipine with that dose?    Thanks,    Phan      ----- Message -----  From: Phyllis Drew MD  Sent: 6/8/2022  12:44 PM CDT  To: MD Denis Madrid,    Thanks. I would discontinue amlodipine. What dose of propranolol are you thinking of using? She may also need to cut back on the lisinopril-hydrochlorothiazide .    Phyllis  ----- Message -----  From: Guevara Whitney MD  Sent: 6/7/2022   3:24 PM CDT  To: Phyllis Drew MD    [facility],    Just saw Lenora.  Wanted to add propranolol for tremor, would you stop any of the current BP medications if so? Her pulse has been fine of late but pressures a little soft.     Let me know,    Thanks    Phan

## 2022-06-08 NOTE — TELEPHONE ENCOUNTER
Spoke with patient who was unaware of changes.  Instructed patient to stop her amlodipine and begin the propranolol which she had picked up today.      Instructed to watch for dizziness (dawn on standing), nausea, fatigue, vision changes.      Will call clinic with any concerns or questions.    Scheduled for BP check with RN on Monday 6/13/22 as MA schedule is full.    Routed to providers as FYI; may close encounter.    MARSHAL Torres RN  [facility]

## 2022-06-13 ENCOUNTER — TELEPHONE (OUTPATIENT)
Dept: FAMILY MEDICINE | Facility: CLINIC | Age: 77
End: 2022-06-13

## 2022-06-13 ENCOUNTER — ALLIED HEALTH/NURSE VISIT (OUTPATIENT)
Dept: NURSING | Facility: CLINIC | Age: 77
End: 2022-06-13
Payer: COMMERCIAL

## 2022-06-13 VITALS — HEART RATE: 56 BPM | SYSTOLIC BLOOD PRESSURE: 104 MMHG | DIASTOLIC BLOOD PRESSURE: 58 MMHG

## 2022-06-13 DIAGNOSIS — I10 HYPERTENSION GOAL BP (BLOOD PRESSURE) < 140/90: Primary | ICD-10-CM

## 2022-06-13 DIAGNOSIS — I10 HYPERTENSION GOAL BP (BLOOD PRESSURE) < 140/90: ICD-10-CM

## 2022-06-13 PROCEDURE — 99207 PR NO CHARGE NURSE ONLY: CPT

## 2022-06-13 RX ORDER — LISINOPRIL/HYDROCHLOROTHIAZIDE 10-12.5 MG
1 TABLET ORAL DAILY
Qty: 90 TABLET | Refills: 1 | Status: SHIPPED | OUTPATIENT
Start: 2022-06-13 | End: 2023-01-24

## 2022-06-13 NOTE — PROGRESS NOTES
Lenora Hammonds is a 76 year old year old patient who comes in today for a Blood Pressure check because of new medication.  Vital Signs-see flowsheet (104/58 and pulse of 56).  Vital signs stable.  Patient is taking medication as prescribed: last doses of Propranolol and Lisinopril-hydrochlorothiazide were today at 0700.  Patient is tolerating medications well.  Patient is not monitoring Blood Pressure at home.    Current complaints: none  Disposition:  patient to continue with the same medication and informed patient Dr. Drew will be updated.  Dr. Drew will be in clinic starting Wednesday of this week (6/15/22).  Patient verbalized understanding and in agreement with plan.        GUS Smith, RN  [facility]

## 2022-09-09 DIAGNOSIS — I10 HYPERTENSION GOAL BP (BLOOD PRESSURE) < 140/90: ICD-10-CM

## 2022-09-12 RX ORDER — AMLODIPINE BESYLATE 2.5 MG/1
TABLET ORAL
Qty: 90 TABLET | Refills: 1 | OUTPATIENT
Start: 2022-09-12

## 2022-09-12 NOTE — TELEPHONE ENCOUNTER
Notes in last amlodipine order 10/14/21 - Medication Notes         SRINI WASHINGTON   Mon Jun 13, 2022 10:03 AM   See 6/8/22 telephone encounter-patient instructed to stop this medication.   GUS Smith, RN   [facility]               Discontinued Alternate therapy Phyllis Drew MD 6/13/22 9942     Message sent to pharmacy - Refusal reason: Medication has been discontinued ( DISCONTINUED AMLODIPINE 6/13/22 FOR AN ALTERNATE THERAPY.).  Gina DANIELLE

## 2022-10-20 ENCOUNTER — OFFICE VISIT (OUTPATIENT)
Dept: FAMILY MEDICINE | Facility: CLINIC | Age: 77
End: 2022-10-20
Payer: COMMERCIAL

## 2022-10-20 VITALS
OXYGEN SATURATION: 95 % | RESPIRATION RATE: 20 BRPM | TEMPERATURE: 97.2 F | WEIGHT: 105 LBS | HEIGHT: 63 IN | DIASTOLIC BLOOD PRESSURE: 79 MMHG | SYSTOLIC BLOOD PRESSURE: 132 MMHG | HEART RATE: 60 BPM | BODY MASS INDEX: 18.61 KG/M2

## 2022-10-20 DIAGNOSIS — H61.23 BILATERAL IMPACTED CERUMEN: ICD-10-CM

## 2022-10-20 DIAGNOSIS — R26.89 BALANCE PROBLEMS: Primary | ICD-10-CM

## 2022-10-20 DIAGNOSIS — D33.3 ACOUSTIC NEUROMA (H): ICD-10-CM

## 2022-10-20 PROCEDURE — 99214 OFFICE O/P EST MOD 30 MIN: CPT | Performed by: FAMILY MEDICINE

## 2022-10-20 NOTE — PROGRESS NOTES
Assessment & Plan     Balance problems  - Lenora feels weakness in b/l thighs which is contributing to symptoms. No other contributing symptoms. No falls. Reviewed recent neurology note. I recommended course of PT. Lenora agreeable, referral placed.   - Physical Therapy Referral; Future    Acoustic neuroma gamma knife surgery x 2  - Recommended to schedule with Dr. Hurley, given contact information. Due for repeat MRI.      Bilateral impacted cerumen  - Lenora received bilateral ear irrigation with improvement of symptoms       Valerio Mccain MD  [facility]    Clemente Cooley is a 77 year old, presenting for the following health issues:  Extremity Weakness      History of Present Illness       Reason for visit:  Legs problem  Symptom onset:  3-4 weeks ago  Symptoms include:  Weak  Symptom intensity:  Mild  Symptom progression:  Staying the same  Had these symptoms before:  No  What makes it worse:  No  What makes it better:  NoShe consumes 0 sweetened beverage(s) daily.She exercises with enough effort to increase her heart rate 9 or less minutes per day.  She exercises with enough effort to increase her heart rate 3 or less days per week.   She is taking medications regularly.     She would like her right ears checked.     Last year when she was taking a walk, she noticed difficulty walking.   Now lately it's becoming more apparent. She notes weakness of her thigh muscles.   No falls. No back pain or radiculopathy.  She feels slightly off balance when walking.   No dizziness, light headiness, chest pain, shortness of breath or palpitations.   She hasn't started physical therapy.   She lives alone. She lives in [facility]. No help at home.             Review of Systems         Objective    LMP  (LMP Unknown)   There is no height or weight on file to calculate BMI.  Physical Exam   GENERAL: healthy, alert and no distress  HENT: ear canals with cerumen, TM's normal  MS: with gait mild  unsteadiness, weakness on upper thigh strength, normal ROM of lower extremities

## 2022-10-20 NOTE — PATIENT INSTRUCTIONS
Douglas Hurley MD    [address]    [place] [address]    Phone: [phone]

## 2022-11-01 ENCOUNTER — THERAPY VISIT (OUTPATIENT)
Dept: PHYSICAL THERAPY | Facility: CLINIC | Age: 77
End: 2022-11-01
Attending: FAMILY MEDICINE
Payer: COMMERCIAL

## 2022-11-01 DIAGNOSIS — R26.89 BALANCE PROBLEMS: ICD-10-CM

## 2022-11-01 PROCEDURE — 97162 PT EVAL MOD COMPLEX 30 MIN: CPT | Mod: GP | Performed by: PHYSICAL THERAPIST

## 2022-11-01 PROCEDURE — 97112 NEUROMUSCULAR REEDUCATION: CPT | Mod: GP | Performed by: PHYSICAL THERAPIST

## 2022-11-01 NOTE — PROGRESS NOTES
"Physical Therapy Initial Evaluation  November 1, 2022     Precautions/Restrictions/MD instructions: PT eval and treat.      Subjective:   Date of Onset: 10/20/22  C/C: gait and balance issues. She notes her legs \"aren't listening\" and feel weak. She note she has felt like she wants to fall forward. Mild low back pain occasionally  Quality of pain is dull and aching. Pains are described as constant in nature. Pain is worse: morning. Pain is rated 0/10.   History of symptoms: Pains began suddenly as the result of unknown origins. Since onset, symptoms are worsening. Previous episodes: none  Worsened by: walking, standing, sit to stands  Alleviated by: Nothing.    General health as reported by patient: good  Pertinent medical/surgical history: depression, HTN.  Acoustic neuroma, benign neoplasm of cranial nerve, left cerebellopontine mass  see neurology note for full description  Imaging: none. Current occupational status: retired - owned [employer] . Patient's goals are: decrease pain, improve tolerance to walking up to 1/2 mile with the dog. Return to MD:  PRN.       Objective:    Lower extremity:     Strength: All results within normal limits unless otherwise noted.      HIP:  ROM: WNL  Strength: L  R   Flexion 4/5 3+/5   Extension 4/5 4/5   Adduction 4-/5 3+/5   Abduction 3-/5 3-/5   IR (prone/supine) 3+/5 3+/5   ER (prone/supine) 3/5 3/5   KNEE      3/5 3-/5    3+/5 3+/5    Gait: ataxia, wide base of support, downward gaze, and no assistive device, bilateral knee flexion of 10 degrees throughout gait cycle.   Stride length and motor planning improve following exercises and with SPC in her L UE       Assessment/Plan:    The patient is a 77 year old female with chief complaint of gait antalagia, balance deficits.    The patient has the following significant findings with corresponding treatment plan.  Diagnosis 1:  Balance defecits    Pain -  hot/cold therapy, manual therapy, " splint/taping/bracing/orthotics, self management, education, directional preference exercise and home program  Decreased ROM/flexibility - manual therapy and therapeutic exercise  Decreased joint mobility - manual therapy and therapeutic exercise  Decreased strength - therapeutic exercise and therapeutic activities  Impaired balance - neuro re-education and therapeutic activities  Decreased proprioception - neuro re-education and therapeutic activities  Impaired gait - gait training  Impaired muscle performance - neuro re-education  Decreased function - therapeutic activities  Impaired posture - neuro re-education           Therapy Evaluation Codes:   1) History comprised of:   Personal factors that impact the plan of care:      Age and Time since onset of symptoms.    Comorbidity factors that impact the plan of care are:      Depression, High blood pressure, Weakness and acoustic neruroma, left cerebellopontine mass.     Medications impacting care: Anti-depressant and HTN, see EPIC.  2) Examination of Body Systems comprised of:   Body structures and functions that impact the plan of care:      Ankle, Hip, Knee, Lumbar spine, Pelvis and feet.   Activity limitations that impact the plan of care are:      Bathing, Bending, Dressing, Lifting, Squatting/kneeling, Stairs, Standing, Walking and transfers.   Clinical presentation characteristics are:    Evolving/Changing.  3) Presentation comprised of:   Presentation scored as Moderate complexity with Evolving presentation with changing characteristics..  4) Decision-Making    Moderate complexity using standardized patient assessment instrument and/or measureable assessment of functional outcome.  Cumulative Therapy Evaluation is: Moderate complexity.    Previous and current functional limitations:  (See Goal Flow Sheet for this information)    Short term and Long term goals: (See Goal Flow Sheet for this information)     Communication ability:  Patient appears to be able  to clearly communicate and understand verbal and written communication and follow directions correctly.  Treatment Explanation - The following has been discussed with the patient: RX ordered/plan of care, anticipated outcomes, and possible risks and side effects.  This patient would benefit from PT intervention to resume normal activities.   Rehab potential is good.    Frequency:  1 X week, once daily  Duration:  for 8 weeks  Discharge Plan: Achieve all LTGs, be independent in home treatment program, and reach maximal therapeutic benefit.    Please refer to the daily flowsheet for treatment today, total treatment time and time spent performing 1:1 timed codes.

## 2022-11-01 NOTE — PROGRESS NOTES
[facility]    OUTPATIENT Physical Therapy ORTHOPEDIC EVALUATION  PLAN OF TREATMENT FOR OUTPATIENT REHABILITATION  (COMPLETE FOR INITIAL CLAIMS ONLY)  Patient's Last Name, First Name, M.I.  YOB: 1945  Lenora Hammonds    Provider s Name:  [facility]   Medical Record No.  [id number]   Start of Care Date:  11/01/22   Onset Date:   10/20/22   Treatment Diagnosis:  balance problems Medical Diagnosis:  Balance problems       Goals:     11/01/22 0500   Body Part   Goals listed below are for balance   Goal #1   Goal #1 ambulation   Previous Functional Level Miles patient could walk   Performance Level 1/2 with her dog   Current Functional Level Feet patient can walk   Performance Level 10-20, slow   STG Target Performance Feet patient will be able to walk   Performance Level 500-1000, SPC   Rationale for safe household ambulation;for safe community ambulation;to promote a healthy and active lifestyle   Due Date 11/29/22    LTG Target Performance Miles patient will be able to  walk   Performance Level 1/2 mile, with or without SPC   Rationale for safe community ambulation   Due Date 12/27/22       Therapy Frequency:  1 x per week  Predicted Duration of Therapy Intervention:  8 weeks    Kirsty Pittman, JERICA                 I CERTIFY THE NEED FOR THESE SERVICES FURNISHED UNDER        THIS PLAN OF TREATMENT AND WHILE UNDER MY CARE     (Physician attestation of this document indicates review and certification of the therapy plan).                     Certification Date From:  11/01/22   Certification Date To:  01/29/23    Referring Provider:  Valerio Mccain    Initial Assessment        See Epic Evaluation SOC Date: 11/01/22

## 2022-11-21 ENCOUNTER — THERAPY VISIT (OUTPATIENT)
Dept: PHYSICAL THERAPY | Facility: CLINIC | Age: 77
End: 2022-11-21
Payer: COMMERCIAL

## 2022-11-21 DIAGNOSIS — R26.89 BALANCE PROBLEMS: Primary | ICD-10-CM

## 2022-11-21 PROCEDURE — 97110 THERAPEUTIC EXERCISES: CPT | Mod: GP | Performed by: PHYSICAL THERAPIST

## 2022-11-21 PROCEDURE — 97112 NEUROMUSCULAR REEDUCATION: CPT | Mod: GP | Performed by: PHYSICAL THERAPIST

## 2022-12-03 DIAGNOSIS — F32.0 MAJOR DEPRESSIVE DISORDER, SINGLE EPISODE, MILD (H): ICD-10-CM

## 2022-12-05 ENCOUNTER — VIRTUAL VISIT (OUTPATIENT)
Dept: FAMILY MEDICINE | Facility: CLINIC | Age: 77
End: 2022-12-05
Payer: COMMERCIAL

## 2022-12-05 DIAGNOSIS — R26.89 BALANCE PROBLEMS: Primary | ICD-10-CM

## 2022-12-05 PROCEDURE — 99213 OFFICE O/P EST LOW 20 MIN: CPT | Mod: 95 | Performed by: NURSE PRACTITIONER

## 2022-12-05 NOTE — PROGRESS NOTES
"Lenora is a 77 year old who is being evaluated via a billable telephone visit.      What phone number would you like to be contacted at?   How would you like to obtain your AVS?     Assessment & Plan     (R29.75) Balance problems  (primary encounter diagnosis)  Comment:   Plan: Will have her schedule an appointment to see her ENT about the schwannoma.  She will see neurology next week.       22 minutes spent on the date of the encounter doing chart review, interpretation of tests, patient visit and documentation            No follow-ups on file.    Arina Bonilla NP  [facility]    Clemente Cooley is a 77 year old, presenting for the following health issues:  No chief complaint on file.      HPI     She is having difficulty walking - feels off-balance, like she might fall forward. She feels like she is \"almost running\", like she leans forward and gets propelled forward.  This has been going on for the past 6 months.  She did fall once.  She did go to PT, which did not seem to help.  She denies any dizziness    She did see neurology in June.  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  Has a follow up appointment with him on 12/13.        Review of Systems         Objective           Vitals:  No vitals were obtained today due to virtual visit.    Physical Exam   healthy, alert and no distress  PSYCH: Alert and oriented times 3; coherent speech, normal   rate and volume, able to articulate logical thoughts, able   to abstract reason, no tangential thoughts, no hallucinations   or delusions  Her affect is normal  RESP: No cough, no audible wheezing, able to talk in full sentences  Remainder of exam unable to be completed due to telephone visits                Phone call duration: 13 minutes    "

## 2022-12-06 NOTE — TELEPHONE ENCOUNTER
Routing refill request to provider for review/approval because:    Last ordered 10/4/21 by JAYSON Soto.     Team Coordinators: Please contact patient to set up annual physical and EST care with new provider for any further refills.     GUS Finnegan RN  [facility]

## 2022-12-07 RX ORDER — ESCITALOPRAM OXALATE 10 MG/1
TABLET ORAL
Qty: 90 TABLET | Refills: 0 | Status: SHIPPED | OUTPATIENT
Start: 2022-12-07 | End: 2023-03-20

## 2022-12-13 ENCOUNTER — OFFICE VISIT (OUTPATIENT)
Dept: NEUROLOGY | Facility: CLINIC | Age: 77
End: 2022-12-13
Payer: COMMERCIAL

## 2022-12-13 VITALS
SYSTOLIC BLOOD PRESSURE: 137 MMHG | WEIGHT: 105 LBS | HEART RATE: 56 BPM | OXYGEN SATURATION: 98 % | HEIGHT: 63 IN | BODY MASS INDEX: 18.61 KG/M2 | DIASTOLIC BLOOD PRESSURE: 74 MMHG

## 2022-12-13 DIAGNOSIS — R26.89 BALANCE PROBLEMS: ICD-10-CM

## 2022-12-13 DIAGNOSIS — R25.1 TREMOR: Primary | ICD-10-CM

## 2022-12-13 PROCEDURE — 99214 OFFICE O/P EST MOD 30 MIN: CPT | Performed by: STUDENT IN AN ORGANIZED HEALTH CARE EDUCATION/TRAINING PROGRAM

## 2022-12-13 RX ORDER — PROPRANOLOL HYDROCHLORIDE 20 MG/1
20 TABLET ORAL 2 TIMES DAILY PRN
Qty: 60 TABLET | Refills: 8 | Status: SHIPPED | OUTPATIENT
Start: 2022-12-13 | End: 2024-02-22

## 2022-12-13 RX ORDER — PROPRANOLOL HCL 60 MG
60 CAPSULE, EXTENDED RELEASE 24HR ORAL DAILY
Qty: 90 CAPSULE | Refills: 2 | Status: SHIPPED | OUTPATIENT
Start: 2022-12-13 | End: 2024-01-29

## 2022-12-13 NOTE — PATIENT INSTRUCTIONS
Follow up with your previous ENT doctor through [facility]    Propranolol 60 mg daily +10-20 mg as needed     Continue home PT, think about an assistive device

## 2022-12-13 NOTE — NURSING NOTE
"Lenora Hammonds is a 77 year old female who presents for:  Chief Complaint   Patient presents with     Follow Up     Tremors, 6 month f/u        Initial Vitals:  /74   Pulse 56   Ht 1.6 m (5' 3\")   Wt 47.6 kg (105 lb)   LMP  (LMP Unknown)   SpO2 98%   BMI 18.60 kg/m   Estimated body mass index is 18.6 kg/m  as calculated from the following:    Height as of this encounter: 1.6 m (5' 3\").    Weight as of this encounter: 47.6 kg (105 lb).. Body surface area is 1.45 meters squared. BP completed using cuff size: regular    Nursing Comments:     Otilio Uriarte    "

## 2022-12-13 NOTE — PROGRESS NOTES
[facility]  Section of General Neurology  Return Patient Visit    Lenora Hammonds MRN# [id number]   Age: 77 year old YOB: 1945            Assessment and Plan:   Lenora Hammonds is a pleasant 76 year old female who presents in follow up for tremor and balance problems. 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  We reviewed this imaging as below.  She is continuing to do home PT exercises.  Discussed I would strongly consider an assistive device to prevent falls/as a precaution.      Regarding her tremor:  Still seems to be most representative of essential tremor.  She still has no clear signs of parkinsonism to history or exam.  She did respond and is improved with propranolol as below.  Discussed addition of PRN dosing, things to look out for (lightheadedness e.g.) with such doses.     Plan:  --Continue propranolol 60 mg daily, will add in 10-20 mg PRN tablets for bad tremor days/before fine motor tasks  --Continue home PT exercises for leg strengthening, fall prevention.    --Would schedule a follow up with Dr. Hurley for further Schwannoma surveillance/managment   --Follow up in 6 months, she will reach out with questions in the meanwhile.         Alexander Whitney MD   of Neurology   [facility]      Interval history:     Here with her son Mahad.   She continues to feel off balance  PT did not help a great deal, has home excercises.    Discussed importance of leg strength in maintaining independence.    Discussed assistive device/potential for use of cane or walking stick if she feels she needs one/continues to be off balance.   When seeing ENT/Rad Onc regarding Schwannoma?--No visit planned just yet  Previous followed with Dr. Hurley.       Past Medical History:   Plan at last visit:  --Will discuss addition of propranolol with her  primary/if she could consider changing a different blood pressure medication if so, script for propranolol 60 mg daily given.  Other ET medication treatments discussed as well.  --PT for balance training, fall prevention.  No falls noted of yet, good news.  --Follow up in 6 months, she will reach out with questions in the meanwhile.    Patient Active Problem List   Diagnosis     Tremor     CARDIOVASCULAR SCREENING; LDL GOAL LESS THAN 160     Mild major depression (H)     Dyspepsia     Wears glasses     Urinary incontinence     Snores     Histoplasmosis     Advanced directives, counseling/discussion     Neoplasm of uncertain behavior of skin     Epigastric pain     Benign neoplasm of cranial nerve (H)     Acoustic neuroma (H)     Hypertension goal BP (blood pressure) < 140/90     Acute bilateral low back pain without sciatica     Osteopenia     Pulmonary nodule, right     Stiffness of right shoulder joint     Shoulder pain     Cyst of left kidney     Major depressive disorder, recurrent episode, in full remission (H)     Balance problems     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     Snores 10/6/2011        Past Surgical History:     Past Surgical History:   Procedure Laterality Date     COLONOSCOPY N/A 12/6/2017    Procedure: COMBINED COLONOSCOPY, SINGLE OR MULTIPLE BIOPSY/POLYPECTOMY BY BIOPSY;  colonoscopy;  Surgeon: Moise Vásquez MD;  Location:  GI     COLONOSCOPY N/A 6/28/2018    Procedure: COMBINED COLONOSCOPY, SINGLE OR MULTIPLE BIOPSY/POLYPECTOMY BY BIOPSY;  COLONOSCOPY;  Surgeon: Moise Vásquez MD;  Location:  GI     THORACIC SURGERY       TONSILLECTOMY       ZZC THORACOTOMY,MAJOR,EXPLOR/BIOPSY  1983    histoplasmosis, right lower lobe        Social History:     Social History     Tobacco  "Use     Smoking status: Former     Packs/day: 0.30     Years: 50.00     Pack years: 15.00     Types: Cigarettes     Smokeless tobacco: Never     Tobacco comments:     quit 2014   Substance Use Topics     Alcohol use: Yes     Comment: occasionally      Drug use: No        Family History:     Family History   Problem Relation Age of Onset     Hypertension Mother          at age 84     Neurologic Disorder Mother         ?parkinsonism - she had a tremor; walked normally. voice was soft     Cancer Father         brain tumor;  at age 62 y rs     Other - See Comments Sister         Homicide     Cancer Brother         Bladder     Breast Cancer Sister         Medications:     Current Outpatient Medications   Medication Sig     acetaminophen (TYLENOL) 500 MG tablet Take 1-2 tablets (500-1,000 mg) by mouth every 6 hours as needed for mild pain     albuterol (PROAIR HFA/PROVENTIL HFA/VENTOLIN HFA) 108 (90 Base) MCG/ACT inhaler Inhale 1-2 puffs into the lungs every 4 hours as needed for shortness of breath / dyspnea or wheezing     escitalopram (LEXAPRO) 10 MG tablet TAKE 1 TABLET(10 MG) BY MOUTH DAILY     lisinopril-hydrochlorothiazide (ZESTORETIC) 10-12.5 MG tablet Take 1 tablet by mouth daily     propranolol ER (INDERAL LA) 60 MG 24 hr capsule Take 1 capsule (60 mg) by mouth daily     No current facility-administered medications for this visit.        Allergies:     Allergies   Allergen Reactions     Rosuvastatin Hives, Itching and Rash          Physical Exam:   Vitals: /74   Pulse 56   Ht 1.6 m (5' 3\")   Wt 47.6 kg (105 lb)   LMP  (LMP Unknown)   SpO2 98%   BMI 18.60 kg/m         Neuro:   General Appearance: No apparent distress, well-nourished, well-groomed, pleasant     Mental Status: Alert and oriented to person, place, and time. Speech fluent and comprehension intact. No dysarthria.     Cranial Nerves:   II: Visual fields: normal  III: Pupils: 3 mm, equal, round, reactive to light   III,IV,VI: " Extraocular Movements: intact   VII: Facial strength: intact without asymmetry  VIII: Hearing: intact grossly       Motor Exam:   5/5 diffusely. Tone is normal throughout.    Bilateral low amplitude high frequency tremor worse with terminal movement not present at rest     Sensory: intact to light touch    Coordination: no dysmetria with finger-to-nose bilaterally    Reflexes: biceps, triceps, brachioradialis, patellar, and ankle jerks 2+ and symmetric.              Data: Pertinent prior to visit   Imaging:  Brain MRI without and with intravenous contrast primarily for the  purposes of stereotactic evaluation     History: Benign neoplasm of cranial nerve (H)  ICD-10: Benign neoplasm of cranial nerve (H)     Comparison: 3/26/2014 dated MR     Technique: MR imaging performed with 3-dimensonally acquired axial  T1-weighted sequences performed with intravenous contrast.     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 No significant  change in 5 mm right frontal parafalcine meningioma. No extra-axial  collection or midline shift. The major intracranial arterial  structures are grossly patent.                                                                      Impression: Increase in the size of presumed left vestibular  schwannoma since 3/26/2014. Limited imaging primarily for the purposes  of stereotactic localization.                    The total time of this encounter today amounted to 30 minutes. This time included time spent with the patient, prep work, reviewing imaging and outside records, ordering tests, and performing post visit documentation.

## 2022-12-13 NOTE — LETTER
12/13/2022         RE: Lenora Hammonds  [address] [facility] [address] [facility] [id number]        Dear Colleague,    Thank you for referring your patient, Lenora Hammonds, to the [facility] NEUROLOGY [facility]. Please see a copy of my visit note below.    [facility]  Section of General Neurology  Return Patient Visit    Lenora Hammonds MRN# [id number]   Age: 77 year old YOB: 1945            Assessment and Plan:   Lenora Hammonds is a pleasant 76 year old female who presents in follow up for tremor and balance problems. 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  We reviewed this imaging as below.  She is continuing to do home PT exercises.  Discussed I would strongly consider an assistive device to prevent falls/as a precaution.      Regarding her tremor:  Still seems to be most representative of essential tremor.  She still has no clear signs of parkinsonism to history or exam.  She did respond and is improved with propranolol as below.  Discussed addition of PRN dosing, things to look out for (lightheadedness e.g.) with such doses.     Plan:  --Continue propranolol 60 mg daily, will add in 10-20 mg PRN tablets for bad tremor days/before fine motor tasks  --Continue home PT exercises for leg strengthening, fall prevention.    --Would schedule a follow up with Dr. Hurley for further Schwannoma surveillance/managment   --Follow up in 6 months, she will reach out with questions in the meanwhile.         Alexander Whitney MD   of Neurology   [facility]      Interval history:     Here with her son Mahad.   She continues to feel off balance  PT did not help a great deal, has home excercises.    Discussed importance of leg strength in maintaining independence.    Discussed assistive device/potential for use of cane or walking stick if  she feels she needs one/continues to be off balance.   When seeing ENT/Rad Onc regarding Schwannoma?--No visit planned just yet  Previous followed with Dr. Hurley.       Past Medical History:   Plan at last visit:  --Will discuss addition of propranolol with her primary/if she could consider changing a different blood pressure medication if so, script for propranolol 60 mg daily given.  Other ET medication treatments discussed as well.  --PT for balance training, fall prevention.  No falls noted of yet, good news.  --Follow up in 6 months, she will reach out with questions in the meanwhile.    Patient Active Problem List   Diagnosis     Tremor     CARDIOVASCULAR SCREENING; LDL GOAL LESS THAN 160     Mild major depression (H)     Dyspepsia     Wears glasses     Urinary incontinence     Snores     Histoplasmosis     Advanced directives, counseling/discussion     Neoplasm of uncertain behavior of skin     Epigastric pain     Benign neoplasm of cranial nerve (H)     Acoustic neuroma (H)     Hypertension goal BP (blood pressure) < 140/90     Acute bilateral low back pain without sciatica     Osteopenia     Pulmonary nodule, right     Stiffness of right shoulder joint     Shoulder pain     Cyst of left kidney     Major depressive disorder, recurrent episode, in full remission (H)     Balance problems     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     Snores 10/6/2011        Past Surgical History:     Past Surgical History:   Procedure Laterality Date     COLONOSCOPY N/A 12/6/2017    Procedure: COMBINED COLONOSCOPY, SINGLE OR MULTIPLE BIOPSY/POLYPECTOMY BY BIOPSY;  colonoscopy;  Surgeon: Moise Vásquez MD;  Location:  GI     COLONOSCOPY N/A 6/28/2018    Procedure: COMBINED COLONOSCOPY, SINGLE OR MULTIPLE  "BIOPSY/POLYPECTOMY BY BIOPSY;  COLONOSCOPY;  Surgeon: Moise Vásquez MD;  Location:  GI     THORACIC SURGERY       TONSILLECTOMY       ZZC THORACOTOMY,MAJOR,EXPLOR/BIOPSY      histoplasmosis, right lower lobe        Social History:     Social History     Tobacco Use     Smoking status: Former     Packs/day: 0.30     Years: 50.00     Pack years: 15.00     Types: Cigarettes     Smokeless tobacco: Never     Tobacco comments:     quit 2014   Substance Use Topics     Alcohol use: Yes     Comment: occasionally      Drug use: No        Family History:     Family History   Problem Relation Age of Onset     Hypertension Mother          at age 84     Neurologic Disorder Mother         ?parkinsonism - she had a tremor; walked normally. voice was soft     Cancer Father         brain tumor;  at age 62 y rs     Other - See Comments Sister         Homicide     Cancer Brother         Bladder     Breast Cancer Sister         Medications:     Current Outpatient Medications   Medication Sig     acetaminophen (TYLENOL) 500 MG tablet Take 1-2 tablets (500-1,000 mg) by mouth every 6 hours as needed for mild pain     albuterol (PROAIR HFA/PROVENTIL HFA/VENTOLIN HFA) 108 (90 Base) MCG/ACT inhaler Inhale 1-2 puffs into the lungs every 4 hours as needed for shortness of breath / dyspnea or wheezing     escitalopram (LEXAPRO) 10 MG tablet TAKE 1 TABLET(10 MG) BY MOUTH DAILY     lisinopril-hydrochlorothiazide (ZESTORETIC) 10-12.5 MG tablet Take 1 tablet by mouth daily     propranolol ER (INDERAL LA) 60 MG 24 hr capsule Take 1 capsule (60 mg) by mouth daily     No current facility-administered medications for this visit.        Allergies:     Allergies   Allergen Reactions     Rosuvastatin Hives, Itching and Rash          Physical Exam:   Vitals: /74   Pulse 56   Ht 1.6 m (5' 3\")   Wt 47.6 kg (105 lb)   LMP  (LMP Unknown)   SpO2 98%   BMI 18.60 kg/m         Neuro:   General Appearance: No apparent " distress, well-nourished, well-groomed, pleasant     Mental Status: Alert and oriented to person, place, and time. Speech fluent and comprehension intact. No dysarthria.     Cranial Nerves:   II: Visual fields: normal  III: Pupils: 3 mm, equal, round, reactive to light   III,IV,VI: Extraocular Movements: intact   VII: Facial strength: intact without asymmetry  VIII: Hearing: intact grossly       Motor Exam:   5/5 diffusely. Tone is normal throughout.    Bilateral low amplitude high frequency tremor worse with terminal movement not present at rest     Sensory: intact to light touch    Coordination: no dysmetria with finger-to-nose bilaterally    Reflexes: biceps, triceps, brachioradialis, patellar, and ankle jerks 2+ and symmetric.              Data: Pertinent prior to visit   Imaging:  Brain MRI without and with intravenous contrast primarily for the  purposes of stereotactic evaluation     History: Benign neoplasm of cranial nerve (H)  ICD-10: Benign neoplasm of cranial nerve (H)     Comparison: 3/26/2014 dated MR     Technique: MR imaging performed with 3-dimensonally acquired axial  T1-weighted sequences performed with intravenous contrast.     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 No significant  change in 5 mm right frontal parafalcine meningioma. No extra-axial  collection or midline shift. The major intracranial arterial  structures are grossly patent.                                                                      Impression: Increase in the size of presumed left vestibular  schwannoma since 3/26/2014. Limited imaging primarily for the purposes  of stereotactic localization.                    The total time of this encounter today amounted to 30 minutes. This time included time spent with the patient, prep work, reviewing  imaging and outside records, ordering tests, and performing post visit documentation.        Again, thank you for allowing me to participate in the care of your patient.        Sincerely,        Guevara Whitney MD

## 2022-12-29 PROBLEM — R26.89 BALANCE PROBLEMS: Status: RESOLVED | Noted: 2022-11-01 | Resolved: 2022-12-29

## 2022-12-29 NOTE — PROGRESS NOTES
Discharge Note    Progress reporting period is from initial evaluation date (please see noted date below) to Nov 21, 2022.  Linked Episodes   Type: Episode: Status: Noted: Resolved: Last update: Updated by:   PHYSICAL THERAPY balance  Active 11/1/2022 11/21/2022  3:19 PM Kirsty Pittman, PT      Comments:       Lenora failed to follow up and current status is unknown.  Please see information below for last relevant information on current status.  Patient seen for 2 visits.    SUBJECTIVE  Subjective changes noted by patient:  Patient states that she still feels like she is going to run forward whenever she starts walking, she also feels like she wants to fall backward when seated in tub. Exercises are going well, but hasn't been able to walk longer distances. She has tried her cane a couple of times, but states it is too much to sequence with her tasks  .  Current pain level is 0/10.     Previous pain level was  0/10.   Changes in function:  Yes (See Goal flowsheet attached for changes in current functional level)  Adverse reaction to treatment or activity: None    OBJECTIVE  Changes noted in objective findings: Min A required for stabilization during full tandem stance     ASSESSMENT/PLAN  Diagnosis: balance problems   Updated problem list and treatment plan:   Pain - HEP  Decreased ROM/flexibility - HEP  Decreased function - HEP  Decreased strength - HEP  STG/LTGs have been met or progress has been made towards goals:  Yes, please see goal flowsheet for most current information  Assessment of Progress: current status is unknown.    Last current status: Pt is progressing as expected   Self Management Plans:  HEP  I have re-evaluated this patient and find that the nature, scope, duration and intensity of the therapy is appropriate for the medical condition of the patient.  Lenora continues to require the following intervention to meet STG and LTG's:  HEP.    Recommendations:  Discharge with current home program.   Patient to follow up with MD as needed.    Please refer to the daily flowsheet for treatment today, total treatment time and time spent performing 1:1 timed codes.

## 2023-01-01 NOTE — TELEPHONE ENCOUNTER
Lenora Hammonds is a 76 year old year old patient who comes in today for a Blood Pressure check because of new medication.  Vital Signs-see flowsheet (104/58 and pulse of 56).  Vital signs stable.  Patient is taking medication as prescribed: last doses of Propranolol and Lisinopril-hydrochlorothiazide were today at 0700.  Patient is tolerating medications well.  Patient is not monitoring Blood Pressure at home.    Current complaints: none  Disposition:  patient to continue with the same medication and informed patient Dr. Drew will be updated.  Dr. Drew will be in clinic starting Wednesday of this week (6/15/22).  Patient verbalized understanding and in agreement with plan.           GUS Smith, RN  [facility]  
Thank you. Vitals noted. I am reducing her dose of lisinopril-hydrochlorothiazide. Please have her schedule an office visit with myself in the next 1-2 months and she should be seen sooner if any new concerns.     Phyllis Drew MD  [facility]  [phone]    
Writer called patient and reviewed message per Dr. Drew.  Patient verbalized understanding and in agreement with plan.    Office visit scheduled with Dr. Drew on 8/5/22.  Visit date, time and location confirmed with patient.    GUS Smith, RN  [facility]    
Statement Selected

## 2023-01-24 ENCOUNTER — OFFICE VISIT (OUTPATIENT)
Dept: URGENT CARE | Facility: URGENT CARE | Age: 78
End: 2023-01-24
Payer: COMMERCIAL

## 2023-01-24 VITALS
WEIGHT: 105 LBS | HEIGHT: 64 IN | TEMPERATURE: 98.7 F | SYSTOLIC BLOOD PRESSURE: 182 MMHG | HEART RATE: 59 BPM | DIASTOLIC BLOOD PRESSURE: 71 MMHG | BODY MASS INDEX: 17.93 KG/M2 | OXYGEN SATURATION: 98 %

## 2023-01-24 DIAGNOSIS — I10 HYPERTENSION GOAL BP (BLOOD PRESSURE) < 140/90: ICD-10-CM

## 2023-01-24 DIAGNOSIS — T22.211A PARTIAL THICKNESS BURN OF RIGHT FOREARM, INITIAL ENCOUNTER: Primary | ICD-10-CM

## 2023-01-24 PROCEDURE — 99214 OFFICE O/P EST MOD 30 MIN: CPT | Performed by: NURSE PRACTITIONER

## 2023-01-24 RX ORDER — LISINOPRIL/HYDROCHLOROTHIAZIDE 10-12.5 MG
1 TABLET ORAL DAILY
Qty: 30 TABLET | Refills: 0 | Status: SHIPPED | OUTPATIENT
Start: 2023-01-24 | End: 2023-03-07

## 2023-01-24 RX ORDER — CEPHALEXIN 500 MG/1
500 CAPSULE ORAL 4 TIMES DAILY
Qty: 40 CAPSULE | Refills: 0 | Status: SHIPPED | OUTPATIENT
Start: 2023-01-24 | End: 2023-02-03

## 2023-01-24 RX ORDER — SILVER SULFADIAZINE 10 MG/G
CREAM TOPICAL DAILY
Qty: 85 G | Refills: 0 | Status: SHIPPED | OUTPATIENT
Start: 2023-01-24 | End: 2023-02-07

## 2023-01-24 RX ORDER — OXYCODONE HYDROCHLORIDE 5 MG/1
5 TABLET ORAL EVERY 6 HOURS PRN
Qty: 10 TABLET | Refills: 0 | Status: SHIPPED | OUTPATIENT
Start: 2023-01-24

## 2023-01-24 NOTE — PATIENT INSTRUCTIONS
Second-degree burn without evidence of eschar nerve damage current infection  Dressed with Silvadene gauze  Wash with lukewarm soapy water and new Silvadene dressing every other day  Wound care follow-up outpatient  Keflex prophylaxis for infection  Oxycodone for pain management as needed for severe pain that breaks through Tylenol or ibuprofen  Use caution due to sedation and constipation  ER if severe worsening pain redness, fever over 100, nausea vomiting

## 2023-01-24 NOTE — PROGRESS NOTES
Chief Complaint   Patient presents with     Urgent Care     Burn     Pt in clinic to have eval for burn on right arm       SUBJECTIVE:  Lenora Hammonds is a 77 year old female who presents to the clinic today with a burn to her right forearm that occurred on Saturday while cooking.  Her son is with her and has noted that the burn has changed drastically in the last days.  It started as redness with clear filled bullae blisters.  Now she has open red raw tissue that is very painful with yellow granulating tissue.  She declines fevers sweats chills nausea vomiting.  No numbness pallor cool sensation last range of motion.  Last tetanus shot was given in 02/24/2022.  She does feel like she needs pain meds.  [place] PDM checked and last controlled substance was given by me last January tramadol 50 mg #10.  She is also requesting her blood pressure medication lisinopril hydrochlorothiazide be refilled as she has been without it for several days.  Has reach out to her primary and having trouble with the refill.    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     Snores 10/6/2011     acetaminophen (TYLENOL) 500 MG tablet, Take 1-2 tablets (500-1,000 mg) by mouth every 6 hours as needed for mild pain  albuterol (PROAIR HFA/PROVENTIL HFA/VENTOLIN HFA) 108 (90 Base) MCG/ACT inhaler, Inhale 1-2 puffs into the lungs every 4 hours as needed for shortness of breath / dyspnea or wheezing  escitalopram (LEXAPRO) 10 MG tablet, TAKE 1 TABLET(10 MG) BY MOUTH DAILY  propranolol (INDERAL) 20 MG tablet, Take 1 tablet (20 mg) by mouth 2 times daily as needed (tremor)  propranolol ER (INDERAL LA) 60 MG 24 hr capsule, Take 1 capsule (60 mg) by mouth daily    No current facility-administered medications on file prior to visit.    Social History  "    Tobacco Use     Smoking status: Former     Packs/day: 0.30     Years: 50.00     Pack years: 15.00     Types: Cigarettes     Smokeless tobacco: Never     Tobacco comments:     quit 12/2014   Substance Use Topics     Alcohol use: Yes     Comment: occasionally      Allergies   Allergen Reactions     Rosuvastatin Hives, Itching and Rash       Review of Systems   All systems negative except for those listed above in HPI.    EXAM:   BP (!) 182/71   Pulse 59   Temp 98.7  F (37.1  C) (Temporal)   Ht 1.626 m (5' 4\")   Wt 47.6 kg (105 lb)   LMP  (LMP Unknown)   SpO2 98%   BMI 18.02 kg/m      Physical Exam  Vitals reviewed.   Constitutional:       General: She is not in acute distress.     Appearance: Normal appearance. She is not ill-appearing, toxic-appearing or diaphoretic.   HENT:      Head: Normocephalic and atraumatic.   Cardiovascular:      Rate and Rhythm: Normal rate.      Pulses: Normal pulses.   Pulmonary:      Effort: Pulmonary effort is normal.   Musculoskeletal:         General: Tenderness and signs of injury present. Normal range of motion.        Arms:    Skin:     General: Skin is warm and dry.      Coloration: Skin is not pale.      Findings: Erythema and lesion present. No rash.   Neurological:      General: No focal deficit present.      Mental Status: She is alert and oriented to person, place, and time.      Sensory: No sensory deficit.      Motor: No weakness.   Psychiatric:         Mood and Affect: Mood normal.         Behavior: Behavior normal.       ASSESSMENT:    ICD-10-CM    1. Partial thickness burn of right forearm, initial encounter  T22.211A cephALEXin (KEFLEX) 500 MG capsule     oxyCODONE (ROXICODONE) 5 MG tablet     silver sulfADIAZINE (SILVADENE) 1 % external cream     Wound Care Referral      2. Hypertension goal BP (blood pressure) < 140/90  I10 lisinopril-hydrochlorothiazide (ZESTORETIC) 10-12.5 MG tablet        PLAN:    Second-degree burn without evidence of eschar nerve damage " current infection  Shared decision making that we can try outpatient management with close monitoring  The only indication for hospitalization would be pain control as well as infection  She is out of the 72-hour window for grafting, there is no eschar or nerve damage for this indication  Dressed with Silvadene gauze in clinic  Wash with lukewarm soapy water and new Silvadene dressing every other day  Wound care follow-up outpatient  Keflex prophylaxis for infection  Oxycodone for pain management as needed for severe pain that breaks through Tylenol or ibuprofen  Use caution due to sedation and constipation  ER if severe worsening pain redness, fever over 100, nausea vomiting    Follow up with primary care provider with any problems, questions or concerns or if symptoms worsen or fail to improve. Patient agreed to plan and verbalized understanding.    MARGARITA Sparks-BC  [facility]

## 2023-02-07 ENCOUNTER — TELEPHONE (OUTPATIENT)
Dept: WOUND CARE | Facility: CLINIC | Age: 78
End: 2023-02-07
Payer: COMMERCIAL

## 2023-02-07 NOTE — TELEPHONE ENCOUNTER
"Called and spoke with the patient about scheduling an appt with the Wound Care clinic per a referral placed by Georgina Reynaga NP  for \"Partial thickness burn of right forearm\". Pt declined scheduling at this time as the burn is healing well. She will call back to schedule if anything changes.  "

## 2023-03-07 DIAGNOSIS — I10 HYPERTENSION GOAL BP (BLOOD PRESSURE) < 140/90: ICD-10-CM

## 2023-03-07 RX ORDER — LISINOPRIL/HYDROCHLOROTHIAZIDE 10-12.5 MG
1 TABLET ORAL DAILY
Qty: 30 TABLET | Refills: 0 | Status: SHIPPED | OUTPATIENT
Start: 2023-03-07 | End: 2023-04-12

## 2023-03-07 NOTE — TELEPHONE ENCOUNTER
Patient states that her pharmacy has requested this medication and it's been 3 weeks told patient that we have no records that we received this request. Patient is out

## 2023-03-07 NOTE — TELEPHONE ENCOUNTER
Routing refill request to provider for review/approval because:  Blood pressure out of range per [facility] protocol  BP Readings from Last 3 Encounters:   01/24/23 (!) 182/71   12/13/22 137/74   10/20/22 132/79     Last visit: 12/5/22 virtual with MARIA ANTONIA TorresN RN  [facility]

## 2023-03-22 DIAGNOSIS — R25.1 TREMOR: ICD-10-CM

## 2023-03-22 NOTE — TELEPHONE ENCOUNTER
Pending Prescriptions:                       Disp   Refills    propranolol (INDERAL) 20 MG tablet        60 tab*8            Sig: Take 1 tablet (20 mg) by mouth 2 times daily as           needed (tremor)      Next Appt: 6/13/2022  Last Appt: 12/13/2022

## 2023-03-23 RX ORDER — PROPRANOLOL HYDROCHLORIDE 20 MG/1
20 TABLET ORAL 2 TIMES DAILY PRN
Qty: 60 TABLET | Refills: 8 | OUTPATIENT
Start: 2023-03-23

## 2023-05-15 ENCOUNTER — OFFICE VISIT (OUTPATIENT)
Dept: FAMILY MEDICINE | Facility: CLINIC | Age: 78
End: 2023-05-15
Payer: COMMERCIAL

## 2023-05-15 VITALS
SYSTOLIC BLOOD PRESSURE: 110 MMHG | HEART RATE: 68 BPM | DIASTOLIC BLOOD PRESSURE: 60 MMHG | WEIGHT: 105.3 LBS | BODY MASS INDEX: 18.66 KG/M2 | TEMPERATURE: 97.9 F | RESPIRATION RATE: 20 BRPM | OXYGEN SATURATION: 94 % | HEIGHT: 63 IN

## 2023-05-15 DIAGNOSIS — Z13.220 LIPID SCREENING: ICD-10-CM

## 2023-05-15 DIAGNOSIS — I10 HYPERTENSION GOAL BP (BLOOD PRESSURE) < 140/90: ICD-10-CM

## 2023-05-15 DIAGNOSIS — F32.0 MAJOR DEPRESSIVE DISORDER, SINGLE EPISODE, MILD (H): ICD-10-CM

## 2023-05-15 DIAGNOSIS — D33.3 ACOUSTIC NEUROMA (H): ICD-10-CM

## 2023-05-15 LAB
CREAT UR-MCNC: 69.8 MG/DL
ERYTHROCYTE [DISTWIDTH] IN BLOOD BY AUTOMATED COUNT: 12.8 % (ref 10–15)
HCT VFR BLD AUTO: 42.8 % (ref 35–47)
HGB BLD-MCNC: 13.8 G/DL (ref 11.7–15.7)
MCH RBC QN AUTO: 30.3 PG (ref 26.5–33)
MCHC RBC AUTO-ENTMCNC: 32.2 G/DL (ref 31.5–36.5)
MCV RBC AUTO: 94 FL (ref 78–100)
MICROALBUMIN UR-MCNC: <12 MG/L
MICROALBUMIN/CREAT UR: NORMAL MG/G{CREAT}
PLATELET # BLD AUTO: 216 10E3/UL (ref 150–450)
RBC # BLD AUTO: 4.55 10E6/UL (ref 3.8–5.2)
WBC # BLD AUTO: 6.8 10E3/UL (ref 4–11)

## 2023-05-15 PROCEDURE — 85027 COMPLETE CBC AUTOMATED: CPT | Performed by: FAMILY MEDICINE

## 2023-05-15 PROCEDURE — 82570 ASSAY OF URINE CREATININE: CPT | Performed by: FAMILY MEDICINE

## 2023-05-15 PROCEDURE — 80053 COMPREHEN METABOLIC PANEL: CPT | Performed by: FAMILY MEDICINE

## 2023-05-15 PROCEDURE — 80061 LIPID PANEL: CPT | Performed by: FAMILY MEDICINE

## 2023-05-15 PROCEDURE — 36415 COLL VENOUS BLD VENIPUNCTURE: CPT | Performed by: FAMILY MEDICINE

## 2023-05-15 PROCEDURE — 82043 UR ALBUMIN QUANTITATIVE: CPT | Performed by: FAMILY MEDICINE

## 2023-05-15 PROCEDURE — 84443 ASSAY THYROID STIM HORMONE: CPT | Performed by: FAMILY MEDICINE

## 2023-05-15 PROCEDURE — 99214 OFFICE O/P EST MOD 30 MIN: CPT | Performed by: FAMILY MEDICINE

## 2023-05-15 RX ORDER — LISINOPRIL/HYDROCHLOROTHIAZIDE 10-12.5 MG
1 TABLET ORAL DAILY
Qty: 90 TABLET | Refills: 0 | Status: SHIPPED | OUTPATIENT
Start: 2023-05-15 | End: 2023-08-14

## 2023-05-15 RX ORDER — ESCITALOPRAM OXALATE 10 MG/1
10 TABLET ORAL DAILY
Qty: 90 TABLET | Refills: 0 | Status: SHIPPED | OUTPATIENT
Start: 2023-05-15 | End: 2023-08-14

## 2023-05-15 ASSESSMENT — ENCOUNTER SYMPTOMS
CHILLS: 0
MYALGIAS: 0
HEMATURIA: 0
FEVER: 0
HEMATOCHEZIA: 0
NAUSEA: 0
WEAKNESS: 1
BREAST MASS: 0
DIZZINESS: 0
EYE PAIN: 0
JOINT SWELLING: 0
CONSTIPATION: 0
DYSURIA: 0
NERVOUS/ANXIOUS: 0
HEADACHES: 0
PALPITATIONS: 0
DIARRHEA: 0
FREQUENCY: 0
PARESTHESIAS: 0
ABDOMINAL PAIN: 0
SHORTNESS OF BREATH: 0
HEARTBURN: 0
SORE THROAT: 0
ARTHRALGIAS: 0
COUGH: 0

## 2023-05-15 ASSESSMENT — ACTIVITIES OF DAILY LIVING (ADL): CURRENT_FUNCTION: NO ASSISTANCE NEEDED

## 2023-05-15 ASSESSMENT — PATIENT HEALTH QUESTIONNAIRE - PHQ9
10. IF YOU CHECKED OFF ANY PROBLEMS, HOW DIFFICULT HAVE THESE PROBLEMS MADE IT FOR YOU TO DO YOUR WORK, TAKE CARE OF THINGS AT HOME, OR GET ALONG WITH OTHER PEOPLE: NOT DIFFICULT AT ALL
SUM OF ALL RESPONSES TO PHQ QUESTIONS 1-9: 0
SUM OF ALL RESPONSES TO PHQ QUESTIONS 1-9: 0

## 2023-05-15 ASSESSMENT — PAIN SCALES - GENERAL: PAINLEVEL: NO PAIN (0)

## 2023-05-15 NOTE — PROGRESS NOTES
Assessment & Plan     Acoustic neuroma  - Reviewed neurology note, gait 2/2 schwannoma and not parkinson's.   - We discussed that gait problems are likely related to schwannoma and advised to schedule with Dr. Hurley for further Schwannoma surveillance/managment. Given Dr. Hurley clinic number/address on AVS.     Major depressive disorder, single episode, mild  - refilled   - escitalopram (LEXAPRO) 10 MG tablet; Take 1 tablet (10 mg) by mouth daily  Dispense: 90 tablet; Refill: 0    Hypertension goal BP (blood pressure) < 140/90  - stable, refilled   - lisinopril-hydrochlorothiazide (ZESTORETIC) 10-12.5 MG tablet; Take 1 tablet by mouth daily  Dispense: 90 tablet; Refill: 0  - CBC with platelets; Future  - Comprehensive metabolic panel (BMP + Alb, Alk Phos, ALT, AST, Total. Bili, TP); Future  - TSH with free T4 reflex; Future  - Albumin Random Urine Quantitative with Creat Ratio; Future  - CBC with platelets  - Comprehensive metabolic panel (BMP + Alb, Alk Phos, ALT, AST, Total. Bili, TP)  - TSH with free T4 reflex  - Albumin Random Urine Quantitative with Creat Ratio    Lipid screening  - Lipid panel reflex to direct LDL Fasting; Future  - Lipid panel reflex to direct LDL Fasting        Valerio Mccain MD  [facility]    Clemente Cooley is a 77 year old, presenting for the following health issues:  Medicare Visit        5/15/2023     2:37 PM   Additional Questions   Roomed by Ranjana Peters   Accompanied by sister adan         5/15/2023     2:37 PM   Patient Reported Additional Medications   Patient reports taking the following new medications none     HPI       She has noticed some changes in walking. Last year she noticed when taking a walk, she noticed the changes in her gait. Her sister notes that her gait is more shuffling and more leaning towards the right side. She did fall a few weeks ago. She was trying to walk the dog, had fast shuffling and lost her balance and landed  "on her forehead. She didn't LOC. She had bleeding at the site on her forehead. No headaches.           Review of Systems         Objective    /60 (BP Location: Right arm, Patient Position: Sitting, Cuff Size: Adult Regular)   Pulse 68   Temp 97.9  F (36.6  C) (Temporal)   Resp 20   Ht 1.588 m (5' 2.5\")   Wt 47.8 kg (105 lb 4.8 oz)   LMP  (LMP Unknown)   SpO2 94%   BMI 18.95 kg/m    Body mass index is 18.95 kg/m .  Physical Exam                       "

## 2023-05-15 NOTE — TELEPHONE ENCOUNTER
Routing refill request to provider for review/approval because:  --Last two orders were catherine.  --Looks due to establish with new provider and due for annual preventive.    --No showed 4/12/23 and 5/12/23.  --No show or cancel 4/14/23.    --Scheduled for today with Kalyn.          --Last visit:  12/5/2022 virtual with Irene for acute care.    --Future Visit:   Next 5 appointments (look out 90 days)    May 15, 2023  2:30 PM  (Arrive by 2:10 PM)  Annual Wellness Visit with Valerio Mccain MD  [facility] [address]  [address]  [place] [id number] [phone]         --Last Written Prescription:     Disp Refills Start End NICOLE   lisinopril-hydrochlorothiazide (ZESTORETIC) 10-12.5 MG tablet 30 tablet 0 4/17/2023  No   Sig - Route: Take 1 tablet by mouth daily - Oral   Sent to pharmacy as: Lisinopril-hydroCHLOROthiazide 10-12.5 MG Oral Tablet (ZESTORETIC)   Class: E-Prescribe   Notes to Pharmacy: Catherine refill 2x. Scheduled annual visit.

## 2023-05-15 NOTE — LETTER
May 22, 2023      Lenora Hammonds  [address] [facility] [address]        Dear ,    We are writing to inform you of your test results.    Here are your results for your recent labs. They are reassuring and continue the current medication dose. Please call or message me if you have questions or concerns.     Resulted Orders   CBC with platelets   Result Value Ref Range    WBC Count 6.8 4.0 - 11.0 10e3/uL    RBC Count 4.55 3.80 - 5.20 10e6/uL    Hemoglobin 13.8 11.7 - 15.7 g/dL    Hematocrit 42.8 35.0 - 47.0 %    MCV 94 78 - 100 fL    MCH 30.3 26.5 - 33.0 pg    MCHC 32.2 31.5 - 36.5 g/dL    RDW 12.8 10.0 - 15.0 %    Platelet Count 216 150 - 450 10e3/uL   Comprehensive metabolic panel (BMP + Alb, Alk Phos, ALT, AST, Total. Bili, TP)   Result Value Ref Range    Sodium 144 136 - 145 mmol/L    Potassium 3.8 3.4 - 5.3 mmol/L    Chloride 102 98 - 107 mmol/L    Carbon Dioxide (CO2) 29 22 - 29 mmol/L    Anion Gap 13 7 - 15 mmol/L    Urea Nitrogen 37.8 (H) 8.0 - 23.0 mg/dL    Creatinine 0.71 0.51 - 0.95 mg/dL    Calcium 9.9 8.8 - 10.2 mg/dL    Glucose 98 70 - 99 mg/dL    Alkaline Phosphatase 76 35 - 104 U/L    AST 27 10 - 35 U/L    ALT 20 10 - 35 U/L    Protein Total 7.3 6.4 - 8.3 g/dL    Albumin 4.4 3.5 - 5.2 g/dL    Bilirubin Total 0.4 <=1.2 mg/dL    GFR Estimate 86 >60 mL/min/1.73m2      Comment:      eGFR calculated using 2021 CKD-EPI equation.   TSH with free T4 reflex   Result Value Ref Range    TSH 0.48 0.30 - 4.20 uIU/mL   Albumin Random Urine Quantitative with Creat Ratio   Result Value Ref Range    Creatinine Urine mg/dL 69.8 mg/dL      Comment:      The reference ranges have not been established in urine creatinine. The results should be integrated into the clinical context for interpretation.    Albumin Urine mg/L <12.0 mg/L      Comment:      The reference ranges have not been established in urine albumin. The results should be integrated into the clinical context for interpretation.    Albumin  Urine mg/g Cr        Comment:      Unable to calculate, urine albumin and/or urine creatinine is outside detectable limits.  Microalbuminuria is defined as an albumin:creatinine ratio of 17 to 299 for males and 25 to 299 for females. A ratio of albumin:creatinine of 300 or higher is indicative of overt proteinuria.  Due to biologic variability, positive results should be confirmed by a second, first-morning random or 24-hour timed urine specimen. If there is discrepancy, a third specimen is recommended. When 2 out of 3 results are in the microalbuminuria range, this is evidence for incipient nephropathy and warrants increased efforts at glucose control, blood pressure control, and institution of therapy with an angiotensin-converting-enzyme (ACE) inhibitor (if the patient can tolerate it).     Lipid panel reflex to direct LDL Fasting   Result Value Ref Range    Cholesterol 178 <200 mg/dL    Triglycerides 68 <150 mg/dL    Direct Measure HDL 61 >=50 mg/dL    LDL Cholesterol Calculated 103 (H) <=100 mg/dL    Non HDL Cholesterol 117 <130 mg/dL    Narrative    Cholesterol  Desirable:  <200 mg/dL    Triglycerides  Normal:  Less than 150 mg/dL  Borderline High:  150-199 mg/dL  High:  200-499 mg/dL  Very High:  Greater than or equal to 500 mg/dL    Direct Measure HDL  Female:  Greater than or equal to 50 mg/dL   Male:  Greater than or equal to 40 mg/dL    LDL Cholesterol  Desirable:  <100mg/dL  Above Desirable:  100-129 mg/dL   Borderline High:  130-159 mg/dL   High:  160-189 mg/dL   Very High:  >= 190 mg/dL    Non HDL Cholesterol  Desirable:  130 mg/dL  Above Desirable:  130-159 mg/dL  Borderline High:  160-189 mg/dL  High:  190-219 mg/dL  Very High:  Greater than or equal to 220 mg/dL       If you have any questions or concerns, please call the clinic at the number listed above.       Sincerely,      LILA/ Valerio Mccain MD

## 2023-05-15 NOTE — PATIENT INSTRUCTIONS
Douglas Hurley MD    [address] [address]    Phone: [phone]    Fax: [phone]

## 2023-05-16 LAB
ALBUMIN SERPL BCG-MCNC: 4.4 G/DL (ref 3.5–5.2)
ALP SERPL-CCNC: 76 U/L (ref 35–104)
ALT SERPL W P-5'-P-CCNC: 20 U/L (ref 10–35)
ANION GAP SERPL CALCULATED.3IONS-SCNC: 13 MMOL/L (ref 7–15)
AST SERPL W P-5'-P-CCNC: 27 U/L (ref 10–35)
BILIRUB SERPL-MCNC: 0.4 MG/DL
BUN SERPL-MCNC: 37.8 MG/DL (ref 8–23)
CALCIUM SERPL-MCNC: 9.9 MG/DL (ref 8.8–10.2)
CHLORIDE SERPL-SCNC: 102 MMOL/L (ref 98–107)
CHOLEST SERPL-MCNC: 178 MG/DL
CREAT SERPL-MCNC: 0.71 MG/DL (ref 0.51–0.95)
DEPRECATED HCO3 PLAS-SCNC: 29 MMOL/L (ref 22–29)
GFR SERPL CREATININE-BSD FRML MDRD: 86 ML/MIN/1.73M2
GLUCOSE SERPL-MCNC: 98 MG/DL (ref 70–99)
HDLC SERPL-MCNC: 61 MG/DL
LDLC SERPL CALC-MCNC: 103 MG/DL
NONHDLC SERPL-MCNC: 117 MG/DL
POTASSIUM SERPL-SCNC: 3.8 MMOL/L (ref 3.4–5.3)
PROT SERPL-MCNC: 7.3 G/DL (ref 6.4–8.3)
SODIUM SERPL-SCNC: 144 MMOL/L (ref 136–145)
TRIGL SERPL-MCNC: 68 MG/DL
TSH SERPL DL<=0.005 MIU/L-ACNC: 0.48 UIU/ML (ref 0.3–4.2)

## 2023-05-17 RX ORDER — LISINOPRIL/HYDROCHLOROTHIAZIDE 10-12.5 MG
1 TABLET ORAL DAILY
Qty: 30 TABLET | Refills: 0 | OUTPATIENT
Start: 2023-05-17

## 2023-07-11 ENCOUNTER — TRANSFERRED RECORDS (OUTPATIENT)
Dept: HEALTH INFORMATION MANAGEMENT | Facility: CLINIC | Age: 78
End: 2023-07-11
Payer: COMMERCIAL

## 2023-08-14 ENCOUNTER — OFFICE VISIT (OUTPATIENT)
Dept: FAMILY MEDICINE | Facility: CLINIC | Age: 78
End: 2023-08-14
Payer: COMMERCIAL

## 2023-08-14 VITALS
HEIGHT: 63 IN | TEMPERATURE: 97.7 F | RESPIRATION RATE: 17 BRPM | WEIGHT: 108.6 LBS | HEART RATE: 65 BPM | BODY MASS INDEX: 19.24 KG/M2 | OXYGEN SATURATION: 99 % | DIASTOLIC BLOOD PRESSURE: 60 MMHG | SYSTOLIC BLOOD PRESSURE: 104 MMHG

## 2023-08-14 DIAGNOSIS — F32.0 MAJOR DEPRESSIVE DISORDER, SINGLE EPISODE, MILD (H): Primary | ICD-10-CM

## 2023-08-14 DIAGNOSIS — I10 HYPERTENSION GOAL BP (BLOOD PRESSURE) < 140/90: ICD-10-CM

## 2023-08-14 DIAGNOSIS — R26.89 BALANCE PROBLEMS: ICD-10-CM

## 2023-08-14 PROCEDURE — 99214 OFFICE O/P EST MOD 30 MIN: CPT | Performed by: FAMILY MEDICINE

## 2023-08-14 RX ORDER — LISINOPRIL/HYDROCHLOROTHIAZIDE 10-12.5 MG
1 TABLET ORAL DAILY
Qty: 90 TABLET | Refills: 3 | Status: SHIPPED | OUTPATIENT
Start: 2023-08-14 | End: 2024-03-15

## 2023-08-14 RX ORDER — ESCITALOPRAM OXALATE 10 MG/1
10 TABLET ORAL DAILY
Qty: 90 TABLET | Refills: 3 | Status: SHIPPED | OUTPATIENT
Start: 2023-08-14 | End: 2024-03-15

## 2023-08-14 ASSESSMENT — PAIN SCALES - GENERAL: PAINLEVEL: NO PAIN (0)

## 2023-08-14 NOTE — PROGRESS NOTES
"  Assessment & Plan     Major depressive disorder, single episode, mild  - escitalopram (LEXAPRO) 10 MG tablet; Take 1 tablet (10 mg) by mouth daily  Dispense: 90 tablet; Refill: 3    Hypertension goal BP (blood pressure) < 140/90  - lisinopril-hydrochlorothiazide (ZESTORETIC) 10-12.5 MG tablet; Take 1 tablet by mouth daily  Dispense: 90 tablet; Refill: 3    Balance problems  - Physical Therapy Referral; Future  - PRIMARY CARE FOLLOW-UP SCHEDULING; Future     Valerio Mccain MD  [facility]    Clemente Cooley is a 77 year old, presenting for the following health issues:  Recheck Medication and Weight Check        8/14/2023    10:05 AM   Additional Questions   Roomed by Sean CASANOVA RN       History of Present Illness       Reason for visit:  Prescrip refill    She eats 0-1 servings of fruits and vegetables daily.She consumes 0 sweetened beverage(s) daily.She exercises with enough effort to increase her heart rate 9 or less minutes per day.  She exercises with enough effort to increase her heart rate 3 or less days per week.   She is taking medications regularly.     She continues to have gait problems.     She feels that walker doesn't help. She doesn't use any devices to help with walking.     She has had one fall over the last 3 months. She was walking down the stairs and unsure what happened. No LOC or injuries from the fall.           Review of Systems         Objective    /60 (BP Location: Right arm, Patient Position: Sitting, Cuff Size: Adult Regular)   Pulse 65   Temp 97.7  F (36.5  C) (Temporal)   Resp 17   Ht 1.588 m (5' 2.5\")   Wt 49.3 kg (108 lb 9.6 oz)   LMP  (LMP Unknown)   SpO2 99%   BMI 19.55 kg/m    Body mass index is 19.55 kg/m .  Physical Exam                         "

## 2023-09-12 NOTE — PROGRESS NOTES
PHYSICAL THERAPY EVALUATION  Type of Visit: Evaluation    See electronic medical record for Abuse and Falls Screening details.    Subjective   Presenting condition or subjective complaint:    Date of onset:      Lenora Hammonds is a 77 year old female with a balance and gait condition. 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 Patient has walker but even with a lot of practice still does not feel comfortable - moves too fast with 4 wheels. Where: (home, work, MVA, community, recreation/sport, unknown, other): NA. Location of symptoms: no complaints of pain. Pain level on number scale: NA/10. Quality of pain: NA. Associated symptoms: weakness, poor balance. Pain frequency (constant/intermittent): constant. Symptoms are exacerbated by: walking, standing, sit<>stand, in<>out of bed. Symptoms are relieved by: Nothing. Progression of symptoms since onset (same/better/worse): same. Special tests (x-ray, MRI, CT scan, EMG, bone scan): None. Previous treatment: No recent treatment. Improvement with previous treatment: NA. General health as reported by patient is good. 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 Medical allergies includes: see Epic. Surgical history includes: see Epic. Current medications include: see Epic. Occupation: retired . Patient is (working in normal job without restrictions/working in normal job with restrictions/working in an alternate job/not working due to present treatment problem): NA. Primary job tasks: NA. Barriers at home/work: None reported by patient. Red flags: None reported by patient.     Objective   Gait:  Flexed posture, slow, shuffling gait pattern, short step and stride length    Bilateral hip and knee AROM WFL    Weak proximal hip musculature    Knee Strength (* = pain)  Right Left   FL 4/5 4/5   EXT 4/5 4/5     Unable to single leg stance on either leg for more than 3 seconds    Assessment & Plan   CLINICAL IMPRESSIONS  Medical Diagnosis: Balance problems    Treatment Diagnosis: Balance problems   Impression/Assessment: Patient is a 77 year old female with balance complaints.  The following significant findings have been identified: Decreased strength, Impaired balance, Decreased proprioception, Impaired gait, Impaired muscle performance, Decreased activity tolerance, and Impaired posture. These impairments interfere with their ability to perform self care tasks, recreational activities, household chores, household mobility, and community mobility as compared to previous level of function.     Clinical Decision Making (Complexity):  Clinical Presentation: Stable/Uncomplicated  Clinical Presentation Rationale: based on medical and personal factors listed in PT evaluation  Clinical Decision Making (Complexity): Low complexity    PLAN OF CARE  Treatment Interventions:  Interventions: Gait Training, Manual Therapy, Neuromuscular Re-education, Therapeutic Activity, Therapeutic Exercise, Self-Care/Home Management    Long Term Goals            Frequency of Treatment: 1x per week  Duration of Treatment: for 4 weeks tapering down to 2x per month for 8 weeks    Recommended Referrals to Other Professionals:  None  Education Assessment:        Risks and benefits of evaluation/treatment have been explained.   Patient/Family/caregiver agrees with Plan of Care.     Evaluation Time:         Signing Clinician: Yanick Palma, PT      [facility]                                                                                   OUTPATIENT PHYSICAL THERAPY      PLAN OF TREATMENT FOR OUTPATIENT REHABILITATION   Patient's Last Name, First Name, Lenora Stuart YOB: 1945   Provider's Name   [facility]   Medical Record  No.  [id number]     Onset Date:    Start of Care Date: 09/13/23     Medical Diagnosis:  Balance problems      PT Treatment Diagnosis:  Balance problems Plan of Treatment  Frequency/Duration: 1x per week/ for 4 weeks tapering down to 2x per month for 8 weeks    Certification date from 09/13/23 to 12/06/23         See note for plan of treatment details and functional goals     Yanick Palma, PT                         I CERTIFY THE NEED FOR THESE SERVICES FURNISHED UNDER        THIS PLAN OF TREATMENT AND WHILE UNDER MY CARE     (Physician attestation of this document indicates review and certification of the therapy plan).                Referring Provider:  Valerio Mccain      Initial Assessment  See Epic Evaluation- Start of Care Date: 09/13/23

## 2023-09-13 ENCOUNTER — THERAPY VISIT (OUTPATIENT)
Dept: PHYSICAL THERAPY | Facility: CLINIC | Age: 78
End: 2023-09-13
Attending: FAMILY MEDICINE
Payer: COMMERCIAL

## 2023-09-13 DIAGNOSIS — R26.89 BALANCE PROBLEMS: ICD-10-CM

## 2023-09-13 PROCEDURE — 97161 PT EVAL LOW COMPLEX 20 MIN: CPT | Mod: GP | Performed by: PHYSICAL THERAPIST

## 2023-09-13 PROCEDURE — 97110 THERAPEUTIC EXERCISES: CPT | Mod: GP | Performed by: PHYSICAL THERAPIST

## 2023-10-27 ENCOUNTER — TELEPHONE (OUTPATIENT)
Dept: NEUROLOGY | Facility: CLINIC | Age: 78
End: 2023-10-27
Payer: COMMERCIAL

## 2024-01-09 DIAGNOSIS — I10 HYPERTENSION GOAL BP (BLOOD PRESSURE) < 140/90: ICD-10-CM

## 2024-01-09 RX ORDER — LISINOPRIL/HYDROCHLOROTHIAZIDE 10-12.5 MG
1 TABLET ORAL DAILY
Qty: 90 TABLET | Refills: 3 | OUTPATIENT
Start: 2024-01-09

## 2024-01-20 ENCOUNTER — NURSE TRIAGE (OUTPATIENT)
Dept: NURSING | Facility: CLINIC | Age: 79
End: 2024-01-20
Payer: COMMERCIAL

## 2024-01-20 NOTE — TELEPHONE ENCOUNTER
Nurse Triage SBAR    Is this a 2nd Level Triage? NO    Situation: Patient calling about her requested refill   Consent: not needed    Background:     Disp Refills Start End NICOLE    lisinopril-hydrochlorothiazide (ZESTORETIC) 10-12.5 MG tablet 90 tablet 3 8/14/2023 -- No   Sig - Route: Take 1 tablet by mouth daily - Oral   Sent to pharmacy as: Lisinopril-hydroCHLOROthiazide 10-12.5 MG Oral Tablet (ZESTORETIC)   Class: E-Prescribe   Order: [id number]   E-Prescribing Status: Receipt confirmed by pharmacy (8/14/2023 10:36 AM CDT       Assessment: Patient calling about above prescription- indicates she has been trying to get it refilled  Advised that there should be refills on file for her at her pharmacy that were sent when she had her last physical    Protocol Recommended Disposition:       Recommendation: Advised patient call her pharmacy and check on remaining refills. Patient is agreeable to do so. Declines additional question .      Home care    Does the patient meet one of the following criteria for ADS visit consideration? 16+ years old, with an [facility] PCP     TIP  Providers, please consider if this condition is appropriate for management at one of our Acute and Diagnostic Services sites.     If patient is a good candidate, please use dotphrase <dot>triageresponse and select Refer to ADS to document.      Mary Ann Gomez RN 1:58 PM 1/20/2024  Reason for Disposition   Patient has refills remaining on their prescription    Additional Information   Negative: New-onset or worsening symptoms, see that guideline (e.g., diarrhea, runny nose, sore throat)   Negative: Medicine question not related to refill or renewal   Negative: Caller (e.g., patient or pharmacist) requesting information about a new medicine   Negative: Caller requesting information unrelated to medicine   Negative: [1] Prescription refill request for ESSENTIAL medicine (i.e., likelihood of harm to patient if not taken) AND [2] triager unable to refill  per department policy   Negative: [1] Prescription not at pharmacy AND [2] was prescribed by PCP recently  (Exception: Triager has access to EMR and prescription is recorded there. Go to Home Care and confirm for pharmacy.)   Negative: [1] Pharmacy calling with prescription questions AND [2] triager unable to answer question   Negative: Prescription request for new medicine (not a refill)   Negative: Caller requesting a CONTROLLED substance prescription refill (e.g., narcotics, ADHD medicines)   Negative: [1] Prescription refill request for NON-ESSENTIAL medicine (i.e., no harm to patient if med not taken) AND [2] triager unable to refill per department policy   Negative: [1] Caller has NON-URGENT medicine question about med that PCP prescribed AND [2] triager unable to answer question   Negative: [1] Prescription prescribed recently is not at pharmacy AND [2] triager has access to patient's EMR AND [3] prescription is recorded in the EMR   Negative: [1] Caller requesting a prescription renewal (no refills left), no triage required, AND [2] triager able to renew prescription per department policy    Protocols used: Medication Refill and Renewal Call-A-

## 2024-01-29 DIAGNOSIS — R25.1 TREMOR: ICD-10-CM

## 2024-01-29 NOTE — TELEPHONE ENCOUNTER
Medication Question or Refill    Contacts         Type Contact Phone/Fax    01/29/2024 02:43 PM CST Phone (Incoming) Lenora Hammonds (Self) [phone] (M)            What medication are you calling about (include dose and sig)?: propranolol ER (INDERAL LA) 60 MG 24 hr capsule     Preferred Pharmacy:  [place] [address] [place] [address] [[id number]]       Controlled Substance Agreement on file:   CSA -- Patient Level:    CSA: None found at the patient level.       Who prescribed the medication?: PCP    Do you need a refill? Yes    When did you use the medication last? A couple of weeks ago     Patient offered an appointment? Yes:  Pt is scheduled for 02/28/24    Do you have any questions or concerns?  No      Okay to leave a detailed message?: Yes at Cell number on file:    Telephone Information:   Mobile [phone]

## 2024-01-31 RX ORDER — PROPRANOLOL HCL 60 MG
60 CAPSULE, EXTENDED RELEASE 24HR ORAL DAILY
Qty: 90 CAPSULE | Refills: 2 | Status: SHIPPED | OUTPATIENT
Start: 2024-01-31 | End: 2024-02-22

## 2024-02-20 NOTE — PROGRESS NOTES
[facility]  Section of General Neurology  Return Patient Visit    Lenora Hammonds MRN# [id number]   Age: 78 year old YOB: 1945          Assessment and Plan:   Assessment:  Lenora Hammonds is a pleasant 78 year old female who presents in follow up for tremor and balance problems. 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   Tremor remains similar though she ran out of her propranolol 60 mgs and is just using her 20 mg BID right now.   She does still think she derives benefit from the propranolol in general and would like to continue it.    Toe exam she does have a tad shuffling gait but no clear cardinal features of Parkinsonsism.  Tremor remains action tremor/bilateral, tone is normal, no bradykinesia.    As noted I do think her schwannoma is the karma of her balance issues.  Encouraged PT to discuss fall prevention, home exercises to do and if she might need any adaptive equipment to prevent falls.       Plan:  Encouraged to google/look up essential tremor cutlery /pens ideas online as heavier objects are easier for those with tremor to hold  Propranolol 60 mg daily + up to 2 20 mg tabs on tough days  Serial imaging of Schwannoma per her neurosurgery team  Next options:   Going higher on propranolol pending blood pressure  Gabapentin (can help pain and tremor)  Follow up in 6 months sooner with any issues questions or changes       Alexander Whitney MD   of Neurology   [facility]      Interval history:     Son, Mahad--here again  Tremor--propranolol helping at times, ran out  Reviewed previous imaging, they have a plan for serial imaging  Walking --still a problem.  Balance an issue  Tumor slightly increased in size but no interval.    No longer doing PT      A/P at last visit (2022)  Lenora Hammonds is a pleasant 76 year old female who  presents in follow up for tremor and balance problems. 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  We reviewed this imaging as below.  She is continuing to do home PT exercises.  Discussed I would strongly consider an assistive device to prevent falls/as a precaution.       Regarding her tremor:  Still seems to be most representative of essential tremor.  She still has no clear signs of parkinsonism to history or exam.  She did respond and is improved with propranolol as below.  Discussed addition of PRN dosing, things to look out for (lightheadedness e.g.) with such doses.     Plan:  --Continue propranolol 60 mg daily, will add in 10-20 mg PRN tablets for bad tremor days/before fine motor tasks  --Continue home PT exercises for leg strengthening, fall prevention.    --Would schedule a follow up with Dr. Hurley for further Schwannoma surveillance/managment   --Follow up in 6 months, she will reach out with questions in the meanwhile.         Past Medical History:     Patient Active Problem List   Diagnosis    Tremor    CARDIOVASCULAR SCREENING; LDL GOAL LESS THAN 160    Mild major depression (H)    Dyspepsia    Wears glasses    Urinary incontinence    Snores    Histoplasmosis    Advanced directives, counseling/discussion    Neoplasm of uncertain behavior of skin    Epigastric pain    Benign neoplasm of cranial nerve (H)    Acoustic neuroma (H)    Hypertension goal BP (blood pressure) < 140/90    Acute bilateral low back pain without sciatica    Osteopenia    Pulmonary nodule, right    Stiffness of right shoulder joint    Shoulder pain    Cyst of left kidney    Major depressive disorder, recurrent episode, in full remission (H24)    Balance problems     Past Medical History:   Diagnosis Date    Acoustic neuroma (H)     left    Depressive disorder     histoplasmosis     Histoplasmosis 10/6/2011    Hypertension      Pancolitis (H) 2017    Shaking     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    Snores 10/6/2011        Past Surgical History:     Past Surgical History:   Procedure Laterality Date    COLONOSCOPY N/A 2017    Procedure: COMBINED COLONOSCOPY, SINGLE OR MULTIPLE BIOPSY/POLYPECTOMY BY BIOPSY;  colonoscopy;  Surgeon: Moise Vásquez MD;  Location:  GI    COLONOSCOPY N/A 2018    Procedure: COMBINED COLONOSCOPY, SINGLE OR MULTIPLE BIOPSY/POLYPECTOMY BY BIOPSY;  COLONOSCOPY;  Surgeon: Moise Vásquez MD;  Location:  GI    THORACIC SURGERY      TONSILLECTOMY      ZZC THORACOTOMY,MAJOR,EXPLOR/BIOPSY      histoplasmosis, right lower lobe        Social History:     Social History     Tobacco Use    Smoking status: Former     Packs/day: 0.50     Years: 50.00     Additional pack years: 0.00     Total pack years: 25.00     Types: Cigarettes     Quit date:      Years since quitting: 10.     Passive exposure: Past    Smokeless tobacco: Never    Tobacco comments:     quit 2014   Vaping Use    Vaping Use: Never used   Substance Use Topics    Alcohol use: Yes     Comment: occasionally     Drug use: No        Family History:     Family History   Problem Relation Age of Onset    Hypertension Mother          at age 84    Neurologic Disorder Mother         ?parkinsonism - she had a tremor; walked normally. voice was soft    Cancer Father         brain tumor;  at age 62 y rs    Other - See Comments Sister         Homicide    Cancer Brother         Bladder    Breast Cancer Sister         Medications:     Current Outpatient Medications   Medication Sig    acetaminophen (TYLENOL) 500 MG tablet Take 1-2 tablets (500-1,000 mg) by mouth every 6 hours as needed for mild pain    albuterol (PROAIR HFA/PROVENTIL HFA/VENTOLIN HFA) 108 (90 Base) MCG/ACT inhaler Inhale 1-2 puffs into the lungs every 4 hours as needed for shortness of breath / dyspnea or wheezing    escitalopram (LEXAPRO) 10 MG tablet  "Take 1 tablet (10 mg) by mouth daily    lisinopril-hydrochlorothiazide (ZESTORETIC) 10-12.5 MG tablet Take 1 tablet by mouth daily    oxyCODONE (ROXICODONE) 5 MG tablet Take 1 tablet (5 mg) by mouth every 6 hours as needed for pain (Patient not taking: Reported on 5/15/2023)    propranolol (INDERAL) 20 MG tablet Take 1 tablet (20 mg) by mouth 2 times daily as needed (tremor)    propranolol ER (INDERAL LA) 60 MG 24 hr capsule Take 1 capsule (60 mg) by mouth daily     No current facility-administered medications for this visit.        Allergies:     Allergies   Allergen Reactions    Rosuvastatin Hives, Itching and Rash          Physical Exam:   Vitals: /71   Pulse 92   Ht 1.626 m (5' 4\")   Wt 49 kg (108 lb)   LMP  (LMP Unknown)   BMI 18.54 kg/m     Neuro:   General Appearance: No apparent distress, well-nourished, well-groomed, pleasant      Mental Status: Alert and oriented to person, place, and time. Speech fluent and comprehension intact. No dysarthria.      Cranial Nerves:   II: Visual fields: normal  III: Pupils: 3 mm, equal, round, reactive to light   III,IV,VI: Extraocular Movements: intact   VII: Facial strength: intact without asymmetry  VIII: Hearing: intact grossly        Motor Exam:   5/5 diffusely. Tone remains normal throughout.     Bilateral low amplitude high frequency tremor worse with terminal movement not present at rest   Mouth tremor notable as well     Sensory: intact to light touch     Coordination: no dysmetria with finger-to-nose bilaterally     Reflexes: biceps, triceps, brachioradialis, patellar, and ankle jerks 2+ and symmetric    Gait:     A tad shuffling, decent arm swing.  Slow, deliberate.  She is worried she may veer at times.  I think is confounded by her vestibular schwannoma, discussed.          Data: Pertinent prior to visit   Imaging:  Brain MRI without and with intravenous contrast primarily for the  purposes of stereotactic evaluation     History: Benign neoplasm of " cranial nerve (H)  ICD-10: Benign neoplasm of cranial nerve (H)     Comparison: 3/26/2014 dated MR     Technique: MR imaging performed with 3-dimensonally acquired axial  T1-weighted sequences performed with intravenous contrast.     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 No significant  change in 5 mm right frontal parafalcine meningioma. No extra-axial  collection or midline shift. The major intracranial arterial  structures are grossly patent.                                                                      Impression: Increase in the size of presumed left vestibular  schwannoma since 3/26/2014. Limited imaging primarily for the purposes  of stereotactic localization.                 The total time of this encounter today amounted to 32 minutes. This time included time spent with the patient, prep work, ordering tests, and performing post visit documentation.    The longitudinal plan of care for essential tremor was addressed during this visit. Due to the added complexity in care, I will continue to support Ms. Kemarradha in the subsequent management of this condition(s) and with the ongoing continuity of care of this condition(s).

## 2024-02-22 ENCOUNTER — OFFICE VISIT (OUTPATIENT)
Dept: NEUROLOGY | Facility: CLINIC | Age: 79
End: 2024-02-22
Payer: COMMERCIAL

## 2024-02-22 VITALS
BODY MASS INDEX: 18.44 KG/M2 | HEART RATE: 92 BPM | HEIGHT: 64 IN | SYSTOLIC BLOOD PRESSURE: 118 MMHG | WEIGHT: 108 LBS | DIASTOLIC BLOOD PRESSURE: 71 MMHG

## 2024-02-22 DIAGNOSIS — R25.1 TREMOR: Primary | ICD-10-CM

## 2024-02-22 DIAGNOSIS — R26.89 BALANCE PROBLEMS: ICD-10-CM

## 2024-02-22 PROCEDURE — 99214 OFFICE O/P EST MOD 30 MIN: CPT | Performed by: STUDENT IN AN ORGANIZED HEALTH CARE EDUCATION/TRAINING PROGRAM

## 2024-02-22 PROCEDURE — G2211 COMPLEX E/M VISIT ADD ON: HCPCS | Performed by: STUDENT IN AN ORGANIZED HEALTH CARE EDUCATION/TRAINING PROGRAM

## 2024-02-22 RX ORDER — PROPRANOLOL HCL 60 MG
60 CAPSULE, EXTENDED RELEASE 24HR ORAL DAILY
Qty: 90 CAPSULE | Refills: 3 | Status: SHIPPED | OUTPATIENT
Start: 2024-02-22

## 2024-02-22 RX ORDER — PROPRANOLOL HYDROCHLORIDE 20 MG/1
20 TABLET ORAL 2 TIMES DAILY PRN
Qty: 180 TABLET | Refills: 3 | Status: SHIPPED | OUTPATIENT
Start: 2024-02-22

## 2024-02-22 NOTE — PATIENT INSTRUCTIONS
Google essential tremor cutlery /pens  Propranolol 60 mg daily + up to 2 20 mg tabs on tough days  Next options: Going higher on propranolol pending blood pressure  Gabapentin can help pain and tremor

## 2024-02-22 NOTE — NURSING NOTE
"Lenroa Hammonds's goals for this visit include:   Chief Complaint   Patient presents with    RECHECK     Tremor follow up       She requests these members of her care team be copied on today's visit information: yes    PCP: Valerio Mccain Y    Referring Provider:  Guevara Whitney MD  [address] [place] [id number]    /71   Pulse 92   Ht 1.626 m (5' 4\")   Wt 49 kg (108 lb)   LMP  (LMP Unknown)   BMI 18.54 kg/m      Do you need any medication refills at today's visit? Yes  Propranolol  DOMONIQUE El, NINI (AAMA)      "

## 2024-02-22 NOTE — LETTER
2/22/2024         RE: Lenora Hammonds  [address] [facility] [facility] [address]        Dear Colleague,    Thank you for referring your patient, Lenora Hammonds, to the [facility] [place]. Please see a copy of my visit note below.    [facility]  Section of General Neurology  Return Patient Visit    Lenora Hammonds MRN# [id number]   Age: 78 year old YOB: 1945          Assessment and Plan:   Assessment:  Lenora Hammonds is a pleasant 78 year old female who presents in follow up for tremor and balance problems. 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   Tremor remains similar though she ran out of her propranolol 60 mgs and is just using her 20 mg BID right now.   She does still think she derives benefit from the propranolol in general and would like to continue it.    Toe exam she does have a tad shuffling gait but no clear cardinal features of Parkinsonsism.  Tremor remains action tremor/bilateral, tone is normal, no bradykinesia.    As noted I do think her schwannoma is the karma of her balance issues.  Encouraged PT to discuss fall prevention, home exercises to do and if she might need any adaptive equipment to prevent falls.       Plan:  Encouraged to google/look up essential tremor cutlery /pens ideas online as heavier objects are easier for those with tremor to hold  Propranolol 60 mg daily + up to 2 20 mg tabs on tough days  Serial imaging of Schwannoma per her neurosurgery team  Next options:   Going higher on propranolol pending blood pressure  Gabapentin (can help pain and tremor)  Follow up in 6 months sooner with any issues questions or changes       Alexander Whitney MD   of Neurology   [facility]      Interval history:     Son, Mahad--here again  Tremor--propranolol helping at times, ran  out  Reviewed previous imaging, they have a plan for serial imaging  Walking --still a problem.  Balance an issue  Tumor slightly increased in size but no interval.    No longer doing PT      A/P at last visit (2022)  Lenora Hammonds is a pleasant 76 year old female who presents in follow up for tremor and balance problems. 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  We reviewed this imaging as below.  She is continuing to do home PT exercises.  Discussed I would strongly consider an assistive device to prevent falls/as a precaution.       Regarding her tremor:  Still seems to be most representative of essential tremor.  She still has no clear signs of parkinsonism to history or exam.  She did respond and is improved with propranolol as below.  Discussed addition of PRN dosing, things to look out for (lightheadedness e.g.) with such doses.     Plan:  --Continue propranolol 60 mg daily, will add in 10-20 mg PRN tablets for bad tremor days/before fine motor tasks  --Continue home PT exercises for leg strengthening, fall prevention.    --Would schedule a follow up with Dr. Hurley for further Schwannoma surveillance/managment   --Follow up in 6 months, she will reach out with questions in the meanwhile.         Past Medical History:     Patient Active Problem List   Diagnosis     Tremor     CARDIOVASCULAR SCREENING; LDL GOAL LESS THAN 160     Mild major depression (H)     Dyspepsia     Wears glasses     Urinary incontinence     Snores     Histoplasmosis     Advanced directives, counseling/discussion     Neoplasm of uncertain behavior of skin     Epigastric pain     Benign neoplasm of cranial nerve (H)     Acoustic neuroma (H)     Hypertension goal BP (blood pressure) < 140/90     Acute bilateral low back pain without sciatica     Osteopenia     Pulmonary nodule, right     Stiffness of right shoulder joint     Shoulder  pain     Cyst of left kidney     Major depressive disorder, recurrent episode, in full remission (H24)     Balance problems     Past Medical History:   Diagnosis Date     Acoustic neuroma (H)     left     Depressive disorder      histoplasmosis      Histoplasmosis 10/6/2011     Hypertension      Pancolitis (H) 2017     Shaking     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     Snores 10/6/2011        Past Surgical History:     Past Surgical History:   Procedure Laterality Date     COLONOSCOPY N/A 2017    Procedure: COMBINED COLONOSCOPY, SINGLE OR MULTIPLE BIOPSY/POLYPECTOMY BY BIOPSY;  colonoscopy;  Surgeon: Moise Vásquez MD;  Location:  GI     COLONOSCOPY N/A 2018    Procedure: COMBINED COLONOSCOPY, SINGLE OR MULTIPLE BIOPSY/POLYPECTOMY BY BIOPSY;  COLONOSCOPY;  Surgeon: Moise Vásquez MD;  Location:  GI     THORACIC SURGERY       TONSILLECTOMY       ZZC THORACOTOMY,MAJOR,EXPLOR/BIOPSY  1983    histoplasmosis, right lower lobe        Social History:     Social History     Tobacco Use     Smoking status: Former     Packs/day: 0.50     Years: 50.00     Additional pack years: 0.00     Total pack years: 25.00     Types: Cigarettes     Quit date:      Years since quitting: 10.1     Passive exposure: Past     Smokeless tobacco: Never     Tobacco comments:     quit 2014   Vaping Use     Vaping Use: Never used   Substance Use Topics     Alcohol use: Yes     Comment: occasionally      Drug use: No        Family History:     Family History   Problem Relation Age of Onset     Hypertension Mother          at age 84     Neurologic Disorder Mother         ?parkinsonism - she had a tremor; walked normally. voice was soft     Cancer Father         brain tumor;  at age 62 y rs     Other - See Comments Sister         Homicide     Cancer Brother         Bladder     Breast Cancer Sister         Medications:     Current Outpatient Medications   Medication Sig     acetaminophen (TYLENOL) 500  "MG tablet Take 1-2 tablets (500-1,000 mg) by mouth every 6 hours as needed for mild pain     albuterol (PROAIR HFA/PROVENTIL HFA/VENTOLIN HFA) 108 (90 Base) MCG/ACT inhaler Inhale 1-2 puffs into the lungs every 4 hours as needed for shortness of breath / dyspnea or wheezing     escitalopram (LEXAPRO) 10 MG tablet Take 1 tablet (10 mg) by mouth daily     lisinopril-hydrochlorothiazide (ZESTORETIC) 10-12.5 MG tablet Take 1 tablet by mouth daily     oxyCODONE (ROXICODONE) 5 MG tablet Take 1 tablet (5 mg) by mouth every 6 hours as needed for pain (Patient not taking: Reported on 5/15/2023)     propranolol (INDERAL) 20 MG tablet Take 1 tablet (20 mg) by mouth 2 times daily as needed (tremor)     propranolol ER (INDERAL LA) 60 MG 24 hr capsule Take 1 capsule (60 mg) by mouth daily     No current facility-administered medications for this visit.        Allergies:     Allergies   Allergen Reactions     Rosuvastatin Hives, Itching and Rash          Physical Exam:   Vitals: /71   Pulse 92   Ht 1.626 m (5' 4\")   Wt 49 kg (108 lb)   LMP  (LMP Unknown)   BMI 18.54 kg/m     Neuro:   General Appearance: No apparent distress, well-nourished, well-groomed, pleasant      Mental Status: Alert and oriented to person, place, and time. Speech fluent and comprehension intact. No dysarthria.      Cranial Nerves:   II: Visual fields: normal  III: Pupils: 3 mm, equal, round, reactive to light   III,IV,VI: Extraocular Movements: intact   VII: Facial strength: intact without asymmetry  VIII: Hearing: intact grossly        Motor Exam:   5/5 diffusely. Tone remains normal throughout.     Bilateral low amplitude high frequency tremor worse with terminal movement not present at rest   Mouth tremor notable as well     Sensory: intact to light touch     Coordination: no dysmetria with finger-to-nose bilaterally     Reflexes: biceps, triceps, brachioradialis, patellar, and ankle jerks 2+ and symmetric    Gait:     A tad shuffling, decent " arm swing.  Slow, deliberate.  She is worried she may veer at times.  I think is confounded by her vestibular schwannoma, discussed.          Data: Pertinent prior to visit   Imaging:  Brain MRI without and with intravenous contrast primarily for the  purposes of stereotactic evaluation     History: Benign neoplasm of cranial nerve (H)  ICD-10: Benign neoplasm of cranial nerve (H)     Comparison: 3/26/2014 dated MR     Technique: MR imaging performed with 3-dimensonally acquired axial  T1-weighted sequences performed with intravenous contrast.     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 No significant  change in 5 mm right frontal parafalcine meningioma. No extra-axial  collection or midline shift. The major intracranial arterial  structures are grossly patent.                                                                      Impression: Increase in the size of presumed left vestibular  schwannoma since 3/26/2014. Limited imaging primarily for the purposes  of stereotactic localization.                 The total time of this encounter today amounted to 32 minutes. This time included time spent with the patient, prep work, ordering tests, and performing post visit documentation.    The longitudinal plan of care for essential tremor was addressed during this visit. Due to the added complexity in care, I will continue to support Ms. Hammonds in the subsequent management of this condition(s) and with the ongoing continuity of care of this condition(s).      Again, thank you for allowing me to participate in the care of your patient.        Sincerely,        Guevara Whitney MD

## 2024-03-15 ENCOUNTER — OFFICE VISIT (OUTPATIENT)
Dept: FAMILY MEDICINE | Facility: CLINIC | Age: 79
End: 2024-03-15
Payer: COMMERCIAL

## 2024-03-15 VITALS
DIASTOLIC BLOOD PRESSURE: 77 MMHG | BODY MASS INDEX: 18.96 KG/M2 | WEIGHT: 107 LBS | HEIGHT: 63 IN | OXYGEN SATURATION: 98 % | TEMPERATURE: 98 F | RESPIRATION RATE: 15 BRPM | SYSTOLIC BLOOD PRESSURE: 132 MMHG | HEART RATE: 66 BPM

## 2024-03-15 DIAGNOSIS — F32.0 MAJOR DEPRESSIVE DISORDER, SINGLE EPISODE, MILD (H): ICD-10-CM

## 2024-03-15 DIAGNOSIS — H61.23 BILATERAL IMPACTED CERUMEN: ICD-10-CM

## 2024-03-15 DIAGNOSIS — I10 HYPERTENSION GOAL BP (BLOOD PRESSURE) < 140/90: ICD-10-CM

## 2024-03-15 DIAGNOSIS — R73.01 ELEVATED FASTING BLOOD SUGAR: ICD-10-CM

## 2024-03-15 DIAGNOSIS — Z00.00 ENCOUNTER FOR MEDICARE ANNUAL WELLNESS EXAM: Primary | ICD-10-CM

## 2024-03-15 DIAGNOSIS — R41.89 COGNITIVE CHANGE: ICD-10-CM

## 2024-03-15 PROCEDURE — 99214 OFFICE O/P EST MOD 30 MIN: CPT | Mod: 25 | Performed by: FAMILY MEDICINE

## 2024-03-15 PROCEDURE — G0439 PPPS, SUBSEQ VISIT: HCPCS | Performed by: FAMILY MEDICINE

## 2024-03-15 RX ORDER — ESCITALOPRAM OXALATE 10 MG/1
10 TABLET ORAL DAILY
Qty: 90 TABLET | Refills: 3 | Status: SHIPPED | OUTPATIENT
Start: 2024-03-15

## 2024-03-15 RX ORDER — LISINOPRIL/HYDROCHLOROTHIAZIDE 10-12.5 MG
1 TABLET ORAL DAILY
Qty: 90 TABLET | Refills: 3 | Status: SHIPPED | OUTPATIENT
Start: 2024-03-15

## 2024-03-15 SDOH — HEALTH STABILITY: PHYSICAL HEALTH: ON AVERAGE, HOW MANY MINUTES DO YOU ENGAGE IN EXERCISE AT THIS LEVEL?: 0 MIN

## 2024-03-15 SDOH — HEALTH STABILITY: PHYSICAL HEALTH: ON AVERAGE, HOW MANY DAYS PER WEEK DO YOU ENGAGE IN MODERATE TO STRENUOUS EXERCISE (LIKE A BRISK WALK)?: 0 DAYS

## 2024-03-15 ASSESSMENT — PATIENT HEALTH QUESTIONNAIRE - PHQ9
SUM OF ALL RESPONSES TO PHQ QUESTIONS 1-9: 0
10. IF YOU CHECKED OFF ANY PROBLEMS, HOW DIFFICULT HAVE THESE PROBLEMS MADE IT FOR YOU TO DO YOUR WORK, TAKE CARE OF THINGS AT HOME, OR GET ALONG WITH OTHER PEOPLE: NOT DIFFICULT AT ALL
SUM OF ALL RESPONSES TO PHQ QUESTIONS 1-9: 0

## 2024-03-15 ASSESSMENT — PAIN SCALES - GENERAL: PAINLEVEL: NO PAIN (0)

## 2024-03-15 ASSESSMENT — SOCIAL DETERMINANTS OF HEALTH (SDOH): HOW OFTEN DO YOU GET TOGETHER WITH FRIENDS OR RELATIVES?: ONCE A WEEK

## 2024-03-15 NOTE — PATIENT INSTRUCTIONS
Preventive Care Advice   This is general advice given by our system to help you stay healthy. However, your care team may have specific advice just for you. Please talk to your care team about your preventive care needs.  Nutrition  Eat 5 or more servings of fruits and vegetables each day.  Try wheat bread, brown rice and whole grain pasta (instead of white bread, rice, and pasta).  Get enough calcium and vitamin D. Check the label on foods and aim for 100% of the RDA (recommended daily allowance).  Lifestyle  Exercise at least 150 minutes each week   (30 minutes a day, 5 days a week).  Do muscle strengthening activities 2 days a week. These help control your weight and prevent disease.  No smoking.  Wear sunscreen to prevent skin cancer.  Have a dental exam and cleaning every 6 months.  Yearly exams  See your health care team every year to talk about:  Any changes in your health.  Any medicines your care team has prescribed.  Preventive care, family planning, and ways to prevent chronic diseases.  Shots (vaccines)   HPV shots (up to age 26), if you've never had them before.  Hepatitis B shots (up to age 59), if you've never had them before.  COVID-19 shot: Get this shot when it's due.  Flu shot: Get a flu shot every year.  Tetanus shot: Get a tetanus shot every 10 years.  Pneumococcal, hepatitis A, and RSV shots: Ask your care team if you need these based on your risk.  Shingles shot (for age 50 and up).  General health tests  Diabetes screening:  Starting at age 35, Get screened for diabetes at least every 3 years.  If you are younger than age 35, ask your care team if you should be screened for diabetes.  Cholesterol test: At age 39, start having a cholesterol test every 5 years, or more often if advised.  Bone density scan (DEXA): At age 50, ask your care team if you should have this scan for osteoporosis (brittle bones).  Hepatitis C: Get tested at least once in your life.  STIs (sexually transmitted  infections)  Before age 24: Ask your care team if you should be screened for STIs.  After age 24: Get screened for STIs if you're at risk. You are at risk for STIs (including HIV) if:  You are sexually active with more than one person.  You don't use condoms every time.  You or a partner was diagnosed with a sexually transmitted infection.  If you are at risk for HIV, ask about PrEP medicine to prevent HIV.  Get tested for HIV at least once in your life, whether you are at risk for HIV or not.  Cancer screening tests  Cervical cancer screening: If you have a cervix, begin getting regular cervical cancer screening tests at age 21. Most people who have regular screenings with normal results can stop after age 65. Talk about this with your provider.  Breast cancer scan (mammogram): If you've ever had breasts, begin having regular mammograms starting at age 40. This is a scan to check for breast cancer.  Colon cancer screening: It is important to start screening for colon cancer at age 45.  Have a colonoscopy test every 10 years (or more often if you're at risk) Or, ask your provider about stool tests like a FIT test every year or Cologuard test every 3 years.  To learn more about your testing options, visit: https://www.[employer]/509897.pdf.  For help making a decision, visit: https://bit.ly/nm42346.  Prostate cancer screening test: If you have a prostate and are age 55 to 69, ask your provider if you would benefit from a yearly prostate cancer screening test.  Lung cancer screening: If you are a current or former smoker age 50 to 80, ask your care team if ongoing lung cancer screenings are right for you.  For informational purposes only. Not to replace the advice of your health care provider. Copyright   2023 [employer]. All rights reserved. Clinically reviewed by the [facility]. qualifyor 594369 - REV 01/24.    Learning About Activities of Daily Living  What are activities  of daily living?     Activities of daily living (ADLs) are the basic self-care tasks you do every day. These include eating, bathing, dressing, and moving around.  As you age, and if you have health problems, you may find that it's harder to do some of these tasks. If so, your doctor can suggest ideas that may help.  To measure what kind of help you may need, your doctor will ask how well you are able to do ADLs. Let your doctor know if there are any tasks that you are having trouble doing. This is an important first step to getting help. And when you have the help you need, you can stay as independent as possible.  How will a doctor assess your ADLs?  Asking about ADLs is part of a routine health checkup your doctor will likely do as you age. Your health check might be done in a doctor's office, in your home, or at a hospital. The goal is to find out if you are having any problems that could make it hard to care for yourself or that make it unsafe for you to be on your own.  To measure your ADLs, your doctor will ask how hard it is for you to do routine tasks. Your doctor may also want to know if you have changed the way you do a task because of a health problem. Your doctor may watch how you:  Walk back and forth.  Keep your balance while you stand or walk.  Move from sitting to standing or from a bed to a chair.  Button or unbutton a shirt or sweater.  Remove and put on your shoes.  It's common to feel a little worried or anxious if you find you can't do all the things you used to be able to do. Talking with your doctor about ADLs is a way to make sure you're as safe as possible and able to care for yourself as well as you can. You may want to bring a caregiver, friend, or family member to your checkup. They can help you talk to your doctor.  Follow-up care is a key part of your treatment and safety. Be sure to make and go to all appointments, and call your doctor if you are having problems. It's also a good idea  to know your test results and keep a list of the medicines you take.  Current as of: October 24, 2023               Content Version: 14.0    8479-6379 [employer].   Care instructions adapted under license by your healthcare professional. If you have questions about a medical condition or this instruction, always ask your healthcare professional. [employer] disclaims any warranty or liability for your use of this information.      Preventing Falls: Care Instructions  Injuries and health problems such as trouble walking or poor eyesight can increase your risk of falling. So can some medicines. But there are things you can do to help prevent falls. You can exercise to get stronger. You can also arrange your home to make it safer.    Talk to your doctor about the medicines you take. Ask if any of them increase the risk of falls and whether they can be changed or stopped.   Try to exercise regularly. It can help improve your strength and balance. This can help lower your risk of falling.     Practice fall safety and prevention.    Wear low-heeled shoes that fit well and give your feet good support. Talk to your doctor if you have foot problems that make this hard.  Carry a cellphone or wear a medical alert device that you can use to call for help.  Use stepladders instead of chairs to reach high objects. Don't climb if you're at risk for falls. Ask for help, if needed.  Wear the correct eyeglasses, if you need them.    Make your home safer.    Remove rugs, cords, clutter, and furniture from walkways.  Keep your house well lit. Use night-lights in hallways and bathrooms.  Install and use sturdy handrails on stairways.  Wear nonskid footwear, even inside. Don't walk barefoot or in socks without shoes.    Be safe outside.    Use handrails, curb cuts, and ramps whenever possible.  Keep your hands free by using a shoulder bag or backpack.  Try to walk in well-lit areas. Watch out for uneven ground,  "changes in pavement, and debris.  Be careful in the winter. Walk on the grass or gravel when sidewalks are slippery. Use de-icer on steps and walkways. Add non-slip devices to shoes.    Put grab bars and nonskid mats in your shower or tub and near the toilet. Try to use a shower chair or bath bench when bathing.   Get into a tub or shower by putting in your weaker leg first. Get out with your strong side first. Have a phone or medical alert device in the bathroom with you.   Where can you learn more?  Go to https://www.[employer]Ditto/patiented  Enter G117 in the search box to learn more about \"Preventing Falls: Care Instructions.\"  Current as of: July 17, 2023               Content Version: 14.0    8698-2930 [employer].   Care instructions adapted under license by your healthcare professional. If you have questions about a medical condition or this instruction, always ask your healthcare professional. [employer] disclaims any warranty or liability for your use of this information.      Chronic Pain: Care Instructions  Your Care Instructions     Chronic pain is pain that lasts a long time (months or even years) and may or may not have a clear cause. It is different from acute pain, which usually does have a clear cause--like an injury or illness--and gets better over time. Chronic pain:  Lasts over time but may vary from day to day.  Does not go away despite efforts to end it.  May disrupt your sleep and lead to fatigue.  May cause depression or anxiety.  May make your muscles tense, causing more pain.  Can disrupt your work, hobbies, home life, and relationships with friends and family.  Chronic pain is a very real condition. It is not just in your head. Treatment can help and usually includes several methods used together, such as medicines, physical therapy, exercise, and other treatments. Learning how to relax and changing negative thought patterns can also help you cope.  Chronic pain " is complex. Taking an active role in your treatment will help you better manage your pain. Tell your doctor if you have trouble dealing with your pain. You may have to try several things before you find what works best for you.  Follow-up care is a key part of your treatment and safety. Be sure to make and go to all appointments, and call your doctor if you are having problems. It's also a good idea to know your test results and keep a list of the medicines you take.  How can you care for yourself at home?  Pace yourself. Break up large jobs into smaller tasks. Save harder tasks for days when you have less pain, or go back and forth between hard tasks and easier ones. Take rest breaks.  Relax, and reduce stress. Relaxation techniques such as deep breathing or meditation can help.  Keep moving. Gentle, daily exercise can help reduce pain over the long run. Try low- or no-impact exercises such as walking, swimming, and stationary biking. Do stretches to stay flexible.  Try heat, cold packs, and massage.  Get enough sleep. Chronic pain can make you tired and drain your energy. Talk with your doctor if you have trouble sleeping because of pain.  Think positive. Your thoughts can affect your pain level. Do things that you enjoy to distract yourself when you have pain instead of focusing on the pain. See a movie, read a book, listen to music, or spend time with a friend.  If you think you are depressed, talk to your doctor about treatment.  Keep a daily pain diary. Record how your moods, thoughts, sleep patterns, activities, and medicine affect your pain. You may find that your pain is worse during or after certain activities or when you are feeling a certain emotion. Having a record of your pain can help you and your doctor find the best ways to treat your pain.  Take pain medicines exactly as directed.  If the doctor gave you a prescription medicine for pain, take it as prescribed.  If you are not taking a prescription  "pain medicine, ask your doctor if you can take an over-the-counter medicine.  Reducing constipation caused by pain medicine  Talk to your doctor about a laxative. If a laxative doesn't work, your doctor may suggest a prescription medicine.  Include fruits, vegetables, beans, and whole grains in your diet each day. These foods are high in fiber.  If your doctor recommends it, get more exercise. Walking is a good choice. Bit by bit, increase the amount you walk every day. Try for at least 30 minutes on most days of the week.  Schedule time each day for a bowel movement. A daily routine may help. Take your time and do not strain when having a bowel movement.  When should you call for help?   Call your doctor now or seek immediate medical care if:    Your pain gets worse or is out of control.     You feel down or blue, or you do not enjoy things like you once did. You may be depressed, which is common in people with chronic pain. Depression can be treated.     You have vomiting or cramps for more than 2 hours.   Watch closely for changes in your health, and be sure to contact your doctor if:    You cannot sleep because of pain.     You are very worried or anxious about your pain.     You have trouble taking your pain medicine.     You have any concerns about your pain medicine.     You have trouble with bowel movements, such as:  No bowel movement in 3 days.  Blood in the anal area, in your stool, or on the toilet paper.  Diarrhea for more than 24 hours.   Where can you learn more?  Go to https://www.[employer].net/patiented  Enter N004 in the search box to learn more about \"Chronic Pain: Care Instructions.\"  Current as of: July 10, 2023               Content Version: 14.0    9377-4973 [employer].   Care instructions adapted under license by your healthcare professional. If you have questions about a medical condition or this instruction, always ask your healthcare professional. [employer] " disclaims any warranty or liability for your use of this information.

## 2024-03-15 NOTE — PROGRESS NOTES
Otoscopic exam reveals cerumen present and irrigation advised. Pain assessment completed.     Cerumen is impacted and patient reports that it is very sensitive. Advised patient to use OTC debrox drops for 1-2 weeks then call the clinic to return for a MA ear wash.     Patient verbalized understanding and agrees with plan.      Mary Davis RN  [facility]

## 2024-03-15 NOTE — PROGRESS NOTES
Preventive Care Visit  [facility]  Valerio Mccain MD, Family Medicine  Mar 15, 2024      Assessment & Plan     1. Encounter for Medicare annual wellness exam  - declined immunizations     2. Major depressive disorder, single episode, mild (H24)  - stable, refill below   - escitalopram (LEXAPRO) 10 MG tablet; Take 1 tablet (10 mg) by mouth daily  Dispense: 90 tablet; Refill: 3    3. Hypertension goal BP (blood pressure) < 140/90  - stable, refill below   - lisinopril-hydrochlorothiazide (ZESTORETIC) 10-12.5 MG tablet; Take 1 tablet by mouth daily  Dispense: 90 tablet; Refill: 3  - Lipid panel reflex to direct LDL Fasting; Future  - CBC with platelets; Future  - Comprehensive metabolic panel (BMP + Alb, Alk Phos, ALT, AST, Total. Bili, TP); Future  - Albumin Random Urine Quantitative with Creat Ratio; Future    4. Cognitive change  - declined further work up     5. Bilateral impacted cerumen  - b/l ear irrigation by MA, see MA note               Counseling  Appropriate preventive services were discussed with this patient, including applicable screening as appropriate for fall prevention, nutrition, physical activity, Tobacco-use cessation, weight loss and cognition.  Checklist reviewing preventive services available has been given to the patient.  Reviewed patient's diet, addressing concerns and/or questions.   Updated plan of care.  Patient reported difficulty with activities of daily living were addressed today.I have reviewed Opioid Use Disorder and Substance Use Disorder risk factors and made any needed referrals.           Clemente Cooley is a 78 year old, presenting for the following:  Annual Visit (AWV)        3/15/2024    10:06 AM   Additional Questions   Roomed by Cesilia Webb   Accompanied by Masha -          Health Care Directive  Patient does not have a Health Care Directive or Living Will: Discussed advance care planning with patient; however, patient declined at  this time.    HPI              3/15/2024   General Health   How would you rate your overall physical health? Good   Feel stress (tense, anxious, or unable to sleep) Not at all         3/15/2024   Nutrition   Diet: Regular (no restrictions)         3/15/2024   Exercise   Days per week of moderate/strenous exercise 0 days   Average minutes spent exercising at this level 0 min   (!) EXERCISE CONCERN      3/15/2024   Social Factors   Frequency of gathering with friends or relatives Once a week   Worry food won't last until get money to buy more No   Food not last or not have enough money for food? No   Do you have housing?  Yes   Are you worried about losing your housing? No   Lack of transportation? No   Unable to get utilities (heat,electricity)? No         3/15/2024   Activities of Daily Living- Home Safety   Needs help with the following daily activites Shopping   Safety concerns in the home None of the above         3/15/2024   Dental   Dentist two times every year? Yes         3/15/2024   Hearing Screening   Hearing concerns? None of the above         3/15/2024   Driving Risk Screening   Patient/family members have concerns about driving No         3/15/2024   General Alertness/Fatigue Screening   Have you been more tired than usual lately? No         3/15/2024   Urinary Incontinence Screening   Bothered by leaking urine in past 6 months No         3/15/2024   TB Screening   Were you born outside of [place]?  No       Today's PHQ-9 Score:       3/15/2024    10:08 AM   PHQ-9 SCORE   PHQ-9 Total Score MyChart 0   PHQ-9 Total Score 0         3/15/2024   Substance Use   Alcohol more than 3/day or more than 7/wk No   Do you have a current opioid prescription? (!) YES   How severe/bad is pain from 1 to 10? 0/10 (No Pain)   Do you use any other substances recreationally? No          No data to display              Low Risk (0-3)  Moderate Risk (4-7)  High Risk (>8)  Social History     Tobacco Use    Smoking status: Former      "Packs/day: 0.50     Years: 50.00     Additional pack years: 0.00     Total pack years: 25.00     Types: Cigarettes     Quit date: 2014     Years since quitting: 10.2     Passive exposure: Past    Smokeless tobacco: Never    Tobacco comments:     quit 12/2014   Vaping Use    Vaping Use: Never used   Substance Use Topics    Alcohol use: Yes     Comment: occasionally     Drug use: No                        Reviewed and updated as needed this visit by Provider                      Current providers sharing in care for this patient include:  Patient Care Team:  Valerio Mccain MD as PCP - General (Family Medicine)  Guevara Whitney MD as MD  Guevara Whitney MD as Assigned Neuroscience Provider  Valerio Mccain MD as Assigned PCP    The following health maintenance items are reviewed in Epic and correct as of today:  Health Maintenance   Topic Date Due    INFLUENZA VACCINE (1) 09/01/2023    COVID-19 Vaccine (4 - 2023-24 season) 09/01/2023    PHQ-9  09/15/2024    MEDICARE ANNUAL WELLNESS VISIT  03/15/2025    ANNUAL REVIEW OF HM ORDERS  03/15/2025    FALL RISK ASSESSMENT  03/15/2025    GLUCOSE  05/15/2026    LIPID  05/15/2028    ADVANCE CARE PLANNING  06/14/2028    COLORECTAL CANCER SCREENING  06/28/2028    DTAP/TDAP/TD IMMUNIZATION (3 - Td or Tdap) 02/24/2032    DEXA  04/06/2032    DEPRESSION ACTION PLAN  Completed    Pneumococcal Vaccine: 65+ Years  Completed    ZOSTER IMMUNIZATION  Completed    RSV VACCINE (Pregnancy & 60+)  Completed    IPV IMMUNIZATION  Aged Out    HPV IMMUNIZATION  Aged Out    MENINGITIS IMMUNIZATION  Aged Out    RSV MONOCLONAL ANTIBODY  Aged Out    LUNG CANCER SCREENING  Discontinued            Objective    Exam  /77 (BP Location: Right arm, Patient Position: Sitting, Cuff Size: Adult Regular)   Pulse 66   Temp 98  F (36.7  C) (Temporal)   Resp 15   Ht 1.59 m (5' 2.6\")   Wt 48.5 kg (107 lb)   LMP  (LMP Unknown)   SpO2 98%   BMI 19.20 kg/m     Estimated body mass index is 19.2 kg/m  " "as calculated from the following:    Height as of this encounter: 1.59 m (5' 2.6\").    Weight as of this encounter: 48.5 kg (107 lb).    Physical Exam           3/15/2024   Mini Cog   Clock Draw Score 0 Abnormal    0 Abnormal   3 Item Recall 1 object recalled    1 object recalled   Mini Cog Total Score 1    1              She lives with her sister. Her sister notes that she might repeat herself. She forgets appointments, to let the dogs out. Sister cooks and cleans. She manage her own finances.     Signed Electronically by: Valerio Mccain MD    Answers submitted by the patient for this visit:  Patient Health Questionnaire (Submitted on 3/15/2024)  If you checked off any problems, how difficult have these problems made it for you to do your work, take care of things at home, or get along with other people?: Not difficult at all  PHQ9 TOTAL SCORE: 0    "

## 2024-03-15 NOTE — COMMUNITY RESOURCES LIST (ENGLISH)
March 15, 2024           YOUR PERSONALIZED LIST OF SERVICES & PROGRAMS           & RECREATION    Sports      [employer] - Tuesday Night Community Run  [address] [place] [address] (Distance: 1.5 miles)  Phone: [phone]) [phone]  Website: http://www.15Five/  Language: English  Fee: Free  Accessibility: Ada accessible      [facility] - Adult Enrichment  Phone: [phone]  Website: https://Pockit/adults-seniors/adult-enrichment/  Language: English  Hours: Mon 7:30 AM - 4:00 PM Tue 7:30 AM - 4:00 PM Wed 7:30 AM - 4:00 PM Thu 7:30 AM - 4:00 PM Fri 7:30 AM - 4:00 PM      [employer] LEAGUE BASEBALL AND SOFTBALL  Website: http://www.COMPS.com.Ladies Who Launch    Classes/Groups      [employer] - Group fitness classes  [address] [place] [address] (Distance: 2.2 miles)  Phone: [phone]) [phone]  Website: http://www.[employer].org  Language: English  Fee: Free  Accessibility: Ada accessible      [employer] - Yoga or Pilates classes  [address] [place] [address] (Distance: 2.2 miles)  Phone: [phone]) [phone]  Website: http://www.[employer].org  Language: English  Fee: Self pay  Accessibility: Ada accessible      - Online and Local Fitness Classes  Phone: (384) [phone]  Website: https://Undertone.Vinja/Search/OnlineClasses  Language: English  Fee: Free               IMPORTANT NUMBERS & WEBSITES        Emergency Services  911  .   United Way  211 http://211unitedway.org  .   Poison Control  (314) 917-8542 http://mnpoison.org http://wisconsinpoison.org  .     Suicide and Crisis Lifeline  988 http://988lifeline.org  .   Childhelp National Child Abuse Hotline  106.419.6876 http://Childhelphotline.org   .   National Sexual Assault Hotline  (389) 204-8513 (HOPE) http://Rainn.org   .     National Runaway Safeline  (497) 704-2442 (RUNAWAY) http://1800runaway.org  .   Pregnancy & Postpartum Support  Call/text  785-662-1861  [place]: http://ppsupportmn.org  [place]: http://Zeis Excelsa.com/wi  .   Substance Abuse National Helpline ([facility])  800622-HELP (0516) http://Findtreatment.gov   .                DISCLAIMER: [employer]  does not endorse any service providers mentioned in this resource list. [employer] does not guarantee that the services mentioned in this resource list will be available to you or will improve your health or wellness.    [facility]

## 2024-06-10 ENCOUNTER — TELEPHONE (OUTPATIENT)
Dept: NEUROLOGY | Facility: CLINIC | Age: 79
End: 2024-06-10
Payer: COMMERCIAL

## 2024-06-10 NOTE — PROGRESS NOTES
[facility]  Section of General Neurology  Return Patient Visit    Lenora Hammonds MRN# [id number]   Age: 78 year old YOB: 1945              Assessment and Plan:   Assessment:  Lenora Hammonds is a pleasant 78 year old female who presents in follow up for tremor and balance problems. 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   Tremor remains similar and is quite responsive to propranolol.  She commonly takes the 60 mg ER and 20 mg pills each morning so we decided to simplify this to 80 mg ER daily.   Exam remains without clear Parkinsonism.  Gait is quite variable, a tad shuffling today though no resting tremor, tone is good .  Will continue to monitor.      Plan:  PT for balance training and assistive device advice, fall prevention.    Increase propranolol to 80 mg daily  A future option--Rosalia scan, discussed.         Alexander Whitney MD   of Neurology   [facility]      Interval history:     Her and her sister go out about 3x a week.   Tremors are alright  Able to eat, using straws.    Walking---Has good and bad days.   Sometimes she feels like she can't move her legs but to audit she can in fact move her legs during these times she just feels off balance.  Not a true freezing per se to review but hard to say.      They do have a walker.    Mahad her son is here again.  Has MRI repeat scheduled next month.    Still uses the 20s every day.      A/P at last visit  Lenora Hammonds is a pleasant 78 year old female who presents in follow up for tremor and balance problems. 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   Tremor remains similar though she ran out of her propranolol 60 mgs and is just using her 20 mg BID right now.   She does  still think she derives benefit from the propranolol in general and would like to continue it.    Toe exam she does have a tad shuffling gait but no clear cardinal features of Parkinsonsism.  Tremor remains action tremor/bilateral, tone is normal, no bradykinesia.    As noted I do think her schwannoma is the karma of her balance issues.  Encouraged PT to discuss fall prevention, home exercises to do and if she might need any adaptive equipment to prevent falls.       Plan:  Encouraged to google/look up essential tremor cutlery /pens ideas online as heavier objects are easier for those with tremor to hold  Propranolol 60 mg daily + up to 2 20 mg tabs on tough days  Serial imaging of Schwannoma per her neurosurgery team  Next options:   Going higher on propranolol pending blood pressure  Gabapentin (can help pain and tremor)  Follow up in 6 months sooner with any issues questions or changes      Past Medical History:     Patient Active Problem List   Diagnosis    Tremor    CARDIOVASCULAR SCREENING; LDL GOAL LESS THAN 160    Mild major depression (H)    Dyspepsia    Wears glasses    Urinary incontinence    Snores    Histoplasmosis    Advanced directives, counseling/discussion    Neoplasm of uncertain behavior of skin    Epigastric pain    Benign neoplasm of cranial nerve (H)    Acoustic neuroma (H)    Hypertension goal BP (blood pressure) < 140/90    Acute bilateral low back pain without sciatica    Osteopenia    Pulmonary nodule, right    Stiffness of right shoulder joint    Shoulder pain    Cyst of left kidney    Major depressive disorder, recurrent episode, in full remission (H24)    Balance problems     Past Medical History:   Diagnosis Date    Acoustic neuroma (H)     left    Depressive disorder     histoplasmosis     Histoplasmosis 10/6/2011    Hypertension     Pancolitis (H) 12/5/2017    Shaking     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    Snores 10/6/2011        Past Surgical History:     Past Surgical History:    Procedure Laterality Date    COLONOSCOPY N/A 2017    Procedure: COMBINED COLONOSCOPY, SINGLE OR MULTIPLE BIOPSY/POLYPECTOMY BY BIOPSY;  colonoscopy;  Surgeon: Moise Vásquez MD;  Location:  GI    COLONOSCOPY N/A 2018    Procedure: COMBINED COLONOSCOPY, SINGLE OR MULTIPLE BIOPSY/POLYPECTOMY BY BIOPSY;  COLONOSCOPY;  Surgeon: Moise Vásquez MD;  Location:  GI    THORACIC SURGERY      TONSILLECTOMY      ZZC THORACOTOMY,MAJOR,EXPLOR/BIOPSY      histoplasmosis, right lower lobe        Social History:     Social History     Tobacco Use    Smoking status: Former     Current packs/day: 0.00     Average packs/day: 0.5 packs/day for 50.0 years (25.0 ttl pk-yrs)     Types: Cigarettes     Start date:      Quit date:      Years since quitting: 10.4     Passive exposure: Past    Smokeless tobacco: Never    Tobacco comments:     quit 2014   Vaping Use    Vaping status: Never Used   Substance Use Topics    Alcohol use: Yes     Comment: occasionally     Drug use: No        Family History:     Family History   Problem Relation Age of Onset    Hypertension Mother          at age 84    Neurologic Disorder Mother         ?parkinsonism - she had a tremor; walked normally. voice was soft    Cancer Father         brain tumor;  at age 62 y rs    Other - See Comments Sister         Homicide    Cancer Brother         Bladder    Breast Cancer Sister         Medications:     Current Outpatient Medications   Medication Sig Dispense Refill    acetaminophen (TYLENOL) 500 MG tablet Take 1-2 tablets (500-1,000 mg) by mouth every 6 hours as needed for mild pain 30 tablet 0    albuterol (PROAIR HFA/PROVENTIL HFA/VENTOLIN HFA) 108 (90 Base) MCG/ACT inhaler Inhale 1-2 puffs into the lungs every 4 hours as needed for shortness of breath / dyspnea or wheezing 18 g 1    escitalopram (LEXAPRO) 10 MG tablet Take 1 tablet (10 mg) by mouth daily 90 tablet 3    lisinopril-hydrochlorothiazide  (ZESTORETIC) 10-12.5 MG tablet Take 1 tablet by mouth daily 90 tablet 3    oxyCODONE (ROXICODONE) 5 MG tablet Take 1 tablet (5 mg) by mouth every 6 hours as needed for pain 10 tablet 0    propranolol (INDERAL) 20 MG tablet Take 1 tablet (20 mg) by mouth 2 times daily as needed (tremor) 180 tablet 3    propranolol ER (INDERAL LA) 60 MG 24 hr capsule Take 1 capsule (60 mg) by mouth daily 90 capsule 3     No current facility-administered medications for this visit.        Allergies:     Allergies   Allergen Reactions    Rosuvastatin Hives, Itching and Rash          Physical Exam:   Vitals: /69   Pulse 62   LMP  (LMP Unknown)   SpO2 96%    Neuro:   General Appearance: No apparent distress, well-nourished, well-groomed, pleasant      Mental Status: Alert and oriented to person, place, and time. Speech fluent and comprehension intact. No dysarthria.      Cranial Nerves:   II: Visual fields: normal  III: Pupils: 3 mm, equal, round, reactive to light   III,IV,VI: Extraocular Movements: intact   VII: Facial strength: intact without asymmetry  VIII: Hearing: intact grossly        Motor Exam:   5/5 diffusely. Tone remains normal throughout.     Bilateral low amplitude high frequency tremor worse with terminal movement not present at rest   Mouth tremor notable as well     Sensory: intact to light touch     Coordination: no dysmetria with finger-to-nose bilaterally     Reflexes: biceps, triceps, brachioradialis, patellar, and ankle jerks 2+ and symmetric     Gait:     A tad shuffling, decent arm swing.  Slow, deliberate.  Veers at times.  Turns take a modest amount of time.          Data: Pertinent prior to visit   Imaging:  Brain MRI without and with intravenous contrast primarily for the  purposes of stereotactic evaluation     History: Benign neoplasm of cranial nerve (H)  ICD-10: Benign neoplasm of cranial nerve (H)     Comparison: 3/26/2014 dated MR     Technique: MR imaging performed with 3-dimensonally acquired  axial  T1-weighted sequences performed with intravenous contrast.     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 No significant  change in 5 mm right frontal parafalcine meningioma. No extra-axial  collection or midline shift. The major intracranial arterial  structures are grossly patent.                                                                      Impression: Increase in the size of presumed left vestibular  schwannoma since 3/26/2014. Limited imaging primarily for the purposes  of stereotactic localization.                             The total time of this encounter today amounted to 30 minutes. This time included time spent with the patient, prep work, ordering tests, and performing post visit documentation.    The longitudinal plan of care for essential tremor was addressed during this visit. Due to the added complexity in care, I will continue to support Ms. Hammonds in the subsequent management of this condition(s) and with the ongoing continuity of care of this condition(s).

## 2024-06-10 NOTE — TELEPHONE ENCOUNTER
Attempted to reach patient to remind them about appointment scheduled with Guevara Whitney MD on 6/11/24 in our [place] location.    A voicemail was left with a call back number if the patient has questions or would like to reschedule.

## 2024-06-11 ENCOUNTER — OFFICE VISIT (OUTPATIENT)
Dept: NEUROLOGY | Facility: CLINIC | Age: 79
End: 2024-06-11
Payer: COMMERCIAL

## 2024-06-11 VITALS — HEART RATE: 62 BPM | SYSTOLIC BLOOD PRESSURE: 132 MMHG | OXYGEN SATURATION: 96 % | DIASTOLIC BLOOD PRESSURE: 69 MMHG

## 2024-06-11 DIAGNOSIS — R25.1 TREMOR: Primary | ICD-10-CM

## 2024-06-11 DIAGNOSIS — R26.89 BALANCE PROBLEMS: ICD-10-CM

## 2024-06-11 PROCEDURE — 99214 OFFICE O/P EST MOD 30 MIN: CPT | Performed by: STUDENT IN AN ORGANIZED HEALTH CARE EDUCATION/TRAINING PROGRAM

## 2024-06-11 PROCEDURE — G2211 COMPLEX E/M VISIT ADD ON: HCPCS | Performed by: STUDENT IN AN ORGANIZED HEALTH CARE EDUCATION/TRAINING PROGRAM

## 2024-06-11 RX ORDER — PROPRANOLOL HYDROCHLORIDE 80 MG/1
80 CAPSULE, EXTENDED RELEASE ORAL DAILY
Qty: 90 CAPSULE | Refills: 1 | Status: SHIPPED | OUTPATIENT
Start: 2024-06-11

## 2024-06-11 NOTE — PATIENT INSTRUCTIONS
PT for balance training and assistive device advice, fall prevention.    Increase propranolol to 80 mg daily    A future option--Rosalia scan

## 2024-06-11 NOTE — NURSING NOTE
"Lenora Hammonds is a 78 year old female who presents for:  Chief Complaint   Patient presents with    Tremor, Balance         Initial Vitals:  /69   Pulse 62   LMP  (LMP Unknown)   SpO2 96%  Estimated body mass index is 19.2 kg/m  as calculated from the following:    Height as of 3/15/24: 1.59 m (5' 2.6\").    Weight as of 3/15/24: 48.5 kg (107 lb).. There is no height or weight on file to calculate BSA. BP completed using cuff size: enrique Hobbs    "

## 2024-06-11 NOTE — LETTER
6/11/2024      Lenora Hammonds  [address] [address]      Dear Colleague,    Thank you for referring your patient, Lenora Hammonds, to the [facility] NEUROLOGY [facility]. Please see a copy of my visit note below.    [facility]/[place]  Section of General Neurology  Return Patient Visit    Lenora Hammonds MRN# [id number]   Age: 78 year old YOB: 1945              Assessment and Plan:   Assessment:  Lenora Hammonds is a pleasant 78 year old female who presents in follow up for tremor and balance problems. 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   Tremor remains similar and is quite responsive to propranolol.  She commonly takes the 60 mg ER and 20 mg pills each morning so we decided to simplify this to 80 mg ER daily.   Exam remains without clear Parkinsonism.  Gait is quite variable, a tad shuffling today though no resting tremor, tone is good .  Will continue to monitor.      Plan:  PT for balance training and assistive device advice, fall prevention.    Increase propranolol to 80 mg daily  A future option--Rosalia scan, discussed.         Alexander Whitney MD   of Neurology   [facility]      Interval history:     Her and her sister go out about 3x a week.   Tremors are alright  Able to eat, using straws.    Walking---Has good and bad days.   Sometimes she feels like she can't move her legs but to audit she can in fact move her legs during these times she just feels off balance.  Not a true freezing per se to review but hard to say.      They do have a walker.    Mahad her son is here again.  Has MRI repeat scheduled next month.    Still uses the 20s every day.      A/P at last visit  Lenora Hammonds is a pleasant 78 year old female who presents in follow up for tremor and balance problems. We again discussed that I  suspect her balance problems are primarily related to her left vestibular schwannoma for which had gamma knife surgery x 2.   Tremor remains similar though she ran out of her propranolol 60 mgs and is just using her 20 mg BID right now.   She does still think she derives benefit from the propranolol in general and would like to continue it.    Toe exam she does have a tad shuffling gait but no clear cardinal features of Parkinsonsism.  Tremor remains action tremor/bilateral, tone is normal, no bradykinesia.    As noted I do think her schwannoma is the karma of her balance issues.  Encouraged PT to discuss fall prevention, home exercises to do and if she might need any adaptive equipment to prevent falls.       Plan:  Encouraged to google/look up essential tremor cutlery /pens ideas online as heavier objects are easier for those with tremor to hold  Propranolol 60 mg daily + up to 2 20 mg tabs on tough days  Serial imaging of Schwannoma per her neurosurgery team  Next options:   Going higher on propranolol pending blood pressure  Gabapentin (can help pain and tremor)  Follow up in 6 months sooner with any issues questions or changes      Past Medical History:     Patient Active Problem List   Diagnosis     Tremor     CARDIOVASCULAR SCREENING; LDL GOAL LESS THAN 160     Mild major depression (H)     Dyspepsia     Wears glasses     Urinary incontinence     Snores     Histoplasmosis     Advanced directives, counseling/discussion     Neoplasm of uncertain behavior of skin     Epigastric pain     Benign neoplasm of cranial nerve (H)     Acoustic neuroma (H)     Hypertension goal BP (blood pressure) < 140/90     Acute bilateral low back pain without sciatica     Osteopenia     Pulmonary nodule, right     Stiffness of right shoulder joint     Shoulder pain     Cyst of left kidney     Major depressive disorder, recurrent episode, in full remission (H24)     Balance problems     Past Medical History:   Diagnosis Date      Acoustic neuroma (H)     left     Depressive disorder      histoplasmosis      Histoplasmosis 10/6/2011     Hypertension      Pancolitis (H) 2017     Shaking     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     Snores 10/6/2011        Past Surgical History:     Past Surgical History:   Procedure Laterality Date     COLONOSCOPY N/A 2017    Procedure: COMBINED COLONOSCOPY, SINGLE OR MULTIPLE BIOPSY/POLYPECTOMY BY BIOPSY;  colonoscopy;  Surgeon: Moise Vásquez MD;  Location:  GI     COLONOSCOPY N/A 2018    Procedure: COMBINED COLONOSCOPY, SINGLE OR MULTIPLE BIOPSY/POLYPECTOMY BY BIOPSY;  COLONOSCOPY;  Surgeon: Moise Vásquez MD;  Location:  GI     THORACIC SURGERY       TONSILLECTOMY       ZZC THORACOTOMY,MAJOR,EXPLOR/BIOPSY  1983    histoplasmosis, right lower lobe        Social History:     Social History     Tobacco Use     Smoking status: Former     Current packs/day: 0.00     Average packs/day: 0.5 packs/day for 50.0 years (25.0 ttl pk-yrs)     Types: Cigarettes     Start date:      Quit date:      Years since quitting: 10.4     Passive exposure: Past     Smokeless tobacco: Never     Tobacco comments:     quit 2014   Vaping Use     Vaping status: Never Used   Substance Use Topics     Alcohol use: Yes     Comment: occasionally      Drug use: No        Family History:     Family History   Problem Relation Age of Onset     Hypertension Mother          at age 84     Neurologic Disorder Mother         ?parkinsonism - she had a tremor; walked normally. voice was soft     Cancer Father         brain tumor;  at age 62 y rs     Other - See Comments Sister         Homicide     Cancer Brother         Bladder     Breast Cancer Sister         Medications:     Current Outpatient Medications   Medication Sig Dispense Refill     acetaminophen (TYLENOL) 500 MG tablet Take 1-2 tablets (500-1,000 mg) by mouth every 6 hours as needed for mild pain 30 tablet 0     albuterol (PROAIR  HFA/PROVENTIL HFA/VENTOLIN HFA) 108 (90 Base) MCG/ACT inhaler Inhale 1-2 puffs into the lungs every 4 hours as needed for shortness of breath / dyspnea or wheezing 18 g 1     escitalopram (LEXAPRO) 10 MG tablet Take 1 tablet (10 mg) by mouth daily 90 tablet 3     lisinopril-hydrochlorothiazide (ZESTORETIC) 10-12.5 MG tablet Take 1 tablet by mouth daily 90 tablet 3     oxyCODONE (ROXICODONE) 5 MG tablet Take 1 tablet (5 mg) by mouth every 6 hours as needed for pain 10 tablet 0     propranolol (INDERAL) 20 MG tablet Take 1 tablet (20 mg) by mouth 2 times daily as needed (tremor) 180 tablet 3     propranolol ER (INDERAL LA) 60 MG 24 hr capsule Take 1 capsule (60 mg) by mouth daily 90 capsule 3     No current facility-administered medications for this visit.        Allergies:     Allergies   Allergen Reactions     Rosuvastatin Hives, Itching and Rash          Physical Exam:   Vitals: /69   Pulse 62   LMP  (LMP Unknown)   SpO2 96%    Neuro:   General Appearance: No apparent distress, well-nourished, well-groomed, pleasant      Mental Status: Alert and oriented to person, place, and time. Speech fluent and comprehension intact. No dysarthria.      Cranial Nerves:   II: Visual fields: normal  III: Pupils: 3 mm, equal, round, reactive to light   III,IV,VI: Extraocular Movements: intact   VII: Facial strength: intact without asymmetry  VIII: Hearing: intact grossly        Motor Exam:   5/5 diffusely. Tone remains normal throughout.     Bilateral low amplitude high frequency tremor worse with terminal movement not present at rest   Mouth tremor notable as well     Sensory: intact to light touch     Coordination: no dysmetria with finger-to-nose bilaterally     Reflexes: biceps, triceps, brachioradialis, patellar, and ankle jerks 2+ and symmetric     Gait:     A tad shuffling, decent arm swing.  Slow, deliberate.  Veers at times.  Turns take a modest amount of time.          Data: Pertinent prior to visit    Imaging:  Brain MRI without and with intravenous contrast primarily for the  purposes of stereotactic evaluation     History: Benign neoplasm of cranial nerve (H)  ICD-10: Benign neoplasm of cranial nerve (H)     Comparison: 3/26/2014 dated MR     Technique: MR imaging performed with 3-dimensonally acquired axial  T1-weighted sequences performed with intravenous contrast.     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 No significant  change in 5 mm right frontal parafalcine meningioma. No extra-axial  collection or midline shift. The major intracranial arterial  structures are grossly patent.                                                                      Impression: Increase in the size of presumed left vestibular  schwannoma since 3/26/2014. Limited imaging primarily for the purposes  of stereotactic localization.                             The total time of this encounter today amounted to 30 minutes. This time included time spent with the patient, prep work, ordering tests, and performing post visit documentation.    The longitudinal plan of care for essential tremor was addressed during this visit. Due to the added complexity in care, I will continue to support Ms. Hammonds in the subsequent management of this condition(s) and with the ongoing continuity of care of this condition(s).      Again, thank you for allowing me to participate in the care of your patient.        Sincerely,        Guevara Whitney MD

## 2024-07-30 ENCOUNTER — TRANSFERRED RECORDS (OUTPATIENT)
Dept: HEALTH INFORMATION MANAGEMENT | Facility: CLINIC | Age: 79
End: 2024-07-30
Payer: COMMERCIAL

## 2024-10-21 ENCOUNTER — APPOINTMENT (OUTPATIENT)
Dept: CT IMAGING | Facility: CLINIC | Age: 79
End: 2024-10-21
Attending: STUDENT IN AN ORGANIZED HEALTH CARE EDUCATION/TRAINING PROGRAM
Payer: COMMERCIAL

## 2024-10-21 ENCOUNTER — APPOINTMENT (OUTPATIENT)
Dept: GENERAL RADIOLOGY | Facility: CLINIC | Age: 79
End: 2024-10-21
Payer: COMMERCIAL

## 2024-10-21 ENCOUNTER — HOSPITAL ENCOUNTER (OUTPATIENT)
Facility: CLINIC | Age: 79
Setting detail: OBSERVATION
Discharge: HOME | End: 2024-10-24
Attending: STUDENT IN AN ORGANIZED HEALTH CARE EDUCATION/TRAINING PROGRAM | Admitting: HOSPITALIST
Payer: COMMERCIAL

## 2024-10-21 DIAGNOSIS — N30.01 ACUTE CYSTITIS WITH HEMATURIA: ICD-10-CM

## 2024-10-21 DIAGNOSIS — U07.1 INFECTION DUE TO 2019 NOVEL CORONAVIRUS: Primary | ICD-10-CM

## 2024-10-21 DIAGNOSIS — R53.1 WEAKNESS: ICD-10-CM

## 2024-10-21 DIAGNOSIS — K59.00 CONSTIPATION, UNSPECIFIED CONSTIPATION TYPE: ICD-10-CM

## 2024-10-21 LAB
ALBUMIN UR-MCNC: 10 MG/DL
ANION GAP SERPL CALCULATED.3IONS-SCNC: 11 MMOL/L (ref 7–15)
APPEARANCE UR: ABNORMAL
BACTERIA #/AREA URNS HPF: ABNORMAL /HPF
BASOPHILS # BLD AUTO: 0 10E3/UL (ref 0–0.2)
BASOPHILS NFR BLD AUTO: 1 %
BILIRUB UR QL STRIP: NEGATIVE
BUN SERPL-MCNC: 29.5 MG/DL (ref 8–23)
CALCIUM SERPL-MCNC: 9.7 MG/DL (ref 8.8–10.4)
CHLORIDE SERPL-SCNC: 99 MMOL/L (ref 98–107)
COLOR UR AUTO: YELLOW
CREAT SERPL-MCNC: 0.91 MG/DL (ref 0.51–0.95)
EGFRCR SERPLBLD CKD-EPI 2021: 63 ML/MIN/1.73M2
EOSINOPHIL # BLD AUTO: 0.1 10E3/UL (ref 0–0.7)
EOSINOPHIL NFR BLD AUTO: 1 %
ERYTHROCYTE [DISTWIDTH] IN BLOOD BY AUTOMATED COUNT: 12.6 % (ref 10–15)
FLUAV RNA SPEC QL NAA+PROBE: NEGATIVE
FLUBV RNA RESP QL NAA+PROBE: NEGATIVE
GLUCOSE SERPL-MCNC: 98 MG/DL (ref 70–99)
GLUCOSE UR STRIP-MCNC: NEGATIVE MG/DL
HCO3 SERPL-SCNC: 27 MMOL/L (ref 22–29)
HCT VFR BLD AUTO: 38.2 % (ref 35–47)
HGB BLD-MCNC: 12.5 G/DL (ref 11.7–15.7)
HGB UR QL STRIP: ABNORMAL
HOLD SPECIMEN: NORMAL
IMM GRANULOCYTES # BLD: 0 10E3/UL
IMM GRANULOCYTES NFR BLD: 0 %
KETONES UR STRIP-MCNC: 10 MG/DL
LACTATE SERPL-SCNC: 0.7 MMOL/L (ref 0.7–2)
LEUKOCYTE ESTERASE UR QL STRIP: ABNORMAL
LYMPHOCYTES # BLD AUTO: 1.5 10E3/UL (ref 0.8–5.3)
LYMPHOCYTES NFR BLD AUTO: 23 %
MCH RBC QN AUTO: 32 PG (ref 26.5–33)
MCHC RBC AUTO-ENTMCNC: 32.7 G/DL (ref 31.5–36.5)
MCV RBC AUTO: 98 FL (ref 78–100)
MONOCYTES # BLD AUTO: 0.9 10E3/UL (ref 0–1.3)
MONOCYTES NFR BLD AUTO: 14 %
MUCOUS THREADS #/AREA URNS LPF: PRESENT /LPF
NEUTROPHILS # BLD AUTO: 4 10E3/UL (ref 1.6–8.3)
NEUTROPHILS NFR BLD AUTO: 61 %
NITRATE UR QL: POSITIVE
NRBC # BLD AUTO: 0 10E3/UL
NRBC BLD AUTO-RTO: 0 /100
PH UR STRIP: 6.5 [PH] (ref 5–7)
PLATELET # BLD AUTO: 185 10E3/UL (ref 150–450)
POTASSIUM SERPL-SCNC: 3.9 MMOL/L (ref 3.4–5.3)
RBC # BLD AUTO: 3.91 10E6/UL (ref 3.8–5.2)
RBC URINE: 18 /HPF
RSV RNA SPEC NAA+PROBE: NEGATIVE
SARS-COV-2 RNA RESP QL NAA+PROBE: POSITIVE
SODIUM SERPL-SCNC: 137 MMOL/L (ref 135–145)
SP GR UR STRIP: 1.02 (ref 1–1.03)
SQUAMOUS EPITHELIAL: <1 /HPF
UROBILINOGEN UR STRIP-MCNC: 2 MG/DL
WBC # BLD AUTO: 6.5 10E3/UL (ref 4–11)
WBC URINE: 43 /HPF

## 2024-10-21 PROCEDURE — 93005 ELECTROCARDIOGRAM TRACING: CPT

## 2024-10-21 PROCEDURE — 87086 URINE CULTURE/COLONY COUNT: CPT | Performed by: STUDENT IN AN ORGANIZED HEALTH CARE EDUCATION/TRAINING PROGRAM

## 2024-10-21 PROCEDURE — G0378 HOSPITAL OBSERVATION PER HR: HCPCS

## 2024-10-21 PROCEDURE — 81003 URINALYSIS AUTO W/O SCOPE: CPT | Performed by: STUDENT IN AN ORGANIZED HEALTH CARE EDUCATION/TRAINING PROGRAM

## 2024-10-21 PROCEDURE — 87040 BLOOD CULTURE FOR BACTERIA: CPT | Performed by: STUDENT IN AN ORGANIZED HEALTH CARE EDUCATION/TRAINING PROGRAM

## 2024-10-21 PROCEDURE — 70450 CT HEAD/BRAIN W/O DYE: CPT

## 2024-10-21 PROCEDURE — 36415 COLL VENOUS BLD VENIPUNCTURE: CPT | Performed by: STUDENT IN AN ORGANIZED HEALTH CARE EDUCATION/TRAINING PROGRAM

## 2024-10-21 PROCEDURE — 87637 SARSCOV2&INF A&B&RSV AMP PRB: CPT | Performed by: STUDENT IN AN ORGANIZED HEALTH CARE EDUCATION/TRAINING PROGRAM

## 2024-10-21 PROCEDURE — 71045 X-RAY EXAM CHEST 1 VIEW: CPT

## 2024-10-21 PROCEDURE — 80048 BASIC METABOLIC PNL TOTAL CA: CPT | Performed by: STUDENT IN AN ORGANIZED HEALTH CARE EDUCATION/TRAINING PROGRAM

## 2024-10-21 PROCEDURE — 99285 EMERGENCY DEPT VISIT HI MDM: CPT | Mod: 25

## 2024-10-21 PROCEDURE — 83605 ASSAY OF LACTIC ACID: CPT | Performed by: STUDENT IN AN ORGANIZED HEALTH CARE EDUCATION/TRAINING PROGRAM

## 2024-10-21 PROCEDURE — 99222 1ST HOSP IP/OBS MODERATE 55: CPT

## 2024-10-21 PROCEDURE — 96365 THER/PROPH/DIAG IV INF INIT: CPT

## 2024-10-21 PROCEDURE — 250N000013 HC RX MED GY IP 250 OP 250 PS 637

## 2024-10-21 PROCEDURE — 250N000011 HC RX IP 250 OP 636: Performed by: STUDENT IN AN ORGANIZED HEALTH CARE EDUCATION/TRAINING PROGRAM

## 2024-10-21 PROCEDURE — 85004 AUTOMATED DIFF WBC COUNT: CPT | Performed by: STUDENT IN AN ORGANIZED HEALTH CARE EDUCATION/TRAINING PROGRAM

## 2024-10-21 RX ORDER — PROPRANOLOL HYDROCHLORIDE 80 MG/1
80 CAPSULE, EXTENDED RELEASE ORAL DAILY
COMMUNITY
End: 2024-11-07

## 2024-10-21 RX ORDER — ACETAMINOPHEN 325 MG/1
650 TABLET ORAL EVERY 4 HOURS PRN
Status: DISCONTINUED | OUTPATIENT
Start: 2024-10-21 | End: 2024-10-24 | Stop reason: HOSPADM

## 2024-10-21 RX ORDER — AMOXICILLIN 250 MG
1 CAPSULE ORAL 2 TIMES DAILY PRN
Status: DISCONTINUED | OUTPATIENT
Start: 2024-10-21 | End: 2024-10-24 | Stop reason: HOSPADM

## 2024-10-21 RX ORDER — POLYETHYLENE GLYCOL 3350 17 G/17G
17 POWDER, FOR SOLUTION ORAL 2 TIMES DAILY PRN
Status: DISCONTINUED | OUTPATIENT
Start: 2024-10-21 | End: 2024-10-24 | Stop reason: HOSPADM

## 2024-10-21 RX ORDER — CEFTRIAXONE 2 G/1
2 INJECTION, POWDER, FOR SOLUTION INTRAMUSCULAR; INTRAVENOUS ONCE
Status: COMPLETED | OUTPATIENT
Start: 2024-10-21 | End: 2024-10-21

## 2024-10-21 RX ORDER — LIDOCAINE 40 MG/G
CREAM TOPICAL
Status: DISCONTINUED | OUTPATIENT
Start: 2024-10-21 | End: 2024-10-24

## 2024-10-21 RX ORDER — ONDANSETRON 4 MG/1
4 TABLET, ORALLY DISINTEGRATING ORAL EVERY 6 HOURS PRN
Status: DISCONTINUED | OUTPATIENT
Start: 2024-10-21 | End: 2024-10-24 | Stop reason: HOSPADM

## 2024-10-21 RX ORDER — ONDANSETRON 2 MG/ML
4 INJECTION INTRAMUSCULAR; INTRAVENOUS EVERY 6 HOURS PRN
Status: DISCONTINUED | OUTPATIENT
Start: 2024-10-21 | End: 2024-10-24 | Stop reason: HOSPADM

## 2024-10-21 RX ORDER — ESCITALOPRAM OXALATE 10 MG/1
10 TABLET ORAL DAILY
Status: DISCONTINUED | OUTPATIENT
Start: 2024-10-22 | End: 2024-10-24 | Stop reason: HOSPADM

## 2024-10-21 RX ORDER — CEFTRIAXONE 1 G/1
1 INJECTION, POWDER, FOR SOLUTION INTRAMUSCULAR; INTRAVENOUS EVERY 24 HOURS
Status: DISCONTINUED | OUTPATIENT
Start: 2024-10-22 | End: 2024-10-24

## 2024-10-21 RX ORDER — LISINOPRIL 10 MG/1
10 TABLET ORAL DAILY
Status: DISCONTINUED | OUTPATIENT
Start: 2024-10-22 | End: 2024-10-24 | Stop reason: HOSPADM

## 2024-10-21 RX ORDER — AMOXICILLIN 250 MG
2 CAPSULE ORAL 2 TIMES DAILY PRN
Status: DISCONTINUED | OUTPATIENT
Start: 2024-10-21 | End: 2024-10-24 | Stop reason: HOSPADM

## 2024-10-21 RX ORDER — ACETAMINOPHEN 650 MG/1
650 SUPPOSITORY RECTAL EVERY 4 HOURS PRN
Status: DISCONTINUED | OUTPATIENT
Start: 2024-10-21 | End: 2024-10-24 | Stop reason: HOSPADM

## 2024-10-21 RX ORDER — PROPRANOLOL HYDROCHLORIDE 80 MG/1
80 CAPSULE, EXTENDED RELEASE ORAL DAILY
Status: DISCONTINUED | OUTPATIENT
Start: 2024-10-22 | End: 2024-10-24 | Stop reason: HOSPADM

## 2024-10-21 RX ADMIN — CEFTRIAXONE 2 G: 2 INJECTION, POWDER, FOR SOLUTION INTRAMUSCULAR; INTRAVENOUS at 19:50

## 2024-10-21 RX ADMIN — NIRMATRELVIR AND RITONAVIR 3 TABLET: KIT at 23:33

## 2024-10-21 ASSESSMENT — COLUMBIA-SUICIDE SEVERITY RATING SCALE - C-SSRS
2. HAVE YOU ACTUALLY HAD ANY THOUGHTS OF KILLING YOURSELF IN THE PAST MONTH?: NO
1. IN THE PAST MONTH, HAVE YOU WISHED YOU WERE DEAD OR WISHED YOU COULD GO TO SLEEP AND NOT WAKE UP?: NO
6. HAVE YOU EVER DONE ANYTHING, STARTED TO DO ANYTHING, OR PREPARED TO DO ANYTHING TO END YOUR LIFE?: NO

## 2024-10-21 ASSESSMENT — ACTIVITIES OF DAILY LIVING (ADL)
ADLS_ACUITY_SCORE: 41
ADLS_ACUITY_SCORE: 37
ADLS_ACUITY_SCORE: 41
ADLS_ACUITY_SCORE: 37

## 2024-10-21 NOTE — ED PROVIDER NOTES
Emergency Department Note      History of Present Illness     Chief Complaint   Generalized Weakness    HPI   Lenora Hammonds is a 79 year old female with a history as noted below who presents to the emergency department for generalized weakness. The patient states that for the past 1-2 weeks, she has been experiencing a decline in her mobility, noting that she is also generally weak and is experiencing a gait instability. She reports that her weakness is worse on her right side. No one-sided numbness or tingling. No dysphagia or dysarthria. She reports that for 1-2 days, she has also been experiencing urinary urgency and cold symptoms, noting that her son with whom she lives with, was recently sick with URI symptoms. No fever or chills. No chest pain, abdominal pain, or shortness of breath. No nausea, vomiting, diarrhea. The patient's son states that the patient moved in with him and his wife as the patient was living with her sister who has since become unable to take care of the patient. He adds that he has been assisting the patient to the bathroom and to stand, noting that she is too weak to stand unassisted. He notes that the patient has been experiencing deconditioning has been a gradual onset for the past few years. He notes that the patient has a hx of a left-sided vestibular schwannoma, requiring a gamma knife procedure x 2 for this schwannoma.    Independent Historian   Son as detailed above.    Review of External Notes   Office note from 02/24. PCP suspects that her balance problems are primarily related to her left vestibular schwannoma for which had gamma knife surgery x 2.      MR brain from 04/28/21, schwannoma in 2021 measured 1.8 x 2.2 cm, previously 1.4 x 2.0 cm.    Past Medical History     Medical History and Problem List   Acoustic neuroma   Depressive disorder  Histoplasmosis  Hypertension  Pancolitis   Shaking  Snores  Left vestibular schwannoma    Medications   acetaminophen (TYLENOL) 500 MG  "tablet  albuterol (PROAIR HFA/PROVENTIL HFA/VENTOLIN HFA) 108 (90 Base) MCG/ACT inhaler  escitalopram (LEXAPRO) 10 MG tablet  lisinopril-hydrochlorothiazide (ZESTORETIC) 10-12.5 MG tablet  oxyCODONE (ROXICODONE) 5 MG tablet  propranolol (INDERAL) 20 MG tablet  propranolol ER (INDERAL LA) 60 MG 24 hr capsule    Surgical History   Thoracic surgery  Tonsillectomy  Histoplasmosis, right lower lobe  Gamma knife x 2  Physical Exam     Patient Vitals for the past 24 hrs:   BP Temp Temp src Pulse Resp SpO2 Height Weight   10/21/24 2330 (!) 151/70 98.5  F (36.9  C) -- 69 16 93 % -- --   10/21/24 2123 (!) 151/59 99.8  F (37.7  C) -- 66 16 94 % 1.626 m (5' 4\") 48.4 kg (106 lb 11.2 oz)   10/21/24 2045 (!) 152/71 -- -- 68 16 92 % -- --   10/21/24 2000 (!) 149/64 -- -- -- 16 95 % -- --   10/21/24 1946 (!) 143/70 -- -- 68 -- 95 % -- --   10/21/24 1931 (!) 146/67 -- -- 67 -- 100 % -- --   10/21/24 1915 (!) 146/59 -- -- 66 16 92 % -- --   10/21/24 1900 132/69 -- -- 71 16 94 % -- --   10/21/24 1845 -- -- -- -- 18 92 % -- --   10/21/24 1830 124/56 -- -- 66 16 91 % -- --   10/21/24 1800 -- -- -- -- 16 95 % -- --   10/21/24 1745 -- -- -- -- 16 98 % -- --   10/21/24 1715 (!) 143/59 -- -- -- 16 92 % -- --   10/21/24 1652 124/73 99.6  F (37.6  C) Oral 68 16 92 % 1.626 m (5' 4\") 49.3 kg (108 lb 11 oz)     Physical Exam  General: Awake, alert, in no acute distress   HEENT: Atraumatic   EOM normal   External ears normal   Trachea midline  Neck: Supple, normal ROM  CV: Regular rate, regular rhythm   No lower extremity edema  2+ radial and DP pulses  PULM: Breath sounds normal bilaterally  No wheezes or rales  ABD: Soft, non-tender, non-distended  Normal bowel sounds   No rebound or guarding   MSK: No gross deformities  NEURO: Alert, no focal deficits. 5/5 strength in bilateral upper and lower extremities.  No gross sensory deficits.  Patient moves in a coordinated fashion.  Cranial nerves 2-12 intact.   Skin: Warm, dry and " intact    Diagnostics     Lab Results   Labs Ordered and Resulted from Time of ED Arrival to Time of ED Departure   BASIC METABOLIC PANEL - Abnormal       Result Value    Sodium 137      Potassium 3.9      Chloride 99      Carbon Dioxide (CO2) 27      Anion Gap 11      Urea Nitrogen 29.5 (*)     Creatinine 0.91      GFR Estimate 63      Calcium 9.7      Glucose 98     ROUTINE UA WITH MICROSCOPIC REFLEX TO CULTURE - Abnormal    Color Urine Yellow      Appearance Urine Slightly Cloudy (*)     Glucose Urine Negative      Bilirubin Urine Negative      Ketones Urine 10 (*)     Specific Gravity Urine 1.020      Blood Urine Moderate (*)     pH Urine 6.5      Protein Albumin Urine 10 (*)     Urobilinogen Urine 2.0      Nitrite Urine Positive (*)     Leukocyte Esterase Urine Large (*)     Bacteria Urine Many (*)     Mucus Urine Present (*)     RBC Urine 18 (*)     WBC Urine 43 (*)     Squamous Epithelials Urine <1     INFLUENZA A/B, RSV, & SARS-COV2 PCR - Abnormal    Influenza A PCR Negative      Influenza B PCR Negative      RSV PCR Negative      SARS CoV2 PCR Positive (*)    LACTIC ACID WHOLE BLOOD - Normal    Lactic Acid 0.7     CBC WITH PLATELETS AND DIFFERENTIAL    WBC Count 6.5      RBC Count 3.91      Hemoglobin 12.5      Hematocrit 38.2      MCV 98      MCH 32.0      MCHC 32.7      RDW 12.6      Platelet Count 185      % Neutrophils 61      % Lymphocytes 23      % Monocytes 14      % Eosinophils 1      % Basophils 1      % Immature Granulocytes 0      NRBCs per 100 WBC 0      Absolute Neutrophils 4.0      Absolute Lymphocytes 1.5      Absolute Monocytes 0.9      Absolute Eosinophils 0.1      Absolute Basophils 0.0      Absolute Immature Granulocytes 0.0      Absolute NRBCs 0.0     URINE CULTURE   BLOOD CULTURE     Imaging   XR Chest Port 1 View   Final Result   IMPRESSION: Hyperinflation with minimal linear scarring lung bases. Normal heart size and pulmonary vascularity. Postoperative changes projecting over the  right hilar region. Normal heart size and pulmonary vascularity. Aortic calcification. Minimal    degenerative changes in the spine.      CT Head w/o Contrast   Final Result   IMPRESSION:       1. Increasing dilatation of the supratentorial ventricular system out of proportion to the degree of volume loss, which can be seen in normal pressure hydrocephalus.      2. Redemonstrated left cerebellopontine angle lesion measuring up to 2.0 cm, which is slightly increased in size since prior MRI dated 4/28/2021        EKG   ECG taken at 1721, ECG read at 1730  Normal sinus rhythm  Possible left atrial enlargement   No significant changes as compared to prior, dated 11/07/19.  Rate 64 bpm. WI interval 154 ms. QRS duration 86 ms. QT/QTc 400/412 ms. P-R-T axes 61 44 48.     Independent Interpretation   CT Head: No intracranial hemorrhage.    ED Course      Medications Administered   Medications   escitalopram (LEXAPRO) tablet 10 mg (has no administration in time range)   propranolol ER (INDERAL LA) 24 hr capsule 80 mg (has no administration in time range)   lisinopril (ZESTRIL) tablet 10 mg (has no administration in time range)   senna-docusate (SENOKOT-S/PERICOLACE) 8.6-50 MG per tablet 1 tablet (has no administration in time range)     Or   senna-docusate (SENOKOT-S/PERICOLACE) 8.6-50 MG per tablet 2 tablet (has no administration in time range)   ondansetron (ZOFRAN ODT) ODT tab 4 mg (has no administration in time range)     Or   ondansetron (ZOFRAN) injection 4 mg (has no administration in time range)   lidocaine 1 % 0.1-1 mL (has no administration in time range)   lidocaine (LMX4) cream (has no administration in time range)   sodium chloride (PF) 0.9% PF flush 3 mL (has no administration in time range)   sodium chloride (PF) 0.9% PF flush 3 mL (3 mLs Intracatheter $Given 10/21/24 9266)   acetaminophen (TYLENOL) tablet 650 mg (has no administration in time range)     Or   acetaminophen (TYLENOL) Suppository 650 mg (has no  administration in time range)   polyethylene glycol (MIRALAX) Packet 17 g (has no administration in time range)   cefTRIAXone (ROCEPHIN) 1 g vial to attach to  mL bag for ADULTS or NS 50 mL bag for PEDS (has no administration in time range)   nirmatrelvir and ritonavir (PAXLOVID) 300 mg/100 mg therapy pack 3 tablet (3 tablets Oral $Given 10/21/24 2333)   cefTRIAXone (ROCEPHIN) 2 g vial to attach to  ml bag for ADULTS or NS 50 ml bag for PEDS (0 g Intravenous Stopped 10/21/24 2031)     Discussion of Management   Admitting Hospitalist, Carmen Iqbal for Dr. Iqbal  Neurology, Dr. Griffith    ED Course   ED Course as of 10/22/24 0058   Mon Oct 21, 2024   1728 I obtained history and examined the patient as noted above.      1931 I rechecked the patient and explained findings. I discussed hospital admission, all questions answered.     2022 Spoke with Dr. Griffith, neurology, regarding the patient.     2029 Spoke with Carmen Mcneal PA-C, regarding the patient. Patient accepted for admission under the care of Dr. Iqbal.       Additional Documentation  None    Medical Decision Making / Diagnosis     CMS Diagnoses:   None    MIPS   None    MDM   Lenora Hammonsd is a 79 year old female who presents to the ED for the above history.  Her neuroexam is reassuring.  Did send for CT head given her history of vestibular schwannoma.  There was question of increased ventricular size out of proportion to age-related volume loss.  She does have gait instability and is having urinary incontinence though I think likely these are acutely worsened by her COVID and UTI currently.  Is nontoxic appearing.  Lactic is WNL.  Will collect blood culture and start Rocephin for acute cystitis.  COVID symptoms are rather mild at this time, not hypoxic requiring steroids or oxygen.  Patient may rebound and that she can ultimately discharge back to her family's home with the original plan of care at home but will likely need further treatment  to determine if she is safe at home or needs placement ultimately.  I did speak briefly with neurology who recommend outpatient follow-up regarding possible NPH especially given that her symptoms today can be explained by other medical causes.  Family and patient understand plan for admission.  Spoke with the above hospitalist who kindly accepts.  All questions answered.    Disposition   The patient was admitted to the hospital.     Diagnosis     ICD-10-CM    1. Acute cystitis with hematuria  N30.01       2. Weakness  R53.1         Scribe Disclosure:  I, Kodi Calloway, am serving as a scribe at 5:11 PM on 10/21/2024 to document services personally performed by Komal Reynolds DO based on my observations and the provider's statements to me.        Komal Reyonlds DO  10/22/24 0058

## 2024-10-21 NOTE — ED TRIAGE NOTES
Pt presents to the ED with sons who state pt recently moved in with one of them and within 9 days pt mobility declined to where pt can barely walk. Pts son also concerned pt has been incontinent. Pt denies pain.

## 2024-10-22 ENCOUNTER — APPOINTMENT (OUTPATIENT)
Dept: PHYSICAL THERAPY | Facility: CLINIC | Age: 79
End: 2024-10-22
Payer: COMMERCIAL

## 2024-10-22 LAB
ANION GAP SERPL CALCULATED.3IONS-SCNC: 14 MMOL/L (ref 7–15)
BASOPHILS # BLD AUTO: 0 10E3/UL (ref 0–0.2)
BASOPHILS NFR BLD AUTO: 1 %
BUN SERPL-MCNC: 24.2 MG/DL (ref 8–23)
CALCIUM SERPL-MCNC: 9 MG/DL (ref 8.8–10.4)
CHLORIDE SERPL-SCNC: 98 MMOL/L (ref 98–107)
CREAT SERPL-MCNC: 0.71 MG/DL (ref 0.51–0.95)
EGFRCR SERPLBLD CKD-EPI 2021: 85 ML/MIN/1.73M2
EOSINOPHIL # BLD AUTO: 0.1 10E3/UL (ref 0–0.7)
EOSINOPHIL NFR BLD AUTO: 1 %
ERYTHROCYTE [DISTWIDTH] IN BLOOD BY AUTOMATED COUNT: 12.3 % (ref 10–15)
GLUCOSE SERPL-MCNC: 85 MG/DL (ref 70–99)
HCO3 SERPL-SCNC: 25 MMOL/L (ref 22–29)
HCT VFR BLD AUTO: 38.9 % (ref 35–47)
HGB BLD-MCNC: 13 G/DL (ref 11.7–15.7)
IMM GRANULOCYTES # BLD: 0 10E3/UL
IMM GRANULOCYTES NFR BLD: 0 %
LYMPHOCYTES # BLD AUTO: 1.4 10E3/UL (ref 0.8–5.3)
LYMPHOCYTES NFR BLD AUTO: 25 %
MCH RBC QN AUTO: 31.9 PG (ref 26.5–33)
MCHC RBC AUTO-ENTMCNC: 33.4 G/DL (ref 31.5–36.5)
MCV RBC AUTO: 95 FL (ref 78–100)
MONOCYTES # BLD AUTO: 0.9 10E3/UL (ref 0–1.3)
MONOCYTES NFR BLD AUTO: 17 %
NEUTROPHILS # BLD AUTO: 3.1 10E3/UL (ref 1.6–8.3)
NEUTROPHILS NFR BLD AUTO: 56 %
NRBC # BLD AUTO: 0 10E3/UL
NRBC BLD AUTO-RTO: 0 /100
PLATELET # BLD AUTO: 155 10E3/UL (ref 150–450)
POTASSIUM SERPL-SCNC: 3.4 MMOL/L (ref 3.4–5.3)
RBC # BLD AUTO: 4.08 10E6/UL (ref 3.8–5.2)
SODIUM SERPL-SCNC: 137 MMOL/L (ref 135–145)
WBC # BLD AUTO: 5.4 10E3/UL (ref 4–11)

## 2024-10-22 PROCEDURE — 80048 BASIC METABOLIC PNL TOTAL CA: CPT

## 2024-10-22 PROCEDURE — 250N000013 HC RX MED GY IP 250 OP 250 PS 637

## 2024-10-22 PROCEDURE — 99232 SBSQ HOSP IP/OBS MODERATE 35: CPT | Performed by: PHYSICIAN ASSISTANT

## 2024-10-22 PROCEDURE — 97530 THERAPEUTIC ACTIVITIES: CPT | Mod: GP

## 2024-10-22 PROCEDURE — 85025 COMPLETE CBC W/AUTO DIFF WBC: CPT

## 2024-10-22 PROCEDURE — G0378 HOSPITAL OBSERVATION PER HR: HCPCS

## 2024-10-22 PROCEDURE — 97116 GAIT TRAINING THERAPY: CPT | Mod: GP

## 2024-10-22 PROCEDURE — 250N000011 HC RX IP 250 OP 636

## 2024-10-22 PROCEDURE — 97162 PT EVAL MOD COMPLEX 30 MIN: CPT | Mod: GP

## 2024-10-22 PROCEDURE — 96376 TX/PRO/DX INJ SAME DRUG ADON: CPT

## 2024-10-22 PROCEDURE — 36415 COLL VENOUS BLD VENIPUNCTURE: CPT

## 2024-10-22 RX ORDER — HYDROCHLOROTHIAZIDE 12.5 MG/1
12.5 CAPSULE ORAL DAILY
Status: DISCONTINUED | OUTPATIENT
Start: 2024-10-23 | End: 2024-10-24 | Stop reason: HOSPADM

## 2024-10-22 RX ADMIN — ESCITALOPRAM OXALATE 10 MG: 10 TABLET ORAL at 08:41

## 2024-10-22 RX ADMIN — LISINOPRIL 10 MG: 10 TABLET ORAL at 08:41

## 2024-10-22 RX ADMIN — CEFTRIAXONE 1 G: 1 INJECTION, POWDER, FOR SOLUTION INTRAMUSCULAR; INTRAVENOUS at 20:22

## 2024-10-22 RX ADMIN — NIRMATRELVIR AND RITONAVIR 3 TABLET: KIT at 08:42

## 2024-10-22 RX ADMIN — PROPRANOLOL HYDROCHLORIDE 80 MG: 80 CAPSULE, EXTENDED RELEASE ORAL at 08:42

## 2024-10-22 RX ADMIN — NIRMATRELVIR AND RITONAVIR 3 TABLET: KIT at 20:28

## 2024-10-22 ASSESSMENT — ACTIVITIES OF DAILY LIVING (ADL)
ADLS_ACUITY_SCORE: 42
ADLS_ACUITY_SCORE: 42
ADLS_ACUITY_SCORE: 37
ADLS_ACUITY_SCORE: 42
ADLS_ACUITY_SCORE: 37
ADLS_ACUITY_SCORE: 42
ADLS_ACUITY_SCORE: 37
DEPENDENT_IADLS:: CLEANING;COOKING;LAUNDRY;SHOPPING;MEAL PREPARATION;MEDICATION MANAGEMENT;MONEY MANAGEMENT;TRANSPORTATION
ADLS_ACUITY_SCORE: 42
ADLS_ACUITY_SCORE: 37
ADLS_ACUITY_SCORE: 42
ADLS_ACUITY_SCORE: 42

## 2024-10-22 NOTE — PROGRESS NOTES
I was present with the student who participated in the service and in the documentation of the note. I have verified the history and personally performed the physical exam and medical decision-making. I agree with the assessment and plan of care as documented in the note. Leann Simmons PA-C     [facility]    Medicine Progress Note - Hospitalist Service    Date of Admission:  10/21/2024    Assessment & Plan   Lenora Hammonds is a 79 year old female with PMH tremor, balance problems, depression, HTN, cognitive impairment who was admitted 10/21/2024 with generalized weakness due to Covid-19 infection and possible UTI.     Over the last week patient has developed significant generalized weakness.  At baseline she uses a wheelchair outside of the home but is able to use a walker for short distances in the home.  Lives with her son.  Son has had to assist her for all cares this week.  Increased urinary incontinence and dysuria.  On 10/19 patient developed  runny nose, dry cough and worsening weakness.  No fevers, chills, shortness of breath, chest pain, abdominal pain.     Generalized weakness  Acute cystitis with hematuria  COVID 19  Generalized weakness likely due to combination of COVID-19 infection and UTI. UA shows positive nitrate, moderate blood, large leukocyte esterase, 43 WBC.    - start Paxlovid, symptom onset around 10/19  - CXR clear  - continuous pulse ox  - continue rocephin  - blood culture and urine culture pending  - encourage oral hydration  - PT/ SW consult, recommending TCU     Tremor  Balance problems  Chronic issues that she follows with neurology outpatient.  Suspect sx are due to hx of schwannoma s/p gamma knife surgery x2. Has findings on imaging concerning for NPH but per last neurosurgery note, does not have urinary incontinence or memory issues to be consistent with NPH.   CT imaging in the ED notes concerns for possible NPH and slightly increased cerebellopontine  lesion.   - tremor is responsive to Propranolol per neurology notes  - continue follow up with outpatient neurologist   - PT consulted, recommending TCU at this time.     HTN  - continue her lisinopril and propranolol   - resume hydrochlorothiazide 10/23  - encourage oral hydration     Cognitive impairment  - declined further workup with PCP in March 2024     Depression  - continue pta lexapro      Observation Goals: -diagnostic tests and consults completed and resulted, -vital signs normal or at patient baseline, -tolerating oral intake to maintain hydration, -returns to baseline functional status, -safe disposition plan has been identified, Nurse to notify provider when observation goals have been met and patient is ready for discharge.  Diet: Regular Diet Adult    DVT Prophylaxis: Pneumatic Compression Devices  Ernandez Catheter: Not present  Lines: None     Cardiac Monitoring: None  Code Status: No CPR- Do NOT Intubate      Clinically Significant Risk Factors Present on Admission                   # Hypertension: Noted on problem list                    Disposition Plan     Medically Ready for Discharge: Anticipated Tomorrow           The patient's care was discussed with RUBI Payne Pili  Hospitalist Service  [facility]  Securely message with hint (more info)  Text page via [employer] Paging/Directory   ______________________________________________________________________    Interval History       Lenora is doing well. States the antibiotics for the UTI are helping and that her burning with urination has resolved. She mentioned the urgency with urination is still present. Patient continues to have bilateral weakness in lower extremities that patient's son (Deepak) stated is more noticeable on the right side. Deepak mentioned that Lenora has a difficult time moving her feet to start walking. Deepak also noted the patient has fallen a few times within the past week because the  "walker \"gets ahead of her\". Deepak also stated that the lower extremity weakness has been progressing for the past couple years, but has significantly gotten worse over this past weekend.   Lenora denies dizziness, SOB, chest pain, chills, fever, nausea, vomiting, diarrhea, or headache. Patient denies change in strength in upper extremities. Lenora mentioned she is having good bowel movements and has a good appetite.     Patient's son (Deepak) is requesting to consult with case management about future care.         Physical Exam   Vital Signs: Temp: 98.5  F (36.9  C) Temp src: Oral BP: (!) 155/127 Pulse: 69   Resp: 18 SpO2: 95 % O2 Device: None (Room air)    Weight: 106 lbs 11.24 oz    General Appearance: Well-developed, thin, no acute distress, comfortable. Lying in bed.  HEENT: Normocephalic, no scleral icterus or conjunctival injection. EOM intact. 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  Respiratory: Normal respiratory effort. Mild diffuse crackles with deep breaths. No rhonchi or wheezing.  Cardiovascular: RRR. S1 and S2 present. No M/R/G.  GI: Abdomen soft and non-distended. No guarding. Bowel sounds present.  Skin: Lower extremities warm, dry without edema.  Neurological: 5/5 strength bilaterally in upper extremity. Decreased strength bilaterally for lower extremities. Patient had tremor in UE bilaterally which seemed to worsen with strength testing. Speech clear. Alert. Oriented to person, place, situation. Could answer who the president was and who was visiting her (son).     Medical Decision Making       45 MINUTES SPENT BY ME on the date of service doing chart review, history, exam, documentation & further activities per the note.      Data     I have personally reviewed the following data over the past 24 hrs:    5.4  \   13.0   / 155     137 98 24.2 (H) /  85   3.4 25 0.71 \     Procal: N/A CRP: N/A Lactic Acid: 0.7         Imaging results reviewed over the " past 24 hrs:   Recent Results (from the past 24 hour(s))   CT Head w/o Contrast    Narrative    EXAM: CT HEAD W/O CONTRAST  LOCATION: [facility]  DATE/TIME: 10/21/2024 6:22 PM CDT    INDICATION: Increasing weakness.   COMPARISON: Brain MRI dated 4/28/2021  TECHNIQUE: Routine CT Head without IV contrast. Multiplanar reformats. Dose reduction techniques were used.    FINDINGS:   INTRACRANIAL CONTENTS: Redemonstrated left cerebellopontine angle lesion, which is better characterized on prior brain MRI dated 4/28/2021 measuring 2.0 x 1.6 cm (previously measured 1.9 x 1.5 cm on 4/28/2021). No acute intracranial hemorrhage. No CT   evidence of acute infarct. Sequelae of mild chronic microangiopathy. Mild cerebral volume loss. Increasing dilatation of the supratentorial ventricular system out of proportion to the degree of volume loss, which can be seen in normal pressure   hydrocephalus. Patent basal cisterns.     VISUALIZED ORBITS/SINUSES/MASTOIDS: The orbits are unremarkable. The visualized paranasal sinuses and temporal bone structures are well-aerated.     BONES/SOFT TISSUES: The calvarium and skull base are unremarkable.       Impression    IMPRESSION:     1. Increasing dilatation of the supratentorial ventricular system out of proportion to the degree of volume loss, which can be seen in normal pressure hydrocephalus.    2. Redemonstrated left cerebellopontine angle lesion measuring up to 2.0 cm, which is slightly increased in size since prior MRI dated 4/28/2021   XR Chest Port 1 View    Narrative    EXAM: XR CHEST PORT 1 VIEW  LOCATION: [facility]  DATE: 10/21/2024    INDICATION: COVID 19, crackles bilaterally  COMPARISON: 2/13/2022      Impression    IMPRESSION: Hyperinflation with minimal linear scarring lung bases. Normal heart size and pulmonary vascularity. Postoperative changes projecting over the right hilar region. Normal heart size and pulmonary vascularity.  Aortic calcification. Minimal   degenerative changes in the spine.

## 2024-10-22 NOTE — PLAN OF CARE
PRIMARY DIAGNOSIS: COVID  OUTPATIENT/OBSERVATION GOALS TO BE MET BEFORE DISCHARGE:  1. Vital signs stable: Yes    3. Dyspnea improved and O2 sats >88% at RA or at prior home O2 therapy level: Yes      SpO2: 95 %, O2 Device: None (Room air)    4. Short term supplemental O2 needed for use with activity at home: No    5. Tolerating adequate PO diet and medications: Yes    6. Return to near baseline physical activity: Yes    Discharge Planner Nurse   Safe discharge environment identified: Yes  Barriers to discharge: No       Entered by: Marilyn Watt RN 10/22/2024 10:09 AM     Please review provider order for any additional goals.   Nurse to notify provider when observation goals have been met and patient is ready for discharge.    Goal Outcome Evaluation:      Plan of Care Reviewed With: patient    Overall Patient Progress: improvingOverall Patient Progress: improving    Outcome Evaluation: PT a/O X2 not oriented to time, place, A touch and go w/situation. PT provied IS. RPIV SL. Lives at home w/son who is primary caregiver. PT/SW consult later at 1330. PT lungs have crackles. Reg diet.      Problem: Adult Inpatient Plan of Care  Goal: Plan of Care Review  Description: The Plan of Care Review/Shift note should be completed every shift.  The Outcome Evaluation is a brief statement about your assessment that the patient is improving, declining, or no change.  This information will be displayed automatically on your shift  note.  Outcome: Progressing  Flowsheets (Taken 10/22/2024 1005)  Outcome Evaluation: PT a/O X2 not oriented to time, place, A touch and go w/situation. PT provied IS. RPIV SL. Lives at home w/son who is primary caregiver. PT/SW consult later at 1330. PT lungs have crackles. Reg diet.  Plan of Care Reviewed With: patient  Overall Patient Progress: improving  Goal: Patient-Specific Goal (Individualized)  Description: You can add care plan individualizations to a care plan. Examples of Individualization  "might be:  \"Parent requests to be called daily at 9am for status\", \"I have a hard time hearing out of my right ear\", or \"Do not touch me to wake me up as it startles  me\".  Outcome: Progressing  Goal: Absence of Hospital-Acquired Illness or Injury  Outcome: Progressing  Goal: Optimal Comfort and Wellbeing  Outcome: Progressing  Goal: Readiness for Transition of Care  Outcome: Progressing     "

## 2024-10-22 NOTE — PLAN OF CARE
PRIMARY DIAGNOSIS: COVID  OUTPATIENT/OBSERVATION GOALS TO BE MET BEFORE DISCHARGE:  1. Vital signs stable: Yes    3. Dyspnea improved and O2 sats >88% at RA or at prior home O2 therapy level: Yes      SpO2: 95 %, O2 Device: None (Room air)    4. Short term supplemental O2 needed for use with activity at home: No    5. Tolerating adequate PO diet and medications: Yes    6. Return to near baseline physical activity: Yes    Discharge Planner Nurse   Safe discharge environment identified: Yes  Barriers to discharge: Yes       Entered by: Marilyn Watt RN 10/22/2024 4:44 PM     Please review provider order for any additional goals.   Nurse to notify provider when observation goals have been met and patient is ready for discharge.    Goal Outcome Evaluation:      Plan of Care Reviewed With: patient    Overall Patient Progress: improvingOverall Patient Progress: improving    Outcome Evaluation: PT a/O X2 not oriented to time, place, A touch and go w/situation. PT provied IS. RPIV SL. Lives at home w/son who is primary caregiver. PT/SW consult later at 1330. PT lungs have crackles. Reg diet.      Problem: Adult Inpatient Plan of Care  Goal: Plan of Care Review  Description: The Plan of Care Review/Shift note should be completed every shift.  The Outcome Evaluation is a brief statement about your assessment that the patient is improving, declining, or no change.  This information will be displayed automatically on your shift  note.  10/22/2024 1644 by Marilyn Watt RN  Outcome: Progressing  Flowsheets (Taken 10/22/2024 1644)  Plan of Care Reviewed With: patient  Overall Patient Progress: improving  10/22/2024 1227 by Marilyn Watt RN  Outcome: Progressing  Flowsheets (Taken 10/22/2024 1227)  Plan of Care Reviewed With: patient  Overall Patient Progress: improving  10/22/2024 1005 by Marilyn Watt RN  Outcome: Progressing  Flowsheets (Taken 10/22/2024 1005)  Outcome Evaluation: PT a/O X2 not oriented to time,  "place, A touch and go w/situation. PT provied IS. RPIV SL. Lives at home w/son who is primary caregiver. PT/SW consult later at 1330. PT lungs have crackles. Reg diet.  Plan of Care Reviewed With: patient  Overall Patient Progress: improving  Goal: Patient-Specific Goal (Individualized)  Description: You can add care plan individualizations to a care plan. Examples of Individualization might be:  \"Parent requests to be called daily at 9am for status\", \"I have a hard time hearing out of my right ear\", or \"Do not touch me to wake me up as it startles  me\".  10/22/2024 1644 by Marilyn Watt RN  Outcome: Progressing  10/22/2024 1227 by Marilyn Watt RN  Outcome: Progressing  10/22/2024 1005 by Marilyn Watt RN  Outcome: Progressing  Goal: Absence of Hospital-Acquired Illness or Injury  10/22/2024 1644 by Marilyn Watt RN  Outcome: Progressing  10/22/2024 1227 by Marilyn Watt RN  Outcome: Progressing  10/22/2024 1005 by Marilyn Watt RN  Outcome: Progressing  Intervention: Identify and Manage Fall Risk  Recent Flowsheet Documentation  Taken 10/22/2024 1643 by Marilyn Watt RN  Safety Promotion/Fall Prevention: safety round/check completed  Taken 10/22/2024 1200 by Marilyn Watt RN  Safety Promotion/Fall Prevention: safety round/check completed  Taken 10/22/2024 0800 by Marilyn Watt RN  Safety Promotion/Fall Prevention: safety round/check completed  Intervention: Prevent Skin Injury  Recent Flowsheet Documentation  Taken 10/22/2024 1643 by Marilyn Watt RN  Skin Protection: adhesive use limited  Device Skin Pressure Protection: absorbent pad utilized/changed  Taken 10/22/2024 1200 by Marilyn Watt RN  Skin Protection: adhesive use limited  Device Skin Pressure Protection: absorbent pad utilized/changed  Taken 10/22/2024 0800 by Marilyn Watt RN  Skin Protection: adhesive use limited  Device Skin Pressure Protection: absorbent pad utilized/changed  Intervention: Prevent " Infection  Recent Flowsheet Documentation  Taken 10/22/2024 1643 by Marilyn Watt RN  Infection Prevention:   cohorting utilized   equipment surfaces disinfected   hand hygiene promoted   personal protective equipment utilized   rest/sleep promoted   single patient room provided  Taken 10/22/2024 1200 by Marilyn Watt RN  Infection Prevention:   cohorting utilized   equipment surfaces disinfected   hand hygiene promoted   personal protective equipment utilized   rest/sleep promoted   single patient room provided  Taken 10/22/2024 0800 by Marilyn Watt RN  Infection Prevention:   cohorting utilized   equipment surfaces disinfected   hand hygiene promoted   personal protective equipment utilized   rest/sleep promoted   single patient room provided  Goal: Optimal Comfort and Wellbeing  10/22/2024 1644 by Marilyn Watt RN  Outcome: Progressing  10/22/2024 1227 by Marilyn Watt RN  Outcome: Progressing  10/22/2024 1005 by Marilyn Watt RN  Outcome: Progressing  Goal: Readiness for Transition of Care  10/22/2024 1644 by Marilyn Watt RN  Outcome: Progressing  10/22/2024 1227 by Marilyn Watt RN  Outcome: Progressing  10/22/2024 1005 by Marilyn Watt RN  Outcome: Progressing

## 2024-10-22 NOTE — PROGRESS NOTES
10/22/24 1100   Appointment Info   Signing Clinician's Name / Credentials (PT) Brianna Talbot, PT, DPT   Quick Adds   Quick Adds Certification   Living Environment   People in Home child(kelle), adult   Current Living Arrangements house   Home Accessibility stairs to enter home;stairs within home   Number of Stairs, Main Entrance 2   Stair Railings, Main Entrance none   Number of Stairs, Within Home, Primary eight   Stair Railings, Within Home, Primary railing on left side (ascending)   Transportation Anticipated family or friend will provide   Living Environment Comments Patient lives with her son and daughter-in-law in a split-level home.  All needs met upstairs.  Patient sleeps in a standard bed (exits to right).  Near her room, she has a bathroom with a walk-in shower and a shower chair seat.  There is a grab bar outside the shower and near the toilet.   Self-Care   Usual Activity Tolerance moderate   Current Activity Tolerance moderate   Regular Exercise No   Equipment Currently Used at Home walker, rolling  (4WW)   Fall history within last six months yes   Number of times patient has fallen within last six months 3  (Son reports patient has fallen to her knee multiple times in the past week.)   Activity/Exercise/Self-Care Comment Patient reports baseline independence with dressing, bathing, and toileting.  Patient's son sets up his pill box.  She was ambulating in the home using a 4WW.  When outside the home, son pushes her in a transport wheel chair.   General Information   Onset of Illness/Injury or Date of Surgery 10/21/24   Referring Physician Carmen Mcneal PA-C   Patient/Family Therapy Goals Statement (PT) Patient does not state specific goals, seems open to TCU if recommended.   Pertinent History of Current Problem (include personal factors and/or comorbidities that impact the POC) 79 year old female with PMH tremor, balance problems, depression, HTN, cognitive impairment who presents with generalized  weakness.   Existing Precautions/Restrictions fall   Cognition   Affect/Mental Status (Cognition) WFL   Orientation Status (Cognition) oriented to;person;situation   Follows Commands (Cognition) follows one-step commands;over 90% accuracy;delayed response/completion;increased processing time needed   Pain Assessment   Patient Currently in Pain No   Integumentary/Edema   Integumentary/Edema Comments Age-related skin changes   Posture    Posture Forward head position;Protracted shoulders   Range of Motion (ROM)   Range of Motion ROM is WFL   Strength (Manual Muscle Testing)   Strength (Manual Muscle Testing) Able to perform R SLR;Able to perform L SLR;Deficits observed during functional mobility   Bed Mobility   Comment, (Bed Mobility) SBA supine > sit with no bed rails and HOB flat.   Transfers   Comment, (Transfers) CGA sit <> stand from bed, initially with unsafe hand placement pulling on FWW.   Gait/Stairs (Locomotion)   Comment, (Gait/Stairs) CGA 8' ambulation with FWW, demonstrating slow, shuffling steps and walker positioned too far from body.   Balance   Balance Comments Impaired dynamic balance, using walker at baseline.   Sensory Examination   Sensory Perception patient reports no sensory changes   Coordination   Coordination Comments Son reports essential tremor at baseline.   Clinical Impression   Criteria for Skilled Therapeutic Intervention Yes, treatment indicated   PT Diagnosis (PT) Impaired functional mobility   Influenced by the following impairments COVID +, generalized weakness, deconditioning, impaired balance, decreased activity tolerance   Functional limitations due to impairments Impaired independence with  bed mobility, transfers, gait, and stairs   Clinical Presentation (PT Evaluation Complexity) evolving   Clinical Presentation Rationale Clinical judgement, PMH, social support   Clinical Decision Making (Complexity) moderate complexity   Planned Therapy Interventions (PT) balance  training;bed mobility training;gait training;home exercise program;neuromuscular re-education;patient/family education;postural re-education;stair training;strengthening;transfer training;progressive activity/exercise   Risk & Benefits of therapy have been explained evaluation/treatment results reviewed;care plan/treatment goals reviewed;risks/benefits reviewed;participants voiced agreement with care plan;participants included;patient;son   PT Total Evaluation Time   PT Eval, Moderate Complexity Minutes (71018) 11   Therapy Certification   Start of care date 10/22/24   Certification date from 10/22/24   Certification date to 10/29/24   Medical Diagnosis Weakness   Physical Therapy Goals   PT Frequency Daily   PT Predicted Duration/Target Date for Goal Attainment 10/28/24   PT Goals Bed Mobility;Transfers;Gait;Stairs   PT: Bed Mobility Independent;Supine to/from sit;Rolling   PT: Transfers Modified independent;Sit to/from stand;Bed to/from chair;Assistive device   PT: Gait Modified independent;Rolling walker;100 feet   PT: Stairs Supervision/stand-by assist;8 stairs;Rail on left   Interventions   Interventions Quick Adds Gait Training;Therapeutic Activity   Therapeutic Activity   Therapeutic Activities: dynamic activities to improve functional performance Minutes (52500) 11   Symptoms Noted During/After Treatment None   Treatment Detail/Skilled Intervention Patient supine in bed, son arriving at start of session.  Patient on room air.  Awake and agreeable to PT session, son encouraging.  Patient SBA for supine > sit transfer, SBA for sitting balance.  Tremors noted with bed mobility.  Increased productive cough with bed mobility, educated on benefits of early mobility and importance of OOB for pulmonary hygiene.  Patient instructed for x3 repeated sit <> stands from bed, initially pulling with B hands on walker when pushing with one hand from bed when cued.  Walker height adjusted to promote upright posture.  Patient  cued for pursed lip breathing during sit <> stands.  Progressing from CGA to SBA for sit > stand.  Patient returning to bed at end of session, SBA for sit > supine transfer with bed flat and no bed rail.  Left supine with call light in reach and bed alarm activate.  SpO2 93%- 95% on room air.  Educated on walking program with goal of 3 walks in room per day with staff to progress activity tolerance and maintain strength.   Gait Training   Gait Training Minutes (78679) 12   Symptoms Noted During/After Treatment (Gait Training) fatigue   Treatment Detail/Skilled Intervention Patient ambulates total distance of ~35 ft with FWW and CGA.  Patient ambulates very slowly.  Demonstrates slow, shuffling steps with minimal foot clearance.  Patient needing increased time for 180 degree turn, difficulty initiating movement.  Does when with visual cues to increase step length and initiate turns.  Cues for upright posture and maintaining walker in closer proximity to body.  Son states patient tends to keep walker too far from body even at home.  Encouraged feet inside box of walker, especially during turns/pivots.   Distance in Feet 35' (8 ft eval)   [place] Level (Gait Training) contact guard   Assistive Device (Gait Training) rolling walker   PT Discharge Planning   PT Plan Repeated sit <> stands and gait with FWW, bring aerobic step, trial 8 steps with L rails as able   PT Discharge Recommendation (DC Rec) Transitional Care Facility   PT Rationale for DC Rec Patient significantly below her IND/mod I baseline, currently requiring Ax1 for all ADLs and mobility.  Patient lives with her son and daughter-in-law.  Family helpful but unable to  provide 24 hour assist.  Patient needing CGA for ambulation with FWW, demonstrating slow, shuffling gait with increased falls risk.  Son reporting multiple falls in past week despite using 4WW at home.  Recommend discharge to TCU to promote safety and independence with mobility.  If able  to progress to mod I for household mobility during IP PT, could return home with HHPT to promote strength, balance, and endurance.   PT Brief overview of current status Ax1 FWW, encourage 3 walks/day in room   PT Equipment Needed at Discharge walker, rolling  (Owns 4WW, may be safer with FWW)   Total Session Time   Timed Code Treatment Minutes 23   Total Session Time (sum of timed and untimed services) 34     M [facility]  OUTPATIENT PHYSICAL THERAPY EVALUATION  PLAN OF TREATMENT FOR OUTPATIENT REHABILITATION  (COMPLETE FOR INITIAL CLAIMS ONLY)  Patient's Last Name, First Name, M.I.  YOB: 1945  Lenora Hammonds                        Provider's Name  [facility] Medical Record No.  [id number]                             Onset Date:  10/21/24   Start of Care Date:  10/22/24   Type:     _X_PT   ___OT   ___SLP Medical Diagnosis:  Weakness              PT Diagnosis:  Impaired functional mobility Visits from SOC:  1     See note for plan of treatment, functional goals and certification details    I CERTIFY THE NEED FOR THESE SERVICES FURNISHED UNDER        THIS PLAN OF TREATMENT AND WHILE UNDER MY CARE     (Physician co-signature of this document indicates review and certification of the therapy plan).

## 2024-10-22 NOTE — H&P
[facility]    History and Physical - Hospitalist Service       Date of Admission:  10/21/2024    Assessment & Plan      Lenora Hammonds is a 79 year old female with PMH tremor, balance problems, depression, HTN, cognitive impairment who presents with generalized weakness.    Over the last week patient has developed significant generalized weakness.  At baseline she uses a wheelchair outside of the home but is able to use a walker for short distances in the home.  Lives with her son.  Son has had to assist her for all cares this week.  Increased urinary incontinence and dysuria.  On 10/19 patient developed  runny nose, dry cough and worsening weakness.  No fevers, chills, shortness of breath, chest pain, abdominal pain.    Generalized weakness  Acute cystitis with hematuria  COVID 19  Generalized weakness likely due to combination of COVID-19 infection and UTI. UA shows positive nitrate, moderate blood, large leukocyte esterase, 43 WBC.    - start Paxlovid, symptom onset around 10/19  - check CXR with crackles on exam and COVID infection  - continuous pulse ox  - continue rocephin  - blood culture and urine culture pending  - encourage oral hydration  - PT/ SW consult     Tremor  Balance problems  Chronic issues that she follows with nephrology outpatient.  CT imaging in the ED notes concerns for possible NPH and slightly increased cerebellopontine lesion.   - Recommend following up on CT imaging findings with her outpatient neurologist     HTN  - continue her lisinopril  - holding hydrochlorothiazide, encourage oral hydration, may need to resume if BP remains elevated    Cognitive impairment  - declined further workup with PCP in March 2024    Depression  - continue pta lexapro             Diet:  regular diet  DVT Prophylaxis: Pneumatic Compression Devices  Ernandez Catheter: Not present  Lines: None     Cardiac Monitoring: None  Code Status:  DNR/DNI    Clinically Significant Risk Factors Present  on Admission                   # Hypertension: Noted on problem list                  Disposition Plan     Medically Ready for Discharge: Anticipated Tomorrow       The patient's care was discussed with the Bedside Nurse, Patient, and ED provider .    COURT Mcneal PA-C  Hospitalist Service  [facility]  Securely message with [employer] (more info)  Text page via [facility] Paging/Directory     ______________________________________________________________________    Chief Complaint   weakness    History is obtained from the patient    History of Present Illness   Lenora Hammonds is a 79 year old female with PMH tremor, balance problems, depression, HTN, cognitive impairment who presents with generalized weakness.    Over the last week patient has developed significant generalized weakness.  At baseline she uses a wheelchair outside of the home but is able to use a walker for short distances in the home.  Lives with her son.  Son has had to assist her for all cares this week.  Increased urinary incontinence and dysuria.  On 10/19 patient developed  runny nose, dry cough and worsening weakness.  No fevers, chills, shortness of breath, chest pain, abdominal pain.    In the ED, afebrile, mildly hypertensive at 149/64, heart rate 65, respirations 16, oxygen 95% on room air.  BMP notable for BUN of 29.5.  Lactic acid 0.7.  CBC unremarkable.  UA shows positive nitrate, moderate blood, large leukocyte esterase, 43 WBC.  Blood culture pending urine culture pending.  COVID-positive.  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    Snores 10/6/2011     Past Surgical History   Past Surgical History:   Procedure Laterality Date    COLONOSCOPY N/A 12/6/2017    Procedure: COMBINED COLONOSCOPY, SINGLE OR MULTIPLE BIOPSY/POLYPECTOMY BY BIOPSY;  colonoscopy;  Surgeon: Moise Vásquez MD;  Location:  GI    COLONOSCOPY N/A 6/28/2018    Procedure: COMBINED COLONOSCOPY, SINGLE OR MULTIPLE BIOPSY/POLYPECTOMY BY BIOPSY;  COLONOSCOPY;  Surgeon: Moise Vásquez MD;  Location:  GI    THORACIC SURGERY      TONSILLECTOMY      ZZC THORACOTOMY,MAJOR,EXPLOR/BIOPSY  1983    histoplasmosis, right lower lobe     Prior to Admission Medications   Prior to Admission Medications   Prescriptions Last Dose Informant Patient Reported? Taking?   acetaminophen (TYLENOL) 500 MG tablet Unknown at PRN  No Yes   Sig: Take 1-2 tablets (500-1,000 mg) by mouth every 6 hours as needed for mild pain   escitalopram (LEXAPRO) 10 MG tablet 10/21/2024 at am  No Yes   Sig: Take 1 tablet (10 mg) by mouth daily   lisinopril-hydrochlorothiazide (ZESTORETIC) 10-12.5 MG tablet 10/21/2024 at am  No Yes   Sig: Take 1 tablet by mouth daily   propranolol ER (INDERAL LA) 80 MG 24 hr capsule 10/21/2024 at am  Yes Yes   Sig: Take 80 mg by mouth daily.      Facility-Administered Medications: None        Social History   I have reviewed this patient's social history and updated it with pertinent information if needed.  Social History     Tobacco Use    Smoking status: Former     Current packs/day: 0.00     Average packs/day: 0.5 packs/day for 50.0 years (25.0 ttl pk-yrs)     Types: Cigarettes     Start date: 1964     Quit date: 2014     Years since quitting: 10.8     Passive exposure: Past    Smokeless tobacco: Never    Tobacco comments:     quit 12/2014   Vaping Use    Vaping status: Never Used   Substance Use Topics     Alcohol use: Yes     Comment: occasionally     Drug use: No         Allergies   Allergies   Allergen Reactions    Rosuvastatin Hives, Itching and Rash        Physical Exam   Vital Signs: Temp: 99.6  F (37.6  C) Temp src: Oral BP: (!) 149/64 Pulse: 68   Resp: 16 SpO2: 95 % O2 Device: None (Room air)    Weight: 108 lbs 10.99 oz    GENERAL:  Frail elderly women, Alert, Comfortable, No acute distress. Laying in bed.   PSYCH: pleasant  HEENT:  Normocephalic, No scleral icterus or conjunctival injection, normal hearing, oral mucosa moist, Left ear canal blocked with cerumen, right ear canal clear, TM appears normal  HEART:  Normal S1, S2 with no murmur, RRR  LUNGS:  Normal Respiratory effort. Crackles bilaterally, diminished in the bases bilaterally.  ABDOMEN:  Soft, non-tender, non distended. No peritoneal signs.   EXTREMITIES:  No pitting pedal edema, No cyanosis.   SKIN:  Warm, dry to touch. No rash.  NEUROLOGIC:  BL 5/5 symmetric upper, slight decrease strength on the left lower extremity compared to right, no focal deficits. Speech clear, alert    Medical Decision Making       MANAGEMENT DISCUSSED with the following over the past 24 hours: patient, family, ED provider   NOTE(S)/MEDICAL RECORDS REVIEWED over the past 24 hours: labs, imaging, progress notes       Data     I have personally reviewed the following data over the past 24 hrs:    6.5  \   12.5   / 185     137 99 29.5 (H) /  98   3.9 27 0.91 \     Procal: N/A CRP: N/A Lactic Acid: 0.7         Imaging results reviewed over the past 24 hrs:   Recent Results (from the past 24 hour(s))   CT Head w/o Contrast    Narrative    EXAM: CT HEAD W/O CONTRAST  LOCATION: [facility]  DATE/TIME: 10/21/2024 6:22 PM CDT    INDICATION: Increasing weakness.   COMPARISON: Brain MRI dated 4/28/2021  TECHNIQUE: Routine CT Head without IV contrast. Multiplanar reformats. Dose reduction techniques were used.    FINDINGS:   INTRACRANIAL CONTENTS: Redemonstrated  left cerebellopontine angle lesion, which is better characterized on prior brain MRI dated 4/28/2021 measuring 2.0 x 1.6 cm (previously measured 1.9 x 1.5 cm on 4/28/2021). No acute intracranial hemorrhage. No CT   evidence of acute infarct. Sequelae of mild chronic microangiopathy. Mild cerebral volume loss. Increasing dilatation of the supratentorial ventricular system out of proportion to the degree of volume loss, which can be seen in normal pressure   hydrocephalus. Patent basal cisterns.     VISUALIZED ORBITS/SINUSES/MASTOIDS: The orbits are unremarkable. The visualized paranasal sinuses and temporal bone structures are well-aerated.     BONES/SOFT TISSUES: The calvarium and skull base are unremarkable.       Impression    IMPRESSION:     1. Increasing dilatation of the supratentorial ventricular system out of proportion to the degree of volume loss, which can be seen in normal pressure hydrocephalus.    2. 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

## 2024-10-22 NOTE — PLAN OF CARE
"PRIMARY DIAGNOSIS: GENERALIZED WEAKNESS, COVID +, UTI     OUTPATIENT/OBSERVATION GOALS TO BE MET BEFORE DISCHARGE  1. Orthostatic performed: N/A    2. Tolerating PO medications: Yes    3. Return to near baseline physical activity: No    4. Cleared for discharge by consultants (if involved): No    Discharge Planner Nurse   Safe discharge environment identified: Yes  Barriers to discharge: Yes       Entered by: Tanvi Rubin RN 10/21/2024 11:42 PM   Patient alert and oriented. Hypertensive- otherwise vitally stable on room air. Has intermittent dry cough. Denies shortness of breath/trouble breathing. Continuous pulse oximeter in place. Voiding within limits. COVID +, Paxlovid started and given per orders. Assist of 1 with rolling walker and gait belt when ambulating. Call appropriately.   Please review provider order for any additional goals.   Nurse to notify provider when observation goals have been met and patient is ready for discharge.Goal Outcome Evaluation:      Plan of Care Reviewed With: patient    Overall Patient Progress: improvingOverall Patient Progress: improving    Outcome Evaluation: Pt denies SOB and pain, vitally stable on room air.      Problem: Adult Inpatient Plan of Care  Goal: Plan of Care Review  Description: The Plan of Care Review/Shift note should be completed every shift.  The Outcome Evaluation is a brief statement about your assessment that the patient is improving, declining, or no change.  This information will be displayed automatically on your shift  note.  Outcome: Progressing  Flowsheets (Taken 10/21/2024 2341)  Outcome Evaluation: Pt denies SOB and pain, vitally stable on room air.  Plan of Care Reviewed With: patient  Overall Patient Progress: improving  Goal: Patient-Specific Goal (Individualized)  Description: You can add care plan individualizations to a care plan. Examples of Individualization might be:  \"Parent requests to be called daily at 9am for status\", \"I have a hard " "time hearing out of my right ear\", or \"Do not touch me to wake me up as it startles  me\".  Outcome: Progressing  Goal: Absence of Hospital-Acquired Illness or Injury  Outcome: Progressing  Intervention: Prevent Skin Injury  Recent Flowsheet Documentation  Taken 10/21/2024 2123 by Tanvi Rubin, RN  Body Position: position changed independently  Intervention: Prevent Infection  Recent Flowsheet Documentation  Taken 10/21/2024 2123 by Tanvi Rubin, RN  Infection Prevention:   rest/sleep promoted   hand hygiene promoted  Goal: Optimal Comfort and Wellbeing  Outcome: Progressing  Goal: Readiness for Transition of Care  Outcome: Progressing     "

## 2024-10-22 NOTE — PLAN OF CARE
PRIMARY DIAGNOSIS: GENERALIZED WEAKNESS, COVID +, UTI     OUTPATIENT/OBSERVATION GOALS TO BE MET BEFORE DISCHARGE  1. Orthostatic performed: N/A    2. Tolerating PO medications: Yes    3. Return to near baseline physical activity: No    4. Cleared for discharge by consultants (if involved): No    Discharge Planner Nurse   Safe discharge environment identified: Yes  Barriers to discharge: Yes       Entered by: Taniv Rubin RN 10/22/2024 4:00 AM   Patient alert and oriented. Vitally stable on room air. Denies shortness of breath/trouble breathing. Has intermittent dry cough. Continuous pulse oximeter in place. Incontinent of bladder x1 overnight. Calls appropriately.   Please review provider order for any additional goals.   Nurse to notify provider when observation goals have been met and patient is ready for discharge.      Goal Outcome Evaluation:      Plan of Care Reviewed With: patient    Overall Patient Progress: improvingOverall Patient Progress: improving    Outcome Evaluation: Pt denies SOB and pain, vitally stable on room air.      Problem: Adult Inpatient Plan of Care  Goal: Plan of Care Review  Description: The Plan of Care Review/Shift note should be completed every shift.  The Outcome Evaluation is a brief statement about your assessment that the patient is improving, declining, or no change.  This information will be displayed automatically on your shift  note.  10/22/2024 0400 by Tanvi Rubin RN  Outcome: Progressing  Flowsheets (Taken 10/22/2024 0400)  Plan of Care Reviewed With: patient  Overall Patient Progress: improving  10/21/2024 2341 by Tanvi Rubin RN  Outcome: Progressing  Flowsheets (Taken 10/21/2024 2341)  Outcome Evaluation: Pt denies SOB and pain, vitally stable on room air.  Plan of Care Reviewed With: patient  Overall Patient Progress: improving  Goal: Patient-Specific Goal (Individualized)  Description: You can add care plan individualizations to a care plan. Examples of  "Individualization might be:  \"Parent requests to be called daily at 9am for status\", \"I have a hard time hearing out of my right ear\", or \"Do not touch me to wake me up as it startles  me\".  10/22/2024 0400 by Tanvi Rubin RN  Outcome: Progressing  10/21/2024 2341 by Tanvi Rubin RN  Outcome: Progressing  Goal: Absence of Hospital-Acquired Illness or Injury  10/22/2024 0400 by Tanvi Rubin RN  Outcome: Progressing  10/21/2024 2341 by Tanvi Rubin RN  Outcome: Progressing  Intervention: Prevent Skin Injury  Recent Flowsheet Documentation  Taken 10/21/2024 2123 by Tanvi Rubin RN  Body Position: position changed independently  Intervention: Prevent Infection  Recent Flowsheet Documentation  Taken 10/21/2024 2123 by Tanvi Rubin RN  Infection Prevention:   rest/sleep promoted   hand hygiene promoted  Goal: Optimal Comfort and Wellbeing  10/22/2024 0400 by Tanvi Rubin RN  Outcome: Progressing  10/21/2024 2341 by Tanvi Rubin RN  Outcome: Progressing  Goal: Readiness for Transition of Care  10/22/2024 0400 by Tanvi Rubin RN  Outcome: Progressing  10/21/2024 2341 by Tanvi Rubin RN  Outcome: Progressing     "

## 2024-10-22 NOTE — PHARMACY-ADMISSION MEDICATION HISTORY
Pharmacy Intern Admission Medication History    Admission medication history is complete. The information provided in this note is only as accurate as the sources available at the time of the update.    Information Source(s): Family member and CareEverywhere/SureScripts via phone    Pertinent Information: Med list verified from pt's son    Changes made to PTA medication list:  Added: None  Deleted: albuterol, oxycodone  Changed: None    Allergies reviewed with patient and updates made in EHR: yes    Medication History Completed By: Mitul Black 10/21/2024 8:20 PM    PTA Med List   Medication Sig Last Dose    acetaminophen (TYLENOL) 500 MG tablet Take 1-2 tablets (500-1,000 mg) by mouth every 6 hours as needed for mild pain Unknown at PRN    escitalopram (LEXAPRO) 10 MG tablet Take 1 tablet (10 mg) by mouth daily 10/21/2024 at am    lisinopril-hydrochlorothiazide (ZESTORETIC) 10-12.5 MG tablet Take 1 tablet by mouth daily 10/21/2024 at am    propranolol ER (INDERAL LA) 80 MG 24 hr capsule Take 80 mg by mouth daily. 10/21/2024 at am

## 2024-10-22 NOTE — PROGRESS NOTES
ROOM # 225    Living Situation (if not independent, order SW consult):  Facility name: Home with son  : Mahad Hammonds (son)    Activity level at baseline: SBA  Activity level on admit: SBA with rolling walker and gaitbelt    Who will be transporting you at discharge: Son Mahad Hammonds     Patient registered to observation; given Patient Bill of Rights; given the opportunity to ask questions about observation status and their plan of care.  Patient has been oriented to the observation room, bathroom and call light is in place.    Discussed discharge goals and expectations with patient/family.

## 2024-10-22 NOTE — PLAN OF CARE
PRIMARY DIAGNOSIS: COVID  OUTPATIENT/OBSERVATION GOALS TO BE MET BEFORE DISCHARGE:  1. Vital signs stable: Yes    3. Dyspnea improved and O2 sats >88% at RA or at prior home O2 therapy level: Yes      SpO2: 95 %, O2 Device: None (Room air)    4. Short term supplemental O2 needed for use with activity at home: No    5. Tolerating adequate PO diet and medications: Yes    6. Return to near baseline physical activity: Yes    Discharge Planner Nurse   Safe discharge environment identified: Yes  Barriers to discharge: Yes       Entered by: Marilyn Watt RN 10/22/2024 12:27 PM     Please review provider order for any additional goals.   Nurse to notify provider when observation goals have been met and patient is ready for discharge.    Goal Outcome Evaluation:      Plan of Care Reviewed With: patient    Overall Patient Progress: improvingOverall Patient Progress: improving    Outcome Evaluation: PT a/O X2 not oriented to time, place, A touch and go w/situation. PT provied IS. RPIV SL. Lives at home w/son who is primary caregiver. PT/SW consult later at 1330. PT lungs have crackles. Reg diet.Notified by support staff had large diarrhea episode in bathroom.      Problem: Adult Inpatient Plan of Care  Goal: Plan of Care Review  Description: The Plan of Care Review/Shift note should be completed every shift.  The Outcome Evaluation is a brief statement about your assessment that the patient is improving, declining, or no change.  This information will be displayed automatically on your shift  note.  10/22/2024 1227 by Marilyn Watt RN  Outcome: Progressing  Flowsheets (Taken 10/22/2024 1227)  Plan of Care Reviewed With: patient  Overall Patient Progress: improving  10/22/2024 1005 by Marilyn Watt RN  Outcome: Progressing  Flowsheets (Taken 10/22/2024 1005)  Outcome Evaluation: PT a/O X2 not oriented to time, place, A touch and go w/situation. PT provied IS. RPIV SL. Lives at home w/son who is primary caregiver.  "PT/SW consult later at 1330. PT lungs have crackles. Reg diet.  Plan of Care Reviewed With: patient  Overall Patient Progress: improving  Goal: Patient-Specific Goal (Individualized)  Description: You can add care plan individualizations to a care plan. Examples of Individualization might be:  \"Parent requests to be called daily at 9am for status\", \"I have a hard time hearing out of my right ear\", or \"Do not touch me to wake me up as it startles  me\".  10/22/2024 1227 by Marilyn Watt RN  Outcome: Progressing  10/22/2024 1005 by Marilyn Watt RN  Outcome: Progressing  Goal: Absence of Hospital-Acquired Illness or Injury  10/22/2024 1227 by Marilyn Watt RN  Outcome: Progressing  10/22/2024 1005 by Marilyn Watt RN  Outcome: Progressing  Intervention: Identify and Manage Fall Risk  Recent Flowsheet Documentation  Taken 10/22/2024 0800 by Marilyn Watt RN  Safety Promotion/Fall Prevention: safety round/check completed  Intervention: Prevent Skin Injury  Recent Flowsheet Documentation  Taken 10/22/2024 0800 by Marilyn Watt RN  Skin Protection: adhesive use limited  Device Skin Pressure Protection: absorbent pad utilized/changed  Intervention: Prevent Infection  Recent Flowsheet Documentation  Taken 10/22/2024 0800 by Marilyn Watt RN  Infection Prevention:   cohorting utilized   equipment surfaces disinfected   hand hygiene promoted   personal protective equipment utilized   rest/sleep promoted   single patient room provided  Goal: Optimal Comfort and Wellbeing  10/22/2024 1227 by Marilyn Watt RN  Outcome: Progressing  10/22/2024 1005 by Marilyn Watt RN  Outcome: Progressing  Goal: Readiness for Transition of Care  10/22/2024 1227 by Marilyn Watt RN  Outcome: Progressing  10/22/2024 1005 by Marilyn Watt RN  Outcome: Progressing     "

## 2024-10-22 NOTE — CONSULTS
Care Management Initial Consult    General Information  Assessment completed with: Children, Son Mahad via phone  Type of CM/SW Visit: Initial Assessment    Primary Care Provider verified and updated as needed: Yes   Readmission within the last 30 days: no previous admission in last 30 days      Reason for Consult: discharge planning    Communication Assessment  Patient's communication style: spoken language (English or Bilingual)    Hearing Difficulty or Deaf: no      Cognitive  Cognitive/Neuro/Behavioral: .WDL except, orientation  Level of Consciousness: intermittent confusion  Arousal Level: opens eyes spontaneously  Orientation: disoriented to, time, place, other (see comments) (sometimes situation but generally ok)  Mood/Behavior: calm, cooperative  Best Language: 0 - No aphasia  Speech: clear, logical    Living Environment:   People in home: child(kelle), adult  Lives with son Deepak and dtr-in-law  Current living Arrangements: house      Able to return to prior arrangements: no     Family/Social Support:  Care provided by: child(kelle)  Provides care for: no one, unable/limited ability to care for self  Marital Status:   Support system: Children          Description of Support System: Supportive, Involved    Support Assessment: Adequate family and caregiver support    Current Resources:   Patient receiving home care services: No  Community Resources: None  Equipment currently used at home: walker, rolling, wheelchair, manual  Supplies currently used at home: None    Does the patient's insurance plan have a 3 day qualifying hospital stay waiver?  Yes     Which insurance plan 3 day waiver is available? Alternative insurance waiver    Will the waiver be used for post-acute placement? Yes    Lifestyle & Psychosocial Needs:  Social Determinants of Health     Food Insecurity: Low Risk  (10/21/2024)    Food Insecurity     Within the past 12 months, did you worry that your food would run out before you got money to  buy more?: No     Within the past 12 months, did the food you bought just not last and you didn t have money to get more?: No   Depression: Not at risk (3/15/2024)    PHQ-2     PHQ-2 Score: 0   Housing Stability: Low Risk  (10/21/2024)    Housing Stability     Do you have housing? : Yes     Are you worried about losing your housing?: No   Tobacco Use: Medium Risk (10/21/2024)    Patient History     Smoking Tobacco Use: Former     Smokeless Tobacco Use: Never     Passive Exposure: Past   Financial Resource Strain: Low Risk  (10/21/2024)    Financial Resource Strain     Within the past 12 months, have you or your family members you live with been unable to get utilities (heat, electricity) when it was really needed?: No   Alcohol Use: Not on file   Transportation Needs: Low Risk  (10/21/2024)    Transportation Needs     Within the past 12 months, has lack of transportation kept you from medical appointments, getting your medicines, non-medical meetings or appointments, work, or from getting things that you need?: No   Physical Activity: Inactive (3/15/2024)    Exercise Vital Sign     Days of Exercise per Week: 0 days     Minutes of Exercise per Session: 0 min   Interpersonal Safety: Not on file   Stress: No Stress Concern Present (3/15/2024)    [employer] - Occupational Stress Questionnaire     Feeling of Stress : Not at all   Social Connections: Unknown (3/15/2024)    Social Connection and Isolation Panel [NHANES]     Frequency of Communication with Friends and Family: Not on file     Frequency of Social Gatherings with Friends and Family: Once a week     Attends Hoahaoism Services: Not on file     Active Member of Clubs or Organizations: Not on file     Attends Club or Organization Meetings: Not on file     Marital Status: Not on file   Health Literacy: Not on file       Functional Status:  Prior to admission patient needed assistance:   Dependent ADLs:: Ambulation-walker, Bathing,  Dressing, Grooming, Transfers, Wheelchair-with assist, Toileting  Dependent IADLs:: Cleaning, Cooking, Laundry, Shopping, Meal Preparation, Medication Management, Money Management, Transportation  Assesssment of Functional Status: Not at baseline with mobility    Additional Information:  CM consulted for discharge planning, spoke with son Mahad via phone. He reports that pt lives in a house with her son Deepak and her dtr-in-law. She ambulates with WW at baseline and uses a WC for longer distances outside of the home. He reports that pt has been requiring increased assistance recently and is notably weaker than normal. Reviewed PT recommendations for TCU, they are agreeable to this and would like referrals sent near [place]. Referrals sent, awaiting responses. Family will transport at discharge. They prefer a private room once off COVID precautions.     Marilyn Franklin RN BSN   Inpatient Care Coordination  [facility]   Phone [phone]

## 2024-10-22 NOTE — ED NOTES
[facility]  ED Nurse Handoff Report    ED Chief complaint: Generalized Weakness  . ED Diagnosis:   Final diagnoses:   Acute cystitis with hematuria   Weakness       Allergies:   Allergies   Allergen Reactions    Rosuvastatin Hives, Itching and Rash       Code Status: Full Code    Activity level - Baseline/Home:  standby.  Activity Level - Current:   standby.   Lift room needed: No.   Bariatric: No   Needed: No   Isolation: Yes.   Infection: Not Applicable  COVID r/o and special precautions.     Respiratory status: Room air    Vital Signs (within 30 minutes):   Vitals:    10/21/24 1915 10/21/24 1931 10/21/24 1946 10/21/24 2000   BP: (!) 146/59 (!) 146/67 (!) 143/70 (!) 149/64   Pulse: 66 67 68    Resp: 16   16   Temp:       TempSrc:       SpO2: 92% 100% 95% 95%   Weight:       Height:           Cardiac Rhythm:  ,   Cardiac  Cardiac Rhythm: Normal sinus rhythm  Pain level:    Patient confused: No.   Patient Falls Risk: bed/chair alarm on and nonskid shoes/slippers when out of bed.   Elimination Status: Has voided     Patient Report - Initial Complaint: Lenora Hammonds is a 79 year old female with a history as noted below who presents to the emergency department for generalized weakness. The patient states that for the past 1-2 weeks, she has been experiencing a decline in her mobility, noting that she is also generally weak and is experiencing a gait instability. She reports that her weakness is worse on her right side. No one-sided numbness or tingling. No dysphagia or dysarthria. She reports that for 1-2 days, she has also been experiencing urinary urgency and cold symptoms, noting that her son with whom she lives with, was recently sick with URI symptoms. No fever or chills. No chest pain, abdominal pain, or shortness of breath. No nausea, vomiting, diarrhea. The patient's son states that the patient moved in with him and his wife as the patient was living with her sister who has  since become unable to take care of the patient. He adds that he has been assisting the patient to the bathroom and to stand, noting that she is too weak to stand unassisted. He notes that the patient has been experiencing deconditioning has been a gradual onset for the past few years. He notes that the patient has a hx of a left-sided vestibular schwannoma, requiring a gamma knife procedure x 2 for this schwannoma.   Focused Assessment: Pt more weak than normal, living with sons and they are no longer able to meet her care needs at home.      Abnormal Results:   Labs Ordered and Resulted from Time of ED Arrival to Time of ED Departure   BASIC METABOLIC PANEL - Abnormal       Result Value    Sodium 137      Potassium 3.9      Chloride 99      Carbon Dioxide (CO2) 27      Anion Gap 11      Urea Nitrogen 29.5 (*)     Creatinine 0.91      GFR Estimate 63      Calcium 9.7      Glucose 98     ROUTINE UA WITH MICROSCOPIC REFLEX TO CULTURE - Abnormal    Color Urine Yellow      Appearance Urine Slightly Cloudy (*)     Glucose Urine Negative      Bilirubin Urine Negative      Ketones Urine 10 (*)     Specific Gravity Urine 1.020      Blood Urine Moderate (*)     pH Urine 6.5      Protein Albumin Urine 10 (*)     Urobilinogen Urine 2.0      Nitrite Urine Positive (*)     Leukocyte Esterase Urine Large (*)     Bacteria Urine Many (*)     Mucus Urine Present (*)     RBC Urine 18 (*)     WBC Urine 43 (*)     Squamous Epithelials Urine <1     INFLUENZA A/B, RSV, & SARS-COV2 PCR - Abnormal    Influenza A PCR Negative      Influenza B PCR Negative      RSV PCR Negative      SARS CoV2 PCR Positive (*)    LACTIC ACID WHOLE BLOOD - Normal    Lactic Acid 0.7     CBC WITH PLATELETS AND DIFFERENTIAL    WBC Count 6.5      RBC Count 3.91      Hemoglobin 12.5      Hematocrit 38.2      MCV 98      MCH 32.0      MCHC 32.7      RDW 12.6      Platelet Count 185      % Neutrophils 61      % Lymphocytes 23      % Monocytes 14      % Eosinophils 1       % Basophils 1      % Immature Granulocytes 0      NRBCs per 100 WBC 0      Absolute Neutrophils 4.0      Absolute Lymphocytes 1.5      Absolute Monocytes 0.9      Absolute Eosinophils 0.1      Absolute Basophils 0.0      Absolute Immature Granulocytes 0.0      Absolute NRBCs 0.0     URINE CULTURE   BLOOD CULTURE        CT Head w/o Contrast   Final Result   IMPRESSION:       1. Increasing dilatation of the supratentorial ventricular system out of proportion to the degree of volume loss, which can be seen in normal pressure hydrocephalus.      2. 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   Interventions performed were IV meds.    Sepsis treatment initiated: No    Cares/treatment/interventions/medications to be completed following ED care: Per MD order    ED Nurse Name: Susan Lainez RN  8:31 PM    RECEIVING UNIT ED HANDOFF REVIEW    Above ED Nurse Handoff Report was reviewed: Yes  Reviewed by: Tanvi Rubin RN on October 21, 2024 at 8:56 PM   MILENA Elizabeth called the ED to inform them the note was read: Yes      15

## 2024-10-23 ENCOUNTER — APPOINTMENT (OUTPATIENT)
Dept: PHYSICAL THERAPY | Facility: CLINIC | Age: 79
End: 2024-10-23
Payer: COMMERCIAL

## 2024-10-23 LAB
ATRIAL RATE - MUSE: 64 BPM
BACTERIA UR CULT: NORMAL
DIASTOLIC BLOOD PRESSURE - MUSE: NORMAL MMHG
INTERPRETATION ECG - MUSE: NORMAL
P AXIS - MUSE: 61 DEGREES
PR INTERVAL - MUSE: 154 MS
QRS DURATION - MUSE: 86 MS
QT - MUSE: 400 MS
QTC - MUSE: 412 MS
R AXIS - MUSE: 44 DEGREES
SYSTOLIC BLOOD PRESSURE - MUSE: NORMAL MMHG
T AXIS - MUSE: 48 DEGREES
VENTRICULAR RATE- MUSE: 64 BPM

## 2024-10-23 PROCEDURE — G0378 HOSPITAL OBSERVATION PER HR: HCPCS

## 2024-10-23 PROCEDURE — 250N000013 HC RX MED GY IP 250 OP 250 PS 637

## 2024-10-23 PROCEDURE — 97530 THERAPEUTIC ACTIVITIES: CPT | Mod: GP | Performed by: PHYSICAL THERAPIST

## 2024-10-23 PROCEDURE — 96376 TX/PRO/DX INJ SAME DRUG ADON: CPT

## 2024-10-23 PROCEDURE — 97116 GAIT TRAINING THERAPY: CPT | Mod: GP | Performed by: PHYSICAL THERAPIST

## 2024-10-23 PROCEDURE — 99232 SBSQ HOSP IP/OBS MODERATE 35: CPT | Performed by: NURSE PRACTITIONER

## 2024-10-23 PROCEDURE — 250N000011 HC RX IP 250 OP 636

## 2024-10-23 RX ORDER — ONDANSETRON 4 MG/1
4 TABLET, ORALLY DISINTEGRATING ORAL EVERY 6 HOURS PRN
DISCHARGE
Start: 2024-10-23

## 2024-10-23 RX ORDER — ACETAMINOPHEN 325 MG/1
650 TABLET ORAL EVERY 4 HOURS PRN
DISCHARGE
Start: 2024-10-23 | End: 2024-11-04

## 2024-10-23 RX ORDER — POLYETHYLENE GLYCOL 3350 17 G/17G
17 POWDER, FOR SOLUTION ORAL DAILY
DISCHARGE
Start: 2024-10-23

## 2024-10-23 RX ADMIN — CEFTRIAXONE 1 G: 1 INJECTION, POWDER, FOR SOLUTION INTRAMUSCULAR; INTRAVENOUS at 19:44

## 2024-10-23 RX ADMIN — NIRMATRELVIR AND RITONAVIR 3 TABLET: KIT at 20:19

## 2024-10-23 RX ADMIN — ESCITALOPRAM OXALATE 10 MG: 10 TABLET ORAL at 08:03

## 2024-10-23 RX ADMIN — NIRMATRELVIR AND RITONAVIR 3 TABLET: KIT at 08:03

## 2024-10-23 ASSESSMENT — ACTIVITIES OF DAILY LIVING (ADL)
ADLS_ACUITY_SCORE: 0
ADLS_ACUITY_SCORE: 42
ADLS_ACUITY_SCORE: 0

## 2024-10-23 NOTE — PLAN OF CARE
"PRIMARY DIAGNOSIS: COVID  OUTPATIENT/OBSERVATION GOALS TO BE MET BEFORE DISCHARGE:  1. Vital signs stable: Yes    3. Dyspnea improved and O2 sats >88% at RA or at prior home O2 therapy level: Yes      SpO2: 95 %, O2 Device: None (Room air)    4. Short term supplemental O2 needed for use with activity at home: No    5. Tolerating adequate PO diet and medications: Yes    6. Return to near baseline physical activity: Yes    Discharge Planner Nurse   Safe discharge environment identified: Yes  Barriers to discharge: Yes       Entered by: Marilyn Watt RN 10/23/2024 9:59 AM     Please review provider order for any additional goals.   Nurse to notify provider when observation goals have been met and patient is ready for discharge.    Goal Outcome Evaluation:      Plan of Care Reviewed With: patient    Overall Patient Progress: improvingOverall Patient Progress: improving    Outcome Evaluation: PT a/ox4, intermittent confusion. RPIV SL. A1 steady. PT lungs minor crackles in posterior, Cough more infreqeunt now.      Problem: Adult Inpatient Plan of Care  Goal: Plan of Care Review  Description: The Plan of Care Review/Shift note should be completed every shift.  The Outcome Evaluation is a brief statement about your assessment that the patient is improving, declining, or no change.  This information will be displayed automatically on your shift  note.  Outcome: Progressing  Flowsheets (Taken 10/23/2024 0955)  Outcome Evaluation: PT a/ox4, intermittent confusion. RPIV SL. A1 steady. PT lungs minor crackles in posterior, Cough more infreqeunt now.  Plan of Care Reviewed With: patient  Overall Patient Progress: improving  Goal: Patient-Specific Goal (Individualized)  Description: You can add care plan individualizations to a care plan. Examples of Individualization might be:  \"Parent requests to be called daily at 9am for status\", \"I have a hard time hearing out of my right ear\", or \"Do not touch me to wake me up as it " "startles  me\".  Outcome: Progressing  Goal: Absence of Hospital-Acquired Illness or Injury  Outcome: Progressing  Intervention: Identify and Manage Fall Risk  Recent Flowsheet Documentation  Taken 10/23/2024 0800 by Marilyn Watt RN  Safety Promotion/Fall Prevention: safety round/check completed  Intervention: Prevent Skin Injury  Recent Flowsheet Documentation  Taken 10/23/2024 0800 by Marilyn Watt RN  Skin Protection: adhesive use limited  Intervention: Prevent and Manage VTE (Venous Thromboembolism) Risk  Recent Flowsheet Documentation  Taken 10/23/2024 0800 by Marilyn Watt RN  VTE Prevention/Management:   compression stockings off   SCDs off (sequential compression devices)  Intervention: Prevent Infection  Recent Flowsheet Documentation  Taken 10/23/2024 0800 by Marilyn Watt RN  Infection Prevention:   hand hygiene promoted   rest/sleep promoted   single patient room provided   equipment surfaces disinfected   cohorting utilized  Goal: Optimal Comfort and Wellbeing  Outcome: Progressing  Goal: Readiness for Transition of Care  Outcome: Progressing     "

## 2024-10-23 NOTE — PROGRESS NOTES
Care Management Follow Up    Length of Stay (days): 0    Expected Discharge Date: 10/24/2024     Concerns to be Addressed: discharge planning     Patient plan of care discussed at interdisciplinary rounds: Yes    Anticipated Discharge Disposition: Transitional Care     Anticipated Discharge Services: None  Anticipated Discharge DME: None    Patient/family educated on Medicare website which has current facility and service quality ratings: yes  Education Provided on the Discharge Plan: Yes  Patient/Family in Agreement with the Plan: yes    Referrals Placed by CM/SW: Post Acute Facilities  Private pay costs discussed: private room/amenity fees    Discussed  Partnership in Safe Discharge Planning  document with patient/family: Yes     Handoff Completed: No, handoff not indicated or clinically appropriate    Additional Information:  [facility] pt for tomorrow 10/24 after 1500, they are working on obtaining insurance authorization at this time. Updated provider and son Deepak. Deepak will plan to transport tomorrow at 1530. Will send orders once complete.     Marilyn Franklin RN BSN   Inpatient Care Coordination  [facility]   Phone [phone]

## 2024-10-23 NOTE — PLAN OF CARE
PRIMARY DIAGNOSIS: GENERALIZED WEAKNESS    OUTPATIENT/OBSERVATION GOALS TO BE MET BEFORE DISCHARGE  1. Orthostatic performed: N/A    2. Tolerating PO medications: Yes    3. Return to near baseline physical activity: No    4. Cleared for discharge by consultants (if involved): No    Discharge Planner Nurse   Safe discharge environment identified: No  Barriers to discharge: Yes       Entered by: Teresa Carcamo RN 10/23/2024      Please review provider order for any additional goals.   Nurse to notify provider when observation goals have been met and patient is ready for discharge.    Patient is alert and oriented to person place situation with intermittent confusion. Denies pain nausea sob dizziness. Tremors in upper extremity at baseline. Regular diet. Rocephin q24. Special precaution for covid 19. PT recommending TCU.    Goal Outcome Evaluation:      Plan of Care Reviewed With: patient    Overall Patient Progress: improvingOverall Patient Progress: improving

## 2024-10-23 NOTE — PLAN OF CARE
PRIMARY DIAGNOSIS: GENERALIZED WEAKNESS/ COVID    OUTPATIENT/OBSERVATION GOALS TO BE MET BEFORE DISCHARGE  1. Orthostatic performed: N/A    2. Tolerating PO medications: Yes    3. Return to near baseline physical activity: No    4. Cleared for discharge by consultants (if involved): Yes    Discharge Planner Nurse   Safe discharge environment identified: Yes  Barriers to discharge: No       Entered by: Rosetta Victor RN 10/23/2024      Please review provider order for any additional goals.   Nurse to notify provider when observation goals have been met and patient is ready for discharge.    Vitals are Temp: 97.6  F (36.4  C) Temp src: Oral BP: 133/58 Pulse: 59   Resp: 16 SpO2: 96 %.  Patient is Disorientated to and Place. denying pain.  Patient is Saline locked.    Patient is on Airborne precuations for COVID-19.  Pt is a Regular diet. Assistx1 with Gait Belt.

## 2024-10-23 NOTE — PLAN OF CARE
PRIMARY DIAGNOSIS: GENERALIZED WEAKNESS     OUTPATIENT/OBSERVATION GOALS TO BE MET BEFORE DISCHARGE  1. Orthostatic performed: N/A     2. Tolerating PO medications: Yes     3. Return to near baseline physical activity: No     4. Cleared for discharge by consultants (if involved): No        Discharge Planner Nurse  Safe discharge environment identified: No  Barriers to discharge: Yes       Entered by: Teresa Carcamo RN 10/23/2024         Please review provider order for any additional goals.   Nurse to notify provider when observation goals have been met and patient is ready for discharge.     Patient is alert and oriented to person place situation with intermittent confusion. Denies pain nausea sob dizziness. Tremors in upper extremity at baseline. Regular diet. Rocephin q24. Special precaution for covid 19. PT recommending TCU. Patient is sleeping well and able to make needs known.    Goal Outcome Evaluation:      Plan of Care Reviewed With: patient    Overall Patient Progress: improvingOverall Patient Progress: improving

## 2024-10-23 NOTE — PROGRESS NOTES
[facility]    Medicine Progress Note - Hospitalist Service    Date of Admission:  10/21/2024    Assessment & Plan   Lenora Hammonds is a 79 year old female with PMH tremor, balance problems, depression, HTN, cognitive impairment who was admitted 10/21/2024 with generalized weakness due to Covid-19 infection and possible UTI.     Over the last week patient has developed significant generalized weakness.  At baseline she uses a wheelchair outside of the home but is able to use a walker for short distances in the home.  Lives with her son.  Son has had to assist her for all cares this past week. Increased urinary incontinence and dysuria.  On 10/19 patient developed  runny nose, dry cough and worsening weakness. No fevers, chills, shortness of breath, chest pain, abdominal pain.     Generalized weakness  Acute cystitis with hematuria  COVID 19  Generalized weakness likely due to combination of COVID-19 infection and UTI. UA on 10/21/24 shows positive nitrate, moderate blood, large leukocyte esterase, 43 WBC. CXR showed hyperinflation with minimal linear scarring lung bases. Blood cultures have no growth after 1 day. Urine culture consistent with probably contamination during collection; >100,000 CFU/mL mixture of urogenital jaqueline.  PT (10/22) recommended discharge to TCU. Plan to discharge 10/24/24 to [facility].  - Continue Paxlovid, symptom onset around 10/19  - continuous pulse ox  - continue rocephin (day 3).  Urine culture results  > 100K CFU mixture of urogenital jaqueline.  Likely will stop abx at time of discharge.   - encourage oral hydration     Tremor  Balance problems  Chronic issues that she follows with neurology outpatient.  Suspect sx are due to hx of schwannoma s/p gamma knife surgery x2. Has findings on imaging concerning for NPH but per last neurosurgery note, does not have urinary incontinence or memory issues to be consistent with NPH.   CT imaging in the ED notes concerns for  "possible NPH and slightly increased cerebellopontine lesion.   - tremor is responsive to Propranolol per neurology notes  - continue follow up with outpatient neurologist   - PT consulted, recommending TCU at this time.     HTN  /50 this morning, so BP medications were not given.  - continue her lisinopril and propranolol, hold if SBP < 125.  - Continue hydrochlorothiazide, hold if SBP <125.  - encourage oral hydration     Cognitive impairment  - declined further workup with PCP in March 2024     Depression  - continue pta lexapro        Observation Goals: -diagnostic tests and consults completed and resulted, -vital signs normal or at patient baseline, -tolerating oral intake to maintain hydration, -returns to baseline functional status, -safe disposition plan has been identified, Nurse to notify provider when observation goals have been met and patient is ready for discharge.  Diet: Regular Diet Adult    DVT Prophylaxis: Pneumatic Compression Devices  Ernandez Catheter: Not present  Lines: None     Cardiac Monitoring: None  Code Status: No CPR- Do NOT Intubate      Clinically Significant Risk Factors Present on Admission                   # Hypertension: Noted on problem list                    Disposition Plan     Medically Ready for Discharge: Ready Now -- [facility]  able to accept 10/24/2024.  If prolonged stay consider changing to detention status.            The patient's care was discussed with STACY Johnson CNP.    MARICRUZ Cowan APRN CNP   Hospitalist Service  [facility]  Securely message with [employer] (more info)  Text page via [facility] Paging/Directory     Attestation: 10/23/2024 2:55 PM   \"I was present with the student who participated in the service and in the documentation of the note. I have verified the history and personally performed the physical exam and medical decision-making. I agree with the assessment and plan of care as documented in the " "note.\"     Will complete course of Paxlovid (2 more days).  UA abnormal but UC without clearly identified pathogen or strain (multiflora).  Will continue CTX while in the hospital but likely not discharge on additional medication.  Left son a message on voice mail updating progress and call back number PRN.      Deepak Patton, APRN CNP    ______________________________________________________________________    Interval History   Lenora is doing well. Continues to have bilateral weakness in lower extremities; however, patient states she believes it is improving. Patient states she has a good appetite and ate toast for breakfast. Denies nausea, vomiting, or abdominal pain. States she is going to the bathroom alright. States she still has an occasional cough, but denies SOB. Denies chest pain or any other pain/concerns today.  Patient was agreeable to the plan of discharge tomorrow to [facility]. All questions answered.    Physical Exam   Vital Signs: Temp: 98.2  F (36.8  C) Temp src: Oral BP: 127/69 Pulse: 55   Resp: 16 SpO2: 95 % O2 Device: None (Room air)    Weight: 106 lbs 11.24 oz    General Appearance: Well-developed, thin, NAD, comfortable. Lying in bed.  HEENT: Normocephalic, no scleral icterus or conjunctival injection. EOM intact.  Respiratory: Normal respiratory effort. Mild diffuse crackles with deep breaths. No rhonchi.  Cardiovascular: RRR. S1 and S2 present. No M/R/G.  GI: Abdomen soft and non-distended. No guarding. Bowel sounds present.  Skin: Lower extremities warm, dry without edema.  Neurologic: 5/5 strength bilaterally in upper extremity. Decreased strength bilaterally for lower extremities. Patient has tremor in UE bilaterally which seems to worsen with strength testing. Tremor is present at rest in upper extremities and chin. Speech clear. Alert.  Psychiatric: Pleasant and cooperative.    Medical Decision Making       45 MINUTES SPENT BY ME on the date of service doing chart review, history, " exam, documentation & further activities per the note.      Data   ------------------------- PAST 24 HR DATA REVIEWED -----------------------------------------------        Imaging results reviewed over the past 24 hrs:   No results found for this or any previous visit (from the past 24 hours).

## 2024-10-23 NOTE — UTILIZATION REVIEW
Concurrent stay review; [facility] Determination [facility]        Under the authority of the Utilization Management Committee, the utilization review process indicated a secondary review on the above patient.  The review outcome is based on review of the medical records, discussions with staff, and applying clinical experience noted on the date of the review.        (x) Outpatient correction status is appropriate       RATIONALE FOR DETERMINATION:     Lenora Hammonds is an 79 year old female with history of tremor, balance problems, depression, HTN, and cognitive impairment. She was admitted on 10/21/2024 with generalized weakness due to Covid-19 infection and possible UTI. She had no hypoxia or respiratory distress. Urine culture has grown > 100 000 colonies of mixed jaqueline. She has been treated with Paxlovid and Rocephin. She has been seen by PT and TCU on discharge has been recommended. Care coordination has arranged TCU placement for tomorrow, pending insurance authorization.       Patient delayed discharge is related to disposition, there is no medical necessity for inpatient admission at the time of this review. If there is a change in patient status, please resend for review.    The information on this document is developed by the utilization review team in order for the business office to ensure compliance.  This only denotes the appropriateness of proper admission status and does not reflect the quality of care rendered.       The definitions of Inpatient Status and Observation Status used in making the determination above are those provided in the CMS Coverage Manual, Chapter 1 and Chapter 6, section 70.4.       Sincerely,    Danyel Chinchilla MD

## 2024-10-23 NOTE — PLAN OF CARE
PRIMARY DIAGNOSIS: GENERALIZED WEAKNESS     OUTPATIENT/OBSERVATION GOALS TO BE MET BEFORE DISCHARGE  1. Orthostatic performed: N/A     2. Tolerating PO medications: Yes     3. Return to near baseline physical activity: No     4. Cleared for discharge by consultants (if involved): No        Discharge Planner Nurse  Safe discharge environment identified: No  Barriers to discharge: Yes       Entered by: Teresa Carcamo RN 10/23/2024         Please review provider order for any additional goals.   Nurse to notify provider when observation goals have been met and patient is ready for discharge.     Patient is alert and oriented to person place situation with intermittent confusion. Denies pain nausea sob dizziness. Tremors in upper extremity at baseline. Regular diet. Rocephin q24. Special precaution for covid 19. Continuous pulse oximetry. Patient ambulated to the bathroom back to bed with assist of 1, patient voided clear yellow output. PT recommending TCU.    Goal Outcome Evaluation:      Plan of Care Reviewed With: patient    Overall Patient Progress: improvingOverall Patient Progress: improving

## 2024-10-23 NOTE — PLAN OF CARE
"PRIMARY DIAGNOSIS: GENERALIZED WEAKNESS    OUTPATIENT/OBSERVATION GOALS TO BE MET BEFORE DISCHARGE  1. Orthostatic performed: No    2. Tolerating PO medications: Yes    3. Return to near baseline physical activity: Yes    4. Cleared for discharge by consultants (if involved): Yes    Discharge Planner Nurse   Safe discharge environment identified: Yes  Barriers to discharge: Yes       Entered by: Marilyn Watt RN 10/23/2024 12:15 PM     Please review provider order for any additional goals.   Nurse to notify provider when observation goals have been met and patient is ready for discharge.    Goal Outcome Evaluation:      Plan of Care Reviewed With: patient    Overall Patient Progress: improvingOverall Patient Progress: improving    Outcome Evaluation: PT a/ox4, intermittent confusion. RPIV SL. A1 steady. PT lungs minor crackles in posterior, Cough more infreqeunt now.      Problem: Adult Inpatient Plan of Care  Goal: Plan of Care Review  Description: The Plan of Care Review/Shift note should be completed every shift.  The Outcome Evaluation is a brief statement about your assessment that the patient is improving, declining, or no change.  This information will be displayed automatically on your shift  note.  10/23/2024 1215 by Marilyn Watt RN  Outcome: Progressing  Flowsheets (Taken 10/23/2024 1213)  Plan of Care Reviewed With: patient  Overall Patient Progress: improving  10/23/2024 0955 by Marilyn Watt RN  Outcome: Progressing  Flowsheets (Taken 10/23/2024 0955)  Outcome Evaluation: PT a/ox4, intermittent confusion. RPIV SL. A1 steady. PT lungs minor crackles in posterior, Cough more infreqeunt now.  Plan of Care Reviewed With: patient  Overall Patient Progress: improving  Goal: Patient-Specific Goal (Individualized)  Description: You can add care plan individualizations to a care plan. Examples of Individualization might be:  \"Parent requests to be called daily at 9am for status\", \"I have a hard time " "hearing out of my right ear\", or \"Do not touch me to wake me up as it startles  me\".  10/23/2024 1215 by Marilyn Watt RN  Outcome: Progressing  10/23/2024 0955 by Marilyn Watt RN  Outcome: Progressing  Goal: Absence of Hospital-Acquired Illness or Injury  10/23/2024 1215 by Marilyn Watt RN  Outcome: Progressing  10/23/2024 0955 by Marilyn Watt RN  Outcome: Progressing  Intervention: Identify and Manage Fall Risk  Recent Flowsheet Documentation  Taken 10/23/2024 0800 by Marilyn Watt RN  Safety Promotion/Fall Prevention: safety round/check completed  Intervention: Prevent Skin Injury  Recent Flowsheet Documentation  Taken 10/23/2024 0800 by Marilyn Watt RN  Skin Protection: adhesive use limited  Intervention: Prevent and Manage VTE (Venous Thromboembolism) Risk  Recent Flowsheet Documentation  Taken 10/23/2024 0800 by Marilyn Watt RN  VTE Prevention/Management:   compression stockings off   SCDs off (sequential compression devices)  Intervention: Prevent Infection  Recent Flowsheet Documentation  Taken 10/23/2024 0800 by Marilyn Watt RN  Infection Prevention:   hand hygiene promoted   rest/sleep promoted   single patient room provided   equipment surfaces disinfected   cohorting utilized  Goal: Optimal Comfort and Wellbeing  10/23/2024 1215 by Marilyn Watt RN  Outcome: Progressing  10/23/2024 0955 by Marilyn Watt RN  Outcome: Progressing  Goal: Readiness for Transition of Care  10/23/2024 1215 by Marilyn Watt RN  Outcome: Progressing  10/23/2024 0955 by Marilyn Watt RN  Outcome: Progressing     "

## 2024-10-24 VITALS
DIASTOLIC BLOOD PRESSURE: 57 MMHG | SYSTOLIC BLOOD PRESSURE: 129 MMHG | TEMPERATURE: 98.4 F | OXYGEN SATURATION: 96 % | BODY MASS INDEX: 18.22 KG/M2 | RESPIRATION RATE: 16 BRPM | HEART RATE: 65 BPM | WEIGHT: 106.7 LBS | HEIGHT: 64 IN

## 2024-10-24 PROCEDURE — 250N000013 HC RX MED GY IP 250 OP 250 PS 637: Performed by: PHYSICIAN ASSISTANT

## 2024-10-24 PROCEDURE — G0378 HOSPITAL OBSERVATION PER HR: HCPCS

## 2024-10-24 PROCEDURE — 250N000013 HC RX MED GY IP 250 OP 250 PS 637

## 2024-10-24 RX ADMIN — ESCITALOPRAM OXALATE 10 MG: 10 TABLET ORAL at 08:26

## 2024-10-24 RX ADMIN — HYDROCHLOROTHIAZIDE 12.5 MG: 12.5 CAPSULE ORAL at 08:26

## 2024-10-24 RX ADMIN — LISINOPRIL 10 MG: 10 TABLET ORAL at 08:26

## 2024-10-24 RX ADMIN — PROPRANOLOL HYDROCHLORIDE 80 MG: 80 CAPSULE, EXTENDED RELEASE ORAL at 08:27

## 2024-10-24 RX ADMIN — NIRMATRELVIR AND RITONAVIR 3 TABLET: KIT at 08:29

## 2024-10-24 ASSESSMENT — ACTIVITIES OF DAILY LIVING (ADL)
ADLS_ACUITY_SCORE: 0

## 2024-10-24 NOTE — CONSULTS
Care Management Medical long term Outpatient Consult    Care management consulted by provider for detention assessment.  CM spoke with provider to discuss detention case. Provider has confirmed patient is medically stable for discharge.    Background information: Pt resides at home with son and dtr-in-law and was admitted for weakness, Covid and UTI.     Care team recommended discharge disposition:  PT has evaluated and is recommending TCU.     Resources needed for discharge: Coordinated discharge planning with pt's children.     Discharge plan secured to meet patient's needs?  Yes    Estimated timeline for discharge:   less than 24 hours    Barriers to discharge: None.     Discussed above with provider & charge RN.      Next steps:       Anticipate patient will discharge within the next 24 hours to [facility]. They are able to accept today after 1500. [facility] Sotelo to transport to TCU at 1530.      Patient has an established discharge plan that can meet their needs as identified above. Will update charge RN and provider.       Marilyn Franklin RN BSN   Inpatient Care Coordination  [facility]   Phone [phone]

## 2024-10-24 NOTE — PLAN OF CARE
Vitals are Temp: 98.4  F (36.9  C) Temp src: Oral BP: 119/50 Pulse: 66   Resp: 18 SpO2: 96 %.        Patient is alert and oriented X 4  with some confusion VSS Pt  1 Assist with Gait Belt and Walker.Pt is a Regular diet. Pt denies any pain. Patient is Saline locked.         Plan of Care Reviewed With: patient    Overall Patient Progress: improvingOverall Patient Progress: improving

## 2024-10-24 NOTE — PLAN OF CARE
"Goal Outcome Evaluation:      Plan of Care Reviewed With: patient    Overall Patient Progress: improvingOverall Patient Progress: improving     PRIMARY DIAGNOSIS: GENERALIZED WEAKNESS/ COVID    OUTPATIENT/OBSERVATION GOALS TO BE MET BEFORE DISCHARGE  1. Orthostatic performed: No    2. Tolerating PO medications: Yes    3. Return to near baseline physical activity: No    4. Cleared for discharge by consultants (if involved): Yes    Discharge Planner Nurse   Safe discharge environment identified: Yes  Barriers to discharge: No    BP (!) 142/57 (BP Location: Left arm)   Pulse 65   Temp 98.4  F (36.9  C) (Oral)   Resp 16   Ht 1.626 m (5' 4\")   Wt 48.4 kg (106 lb 11.2 oz)   LMP  (LMP Unknown)   SpO2 96%   BMI 18.32 kg/m      Pt A & O X 4 with an intermittent confusion, able to make needs known, call light with in reach for needs, VSS, Assist one with gait belt and Walker, on Regular diet, denies pain, SL, on special precaution for covid 19. Tremors on upper extremity at baseline.Pt recommending TCU and will be discharging to TCU at 3: 30 pm and the pt's son will be transporting the pt, SW following.          Entered by: Kelvin Murray RN 10/24/2024 9:42 AM     Please review provider order for any additional goals.   Nurse to notify provider when observation goals have been met and patient is ready for discharge.      Problem: Adult Inpatient Plan of Care  Goal: Plan of Care Review  Description: The Plan of Care Review/Shift note should be completed every shift.  The Outcome Evaluation is a brief statement about your assessment that the patient is improving, declining, or no change.  This information will be displayed automatically on your shift  note.  Outcome: Progressing  Flowsheets (Taken 10/24/2024 0942)  Plan of Care Reviewed With: patient  Overall Patient Progress: improving  Goal: Patient-Specific Goal (Individualized)  Description: You can add care plan individualizations to a care plan. Examples of " "Individualization might be:  \"Parent requests to be called daily at 9am for status\", \"I have a hard time hearing out of my right ear\", or \"Do not touch me to wake me up as it startles  me\".  Outcome: Progressing  Goal: Absence of Hospital-Acquired Illness or Injury  Outcome: Progressing  Intervention: Identify and Manage Fall Risk  Recent Flowsheet Documentation  Taken 10/24/2024 0900 by Kelvin Murray RN  Safety Promotion/Fall Prevention: activity supervised  Intervention: Prevent Skin Injury  Recent Flowsheet Documentation  Taken 10/24/2024 0900 by Kelvin Murray RN  Body Position: position changed independently  Intervention: Prevent and Manage VTE (Venous Thromboembolism) Risk  Recent Flowsheet Documentation  Taken 10/24/2024 0900 by Kelvin Murray RN  VTE Prevention/Management: compression stockings on  Intervention: Prevent Infection  Recent Flowsheet Documentation  Taken 10/24/2024 0900 by Kelvin Murray RN  Infection Prevention:   hand hygiene promoted   rest/sleep promoted   single patient room provided   equipment surfaces disinfected   cohorting utilized  Goal: Optimal Comfort and Wellbeing  Outcome: Progressing  Goal: Readiness for Transition of Care  Outcome: Progressing     "

## 2024-10-24 NOTE — PLAN OF CARE
"Goal Outcome Evaluation:      Plan of Care Reviewed With: patient    Overall Patient Progress: improvingOverall Patient Progress: improving    Outcome Evaluation: discharging  /57 (BP Location: Left arm)   Pulse 65   Temp 98.4  F (36.9  C) (Oral)   Resp 16   Ht 1.626 m (5' 4\")   Wt 48.4 kg (106 lb 11.2 oz)   LMP  (LMP Unknown)   SpO2 96%   BMI 18.32 kg/m    Patient's After Visit Summary was reviewed with patient and/or family.   Patient verbalized understanding of After Visit Summary, recommended follow up and was given an opportunity to ask questions.   Discharge medications sent home with patient/family: YES   Discharged with son, pt is medically ready to be discharged, pt's all belongings sent home with pt, pt's son transporting the pt to home.         Problem: Adult Inpatient Plan of Care  Goal: Plan of Care Review  Description: The Plan of Care Review/Shift note should be completed every shift.  The Outcome Evaluation is a brief statement about your assessment that the patient is improving, declining, or no change.  This information will be displayed automatically on your shift  note.  10/24/2024 1433 by Kelvin Murray RN  Outcome: Met  Flowsheets (Taken 10/24/2024 1433)  Outcome Evaluation: discharging  Plan of Care Reviewed With: patient  10/24/2024 0942 by Kelvin Murray RN  Outcome: Progressing  Flowsheets (Taken 10/24/2024 0942)  Plan of Care Reviewed With: patient  Overall Patient Progress: improving  Goal: Patient-Specific Goal (Individualized)  Description: You can add care plan individualizations to a care plan. Examples of Individualization might be:  \"Parent requests to be called daily at 9am for status\", \"I have a hard time hearing out of my right ear\", or \"Do not touch me to wake me up as it startles  me\".  10/24/2024 1433 by Kelvin Murray RN  Outcome: Met  10/24/2024 0942 by Kelvin Murray RN  Outcome: Progressing  Goal: Absence of Hospital-Acquired Illness " or Injury  10/24/2024 1433 by Kelvin Murray RN  Outcome: Met  10/24/2024 0942 by Kelvin Murray RN  Outcome: Progressing  Intervention: Identify and Manage Fall Risk  Recent Flowsheet Documentation  Taken 10/24/2024 0900 by Kelvin Murray RN  Safety Promotion/Fall Prevention: activity supervised  Intervention: Prevent Skin Injury  Recent Flowsheet Documentation  Taken 10/24/2024 0900 by Kelvin Murray RN  Body Position: position changed independently  Intervention: Prevent and Manage VTE (Venous Thromboembolism) Risk  Recent Flowsheet Documentation  Taken 10/24/2024 0900 by Kelvin Murray RN  VTE Prevention/Management: compression stockings on  Intervention: Prevent Infection  Recent Flowsheet Documentation  Taken 10/24/2024 0900 by Kelvin Murray RN  Infection Prevention:   hand hygiene promoted   rest/sleep promoted   single patient room provided   equipment surfaces disinfected   cohorting utilized  Goal: Optimal Comfort and Wellbeing  10/24/2024 1433 by Kelvin Murray RN  Outcome: Met  10/24/2024 0942 by Kelvin Murray RN  Outcome: Progressing  Goal: Readiness for Transition of Care  10/24/2024 1433 by Kelvin Murray RN  Outcome: Met  10/24/2024 0942 by Kelvin Murray RN  Outcome: Progressing

## 2024-10-24 NOTE — PLAN OF CARE
"PRIMARY DIAGNOSIS: ACUTE PAIN  OUTPATIENT/OBSERVATION GOALS TO BE MET BEFORE DISCHARGE:  1. Pain Status: Pain free.    2. Return to near baseline physical activity: No    3. Cleared for discharge by consultants (if involved): No    Discharge Planner Nurse   Safe discharge environment identified: No  Barriers to discharge: Yes       Entered by:   /55 (BP Location: Left arm)   Pulse 60   Temp 98.2  F (36.8  C) (Oral)   Resp 18   Ht 1.626 m (5' 4\")   Wt 48.4 kg (106 lb 11.2 oz)   LMP  (LMP Unknown)   SpO2 95%   BMI 18.32 kg/m     Patient is alert and oriented X 4  with ntermittent confusion VSS Pt  1 Assist with Gait Belt and Walker.Pt is a Regular diet.  Pt has Pt denies any pain. Patient is Saline locked.Lives at home w/son who is primary caregiver Incontinent of bladder q24. Special precaution for covid 19. Tremors in upper extremity at baseline.Pt recommending TCU.                 Please review provider order for any additional goals.   Nurse to notify provider when observation goals have been met and patient is ready for discharge.Goal Outcome Evaluation:      Plan of Care Reviewed With: patient    Overall Patient Progress: improvingOverall Patient Progress: improving    Outcome Evaluation: Pt alert and oreinted X4  VSS,mild cough,      "

## 2024-10-24 NOTE — PLAN OF CARE
PRIMARY DIAGNOSIS:GENERALIZED WEAKNESS/ COVID   OUTPATIENT/OBSERVATION GOALS TO BE MET BEFORE DISCHARGE:  1. Pain Status: Improved-controlled with oral pain medications.    2. Return to near baseline physical activity: No    3. Cleared for discharge by consultants (if involved): No    Discharge Planner Nurse   Safe discharge environment identified: No  Barriers to discharge: Yes       Entered by: Phuong Faust RN 10/24/2024 12:48 AM    Patient is alert and oriented X 4  with ntermittent confusion VSS Pt  1 Assist with Gait Belt and Walker.Pt is a Regular diet.  Pt has Pt denies any pain. Patient is Saline locked.Lives at home w/son who is primary caregiver Incontinent of bladder q24. Special precaution for covid 19. Tremors in upper extremity at baseline.Pt recommending TCU.

## 2024-10-24 NOTE — PROGRESS NOTES
Care Management Discharge Note    Discharge Date: 10/24/2024     Discharge Disposition: Transitional Care    Discharge Services: None    Discharge DME: None    Discharge Transportation: family or friend will provide    Private pay costs discussed: private room/amenity fees    Does the patient's insurance plan have a 3 day qualifying hospital stay waiver?  Yes     Which insurance plan 3 day waiver is available? Alternative insurance waiver    Will the waiver be used for post-acute placement? Yes    PAS Confirmation Code: [id number]  Patient/family educated on Medicare website which has current facility and service quality ratings: yes    Education Provided on the Discharge Plan: Yes  Persons Notified of Discharge Plans: Pt's tatum Sotelo   Patient/Family in Agreement with the Plan: yes    Handoff Referral Completed: No, handoff not indicated or clinically appropriate    Additional Information:  Pt discharging to [facility] today via tatum Sotelo transporting at 1530. Provider and bedside RN aware. Will send orders once complete.     Update 1235: Orders completed and sent.     Marilyn Franklin RN BSN   Inpatient Care Coordination  [facility]   Phone [phone]

## 2024-10-24 NOTE — DISCHARGE SUMMARY
[facility]  Hospitalist Discharge Summary      Date of Admission:  10/21/2024  Date of Discharge:  10/24/2024  3:21 PM  Discharging Provider: STACY Moralez CNP   Discharge Service: Hospitalist Service    Discharge Diagnoses   Generalized weakness  Acute cystitis with hematuria  COVID 19  Tremor  Balance problems    Clinically Significant Risk Factors          Follow-ups Needed After Discharge   Follow-up Appointments     Follow Up and recommended labs and tests      Follow up with prison physician.  The following labs/tests are   recommended: CBC, BMP.            Unresulted Labs Ordered in the Past 30 Days of this Admission       Date and Time Order Name Status Description    10/21/2024  7:27 PM Blood Culture Peripheral Blood Preliminary         These results will be followed up by HM/TCU/PCP    Discharge Disposition   Discharged to short-term care facility  Condition at discharge: Stable    Hospital Course   Lenora Hammonds is a 79 year old female with PMH tremor, balance problems, depression, HTN, cognitive impairment. Presented to the emergency department 10/21/24 for generalized weakness and urinary incontinence. Had been experiencing a decline in mobility for 1-2 weeks, but also notes general weakness and gait instability at baseline. At baseline she uses a wheelchair outside of the home but is able to use a walker for short distances in the home.  Lives with her son. Denied numbness or tingling. Also noted 1-2 days of runny nose, dry cough, and urinary urgency. COVID test in ER was positive. EKG showed sinus rhythm with possible left atrial enlargement. Denied chest pain, abdominal pain, or SOB. Denied nausea, vomiting, or diarrhea. UA contaminated; cultures pending. Given ceftriaxone.  Patient has a history significant for left-sided vestibular schwannoma, requiring a gamma knife procedure x 2. Head CT in emergency department showed increasing dilatation of the  supratentorial ventricular system out of proportion to the degree of volume loss, which can be seen in normal pressure hydrocephalus. 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 ER provider discussed possible NPH with neurology, who recommended outpatient follow-up.    Admitted for observation 10/21/24 for generalized weakness, COVID-19 infection, and urinary incontinence. Continued ceftriaxone. Started Paxlovid.     10/22/24 PT consulted patient and recommended discharge to TCU to promote safety and indepedence with mobility. CM consulted patient and was agreeable to TCU. Resumed Lexapro, lisinopril, propranolol    10/23/24 TCU accepted patient for 10/24. Held lisinopril and propranolol due to BP being 108/50. Urine culture results > 100K CFU mixture of urogenital jaqueline.    10/24/24 Patient is doing well. States weakness in lower extremities has improved back to baseline. States she is having normal bowel movements and peeing. Denies burning with urination or urgency with urination. Denies chest pain, SOB, abdominal pain, nausea, vomiting, constipation, or diarrhea. Denies coughing today. Resumed hydrochlorothiazide. Stopping antibiotic for UTI upon discharge due to urine culture results showing mixture of urogenital jaqueline.  -Recommend neurologist follow up outpatient for possible NPH and slightly increased cerebellopontine lesion.     Patient being discharged today (10/24/24) to TCU. Patient being transported by son. Patient verbalized understanding and agreement with this plan. All questions answered.        Consultations This Hospital Stay   PHYSICAL THERAPY ADULT IP CONSULT  CARE MANAGEMENT / SOCIAL WORK IP CONSULT  PHYSICAL THERAPY ADULT IP CONSULT  OCCUPATIONAL THERAPY ADULT IP CONSULT  SOCIAL WORK IP CONSULT    Code Status   No CPR- Do NOT Intubate    Time Spent on this Encounter   Deepak ABBOTT APRN CNP,  personally saw the patient today and spent greater than 30 minutes discharging this patient.       [facility] ([facility] OBSERVATION DEPT  [address]  [facility] [address]  Phone: [phone]  ______________________________________________________________________    Physical Exam   Vital Signs: Temp: 98.4  F (36.9  C) Temp src: Oral BP: 129/57 Pulse: 65   Resp: 16 SpO2: 96 % O2 Device: None (Room air)    Weight: 106 lbs 11.24 oz    General Appearance:  Well-developed, thin, NAD, comfortable. Sitting up in chair.  HEENT: Normocephalic, no scleral icterus or conjunctival injection. EOM intact.  Respiratory: Normal respiratory effort. Clear to auscultation bilaterally. No wheezing, rhonchi, or crackles.  Cardiovascular: RRR. S1 and S2 present. No M/R/G.  GI: Abdomen soft and non-distended. No guarding. Bowel sounds present.  Skin: Lower extremities warm, dry without edema.  Neurologic: 5/5 strength bilaterally in upper extremity. Decreased strength bilaterally for lower extremities. Tremor is present at rest in upper extremities and chin. Tremor seems to worsen with strength testing. Speech clear. Alert.  Psychiatric: Pleasant and cooperative.       Primary Care Physician   Valerio Mccain    Discharge Orders      General info for SNF    Length of Stay Estimate: Short Term Care: Estimated # of Days <30  Condition at Discharge: Improving  Level of care:skilled   Rehabilitation Potential: Good  Admission H&P remains valid and up-to-date: Yes  Recent Chemotherapy: N/A  Use Nursing Home Standing Orders: Yes     Mantoux instructions    Give two-step Mantoux (PPD) Per Facility Policy Yes     Follow Up and recommended labs and tests    Follow up with residential physician.  The following labs/tests are recommended: CBC, BMP.     Reason for your hospital stay    COVID 19  Generalized weakness  Abnormal UA  Hypertension  Cognitive impairment  Tremor/unsteady  gait  Depression    Consults: PT     Activity - Up with nursing assistance     Encourage PO fluids     No CPR- Do NOT Intubate     Physical Therapy Adult Consult    Evaluate and treat as clinically indicated.    Reason:  COVID-19 weakness.     Occupational Therapy Adult Consult    Evaluate and treat as clinically indicated.    Reason:  COVID-19 weakness.     Airborne and Contact Isolation     Fall precautions     Diet    Follow this diet upon discharge: Current Diet:Orders Placed This Encounter      Regular Diet Adult       Discharge Follow-up Details      Occupational Therapy Adult Consult      Physical Therapy Adult Consult              Significant Results and Procedures   Most Recent 3 CBC's:  Recent Labs   Lab Test 10/29/24  0757 10/22/24  0628 10/21/24  1710   WBC 8.2 5.4 6.5   HGB 12.7 13.0 12.5   MCV 97 95 98    155 185     Most Recent 3 BMP's:  Recent Labs   Lab Test 10/29/24  0757 10/22/24  0628 10/21/24  1710    137 137   POTASSIUM 3.7 3.4 3.9   CHLORIDE 101 98 99   CO2 28 25 27   BUN 24.3* 24.2* 29.5*   CR 0.79 0.71 0.91   ANIONGAP 12 14 11   KATHLEEN 10.0 9.0 9.7   GLC 94 85 98     Most Recent 2 LFT's:  Recent Labs   Lab Test 03/15/24  1141 05/15/23  1603   AST 27 27   ALT 28 20   ALKPHOS 88 76   BILITOTAL 0.6 0.4     Most Recent 3 INR's:  Recent Labs   Lab Test 12/05/17  1140   INR 1.20*   ,   Results for orders placed or performed during the hospital encounter of 10/21/24   CT Head w/o Contrast    Narrative    EXAM: CT HEAD W/O CONTRAST  LOCATION: [facility]  DATE/TIME: 10/21/2024 6:22 PM CDT    INDICATION: Increasing weakness.   COMPARISON: Brain MRI dated 4/28/2021  TECHNIQUE: Routine CT Head without IV contrast. Multiplanar reformats. Dose reduction techniques were used.    FINDINGS:   INTRACRANIAL CONTENTS: Redemonstrated left cerebellopontine angle lesion, which is better characterized on prior brain MRI dated 4/28/2021 measuring 2.0 x 1.6 cm (previously measured 1.9  x 1.5 cm on 4/28/2021). No acute intracranial hemorrhage. No CT   evidence of acute infarct. Sequelae of mild chronic microangiopathy. Mild cerebral volume loss. Increasing dilatation of the supratentorial ventricular system out of proportion to the degree of volume loss, which can be seen in normal pressure   hydrocephalus. Patent basal cisterns.     VISUALIZED ORBITS/SINUSES/MASTOIDS: The orbits are unremarkable. The visualized paranasal sinuses and temporal bone structures are well-aerated.     BONES/SOFT TISSUES: The calvarium and skull base are unremarkable.       Impression    IMPRESSION:     1. Increasing dilatation of the supratentorial ventricular system out of proportion to the degree of volume loss, which can be seen in normal pressure hydrocephalus.    2. 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 Normal heart size and pulmonary vascularity. Postoperative changes projecting over the right hilar region. Normal heart size and pulmonary vascularity. Aortic calcification. Minimal   degenerative changes in the spine.       Discharge Medications   Current Discharge Medication List        START taking these medications    Details   nirmatrelvir and ritonavir (PAXLOVID) 300 mg/100 mg therapy pack Take 3 tablets by mouth 2 times daily for 2 days. . Finish remainder of Paxlovid therapy pack that was started in the hospital.  Follow instructions on that pack.    Comments: This order will not be sent to a retail pharmacy. This order is intended for the AVS summary and for continuation of the remainder of the Paxlovid pack dispensed by the  inpatient pharmacy at home. Confirm that the patient has received the inpatient pharmacy supplied Paxlovid to take home.  Associated Diagnoses: Infection due to 2019 novel coronavirus      ondansetron (ZOFRAN ODT) 4 MG ODT tab Take 1 tablet (4 mg) by mouth every 6 hours as needed for nausea or vomiting.    Associated Diagnoses: Infection due to 2019 novel coronavirus      polyethylene glycol (MIRALAX) 17 GM/Dose powder Take 17 g by mouth daily.    Associated Diagnoses: Constipation, unspecified constipation type           CONTINUE these medications which have CHANGED    Details   acetaminophen (TYLENOL) 325 MG tablet Take 2 tablets (650 mg) by mouth every 4 hours as needed for mild pain or other (and adjunct with moderate or severe pain or per patient request).    Associated Diagnoses: Weakness; Infection due to 2019 novel coronavirus           CONTINUE these medications which have NOT CHANGED    Details   escitalopram (LEXAPRO) 10 MG tablet Take 1 tablet (10 mg) by mouth daily  Qty: 90 tablet, Refills: 3    Associated Diagnoses: Major depressive disorder, single episode, mild (H)      lisinopril-hydrochlorothiazide (ZESTORETIC) 10-12.5 MG tablet Take 1 tablet by mouth daily  Qty: 90 tablet, Refills: 3    Associated Diagnoses: Hypertension goal BP (blood pressure) < 140/90      propranolol ER (INDERAL LA) 80 MG 24 hr capsule Take 80 mg by mouth daily.           Allergies   Allergies   Allergen Reactions    Rosuvastatin Hives, Itching and Rash

## 2024-10-24 NOTE — PLAN OF CARE
Physical Therapy Discharge Summary    Reason for therapy discharge:    Discharged to transitional care facility.    Progress towards therapy goal(s). See goals on Care Plan in [facility] electronic health record for goal details.  Goals not met.  Barriers to achieving goals:   discharge from facility.    Therapy recommendation(s):    Continued therapy is recommended.  Rationale/Recommendations:  Recommend continued therapy at TCU to progress independence with mobility and strength.

## 2024-10-24 NOTE — PLAN OF CARE
"Goal Outcome Evaluation:      Plan of Care Reviewed With: patient    Overall Patient Progress: improvingOverall Patient Progress: improving     PRIMARY DIAGNOSIS: GENERALIZED WEAKNESS/ COVID    OUTPATIENT/OBSERVATION GOALS TO BE MET BEFORE DISCHARGE  1. Orthostatic performed: No    2. Tolerating PO medications: Yes    3. Return to near baseline physical activity: No    4. Cleared for discharge by consultants (if involved): Yes    Discharge Planner Nurse   Safe discharge environment identified: Yes  Barriers to discharge: No    /57 (BP Location: Left arm)   Pulse 65   Temp 98.4  F (36.9  C) (Oral)   Resp 16   Ht 1.626 m (5' 4\")   Wt 48.4 kg (106 lb 11.2 oz)   LMP  (LMP Unknown)   SpO2 96%   BMI 18.32 kg/m      Pt A & O X 4 with an intermittent confusion, able to make needs known, call light with in reach for needs, VSS, Assist one with gait belt and Walker, on Regular diet, denies pain, SL, on special precaution for covid 19. Tremors on upper extremity at baseline present. PT recommending TCU and will be discharging to TCU at 3: 30 pm and the pt's son will be transporting the pt, SW following.          Entered by: Kelvin Murray RN 10/24/2024 12:13 PM     Please review provider order for any additional goals.   Nurse to notify provider when observation goals have been met and patient is ready for discharge.      Problem: Adult Inpatient Plan of Care  Goal: Plan of Care Review  Description: The Plan of Care Review/Shift note should be completed every shift.  The Outcome Evaluation is a brief statement about your assessment that the patient is improving, declining, or no change.  This information will be displayed automatically on your shift  note.  Outcome: Progressing  Flowsheets (Taken 10/24/2024 0942)  Plan of Care Reviewed With: patient  Overall Patient Progress: improving  Goal: Patient-Specific Goal (Individualized)  Description: You can add care plan individualizations to a care plan. " "Examples of Individualization might be:  \"Parent requests to be called daily at 9am for status\", \"I have a hard time hearing out of my right ear\", or \"Do not touch me to wake me up as it startles  me\".  Outcome: Progressing  Goal: Absence of Hospital-Acquired Illness or Injury  Outcome: Progressing  Intervention: Identify and Manage Fall Risk  Recent Flowsheet Documentation  Taken 10/24/2024 0900 by Kelvin Murray RN  Safety Promotion/Fall Prevention: activity supervised  Intervention: Prevent Skin Injury  Recent Flowsheet Documentation  Taken 10/24/2024 0900 by Kelvin Murray RN  Body Position: position changed independently  Intervention: Prevent and Manage VTE (Venous Thromboembolism) Risk  Recent Flowsheet Documentation  Taken 10/24/2024 0900 by Kelvin Murray RN  VTE Prevention/Management: compression stockings on  Intervention: Prevent Infection  Recent Flowsheet Documentation  Taken 10/24/2024 0900 by Kelvin Murray RN  Infection Prevention:   hand hygiene promoted   rest/sleep promoted   single patient room provided   equipment surfaces disinfected   cohorting utilized  Goal: Optimal Comfort and Wellbeing  Outcome: Progressing  Goal: Readiness for Transition of Care  Outcome: Progressing     "

## 2024-10-25 ENCOUNTER — DOCUMENTATION ONLY (OUTPATIENT)
Dept: GERIATRICS | Facility: CLINIC | Age: 79
End: 2024-10-25

## 2024-10-25 ENCOUNTER — PATIENT OUTREACH (OUTPATIENT)
Dept: CARE COORDINATION | Facility: CLINIC | Age: 79
End: 2024-10-25
Payer: COMMERCIAL

## 2024-10-25 ENCOUNTER — TRANSITIONAL CARE UNIT VISIT (OUTPATIENT)
Dept: GERIATRICS | Facility: CLINIC | Age: 79
End: 2024-10-25
Payer: COMMERCIAL

## 2024-10-25 VITALS
HEART RATE: 63 BPM | BODY MASS INDEX: 18.1 KG/M2 | DIASTOLIC BLOOD PRESSURE: 68 MMHG | OXYGEN SATURATION: 94 % | RESPIRATION RATE: 18 BRPM | WEIGHT: 106 LBS | TEMPERATURE: 98.3 F | SYSTOLIC BLOOD PRESSURE: 110 MMHG | HEIGHT: 64 IN

## 2024-10-25 DIAGNOSIS — I10 ESSENTIAL HYPERTENSION: ICD-10-CM

## 2024-10-25 DIAGNOSIS — R25.1 TREMOR: ICD-10-CM

## 2024-10-25 DIAGNOSIS — R32 URINARY INCONTINENCE, UNSPECIFIED TYPE: ICD-10-CM

## 2024-10-25 DIAGNOSIS — F33.42 MAJOR DEPRESSIVE DISORDER, RECURRENT EPISODE, IN FULL REMISSION (H): ICD-10-CM

## 2024-10-25 DIAGNOSIS — G93.89 CEREBRAL VENTRICULOMEGALY: ICD-10-CM

## 2024-10-25 DIAGNOSIS — E78.5 DYSLIPIDEMIA: ICD-10-CM

## 2024-10-25 DIAGNOSIS — R53.81 PHYSICAL DECONDITIONING: ICD-10-CM

## 2024-10-25 DIAGNOSIS — K59.01 SLOW TRANSIT CONSTIPATION: ICD-10-CM

## 2024-10-25 DIAGNOSIS — R41.89 COGNITIVE IMPAIRMENT: ICD-10-CM

## 2024-10-25 DIAGNOSIS — R82.90 ABNORMAL FINDING ON URINALYSIS: ICD-10-CM

## 2024-10-25 DIAGNOSIS — U07.1 INFECTION DUE TO 2019 NOVEL CORONAVIRUS: Primary | ICD-10-CM

## 2024-10-25 DIAGNOSIS — D33.3 ACOUSTIC NEUROMA (H): ICD-10-CM

## 2024-10-25 PROCEDURE — 99310 SBSQ NF CARE HIGH MDM 45: CPT

## 2024-10-25 NOTE — PROGRESS NOTES
[facility]  TCU Monitoring    Clinical Data: Patient was identified from [facility] payor report and has transitioned to TCU/ARU for short term rehabilitation:    Facility Name: [facility]   Transition Communication:  Notified facility of Ambulatory Care Management TCU monitoring process via Epic fax.    Assessment/Plan: Patient will be monitored by Ambulatory Care Management to identify patient's discharge plans/need. Care  will review chart and outreach to facility staff every 3-4 weeks and as needed.     Vianca Rome  Care Transitions Assistant  [facility]

## 2024-10-25 NOTE — LETTER
[facility]   To:     Please give to facility    From:   Erinn Rome MA  Care Transitions Assistant    Ambulatory Care Management team  [facility]   Phone: [phone]  Patient Name:  Lenora Hammonds YOB: 1945   Admit date: 10/24/2024      *Information Needed:  Please contact me when the patient will discharge (or if they will move to long term care)- include the discharge date, disposition, and main diagnosis   If the patient is discharged with home care services, please provide the name of the agency    Also- Please inform me if a care conference is being held and you would like to facilitate the virtual presence of an Ambulatory Care Management team member.       Thank you!

## 2024-10-25 NOTE — PROGRESS NOTES
[facility] GERIATRICS    PRIMARY CARE PROVIDER AND CLINIC:  Valerio Mccain MD, [address] [address] / [place] [address]  Chief Complaint   Patient presents with    Hospital F/U      [facility] Medical Record Number:  [id number]  Place of Service where encounter took place:  [facility]  (U) [[id number]]    Lenora Hammonds  is a 79 year old  (1945), admitted to the above facility from  [facility]. Hospital stay 10/21/24 through 10/24/24.    By chart review, patient was admitted to [facility] 10/21-10/24/24 with an acute COVID infection after presenting with weakness. In ED, labs remarkable for WBC 6.5, hgb 12.5, Cr 0/91, lactic acid 2.7. UA was grossly abnormal. Multiplex was positive for COVID. CT of the head demonstrated increased dilation of the supratentorial ventricular system disproportionate to the degree of volume loss, previously noted left cerebellopontine angle lesion slightly increased in size. She was started on paxlovid. She received IV Rocephin for possible UTI but cultures ultimately grew mixed jaqueline. She was recommended Tcu at discharge, admitted to current facility for ongoing medical management, rehab, nursing care.    HPI:    Seen today in her room in LTC up to recliner. She reports she is doing well. Appetite is fair. She reports a bowel movement yesterday. She lives with her son at baseline, struggles to recall where this is. We discuss what to expect in TCU. She is denying CP, SOB, changes in b/b habits, fever/chills.     CODE STATUS/ADVANCE DIRECTIVES DISCUSSION:  No CPR- Do NOT Intubate  DNR / DNI  ALLERGIES:   Allergies   Allergen Reactions    Rosuvastatin Hives, Itching and Rash      PAST MEDICAL HISTORY:   Past Medical History:   Diagnosis Date    Acoustic neuroma (H)     left    Depressive disorder     histoplasmosis     Histoplasmosis 10/6/2011    Hypertension     Pancolitis (H) 12/5/2017    Shaking     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  "   [place] 10/6/2011      PAST SURGICAL HISTORY:   has a past surgical history that includes THORACOTOMY,MAJOR,EXPLOR/BIOPSY (1983); tonsillectomy; Thoracic surgery; Colonoscopy (N/A, 12/6/2017); and Colonoscopy (N/A, 6/28/2018).  FAMILY HISTORY: family history includes Breast Cancer in her sister; Cancer in her brother and father; Hypertension in her mother; Neurologic Disorder in her mother; Other - See Comments in her sister.  SOCIAL HISTORY:   reports that she quit smoking about 10 years ago. Her smoking use included cigarettes. She started smoking about 60 years ago. She has a 25 pack-year smoking history. She has been exposed to tobacco smoke. She has never used smokeless tobacco. She reports current alcohol use. She reports that she does not use drugs.  Patient's living condition: lives with family, children     Post Discharge Medication Reconciliation Status:   MED REC REQUIRED  Post Medication Reconciliation Status: discharge medications reconciled and changed, per note/orders       Current Outpatient Medications   Medication Sig Dispense Refill    acetaminophen (TYLENOL) 325 MG tablet Take 2 tablets (650 mg) by mouth every 4 hours as needed for mild pain (max 3000 mg in 24 hours).      escitalopram (LEXAPRO) 10 MG tablet Take 1 tablet (10 mg) by mouth daily 90 tablet 3    lisinopril-hydrochlorothiazide (ZESTORETIC) 10-12.5 MG tablet Take 1 tablet by mouth daily 90 tablet 3    ondansetron (ZOFRAN ODT) 4 MG ODT tab Take 1 tablet (4 mg) by mouth every 6 hours as needed for nausea or vomiting.      polyethylene glycol (MIRALAX) 17 GM/Dose powder Take 17 g by mouth daily.      propranolol ER (INDERAL LA) 60 MG 24 hr capsule Take 1 capsule (60 mg) by mouth daily.       No current facility-administered medications for this visit.       ROS:  Limited secondary to cognitive impairment but today pt reports the above    Vitals:  /68   Pulse 63   Temp 98.3  F (36.8  C)   Resp 18   Ht 1.626 m (5' 4\")   Wt " 48.1 kg (106 lb)   LMP  (LMP Unknown)   SpO2 94%   BMI 18.19 kg/m    Exam:  GENERAL APPEARANCE:  Alert, in no distress, thin, frail appearing  RESP:  respiratory effort and palpation of chest normal, lungs clear to auscultation , no respiratory distress  CV:  regular rate and rhythm, no murmur, rub, or gallop, no edema  ABDOMEN:  normal bowel sounds, soft, nontender, no hepatosplenomegaly or other masses  M/S:   Gait and station abnormal transfers with assist, walker for mobility  SKIN:  Inspection of skin and subcutaneous tissue baseline, Palpation of skin and subcutaneous tissue baseline  NEURO:   baires freely, jaw tremor  PSYCH:  memory impaired , affect and mood normal    Lab/Diagnostic data:  Labs done in SNF are in [place] [facility]. Please refer to them using Med[employer]/[employer] Everywhere. and Recent labs in [facility] reviewed by me today.     ASSESSMENT/PLAN:    (U07.1) Infection due to 2019 novel coronavirus  (primary encounter diagnosis)  Comment: acute, received a course of paxlovid inpatient  Plan: monitor respiratory status    (R32) Urinary incontinence, unspecified type  (R82.90) Abnormal finding on urinalysis  Comment: noted on Ed evaluation, UC grew mixed jaqueline. 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  Plan: continue propanolol 80 mg daily, follow-up with neurology outpatient    (I10) Essential hypertension  Comment: chronic  Plan: continue lisinopril-hydrochlorothiazide, propanolol, monitor BP with adjustments as needed    (E78.5) Dyslipidemia  Comment: chronic, no longer on statin  Plan: RD to follow    (F33.42) Major depressive disorder, recurrent episode, in full remission (H)  Comment: chronic  Plan: continue escitalopram 10 mg daily, monitor mood and symptoms, ACP PRN    (K59.01) Slow transit constipation  Comment:  immobility contributing  Plan: continue bowel regimen, bowel habit monitoring per nursing    (R41.89) Cognitive impairment  Comment: chronic, lives with family at baseline.   Plan: OT following for formal cognitive testing and safe discharge planning, SNF for assist with ADLs, medication management, meals, activities    (R53.81) Physical deconditioning  Comment: acute on chronic, related to acute and chronic conditions as above, recent hospitalization   Plan: PT/OT. SNF for assist as needed      Orders:  CBC, BMP 10/28    Total time spent with patient visit at the skilled nursing facility was 51 minutes including patient visit, review of past records, and review of management with nursing facility staff.       Electronically signed by:  STACY Munguia CNP

## 2024-10-25 NOTE — LETTER
10/25/2024      Leonra Hammonds  [address] [facility] [id number]        No notes on file      Sincerely,        STACY Munguia CNP

## 2024-10-26 LAB — BACTERIA BLD CULT: NO GROWTH

## 2024-10-28 ENCOUNTER — TRANSITIONAL CARE UNIT VISIT (OUTPATIENT)
Dept: GERIATRICS | Facility: CLINIC | Age: 79
End: 2024-10-28
Payer: COMMERCIAL

## 2024-10-28 ENCOUNTER — LAB REQUISITION (OUTPATIENT)
Dept: LAB | Facility: CLINIC | Age: 79
End: 2024-10-28
Payer: COMMERCIAL

## 2024-10-28 VITALS
WEIGHT: 106 LBS | OXYGEN SATURATION: 96 % | DIASTOLIC BLOOD PRESSURE: 74 MMHG | SYSTOLIC BLOOD PRESSURE: 121 MMHG | BODY MASS INDEX: 18.1 KG/M2 | HEART RATE: 85 BPM | TEMPERATURE: 98.7 F | HEIGHT: 64 IN | RESPIRATION RATE: 16 BRPM

## 2024-10-28 DIAGNOSIS — R82.90 ABNORMAL FINDING ON URINALYSIS: ICD-10-CM

## 2024-10-28 DIAGNOSIS — R41.89 COGNITIVE IMPAIRMENT: ICD-10-CM

## 2024-10-28 DIAGNOSIS — K59.01 SLOW TRANSIT CONSTIPATION: ICD-10-CM

## 2024-10-28 DIAGNOSIS — I10 ESSENTIAL HYPERTENSION: ICD-10-CM

## 2024-10-28 DIAGNOSIS — R53.81 PHYSICAL DECONDITIONING: ICD-10-CM

## 2024-10-28 DIAGNOSIS — D33.3 ACOUSTIC NEUROMA (H): ICD-10-CM

## 2024-10-28 DIAGNOSIS — F33.42 MAJOR DEPRESSIVE DISORDER, RECURRENT EPISODE, IN FULL REMISSION (H): ICD-10-CM

## 2024-10-28 DIAGNOSIS — E78.5 DYSLIPIDEMIA: ICD-10-CM

## 2024-10-28 DIAGNOSIS — U07.1 COVID-19: ICD-10-CM

## 2024-10-28 DIAGNOSIS — I10 ESSENTIAL (PRIMARY) HYPERTENSION: ICD-10-CM

## 2024-10-28 DIAGNOSIS — M10.9 GOUT, UNSPECIFIED: ICD-10-CM

## 2024-10-28 DIAGNOSIS — G93.89 CEREBRAL VENTRICULOMEGALY: ICD-10-CM

## 2024-10-28 DIAGNOSIS — M25.571 ACUTE RIGHT ANKLE PAIN: Primary | ICD-10-CM

## 2024-10-28 DIAGNOSIS — R32 URINARY INCONTINENCE, UNSPECIFIED TYPE: ICD-10-CM

## 2024-10-28 DIAGNOSIS — U07.1 INFECTION DUE TO 2019 NOVEL CORONAVIRUS: ICD-10-CM

## 2024-10-28 PROCEDURE — 99310 SBSQ NF CARE HIGH MDM 45: CPT

## 2024-10-28 NOTE — PATIENT INSTRUCTIONS
Orders  Lenora Hammonds  1945     Discontinue tylenol  Tylenol 1000 mg TID dx pain  CBC, BMP, uric acid 10/29/24 dx gout, HTN  Ibuprofen 400 mg Q6H x 4 doses dx: ankle pain      STACY Munguia CNP on 10/28/2024 at 2:39 PM

## 2024-10-28 NOTE — PROGRESS NOTES
"[facility] GERIATRICS    Chief Complaint   Patient presents with    Nursing Home Acute     HPI:  Lenora Hammonds is a 79 year old  (1945), who is being seen today for an episodic care visit at: [facility]  (U) [[id number]].     By chart review, patient was admitted to [facility] 10/21-10/24/24 with an acute COVID infection after presenting with weakness. In ED, labs remarkable for WBC 6.5, hgb 12.5, Cr 0/91, lactic acid 2.7. UA was grossly abnormal. Multiplex was positive for COVID. CT of the head demonstrated increased dilation of the supratentorial ventricular system disproportionate to the degree of volume loss, previously noted left cerebellopontine angle lesion slightly increased in size. She was started on paxlovid. She received IV Rocephin for possible UTI but cultures ultimately grew mixed jaqueline. She was recommended Tcu at discharge, admitted to current facility for ongoing medical management, rehab, nursing care.     Today's concern is: Seen today for follow-up in TCU. Per nursing report, patient developed right ankle pain Saturday, now declining to bear weight and required a dede lift with therapy today. No reports of injury but reporting limited by cognition, patient has been in isolation and could have had an unwitnessed injury. Today, seen in Tcu resting abed. She reports right ankle pain started yesterday. Does not recall a fall or injury. She is denying CP, SOB, changes in b/b habits, fever/chills, dizziness or light headedness.    Allergies, and PMH/PSH reviewed in [facility] today.  REVIEW OF SYSTEMS:  Limited secondary to cognitive impairment but today pt reports the above    Objective:   /74   Pulse 85   Temp 98.7  F (37.1  C)   Resp 16   Ht 1.626 m (5' 4\")   Wt 48.1 kg (106 lb)   LMP  (LMP Unknown)   SpO2 96%   BMI 18.19 kg/m    GENERAL APPEARANCE:  Alert, in no distress  RESP:  respiratory effort and palpation of chest normal, lungs clear to auscultation , no " respiratory distress  CV:  regular rate and rhythm, no murmur, rub, or gallop, no edema  ABDOMEN:  normal bowel sounds, soft, nontender, no hepatosplenomegaly or other masses  M/S and SKIN:   Gait and station abnormal transfers with assist, red/warm/edematous malleolus lateral>medial on exam.  NEURO:   baires  PSYCH:  memory impaired , affect and mood normal    [facility] in SNF [facility]. Please refer to them using [facility]/Care Everywhere. and Recent labs in [facility] reviewed by me today.     Assessment/Plan:  (U07.1) Infection due to 2019 novel coronavirus  (primary encounter diagnosis)  Comment: acute, received a course of paxlovid inpatient  Plan: monitor respiratory status    (M25.571) Acute right ankle pain  (primary encounter diagnosis)  Comment: Acute, limiting participation with WB and therapy. Xray results read negative for fracture but per ortho Layla SOLIS possible slight lateral malleolus fx. Labs not obtained per facility, will schedule tylenol and start ibuprofen for possible gout, check CBC tomorrow for possible leukocytosis/septic arthritis though lower suspicion. No portal of entry to suspect cellulitis. Per ortho okay to WBAT, repeat imaging in 10-15 days if symptoms persist.  Plan: Ibuprofen 400 mg Q8H x 4. Schedule tylenol 1000 mg TID. CBC, BMP, uric acid 10/29. Follow-up with ortho/repeat imaging PRN.      (R32) Urinary incontinence, unspecified type  (R82.90) Abnormal finding on urinalysis  Comment: noted on Ed evaluation, UC grew mixed jaqueline. 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  Plan: continue propanolol 80 mg daily, follow-up with neurology outpatient     (I10) Essential hypertension  Comment: chronic  BP Readings from Last 3 Encounters:   10/25/24 110/68   10/24/24 129/57   06/11/24 132/69   Plan:  continue lisinopril-hydrochlorothiazide, propanolol, monitor BP with adjustments as needed     (E78.5) Dyslipidemia  Comment: chronic, no longer on statin  Plan: RD to follow     (F33.42) Major depressive disorder, recurrent episode, in full remission (H)  Comment: chronic  Plan: continue escitalopram 10 mg daily, monitor mood and symptoms, ACP PRN     (K59.01) Slow transit constipation  Comment: immobility contributing  Plan: continue bowel regimen, bowel habit monitoring per nursing     (R41.89) Cognitive impairment  Comment: chronic, lives with family at baseline.   Plan: OT following for formal cognitive testing and safe discharge planning, SNF for assist with ADLs, medication management, meals, activities     (R53.81) Physical deconditioning  Comment: acute on chronic, related to acute and chronic conditions as above, recent hospitalization   Plan: PT/OT. SNF for assist as needed         MED REC REQUIRED  Post Medication Reconciliation Status: discharge medications reconciled and changed, per note/orders      Orders:  Discontinue tylenol  Tylenol 1000 mg TID dx pain  CBC, BMP, uric acid 10/29/24 dx gout, HTN  Ibuprofen 400 mg Q6H x 4 doses dx: ankle pain    Total time spent with patient visit at the skilled nursing facility was 48 minutes including patient visit, review of past records, and review of management with nursing facility staff, review of xray images, discussion with ortho PA and MD.       Electronically signed by: STACY Munguia CNP

## 2024-10-28 NOTE — LETTER
" 10/28/2024      Lenora Hammonds  [address] [address] [address]        [facility] GERIATRICS    Chief Complaint   Patient presents with     Nursing Home Acute     HPI:  Lenora Hammonds is a 79 year old  (1945), who is being seen today for an episodic care visit at: [facility]  ([facility]) [[id number]].     By chart review, patient was admitted to [facility] 10/21-10/24/24 with an acute COVID infection after presenting with weakness. In ED, labs remarkable for WBC 6.5, hgb 12.5, Cr 0/91, lactic acid 2.7. UA was grossly abnormal. Multiplex was positive for COVID. CT of the head demonstrated increased dilation of the supratentorial ventricular system disproportionate to the degree of volume loss, previously noted left cerebellopontine angle lesion slightly increased in size. She was started on paxlovid. She received IV Rocephin for possible UTI but cultures ultimately grew mixed jaqueline. She was recommended Tcu at discharge, admitted to current facility for ongoing medical management, rehab, nursing care.     Today's concern is: Seen today for follow-up in TCU. Per nursing report, patient developed right ankle pain Saturday, now declining to bear weight and required a dede lift with therapy today. No reports of injury but reporting limited by cognition, patient has been in isolation and could have had an unwitnessed injury. Today, seen in Tcu resting abed. She reports right ankle pain started yesterday. Does not recall a fall or injury. She is denying CP, SOB, changes in b/b habits, fever/chills, dizziness or light headedness.    Allergies, and PMH/PSH reviewed in [facility] today.  REVIEW OF SYSTEMS:  Limited secondary to cognitive impairment but today pt reports the above    Objective:   /74   Pulse 85   Temp 98.7  F (37.1  C)   Resp 16   Ht 1.626 m (5' 4\")   Wt 48.1 kg (106 lb)   LMP  (LMP Unknown)   SpO2 96%   BMI 18.19 kg/m    GENERAL APPEARANCE:  Alert, in no distress  RESP:  " respiratory effort and palpation of chest normal, lungs clear to auscultation , no respiratory distress  CV:  regular rate and rhythm, no murmur, rub, or gallop, no edema  ABDOMEN:  normal bowel sounds, soft, nontender, no hepatosplenomegaly or other masses  M/S and SKIN:   Gait and station abnormal transfers with assist, red/warm/edematous malleolus lateral>medial on exam.  NEURO:   baires  PSYCH:  memory impaired , affect and mood normal    Labs done in SNF are in [facility]. Please refer to them using [facility]/Care Everywhere. and Recent labs in [facility] reviewed by me today.     Assessment/Plan:  (U07.1) Infection due to 2019 novel coronavirus  (primary encounter diagnosis)  Comment: acute, received a course of paxlovid inpatient  Plan: monitor respiratory status    (M25.571) Acute right ankle pain  (primary encounter diagnosis)  Comment: Acute, limiting participation with WB and therapy. Xray results read negative for fracture but per ortho Layla SOLIS possible slight lateral malleolus fx. Labs not obtained per facility, will schedule tylenol and start ibuprofen for possible gout, check CBC tomorrow for possible leukocytosis/septic arthritis though lower suspicion. No portal of entry to suspect cellulitis. Per ortho okay to WBAT, repeat imaging in 10-15 days if symptoms persist.  Plan: Ibuprofen 400 mg Q8H x 4. Schedule tylenol 1000 mg TID. CBC, BMP, uric acid 10/29. Follow-up with ortho/repeat imaging PRN.      (R32) Urinary incontinence, unspecified type  (R82.90) Abnormal finding on urinalysis  Comment: noted on Ed evaluation, UC grew mixed jaqueline. 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  Plan: continue propanolol 80 mg daily, follow-up with neurology outpatient     (I10) Essential hypertension  Comment: chronic  BP Readings  from Last 3 Encounters:   10/25/24 110/68   10/24/24 129/57   06/11/24 132/69   Plan: continue lisinopril-hydrochlorothiazide, propanolol, monitor BP with adjustments as needed     (E78.5) Dyslipidemia  Comment: chronic, no longer on statin  Plan: RD to follow     (F33.42) Major depressive disorder, recurrent episode, in full remission (H)  Comment: chronic  Plan: continue escitalopram 10 mg daily, monitor mood and symptoms, ACP PRN     (K59.01) Slow transit constipation  Comment: immobility contributing  Plan: continue bowel regimen, bowel habit monitoring per nursing     (R41.89) Cognitive impairment  Comment: chronic, lives with family at baseline.   Plan: OT following for formal cognitive testing and safe discharge planning, SNF for assist with ADLs, medication management, meals, activities     (R53.81) Physical deconditioning  Comment: acute on chronic, related to acute and chronic conditions as above, recent hospitalization   Plan: PT/OT. SNF for assist as needed         MED REC REQUIRED  Post Medication Reconciliation Status: discharge medications reconciled and changed, per note/orders      Orders:  Discontinue tylenol  Tylenol 1000 mg TID dx pain  CBC, BMP, uric acid 10/29/24 dx gout, HTN  Ibuprofen 400 mg Q6H x 4 doses dx: ankle pain    Total time spent with patient visit at the skilled nursing facility was 48 minutes including patient visit, review of past records, and review of management with nursing facility staff, review of xray images, discussion with ortho PA and MD.       Electronically signed by: STACY Munguia CNP         Sincerely,        STACY Munguia CNP

## 2024-10-29 LAB
ANION GAP SERPL CALCULATED.3IONS-SCNC: 12 MMOL/L (ref 7–15)
BUN SERPL-MCNC: 24.3 MG/DL (ref 8–23)
CALCIUM SERPL-MCNC: 10 MG/DL (ref 8.8–10.4)
CHLORIDE SERPL-SCNC: 101 MMOL/L (ref 98–107)
CREAT SERPL-MCNC: 0.79 MG/DL (ref 0.51–0.95)
EGFRCR SERPLBLD CKD-EPI 2021: 75 ML/MIN/1.73M2
ERYTHROCYTE [DISTWIDTH] IN BLOOD BY AUTOMATED COUNT: 12.1 % (ref 10–15)
GLUCOSE SERPL-MCNC: 94 MG/DL (ref 70–99)
HCO3 SERPL-SCNC: 28 MMOL/L (ref 22–29)
HCT VFR BLD AUTO: 39.7 % (ref 35–47)
HGB BLD-MCNC: 12.7 G/DL (ref 11.7–15.7)
MCH RBC QN AUTO: 31.1 PG (ref 26.5–33)
MCHC RBC AUTO-ENTMCNC: 32 G/DL (ref 31.5–36.5)
MCV RBC AUTO: 97 FL (ref 78–100)
PLATELET # BLD AUTO: 231 10E3/UL (ref 150–450)
POTASSIUM SERPL-SCNC: 3.7 MMOL/L (ref 3.4–5.3)
RBC # BLD AUTO: 4.08 10E6/UL (ref 3.8–5.2)
SODIUM SERPL-SCNC: 141 MMOL/L (ref 135–145)
WBC # BLD AUTO: 8.2 10E3/UL (ref 4–11)

## 2024-10-29 PROCEDURE — 36415 COLL VENOUS BLD VENIPUNCTURE: CPT | Performed by: NURSE PRACTITIONER

## 2024-10-29 PROCEDURE — 80048 BASIC METABOLIC PNL TOTAL CA: CPT | Performed by: NURSE PRACTITIONER

## 2024-10-29 PROCEDURE — 86481 TB AG RESPONSE T-CELL SUSP: CPT | Performed by: NURSE PRACTITIONER

## 2024-10-29 PROCEDURE — 85027 COMPLETE CBC AUTOMATED: CPT | Performed by: NURSE PRACTITIONER

## 2024-10-29 PROCEDURE — P9604 ONE-WAY ALLOW PRORATED TRIP: HCPCS | Performed by: NURSE PRACTITIONER

## 2024-10-30 VITALS
WEIGHT: 106 LBS | OXYGEN SATURATION: 96 % | BODY MASS INDEX: 18.1 KG/M2 | DIASTOLIC BLOOD PRESSURE: 53 MMHG | HEART RATE: 61 BPM | TEMPERATURE: 97.8 F | SYSTOLIC BLOOD PRESSURE: 107 MMHG | HEIGHT: 64 IN | RESPIRATION RATE: 16 BRPM

## 2024-10-30 LAB
QUANTIFERON MITOGEN: 10 IU/ML
QUANTIFERON NIL TUBE: 0 IU/ML
QUANTIFERON TB1 TUBE: 0 IU/ML
QUANTIFERON TB2 TUBE: 0

## 2024-10-30 NOTE — PROGRESS NOTES
[facility]  INITIAL VISIT NOTE  October 31, 2024    PRIMARY CARE PROVIDER AND CLINIC:  Valerio Mccain Y [address] [address] / [place] [address]    CHIEF COMPLAINT:  Hospital follow-up/Initial visit     HPI:    Lenora Hammonds is a 79 year old  (1945) female [facility].     Medical history is notable for cognitive impairment, tremor, hypertension, diastolic dysfunction, dyslipidemia, acoustic neuroma, histoplasmosis, pancolitis, urinary incontinence, and depression.    Summary of hospital course:  Patient was hospitalized at [facility] from October 21 through October 24, 2024 for COVID-19 infection. Patient originally presented for evaluation of generalized weakness.  Initial laboratory evaluation was significant for sodium 137, potassium 3.9, creatinine 0.91, lactic acid 2.7, white count 6.5, hemoglobin 12.5, abnormal UA, and positive screening for COVID-19.  EKG was remarkable for normal sinus rhythm.  CT head demonstrated increasing dilation of the supratentorial ventricular system, out of proportion to the degree of volume loss and redemonstration of left cerebellopontine angle lesion, measuring up to 2 cm, slightly increased since prior MRI, dated April 28, 2021.  She was started on Paxlovid for COVID-19 infection.  She was treated with IV ceftriaxone for possible UTI but urine culture grew mixed urogenital jaqueline.  TCU was recommended for rehab.    Patient is admitted to this facility for medical management, nursing care, and subacute rehab.     Of note, history was obtained from patient, facility staff, and extensive review of the chart including hospital admission note, consult notes, and discharge summary.    Today's visit:  Patient was seen in her room, lying in bed.  She appears somewhat frail but comfortable and cheerful.  She reports no fever, chills, cough, sputum, dyspnea, chest pain,  palpitation, dyspnea, nausea, vomiting, abdominal pain, or dysuria.  She had acute onset of right ankle pain few days ago which has now resolved.  She reports no BM for few days.      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)  Histoplasmosis (RLL), s/p thoracotomy and exploration/biopsy in   Pancolitis  Urinary incontinence  Depression  Cognitive impairment    Past Medical History:   Diagnosis Date    Acoustic neuroma (H)     left    Depressive disorder     histoplasmosis     Histoplasmosis 10/6/2011    Hypertension     Pancolitis (H) 2017    Shaking     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    Snores 10/6/2011       PAST SURGICAL HISTORY:   Past Surgical History:   Procedure Laterality Date    COLONOSCOPY N/A 2017    Procedure: COMBINED COLONOSCOPY, SINGLE OR MULTIPLE BIOPSY/POLYPECTOMY BY BIOPSY;  colonoscopy;  Surgeon: Moise Vásquez MD;  Location:  GI    COLONOSCOPY N/A 2018    Procedure: COMBINED COLONOSCOPY, SINGLE OR MULTIPLE BIOPSY/POLYPECTOMY BY BIOPSY;  COLONOSCOPY;  Surgeon: Moise Vásquez MD;  Location:  GI    THORACIC SURGERY      TONSILLECTOMY      ZZC THORACOTOMY,MAJOR,EXPLOR/BIOPSY      histoplasmosis, right lower lobe       FAMILY HISTORY:   Family History   Problem Relation Age of Onset    Hypertension Mother          at age 84    Neurologic Disorder Mother         ?parkinsonism - she had a tremor; walked normally. voice was soft    Cancer Father         brain tumor;  at age 62 y rs    Other - See Comments Sister         Homicide    Cancer Brother         Bladder    Breast Cancer Sister        SOCIAL HISTORY:  Social History     Tobacco Use    Smoking status: Former     Current packs/day: 0.00      "Average packs/day: 0.5 packs/day for 50.0 years (25.0 ttl pk-yrs)     Types: Cigarettes     Start date: 1964     Quit date: 2014     Years since quitting: 10.8     Passive exposure: Past    Smokeless tobacco: Never    Tobacco comments:     quit 12/2014   Substance Use Topics    Alcohol use: Yes     Comment: occasionally        MEDICATIONS:  Current Outpatient Medications   Medication Sig Dispense Refill    acetaminophen (TYLENOL) 325 MG tablet Take 2 tablets (650 mg) by mouth every 4 hours as needed for mild pain or other (and adjunct with moderate or severe pain or per patient request).      escitalopram (LEXAPRO) 10 MG tablet Take 1 tablet (10 mg) by mouth daily 90 tablet 3    lisinopril-hydrochlorothiazide (ZESTORETIC) 10-12.5 MG tablet Take 1 tablet by mouth daily 90 tablet 3    ondansetron (ZOFRAN ODT) 4 MG ODT tab Take 1 tablet (4 mg) by mouth every 6 hours as needed for nausea or vomiting.      polyethylene glycol (MIRALAX) 17 GM/Dose powder Take 17 g by mouth daily.      propranolol ER (INDERAL LA) 80 MG 24 hr capsule Take 80 mg by mouth daily.         MED REC REQUIRED  Post Medication Reconciliation Status: medication reconcilation previously completed during another office visit      ALLERGIES:  Allergies   Allergen Reactions    Rosuvastatin Hives, Itching and Rash       ROS:  10 point ROS were negative other than the symptoms noted above in the HPI.    PHYSICAL EXAM:  Vital signs were reviewed in the chart.  Vital Signs: /53   Pulse 61   Temp 97.8  F (36.6  C)   Resp 16   Ht 1.626 m (5' 4\")   Wt 48.1 kg (106 lb)   LMP  (LMP Unknown)   SpO2 96%   BMI 18.19 kg/m     General: Somewhat frail appearing but comfortable, cheerful, and in no acute distress  HEENT: No conjunctival pallor, no scleral icterus or injection, moist oral mucosa  Cardiovascular: Normal S1, S2, RRR  Respiratory: Lungs clear to auscultation bilaterally  GI: Abdomen soft, non-tender, non-distended, +BS  Extremities: No LE " edema, no ankle swelling or erythema  Neuro: CX II-XII grossly intact; ROM in all four extremities grossly intact  Psych: Alert and oriented x 2-3; normal affect  Skin: No acute rash    LABORATORY/IMAGING DATA:  All relevant labs and imaging data in [facility] and/or Care Everywhere were personally reviewed today.    Most Recent 3 CBC's:  Recent Labs   Lab Test 10/29/24  0757 10/22/24  0628 10/21/24  1710   WBC 8.2 5.4 6.5   HGB 12.7 13.0 12.5   MCV 97 95 98    155 185     Most Recent 3 BMP's:  Recent Labs   Lab Test 10/29/24  0757 10/22/24  0628 10/21/24  1710    137 137   POTASSIUM 3.7 3.4 3.9   CHLORIDE 101 98 99   CO2 28 25 27   BUN 24.3* 24.2* 29.5*   CR 0.79 0.71 0.91   ANIONGAP 12 14 11   KATHLEEN 10.0 9.0 9.7   GLC 94 85 98     Most Recent 2 LFT's:  Recent Labs   Lab Test 03/15/24  1141 05/15/23  1603   AST 27 27   ALT 28 20   ALKPHOS 88 76   BILITOTAL 0.6 0.4     Most Recent TSH and T4:  Recent Labs   Lab Test 05/15/23  1603   TSH 0.48     Most Recent Hemoglobin A1c:  Recent Labs   Lab Test 03/15/24  1141   A1C 5.4         ASSESSMENT/PLAN:  COVID-19 infection.  Patient was treated with a course of Paxlovid limitation.  She is stable from a respiratory standpoint.  Plan:  Monitor respiratory status    Urinary incontinence,  Abnormal UA.  Patient was treated with 3 days of IV ceftriaxone inpatient for possible UTI but urine culture grew mixed urogenital jaqueline.  Plan:  Monitor urinary symptoms    Acute right ankle pain.  Right ankle x-ray on October 27 negative for fracture or dislocation.  Received ibuprofen 400 mg every 6 hours for 4 doses on October 28 given concern for acute gout versus acute osteoarthritis.  Pain has now resolved.  Plan:  Continue pain management with acetaminophen 1000 mg 3 times daily  Check uric acid   Monitor for recurrence    Acoustic neuroma (left cerebellopontine angle, measuring up to 2 cm),  Cerebral ventriculomegaly (disproportionate, concerning for  NPH),  Tremor.  Plan:  Continue propranolol ER 80 mg daily  Follow-up with neurology as outpatient    Hypertension,  Grade 1 LV diastolic dysfunction.  Blood pressure is fairly controlled.  Plan:  Continue lisinopril-HCTZ 10-12.5 mg, 1 tablet daily  Continue propranolol ER 80 mg daily  Monitor blood pressure    Dyslipidemia.  Not on medical therapy.  Plan:  Follow-up as outpatient    Depression.  Plan:  Continue escitalopram 10 mg daily  Monitor symptoms  Refer to ACP PRN    Slow transit constipation.  Plan:  Continue the bowel regimen     Cognitive impairment.  Reportedly, she declined further workup with PCP in March 2024  CT finding concerning for NPH.  Today, she is oriented x 2-3.  Plan:  Cognitive evaluation per OT  Follow-up with neurology as outpatient    Physical deconditioning.  Plan:  Continue PT/OT evaluation and therapy       Orders written by provider at facility:  Uric acid level on November 4, DX: Ankle pain        Total time: 50 minutes including face to face time with the patient, reviewing the notes, labs, medications and imaging in [facility], ordering new lab including uric acid, and documenting all information electronically.     Disclaimer: This note contains text created using speech-recognition software and may have unintended word substitutions.      Electronically signed by:  Mahin Manuel MD

## 2024-10-31 ENCOUNTER — TRANSITIONAL CARE UNIT VISIT (OUTPATIENT)
Dept: GERIATRICS | Facility: CLINIC | Age: 79
End: 2024-10-31
Payer: COMMERCIAL

## 2024-10-31 DIAGNOSIS — I10 ESSENTIAL HYPERTENSION: ICD-10-CM

## 2024-10-31 DIAGNOSIS — M25.571 ACUTE RIGHT ANKLE PAIN: ICD-10-CM

## 2024-10-31 DIAGNOSIS — F32.A DEPRESSION, UNSPECIFIED DEPRESSION TYPE: ICD-10-CM

## 2024-10-31 DIAGNOSIS — E78.5 DYSLIPIDEMIA: ICD-10-CM

## 2024-10-31 DIAGNOSIS — G93.89 CEREBRAL VENTRICULOMEGALY: ICD-10-CM

## 2024-10-31 DIAGNOSIS — D33.3 ACOUSTIC NEUROMA (H): ICD-10-CM

## 2024-10-31 DIAGNOSIS — R41.89 COGNITIVE IMPAIRMENT: ICD-10-CM

## 2024-10-31 DIAGNOSIS — K59.01 SLOW TRANSIT CONSTIPATION: ICD-10-CM

## 2024-10-31 DIAGNOSIS — R53.81 PHYSICAL DECONDITIONING: ICD-10-CM

## 2024-10-31 DIAGNOSIS — I51.89 DIASTOLIC DYSFUNCTION: ICD-10-CM

## 2024-10-31 DIAGNOSIS — U07.1 INFECTION DUE TO 2019 NOVEL CORONAVIRUS: Primary | ICD-10-CM

## 2024-10-31 DIAGNOSIS — R32 URINARY INCONTINENCE, UNSPECIFIED TYPE: ICD-10-CM

## 2024-10-31 LAB
GAMMA INTERFERON BACKGROUND BLD IA-ACNC: 0 IU/ML
M TB IFN-G BLD-IMP: NEGATIVE
M TB IFN-G CD4+ BCKGRND COR BLD-ACNC: 10 IU/ML
MITOGEN IGNF BCKGRD COR BLD-ACNC: 0 IU/ML
MITOGEN IGNF BCKGRD COR BLD-ACNC: 0 IU/ML

## 2024-10-31 PROCEDURE — 99306 1ST NF CARE HIGH MDM 50: CPT | Performed by: INTERNAL MEDICINE

## 2024-10-31 NOTE — PATIENT INSTRUCTIONS
Orders for Lenora Hammonds (1945), MR# [id number]:    1) Uric acid level on November 4, DX: Ankle pain    Mahin Manuel MD  [facility]

## 2024-10-31 NOTE — LETTER
10/31/2024      Lenora Hammonds  [address] [facility] [address]        [facility]  INITIAL VISIT NOTE  October 31, 2024    PRIMARY CARE PROVIDER AND CLINIC:  Valerio Mccain [address] [place] [address] / [place] [address]    CHIEF COMPLAINT:  Hospital follow-up/Initial visit     HPI:    Lenora Hammonds is a 79 year old  (1945) female [facility] for an initial visit.     Medical history is notable for cognitive impairment, tremor, hypertension, diastolic dysfunction, dyslipidemia, acoustic neuroma, histoplasmosis, pancolitis, urinary incontinence, and depression.    Summary of hospital course:  Patient was hospitalized at [facility] from October 21 through October 24, 2024 for COVID-19 infection. Patient originally presented for evaluation of generalized weakness.  Initial laboratory evaluation was significant for sodium 137, potassium 3.9, creatinine 0.91, lactic acid 2.7, white count 6.5, hemoglobin 12.5, abnormal UA, and positive screening for COVID-19.  EKG was remarkable for normal sinus rhythm.  CT head demonstrated increasing dilation of the supratentorial ventricular system, out of proportion to the degree of volume loss and redemonstration of left cerebellopontine angle lesion, measuring up to 2 cm, slightly increased since prior MRI, dated April 28, 2021.  She was started on Paxlovid for COVID-19 infection.  She was treated with IV ceftriaxone for possible UTI but urine culture grew mixed urogenital jaqueline.  TCU was recommended for rehab.    Patient is admitted to this facility for medical management, nursing care, and subacute rehab.     Of note, history was obtained from patient, facility staff, and extensive review of the chart including hospital admission note, consult notes, and discharge summary.    Today's visit:  Patient was seen in her room, lying in bed.  She appears somewhat frail but comfortable and  cheerful.  She reports no fever, chills, cough, sputum, dyspnea, chest pain, palpitation, dyspnea, nausea, vomiting, abdominal pain, or dysuria.  She had acute onset of right ankle pain few days ago which has now resolved.  She reports no BM for few days.      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)  Histoplasmosis (RLL), s/p thoracotomy and exploration/biopsy in   Pancolitis  Urinary incontinence  Depression  Cognitive impairment    Past Medical History:   Diagnosis Date     Acoustic neuroma (H)     left     Depressive disorder      histoplasmosis      Histoplasmosis 10/6/2011     Hypertension      Pancolitis (H) 2017     Shaking     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     Snores 10/6/2011       PAST SURGICAL HISTORY:   Past Surgical History:   Procedure Laterality Date     COLONOSCOPY N/A 2017    Procedure: COMBINED COLONOSCOPY, SINGLE OR MULTIPLE BIOPSY/POLYPECTOMY BY BIOPSY;  colonoscopy;  Surgeon: Moise Vásquez MD;  Location:  GI     COLONOSCOPY N/A 2018    Procedure: COMBINED COLONOSCOPY, SINGLE OR MULTIPLE BIOPSY/POLYPECTOMY BY BIOPSY;  COLONOSCOPY;  Surgeon: Moise Vásquez MD;  Location:  GI     THORACIC SURGERY       TONSILLECTOMY       ZZC THORACOTOMY,MAJOR,EXPLOR/BIOPSY      histoplasmosis, right lower lobe       FAMILY HISTORY:   Family History   Problem Relation Age of Onset     Hypertension Mother          at age 84     Neurologic Disorder Mother         ?parkinsonism - she had a tremor; walked normally. voice was soft     Cancer Father         brain tumor;  at age 62 y rs     Other - See Comments Sister         Homicide     Cancer Brother         Bladder     Breast Cancer Sister        SOCIAL  "HISTORY:  Social History     Tobacco Use     Smoking status: Former     Current packs/day: 0.00     Average packs/day: 0.5 packs/day for 50.0 years (25.0 ttl pk-yrs)     Types: Cigarettes     Start date: 1964     Quit date: 2014     Years since quitting: 10.8     Passive exposure: Past     Smokeless tobacco: Never     Tobacco comments:     quit 12/2014   Substance Use Topics     Alcohol use: Yes     Comment: occasionally        MEDICATIONS:  Current Outpatient Medications   Medication Sig Dispense Refill     acetaminophen (TYLENOL) 325 MG tablet Take 2 tablets (650 mg) by mouth every 4 hours as needed for mild pain or other (and adjunct with moderate or severe pain or per patient request).       escitalopram (LEXAPRO) 10 MG tablet Take 1 tablet (10 mg) by mouth daily 90 tablet 3     lisinopril-hydrochlorothiazide (ZESTORETIC) 10-12.5 MG tablet Take 1 tablet by mouth daily 90 tablet 3     ondansetron (ZOFRAN ODT) 4 MG ODT tab Take 1 tablet (4 mg) by mouth every 6 hours as needed for nausea or vomiting.       polyethylene glycol (MIRALAX) 17 GM/Dose powder Take 17 g by mouth daily.       propranolol ER (INDERAL LA) 80 MG 24 hr capsule Take 80 mg by mouth daily.         MED REC REQUIRED  Post Medication Reconciliation Status: medication reconcilation previously completed during another office visit      ALLERGIES:  Allergies   Allergen Reactions     Rosuvastatin Hives, Itching and Rash       ROS:  10 point ROS were negative other than the symptoms noted above in the HPI.    PHYSICAL EXAM:  Vital signs were reviewed in the chart.  Vital Signs: /53   Pulse 61   Temp 97.8  F (36.6  C)   Resp 16   Ht 1.626 m (5' 4\")   Wt 48.1 kg (106 lb)   LMP  (LMP Unknown)   SpO2 96%   BMI 18.19 kg/m     General: Somewhat frail appearing but comfortable, cheerful, and in no acute distress  HEENT: No conjunctival pallor, no scleral icterus or injection, moist oral mucosa  Cardiovascular: Normal S1, S2, RRR  Respiratory: " Lungs clear to auscultation bilaterally  GI: Abdomen soft, non-tender, non-distended, +BS  Extremities: No LE edema, no ankle swelling or erythema  Neuro: CX II-XII grossly intact; ROM in all four extremities grossly intact  Psych: Alert and oriented x 2-3; normal affect  Skin: No acute rash    LABORATORY/IMAGING DATA:  All relevant labs and imaging data in [facility] and/or Care Everywhere were personally reviewed today.    Most Recent 3 CBC's:  Recent Labs   Lab Test 10/29/24  0757 10/22/24  0628 10/21/24  1710   WBC 8.2 5.4 6.5   HGB 12.7 13.0 12.5   MCV 97 95 98    155 185     Most Recent 3 BMP's:  Recent Labs   Lab Test 10/29/24  0757 10/22/24  0628 10/21/24  1710    137 137   POTASSIUM 3.7 3.4 3.9   CHLORIDE 101 98 99   CO2 28 25 27   BUN 24.3* 24.2* 29.5*   CR 0.79 0.71 0.91   ANIONGAP 12 14 11   KATHLEEN 10.0 9.0 9.7   GLC 94 85 98     Most Recent 2 LFT's:  Recent Labs   Lab Test 03/15/24  1141 05/15/23  1603   AST 27 27   ALT 28 20   ALKPHOS 88 76   BILITOTAL 0.6 0.4     Most Recent TSH and T4:  Recent Labs   Lab Test 05/15/23  1603   TSH 0.48     Most Recent Hemoglobin A1c:  Recent Labs   Lab Test 03/15/24  1141   A1C 5.4         ASSESSMENT/PLAN:  COVID-19 infection.  Patient was treated with a course of Paxlovid limitation.  She is stable from a respiratory standpoint.  Plan:  Monitor respiratory status    Urinary incontinence,  Abnormal UA.  Patient was treated with 3 days of IV ceftriaxone inpatient for possible UTI but urine culture grew mixed urogenital jaqueline.  Plan:  Monitor urinary symptoms    Acute right ankle pain.  Right ankle x-ray on October 27 negative for fracture or dislocation.  Received ibuprofen 400 mg every 6 hours for 4 doses on October 28 given concern for acute gout.  Pain has now resolved.  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  Plan:  Continue propranolol ER 80 mg daily  Follow-up with neurology as outpatient    Hypertension,  Grade 1 LV diastolic dysfunction.  Blood pressure is fairly controlled.  Plan:  Continue lisinopril-HCTZ 10-12.5 mg, 1 tablet daily  Continue propranolol ER 80 mg daily  Monitor blood pressure    Dyslipidemia.  Not on medical therapy.  Plan:  Follow-up as outpatient    Depression.  Plan:  Continue escitalopram 10 mg daily  Monitor symptoms  Refer to ACP PRN    Slow transit constipation.  Plan:  Continue the bowel regimen     Cognitive impairment.  Reportedly, she declined further workup with PCP in March 2024  CT finding concerning for NPH.  Today, she is oriented x 2-3.  Plan:  Cognitive evaluation per OT  Follow-up with neurology as outpatient    Physical deconditioning.  Plan:  Continue PT/OT evaluation and therapy       Orders written by provider at facility:  Uric acid level on November 4, DX: Ankle pain        Total time: 50 minutes including face to face time with the patient, reviewing the notes, labs, medications and imaging in [facility] and [facility], ordering new lab including uric acid, and documenting all information electronically.     Disclaimer: This note contains text created using speech-recognition software and may have unintended word substitutions.      Electronically signed by:  Mahin Manuel MD                       Sincerely,        Mahin Manuel MD

## 2024-11-04 ENCOUNTER — TRANSITIONAL CARE UNIT VISIT (OUTPATIENT)
Dept: GERIATRICS | Facility: CLINIC | Age: 79
End: 2024-11-04
Payer: COMMERCIAL

## 2024-11-04 ENCOUNTER — LAB REQUISITION (OUTPATIENT)
Dept: LAB | Facility: CLINIC | Age: 79
End: 2024-11-04
Payer: COMMERCIAL

## 2024-11-04 VITALS
BODY MASS INDEX: 18.1 KG/M2 | TEMPERATURE: 97 F | RESPIRATION RATE: 17 BRPM | SYSTOLIC BLOOD PRESSURE: 123 MMHG | HEART RATE: 71 BPM | WEIGHT: 106 LBS | HEIGHT: 64 IN | OXYGEN SATURATION: 97 % | DIASTOLIC BLOOD PRESSURE: 74 MMHG

## 2024-11-04 DIAGNOSIS — R41.89 COGNITIVE IMPAIRMENT: ICD-10-CM

## 2024-11-04 DIAGNOSIS — F32.A DEPRESSION, UNSPECIFIED DEPRESSION TYPE: ICD-10-CM

## 2024-11-04 DIAGNOSIS — R52 PAIN, UNSPECIFIED: ICD-10-CM

## 2024-11-04 DIAGNOSIS — D33.3 ACOUSTIC NEUROMA (H): ICD-10-CM

## 2024-11-04 DIAGNOSIS — I10 ESSENTIAL HYPERTENSION: ICD-10-CM

## 2024-11-04 DIAGNOSIS — R32 URINARY INCONTINENCE, UNSPECIFIED TYPE: ICD-10-CM

## 2024-11-04 DIAGNOSIS — M25.571 ACUTE RIGHT ANKLE PAIN: ICD-10-CM

## 2024-11-04 DIAGNOSIS — Z86.16 HISTORY OF COVID-19: Primary | ICD-10-CM

## 2024-11-04 DIAGNOSIS — R53.81 PHYSICAL DECONDITIONING: ICD-10-CM

## 2024-11-04 DIAGNOSIS — I51.89 DIASTOLIC DYSFUNCTION: ICD-10-CM

## 2024-11-04 DIAGNOSIS — G93.89 CEREBRAL VENTRICULOMEGALY: ICD-10-CM

## 2024-11-04 PROCEDURE — 99309 SBSQ NF CARE MODERATE MDM 30: CPT | Performed by: NURSE PRACTITIONER

## 2024-11-04 RX ORDER — ACETAMINOPHEN 500 MG
1000 TABLET ORAL 3 TIMES DAILY
COMMUNITY
End: 2024-11-07

## 2024-11-04 NOTE — LETTER
11/4/2024      Lenora Hammonds  [address] [facility] [address]        [facility]    Chief Complaint   Patient presents with     RECHECK     HPI:  Lenora Hammonds is a 79 year old  (1945), who is being seen today for an episodic care visit at: [facility]  ([facility]) [[id number]].     Past medical history significant for hypertension, diastolic dysfunction, dyslipidemia, acoustic neuroma, histoplasmosis, tremor, cognitive impairment, urinary incontinence, depression    Summary of recent hospitalization:  Patient was hospitalized at [facility] from 10/21 to 10/24/2024 for COVID-19 infection.  Patient presented to the emergency department for evaluation of generalized weakness.  In the ED laboratory evaluation significant for creatinine 0.91, lactic acid 2.7, abnormal UA, positive for COVID-19.  Head CT revealed increasing dilation of the supratentorial ventricular system, out of proportion to the degree of volume loss and redemonstration of left cerebellopontine angle lesion, measuring up to 2 cm, slightly increased since prior MRI in 4/2021.  Patient was treated with Paxlovid for COVID-19.  She also initially received IV ceftriaxone for possible UTI, this was discontinued after urine culture grew mixed urogenital jaqueline. Discharged to U for physical rehabilitation and medical management.     Reviewed discharge summary, labs and imaging from recent hospitalization.    Today patient was seen for follow-up in the TCU.  She reports she is feeling back to normal.  She denies any shortness of breath or cough.  She denies lightheadedness/dizziness, dysuria.  Her appetite is back to normal.  She denies any ankle pain.  Shortly after admission to TCU she developed right ankle pain, received course of ibuprofen x 4 doses with concern for acute gout versus acute osteoarthritis versus acute possible small crack on her lateral malleolus, upon review of xray by ortho colleague. She  "continues to work with therapy.    Reviewed facility EMR including medications, recent nursing progress notes, vital signs.  Discussed plan of care with nursing.    Allergies, and PMH/PSH reviewed in EPIC today.  REVIEW OF SYSTEMS:  7 point ROS of systems including Constitutional, Respiratory, Cardiovascular, Gastroenterology, Genitourinary, Musculoskeletal, Psychiatric were all negative except for pertinent positives noted in my HPI.    Objective:   /74   Pulse 71   Temp 97  F (36.1  C)   Resp 17   Ht 1.626 m (5' 4\")   Wt 48.1 kg (106 lb)   LMP  (LMP Unknown)   SpO2 97%   BMI 18.19 kg/m    GENERAL APPEARANCE:  Alert, in NAD  HEENT: normocephalic, moist mucous membranes, nose without drainage or crusting  RESP:  respiratory effort normal, no respiratory distress, Lung sounds clear, patient is on RA  CV: auscultation of heart done, rate and rhythm regular  ABDOMEN: + bowel sounds  M/S:  no lower extremity edema  PSYCH: affect and mood normal      [facility]F [facility]. Please refer to them using [employer]Care Everywhere. and Recent labs in [facility] reviewed by me today.     Assessment/Plan:  History of recent COVID-19 infection  Tested positive for COVID-19 on 10/21/2024  Completed course of Paxlovid  Is no longer contagious  Respiratory status is stable  Plan: Monitor.    Urinary incontinence  Abnormal UA, resolved  Received IV ceftriaxone inpatient, until urine culture came back as urogenital growth  Denies dysuria  Plan: Monitor for symptoms of UTI.    Acute right ankle pain  Right ankle x-ray on 10/27 was negative for fracture or dislocation per read, however Ortho colleague reviewed xray and she found possible small crack on her lateral malleolus  Patient was treated for suspected acute gout versus acute osteomyelitis with 4 doses of ibuprofen  Pain has now resolved  Plan: Uric acid is pending today.  Continue pain management with Tylenol 1000 mg 3 times daily.  Monitor for ongoing " symptoms.  If worsening recommend repeat x-ray.    Acoustic neuroma, left cerebellopontine angle measuring up to 2 cm  Cerebral ventriculomegaly, disproportionate concerning for NPH  Tremor  Cognitive impairment  Cognitive testing 3.6 on ACL  Plan: Continue propranolol ER 80 mg daily.  Based on cognitive testing, patient will require 24-hour supervision for safety and problem-solving at discharge.  Follow-up with neurology outpatient.    Essential hypertension  Diastolic dysfunction  SBP mostly 110-120s, no leg swelling  Plan: Continue propranolol ER 80 mg daily, lisinopril-hydrochlorothiazide 10-12.5 mg tablet daily.  Monitor fluid status.  Monitor blood pressure.    Dyslipidemia  Plan: Not on medical management.  Follow-up outpatient.    Depression  Plan: Continue escitalopram 10 mg daily.  Monitor mood and symptoms.  Consider ACP referral as needed.    Physical deconditioning  Secondary to recent hospitalization, medical conditions as above  Patient continues to work with therapy  Plan: Encourage participation in physical therapy/occupational therapy for strengthening and deconditioning. Discharge planning per their recommendation. Social work to assist with d/c planning.      MED REC REQUIRED  Post Medication Reconciliation Status:  Medication reconciliation previously completed during another office visit      Disclaimer: This note may contain text created using speech-recognition software and may contain unintended word substitutions.       Electronically signed by: STACY Zhang CNP         Sincerely,        STACY Zhang CNP

## 2024-11-04 NOTE — PROGRESS NOTES
[facility] GERIATRICS    Chief Complaint   Patient presents with    RECHECK     HPI:  Lenora Hammonds is a 79 year old  (1945), who is being seen today for an episodic care visit at: [facility]  ([facility]) [[id number]].     Past medical history significant for hypertension, diastolic dysfunction, dyslipidemia, acoustic neuroma, histoplasmosis, tremor, cognitive impairment, urinary incontinence, depression    Summary of recent hospitalization:  Patient was hospitalized at [facility] from 10/21 to 10/24/2024 for COVID-19 infection.  Patient presented to the emergency department for evaluation of generalized weakness.  In the ED laboratory evaluation significant for creatinine 0.91, lactic acid 2.7, abnormal UA, positive for COVID-19.  Head CT revealed increasing dilation of the supratentorial ventricular system, out of proportion to the degree of volume loss and redemonstration of left cerebellopontine angle lesion, measuring up to 2 cm, slightly increased since prior MRI in 4/2021.  Patient was treated with Paxlovid for COVID-19.  She also initially received IV ceftriaxone for possible UTI, this was discontinued after urine culture grew mixed urogenital jaqueline. Discharged to U for physical rehabilitation and medical management.     Reviewed discharge summary, labs and imaging from recent hospitalization.    Today patient was seen for follow-up in the TCU.  She reports she is feeling back to normal.  She denies any shortness of breath or cough.  She denies lightheadedness/dizziness, dysuria.  Her appetite is back to normal.  She denies any ankle pain.  Shortly after admission to TCU she developed right ankle pain, received course of ibuprofen x 4 doses with concern for acute gout versus acute osteoarthritis versus acute possible small crack on her lateral malleolus, upon review of xray by ortho colleague. She continues to work with therapy.    Reviewed facility EMR including medications,  "recent nursing progress notes, vital signs.  Discussed plan of care with nursing.    Allergies, and PMH/PSH reviewed in EPIC today.  REVIEW OF SYSTEMS:  7 point ROS of systems including Constitutional, Respiratory, Cardiovascular, Gastroenterology, Genitourinary, Musculoskeletal, Psychiatric were all negative except for pertinent positives noted in my HPI.    Objective:   /74   Pulse 71   Temp 97  F (36.1  C)   Resp 17   Ht 1.626 m (5' 4\")   Wt 48.1 kg (106 lb)   LMP  (LMP Unknown)   SpO2 97%   BMI 18.19 kg/m    GENERAL APPEARANCE:  Alert, in NAD  HEENT: normocephalic, moist mucous membranes, nose without drainage or crusting  RESP:  respiratory effort normal, no respiratory distress, Lung sounds clear, patient is on RA  CV: auscultation of heart done, rate and rhythm regular  ABDOMEN: + bowel sounds  M/S:  no lower extremity edema  PSYCH: affect and mood normal      Labs done in SNF are in [facility]. Please refer to them using [employer]Care Everywhere. and Recent labs in [facility] reviewed by me today.     Assessment/Plan:  History of recent COVID-19 infection  Tested positive for COVID-19 on 10/21/2024  Completed course of Paxlovid  Is no longer contagious  Respiratory status is stable  Plan: Monitor.    Urinary incontinence  Abnormal UA, resolved  Received IV ceftriaxone inpatient, until urine culture came back as urogenital growth  Denies dysuria  Plan: Monitor for symptoms of UTI.    Acute right ankle pain  Right ankle x-ray on 10/27 was negative for fracture or dislocation per read, however Ortho colleague reviewed xray and she found possible small crack on her lateral malleolus  Patient was treated for suspected acute gout versus acute osteomyelitis with 4 doses of ibuprofen  Pain has now resolved  Plan: Uric acid is pending today.  Continue pain management with Tylenol 1000 mg 3 times daily.  Monitor for ongoing symptoms.  If worsening recommend repeat x-ray.    Acoustic neuroma, left " cerebellopontine angle measuring up to 2 cm  Cerebral ventriculomegaly, disproportionate concerning for NPH  Tremor  Cognitive impairment  Cognitive testing 3.6 on ACL  Plan: Continue propranolol ER 80 mg daily.  Based on cognitive testing, patient will require 24-hour supervision for safety and problem-solving at discharge.  Follow-up with neurology outpatient.    Essential hypertension  Diastolic dysfunction  SBP mostly 110-120s, no leg swelling  Plan: Continue propranolol ER 80 mg daily, lisinopril-hydrochlorothiazide 10-12.5 mg tablet daily.  Monitor fluid status.  Monitor blood pressure.    Dyslipidemia  Plan: Not on medical management.  Follow-up outpatient.    Depression  Plan: Continue escitalopram 10 mg daily.  Monitor mood and symptoms.  Consider ACP referral as needed.    Physical deconditioning  Secondary to recent hospitalization, medical conditions as above  Patient continues to work with therapy  Plan: Encourage participation in physical therapy/occupational therapy for strengthening and deconditioning. Discharge planning per their recommendation. Social work to assist with d/c planning.      MED REC REQUIRED  Post Medication Reconciliation Status:  Medication reconciliation previously completed during another office visit      Disclaimer: This note may contain text created using speech-recognition software and may contain unintended word substitutions.       Electronically signed by: STACY Zhang CNP

## 2024-11-05 LAB — URATE SERPL-MCNC: 5 MG/DL (ref 2.4–5.7)

## 2024-11-05 PROCEDURE — 36415 COLL VENOUS BLD VENIPUNCTURE: CPT | Performed by: NURSE PRACTITIONER

## 2024-11-05 PROCEDURE — 84550 ASSAY OF BLOOD/URIC ACID: CPT | Performed by: NURSE PRACTITIONER

## 2024-11-05 PROCEDURE — P9604 ONE-WAY ALLOW PRORATED TRIP: HCPCS | Performed by: NURSE PRACTITIONER

## 2024-11-06 ENCOUNTER — LAB REQUISITION (OUTPATIENT)
Dept: LAB | Facility: CLINIC | Age: 79
End: 2024-11-06
Payer: COMMERCIAL

## 2024-11-06 DIAGNOSIS — N39.0 URINARY TRACT INFECTION, SITE NOT SPECIFIED: ICD-10-CM

## 2024-11-07 VITALS
RESPIRATION RATE: 16 BRPM | SYSTOLIC BLOOD PRESSURE: 118 MMHG | TEMPERATURE: 98.6 F | WEIGHT: 106 LBS | DIASTOLIC BLOOD PRESSURE: 74 MMHG | OXYGEN SATURATION: 96 % | HEIGHT: 64 IN | HEART RATE: 60 BPM | BODY MASS INDEX: 18.1 KG/M2

## 2024-11-07 RX ORDER — PROPRANOLOL HYDROCHLORIDE 60 MG/1
60 CAPSULE, EXTENDED RELEASE ORAL DAILY
Status: SHIPPED
Start: 2024-11-07

## 2024-11-07 RX ORDER — ACETAMINOPHEN 325 MG/1
650 TABLET ORAL EVERY 4 HOURS PRN
Status: SHIPPED
Start: 2024-11-07

## 2024-11-07 NOTE — PROGRESS NOTES
[facility] GERIATRICS DISCHARGE SUMMARY  PATIENT'S NAME: Lenora Hammonds  YOB: 1945  MEDICAL RECORD NUMBER:  [id number]  Place of Service where encounter took place:  [facility]  ([facility]) [[id number]]    PRIMARY CARE PROVIDER AND CLINIC RESPONSIBLE AFTER TRANSFER:   Valerio Mccain MD, [address] / [place] [address]      Transferring providers: STACY Zhang CNP, Mahin Danielson MD  Recent Hospitalization/ED:  [facility] stay 10/21/24 to 10/24/24.  Date of SNF Admission: October 24, 2024  Date of SNF (anticipated) Discharge: November 09, 2024  Discharged to: home  Cognitive Scores: SLUMS 14/30, CPT 3.6 ACL and CPT 4.4/5.6- recommending 24 hour supervision  Physical function: Ambulating at 150 feet with front wheel walker contact-guard assist, contact-guard assist for transfers.  Standby assist for bed mobility, completed 8 stairs with right handrail and supervision, eating and grooming/hygiene are set up, upper body dressing set up, lower body dressing set up- supervision, toileting supervision    ADDENDUM: Patient now discharging to [facility], admission paperwork completed    CODE STATUS/ADVANCE DIRECTIVES DISCUSSION:  No CPR- Do NOT Intubate   ALLERGIES: Rosuvastatin    NURSING FACILITY COURSE   Medication Changes/Rationale:   Reduced propanolol to 60 mg daily due to softer BP    Past medical history significant for hypertension, diastolic dysfunction, dyslipidemia, acoustic neuroma, histoplasmosis, tremor, cognitive impairment, urinary incontinence, depression     Summary of recent hospitalization:  Patient was hospitalized at [facility] from 10/21 to 10/24/2024 for COVID-19 infection.  Patient presented to the emergency department for evaluation of generalized weakness.  In the ED laboratory evaluation significant for creatinine 0.91, lactic acid 2.7, abnormal UA, positive for COVID-19.  Head CT revealed increasing  dilation of the supratentorial ventricular system, out of proportion to the degree of volume loss and redemonstration of left cerebellopontine angle lesion, measuring up to 2 cm, slightly increased since prior MRI in 4/2021.  Patient was treated with Paxlovid for COVID-19.  She also initially received IV ceftriaxone for possible UTI, this was discontinued after urine culture grew mixed urogenital jaqueline. Discharged to TCU for physical rehabilitation and medical management.    Summary of nursing facility stay:   Today patient is seen for discharge evaluation in the TCU. She denies any SOB, cough, dizziness/lightheadedness, CP. Bowels moving regularly. She continues to deny ankle pain. She denies dysuria/trouble urinating. Patient to follow up with PCP in 1 week. Will continue therapy through home care.    Specific problems addressed in the TCU:  History of recent COVID-19 infection  Tested positive for COVID-19 on 10/21/2024  Completed course of Paxlovid  Is no longer contagious  Respiratory status is stable  Plan: Monitor.     Urinary incontinence  Abnormal UA, resolved  Received IV ceftriaxone inpatient, until urine culture came back as urogenital growth  Denies dysuria/ trouble voiding  Plan: Monitor for symptoms of UTI.     Acute right ankle pain, resolved  Right ankle x-ray on 10/27 was negative for fracture or dislocation per read, however Ortho colleague reviewed xray and she found possible small crack on her lateral malleolus  Patient was treated for suspected acute gout versus acute osteomyelitis with 4 doses of ibuprofen  Uric acid level 5.0 on 11/5/2024- which is within normal range  Pain has resolved  Plan:Change tylenol to PRN  Monitor for ongoing symptoms.  If worsening recommend repeat x-ray.     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  Based on cognitive testing, patient will require 24-hour supervision for safety and problem-solving at discharge.  Follow-up with neurology outpatient.     Essential hypertension  Diastolic dysfunction  SBP mostly 100-110s, no leg swelling   Plan: Reduce propranolol ER 60 mg daily and continue lisinopril-hydrochlorothiazide 10-12.5 mg tablet daily.  Monitor fluid status.  Monitor blood pressure.     Dyslipidemia  Plan: Not on medical management.  Follow-up outpatient.     Depression  Plan: Continue escitalopram 10 mg daily.  Monitor mood and symptoms.  Follow-up with PCP outpatient     Physical deconditioning  Secondary to recent hospitalization, medical conditions as above  Completed course of therapy in the TCU  Plan: Continue therapy through home care      Discharge Medications:  MED REC REQUIRED  Post Medication Reconciliation Status:  Discharge medications reconciled and changed, see notes/orders     Current Outpatient Medications   Medication Sig Dispense Refill    acetaminophen (TYLENOL) 325 MG tablet Take 2 tablets (650 mg) by mouth every 4 hours as needed for mild pain (max 3000 mg in 24 hours).      escitalopram (LEXAPRO) 10 MG tablet Take 1 tablet (10 mg) by mouth daily 90 tablet 3    lisinopril-hydrochlorothiazide (ZESTORETIC) 10-12.5 MG tablet Take 1 tablet by mouth daily 90 tablet 3    ondansetron (ZOFRAN ODT) 4 MG ODT tab Take 1 tablet (4 mg) by mouth every 6 hours as needed for nausea or vomiting.      polyethylene glycol (MIRALAX) 17 GM/Dose powder Take 17 g by mouth daily.      propranolol ER (INDERAL LA) 60 MG 24 hr capsule Take 1 capsule (60 mg) by mouth daily.          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  GENERAL APPEARANCE:  Alert, in NAD  HEENT: normocephalic, moist mucous membranes, nose without drainage or crusting  RESP:  respiratory effort normal, no respiratory distress, Lung sounds clear, patient is on RA  CV: auscultation of heart done, rate and rhythm regular.  ABDOMEN: + bowel sounds, soft, nontender, no grimacing or guarding with palpation.  M/S: no lower extremity edema  PSYCH: affect and mood normal      SNF labs: Labs done in SNF are in [facility]. Please refer to them using [employer] Everywhere. and Recent labs in [facility] reviewed by me today.     DISCHARGE PLAN:  Medical Follow Up:      Follow up with primary care provider in 1 week   Follow up with neurology outpatient to evaluate for NPH  Discharge Services: Home Care:  Occupational Therapy, Physical Therapy, Registered Nurse, Home Health Aide, and From:  [facility]  Discharge Instructions:   Weightbearing as tolerated in supportive shoes, if worsening right leg pain recommend follow up with PCP and xray be obtained    TOTAL DISCHARGE TIME:   Greater than 30 minutes  Electronically signed by:  STACY Zhang CNP     Home care Face to Face documentation done in [facility] attached to Home care orders for [facility].              "

## 2024-11-07 NOTE — PATIENT INSTRUCTIONS
[facility] Discharge Orders    Name: Lenora Hammonds  : 1945  Planned Discharge Date: 2024  Discharged to: home    MEDICAL FOLLOW UP  Follow up with primary care provider in 1 week  Follow up with neurology outpatient to evaluate for NPH    Medication changes:  -Reduced propanolol to 60 mg daily due to softer BP    DISCHARGE MEDICATIONS:  The patient s pharmacy is authorized to dispense a 30-day supply of medications. Refill requests should be directed to the primary provider, Valerio Mccain     Current Outpatient Medications   Medication Sig Dispense Refill    acetaminophen (TYLENOL) 325 MG tablet Take 2 tablets (650 mg) by mouth every 4 hours as needed for mild pain (max 3000 mg in 24 hours).      escitalopram (LEXAPRO) 10 MG tablet Take 1 tablet (10 mg) by mouth daily 90 tablet 3    lisinopril-hydrochlorothiazide (ZESTORETIC) 10-12.5 MG tablet Take 1 tablet by mouth daily 90 tablet 3    ondansetron (ZOFRAN ODT) 4 MG ODT tab Take 1 tablet (4 mg) by mouth every 6 hours as needed for nausea or vomiting.      polyethylene glycol (MIRALAX) 17 GM/Dose powder Take 17 g by mouth daily.      propranolol ER (INDERAL LA) 60 MG 24 hr capsule Take 1 capsule (60 mg) by mouth daily.         SERVICES:  Home Care:  Occupational Therapy, Physical Therapy, Registered Nurse, Home Health Aide, and From:  [facility]    ADDITIONAL INSTRUCTIONS:  Weightbearing as tolerated in supportive shoes, if worsening right leg pain recommend follow up with PCP and xray be obtained    STACY Zhang CNP  This document was electronically signed on 2024

## 2024-11-08 ENCOUNTER — DISCHARGE SUMMARY NURSING HOME (OUTPATIENT)
Dept: GERIATRICS | Facility: CLINIC | Age: 79
End: 2024-11-08
Payer: COMMERCIAL

## 2024-11-08 DIAGNOSIS — I10 ESSENTIAL HYPERTENSION: ICD-10-CM

## 2024-11-08 DIAGNOSIS — R53.81 PHYSICAL DECONDITIONING: ICD-10-CM

## 2024-11-08 DIAGNOSIS — Z86.16 HISTORY OF COVID-19: Primary | ICD-10-CM

## 2024-11-08 DIAGNOSIS — M25.571 ACUTE RIGHT ANKLE PAIN: ICD-10-CM

## 2024-11-08 DIAGNOSIS — G93.89 CEREBRAL VENTRICULOMEGALY: ICD-10-CM

## 2024-11-08 DIAGNOSIS — R41.89 COGNITIVE IMPAIRMENT: ICD-10-CM

## 2024-11-08 DIAGNOSIS — D33.3 ACOUSTIC NEUROMA (H): ICD-10-CM

## 2024-11-08 DIAGNOSIS — I51.89 DIASTOLIC DYSFUNCTION: ICD-10-CM

## 2024-11-08 DIAGNOSIS — F32.A DEPRESSION, UNSPECIFIED DEPRESSION TYPE: ICD-10-CM

## 2024-11-08 DIAGNOSIS — R32 URINARY INCONTINENCE, UNSPECIFIED TYPE: ICD-10-CM

## 2024-11-08 PROCEDURE — 99316 NF DSCHRG MGMT 30 MIN+: CPT | Performed by: NURSE PRACTITIONER

## 2024-11-08 NOTE — LETTER
11/8/2024      Lenora Hammonds  [address] [address]        [facility] GERIATRICS DISCHARGE SUMMARY  PATIENT'S NAME: Lenora Hammonds  YOB: 1945  MEDICAL RECORD NUMBER:  [id number]  Place of Service where encounter took place:  [facility]  ([facility]) [[id number]]    PRIMARY CARE PROVIDER AND CLINIC RESPONSIBLE AFTER TRANSFER:   Valerio Mccain MD, [address] [address] / [place] [address]      Transferring providers: STACY Zhang CNP, Mahin Danielson MD  Recent Hospitalization/ED:  [facility] stay 10/21/24 to 10/24/24.  Date of SNF Admission: October 24, 2024  Date of SNF (anticipated) Discharge: November 09, 2024  Discharged to: home  Cognitive Scores: SLUMS 14/30, CPT 3.6 ACL and CPT 4.4/5.6- recommending 24 hour supervision  Physical function: Ambulating at 150 feet with front wheel walker contact-guard assist, contact-guard assist for transfers.  Standby assist for bed mobility, completed 8 stairs with right handrail and supervision, eating and grooming/hygiene are set up, upper body dressing set up, lower body dressing set up- supervision, toileting supervision    CODE STATUS/ADVANCE DIRECTIVES DISCUSSION:  No CPR- Do NOT Intubate   ALLERGIES: Rosuvastatin    NURSING FACILITY COURSE   Medication Changes/Rationale:   Reduced propanolol to 60 mg daily due to softer BP    Past medical history significant for hypertension, diastolic dysfunction, dyslipidemia, acoustic neuroma, histoplasmosis, tremor, cognitive impairment, urinary incontinence, depression     Summary of recent hospitalization:  Patient was hospitalized at [facility] from 10/21 to 10/24/2024 for COVID-19 infection.  Patient presented to the emergency department for evaluation of generalized weakness.  In the ED laboratory evaluation significant for creatinine 0.91, lactic acid 2.7, abnormal UA, positive for COVID-19.  Head CT revealed increasing  dilation of the supratentorial ventricular system, out of proportion to the degree of volume loss and redemonstration of left cerebellopontine angle lesion, measuring up to 2 cm, slightly increased since prior MRI in 4/2021.  Patient was treated with Paxlovid for COVID-19.  She also initially received IV ceftriaxone for possible UTI, this was discontinued after urine culture grew mixed urogenital jaqueline. Discharged to TCU for physical rehabilitation and medical management.    Summary of nursing facility stay:   Today patient is seen for discharge evaluation in the TCU. She denies any SOB, cough, dizziness/lightheadedness, CP. Bowels moving regularly. She continues to deny ankle pain. She denies dysuria/trouble urinating. Patient to follow up with PCP in 1 week. Will continue therapy through home care.    Specific problems addressed in the TCU:  History of recent COVID-19 infection  Tested positive for COVID-19 on 10/21/2024  Completed course of Paxlovid  Is no longer contagious  Respiratory status is stable  Plan: Monitor.     Urinary incontinence  Abnormal UA, resolved  Received IV ceftriaxone inpatient, until urine culture came back as urogenital growth  Denies dysuria/ trouble voiding  Plan: Monitor for symptoms of UTI.     Acute right ankle pain, resolved  Right ankle x-ray on 10/27 was negative for fracture or dislocation per read, however Ortho colleague reviewed xray and she found possible small crack on her lateral malleolus  Patient was treated for suspected acute gout versus acute osteomyelitis with 4 doses of ibuprofen  Uric acid level 5.0 on 11/5/2024- which is within normal range  Pain has resolved  Plan:Change tylenol to PRN  Monitor for ongoing symptoms.  If worsening recommend repeat x-ray.     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  Based on cognitive testing, patient will require 24-hour supervision for safety and problem-solving at discharge.  Follow-up with neurology outpatient.     Essential hypertension  Diastolic dysfunction  SBP mostly 100-110s, no leg swelling   Plan: Reduce propranolol ER 60 mg daily and continue lisinopril-hydrochlorothiazide 10-12.5 mg tablet daily.  Monitor fluid status.  Monitor blood pressure.     Dyslipidemia  Plan: Not on medical management.  Follow-up outpatient.     Depression  Plan: Continue escitalopram 10 mg daily.  Monitor mood and symptoms.  Follow-up with PCP outpatient     Physical deconditioning  Secondary to recent hospitalization, medical conditions as above  Completed course of therapy in the TCU  Plan: Continue therapy through home care      Discharge Medications:  MED REC REQUIRED  Post Medication Reconciliation Status:  Discharge medications reconciled and changed, see notes/orders     Current Outpatient Medications   Medication Sig Dispense Refill     acetaminophen (TYLENOL) 325 MG tablet Take 2 tablets (650 mg) by mouth every 4 hours as needed for mild pain (max 3000 mg in 24 hours).       escitalopram (LEXAPRO) 10 MG tablet Take 1 tablet (10 mg) by mouth daily 90 tablet 3     lisinopril-hydrochlorothiazide (ZESTORETIC) 10-12.5 MG tablet Take 1 tablet by mouth daily 90 tablet 3     ondansetron (ZOFRAN ODT) 4 MG ODT tab Take 1 tablet (4 mg) by mouth every 6 hours as needed for nausea or vomiting.       polyethylene glycol (MIRALAX) 17 GM/Dose powder Take 17 g by mouth daily.       propranolol ER (INDERAL LA) 60 MG 24 hr capsule Take 1 capsule (60 mg) by mouth daily.          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  GENERAL APPEARANCE:  Alert, in NAD  HEENT: normocephalic, moist mucous membranes, nose without drainage or crusting  RESP:  respiratory effort normal, no respiratory distress, Lung sounds clear, patient is on RA  CV: auscultation of heart done, rate and rhythm regular.  ABDOMEN: + bowel sounds, soft, nontender, no grimacing or guarding with palpation.  M/S: no lower extremity edema  PSYCH: affect and mood normal      SNF labs: Labs done in SNF are in [facility]. Please refer to them using [employer] Everywhere. and Recent labs in [facility] reviewed by me today.     DISCHARGE PLAN:  Medical Follow Up:      Follow up with primary care provider in 1 week   Follow up with neurology outpatient to evaluate for NPH  Discharge Services: Home Care:  Occupational Therapy, Physical Therapy, Registered Nurse, Home Health Aide, and From:  [facility]  Discharge Instructions:   Weightbearing as tolerated in supportive shoes, if worsening right leg pain recommend follow up with PCP and xray be obtained    TOTAL DISCHARGE TIME:   Greater than 30 minutes  Electronically signed by:  STACY Zhang CNP     Home care Face to Face documentation done in [facility] attached to Home care orders for [facility].                Sincerely,        STACY Zhang CNP      "

## 2024-11-11 ENCOUNTER — PATIENT OUTREACH (OUTPATIENT)
Dept: CARE COORDINATION | Facility: CLINIC | Age: 79
End: 2024-11-11
Payer: COMMERCIAL

## 2024-11-11 NOTE — PROGRESS NOTES
[facility]  TCU Discharge    Clinical Data: Per chart review, patient has discharged from TCU to home/community setting.    Assessment/Plan: Transitional Care Management (TCM) program initiated to prompt post-discharge outreach call by [facility] team member.     Vianca Rome  Care Transitions Assistant  [facility]

## 2024-11-12 ENCOUNTER — PATIENT OUTREACH (OUTPATIENT)
Dept: CARE COORDINATION | Facility: CLINIC | Age: 79
End: 2024-11-12
Payer: COMMERCIAL

## 2024-11-12 NOTE — PROGRESS NOTES
Connected Care Resource Center:   Connected Care Resource Center Contact  [facility]/Voicemail     Clinical Data: Post-Discharge Outreach     Outreach attempted x 2.  Left message on patient's voicemail, providing [facility]'s central phone number of [phone] ([phone]) for questions/concerns and/or to schedule an appt with an [facility] provider, if they do not have a PCP.      Plan:  [facility] will do no further outreaches at this time.       Eda Banda  Ambulatory Care  - [facility]        *Connected Care Resource Team does NOT follow patient ongoing. Referrals are identified based on internal discharge reports and the outreach is to ensure patient has an understanding of their discharge instructions.

## 2024-12-02 ENCOUNTER — TRANSITIONAL CARE UNIT VISIT (OUTPATIENT)
Dept: GERIATRICS | Facility: CLINIC | Age: 79
End: 2024-12-02
Payer: COMMERCIAL

## 2024-12-02 ENCOUNTER — TELEPHONE (OUTPATIENT)
Dept: GERIATRICS | Facility: CLINIC | Age: 79
End: 2024-12-02

## 2024-12-02 VITALS
HEART RATE: 60 BPM | TEMPERATURE: 97.8 F | SYSTOLIC BLOOD PRESSURE: 128 MMHG | HEIGHT: 66 IN | OXYGEN SATURATION: 96 % | BODY MASS INDEX: 17.68 KG/M2 | RESPIRATION RATE: 16 BRPM | WEIGHT: 110 LBS | DIASTOLIC BLOOD PRESSURE: 76 MMHG

## 2024-12-02 DIAGNOSIS — I10 ESSENTIAL HYPERTENSION: ICD-10-CM

## 2024-12-02 DIAGNOSIS — F32.A DEPRESSION, UNSPECIFIED DEPRESSION TYPE: ICD-10-CM

## 2024-12-02 DIAGNOSIS — Z86.16 HISTORY OF COVID-19: ICD-10-CM

## 2024-12-02 DIAGNOSIS — R41.89 COGNITIVE IMPAIRMENT: Primary | ICD-10-CM

## 2024-12-02 DIAGNOSIS — G93.89 CEREBRAL VENTRICULOMEGALY: ICD-10-CM

## 2024-12-02 DIAGNOSIS — D33.3 ACOUSTIC NEUROMA (H): ICD-10-CM

## 2024-12-02 DIAGNOSIS — R53.81 PHYSICAL DECONDITIONING: ICD-10-CM

## 2024-12-02 PROCEDURE — 99309 SBSQ NF CARE MODERATE MDM 30: CPT | Performed by: INTERNAL MEDICINE

## 2024-12-02 NOTE — LETTER
12/2/2024      Lenora Hammonds  [address] [address] [address]        Lenora Hammonds is a 79 year old female seen December 2, 2024 at [facility] TCU where she was admitted after [facility] [place] hospitalization 10/21-24 for COVID19 infection    Presented with weakness and urinary incontinence, with decline in mobility in the prior week.  She was treated with Paxlovid, improved and discharged to  TCU.   She has made good progress with therapies and is stronger, able to ambulate with FWW and CGA.         Today pt is seen in her room up to    Feeling well, ankle pain has resolved.   Happy that she will be discharging tomorrow and looking forward to returning home with her son.   No cough or respiratory symptoms        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     Snores 10/6/2011       Past Surgical History:   Procedure Laterality Date     COLONOSCOPY N/A 12/6/2017    Procedure: COMBINED COLONOSCOPY, SINGLE OR MULTIPLE BIOPSY/POLYPECTOMY BY BIOPSY;  colonoscopy;  Surgeon: Moise Vásquez MD;  Location:  GI     COLONOSCOPY N/A 6/28/2018    Procedure: COMBINED COLONOSCOPY, SINGLE OR MULTIPLE BIOPSY/POLYPECTOMY BY BIOPSY;  COLONOSCOPY;  Surgeon: Moise Vásquez MD;  Location:  GI     THORACIC SURGERY       TONSILLECTOMY       ZZC THORACOTOMY,MAJOR,EXPLOR/BIOPSY  1983    histoplasmosis, right lower lobe     SH:  Lives with her son kathryn Tboin in [place]     Has a second son as well     ROS:  Ambulatory with FWW short distances, assist with ADLs.   WC for distances     Wt Readings from Last 5 Encounters:   12/02/24 49.9 kg (110 lb)   11/07/24 48.1 kg (106 lb)   11/07/24 48.1 kg (106 lb)   11/04/24 48.1 kg (106 lb)   10/30/24 48.1 kg (106 lb)      EXAM:  NAD  /76   Pulse 60   Temp 97.8  F  "(36.6  C)   Resp 16   Ht 1.676 m (5' 6\")   Wt 49.9 kg (110 lb)   LMP  (LMP Unknown)   SpO2 96%   BMI 17.75 kg/m     Neck supple without adenopathy  Lungs clear bilaterally with good air movement   Heart RRR s1s2  Abd soft, NT, no distention or guarding, +BS  Ext without edema     Neuro: oriented x2   +STML   Psych: affect okay, pleasant     Lab Results   Component Value Date     10/29/2024    POTASSIUM 3.7 10/29/2024    CHLORIDE 101 10/29/2024    CO2 28 10/29/2024    ANIONGAP 12 10/29/2024    GLC 94 10/29/2024    BUN 24.3 (H) 10/29/2024    CR 0.79 10/29/2024    GFRESTIMATED 75 10/29/2024    KATHLEEN 10.0 10/29/2024     Lab Results   Component Value Date    WBC 8.2 10/29/2024      HGB 12.7 10/29/2024      MCV 97 10/29/2024       10/29/2024     CT HEAD W/O CONTRAST   10/21/2024   INTRACRANIAL CONTENTS: Redemonstrated left cerebellopontine angle lesion, which is better characterized on prior brain MRI dated 4/28/2021 measuring 2.0 x 1.6 cm (previously measured 1.9 x 1.5 cm on 4/28/2021). No acute intracranial hemorrhage. No CT   evidence of acute infarct. Sequelae of mild chronic microangiopathy. Mild cerebral volume loss. Increasing dilatation of the supratentorial ventricular system out of proportion to the degree of volume loss, which can be seen in normal pressure   hydrocephalus. Patent basal cisterns.                                                                  IMPRESSION:   1. Increasing dilatation of the supratentorial ventricular system out of proportion to the degree of volume loss, which can be seen in normal pressure hydrocephalus.  2. Redemonstrated left cerebellopontine angle lesion measuring up to 2.0 cm, which is slightly increased in size since prior MRI dated 4/28/2021      IMP/PLAN:   (R41.89) Cognitive impairment   Comment: SLUMS 14/30   CPT 3.6    With low functional status, now meets criteria for dementia dx    Plan: will need assist with all cares, ADLs and IADLs       (Z86.16) " History of COVID-19  Comment: fully recovered   Plan: follow     (I10) Essential hypertension  Comment:   BP Readings from Last 3 Encounters:   12/02/24 128/76   11/07/24 118/74   11/04/24 123/74      Plan: propanolol ER 60 mg/day, also for tremor  Lisinopril 10 mg/day, hydrochlorothiazide 12.5 mg/day     (D33.3) Acoustic neuroma (H)  (G93.89) Cerebral ventriculomegaly  Comment: left vestibular Schwannoma, s/p gamma knife procedure x2  Changes on above head CT   Plan: follow up with Neurosurgery and Neurology as scheduled    (F32.A) Depression, unspecified depression type  Comment: by hx  Plan: escitalopram 10 mg/day     (R53.81) Physical deconditioning  Comment: okay to discharge home with son's support   Plan: [facility] PHYSICAL THERAPY/ OCCUPATIONAL THERAPY /nursing and A   Home care Face to Face documentation done in [facility] attached to Home care orders for [facility].     Fatuma Vargas MD           Sincerely,        Fatuma Vargas MD

## 2024-12-02 NOTE — PROGRESS NOTES
"Lenora Hammonds is a 79 year old female seen December 2, 2024 at [facility] TCU where she was admitted after [facility] hospitalization 10/21-24 for COVID19 infection    Presented with weakness and urinary incontinence, with decline in mobility in the prior week.  She was treated with Paxlovid, improved and discharged to  TCU.   She has made good progress with therapies and is stronger, able to ambulate with FWW and CGA.         Today pt is seen in her room up to    Feeling well, ankle pain has resolved.   Happy that she will be discharging tomorrow and looking forward to returning home with her son.   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    Snores 10/6/2011       Past Surgical History:   Procedure Laterality Date    COLONOSCOPY N/A 12/6/2017    Procedure: COMBINED COLONOSCOPY, SINGLE OR MULTIPLE BIOPSY/POLYPECTOMY BY BIOPSY;  colonoscopy;  Surgeon: Moise Vásquez MD;  Location:  GI    COLONOSCOPY N/A 6/28/2018    Procedure: COMBINED COLONOSCOPY, SINGLE OR MULTIPLE BIOPSY/POLYPECTOMY BY BIOPSY;  COLONOSCOPY;  Surgeon: Moise Vásquez MD;  Location:  GI    THORACIC SURGERY      TONSILLECTOMY      ZZ THORACOTOMY,MAJOR,EXPLOR/BIOPSY  1983    histoplasmosis, right lower lobe     SH:  Lives with her son Mahad, house in [place]     Has a second son as well     ROS:  Ambulatory with FWW short distances, assist with ADLs.   WC for distances     Wt Readings from Last 5 Encounters:   12/02/24 49.9 kg (110 lb)   11/07/24 48.1 kg (106 lb)   11/07/24 48.1 kg (106 lb)   11/04/24 48.1 kg (106 lb)   10/30/24 48.1 kg (106 lb)      EXAM:  NAD  /76   Pulse 60   Temp 97.8  F (36.6  C)   Resp 16   Ht 1.676 m (5' 6\")   Wt 49.9 kg (110 lb)   LMP  (LMP Unknown)   SpO2 " 96%   BMI 17.75 kg/m     Neck supple without adenopathy  Lungs clear bilaterally with good air movement   Heart RRR s1s2  Abd soft, NT, no distention or guarding, +BS  Ext without edema     Neuro: oriented x2   +STML   Psych: affect okay, pleasant     Lab Results   Component Value Date     10/29/2024    POTASSIUM 3.7 10/29/2024    CHLORIDE 101 10/29/2024    CO2 28 10/29/2024    ANIONGAP 12 10/29/2024    GLC 94 10/29/2024    BUN 24.3 (H) 10/29/2024    CR 0.79 10/29/2024    GFRESTIMATED 75 10/29/2024    KATHLEEN 10.0 10/29/2024     Lab Results   Component Value Date    WBC 8.2 10/29/2024      HGB 12.7 10/29/2024      MCV 97 10/29/2024       10/29/2024     CT HEAD W/O CONTRAST   10/21/2024   INTRACRANIAL CONTENTS: Redemonstrated left cerebellopontine angle lesion, which is better characterized on prior brain MRI dated 4/28/2021 measuring 2.0 x 1.6 cm (previously measured 1.9 x 1.5 cm on 4/28/2021). No acute intracranial hemorrhage. No CT   evidence of acute infarct. Sequelae of mild chronic microangiopathy. Mild cerebral volume loss. Increasing dilatation of the supratentorial ventricular system out of proportion to the degree of volume loss, which can be seen in normal pressure   hydrocephalus. Patent basal cisterns.                                                                  IMPRESSION:   1. Increasing dilatation of the supratentorial ventricular system out of proportion to the degree of volume loss, which can be seen in normal pressure hydrocephalus.  2. Redemonstrated left cerebellopontine angle lesion measuring up to 2.0 cm, which is slightly increased in size since prior MRI dated 4/28/2021      IMP/PLAN:   (R41.89) Cognitive impairment   Comment: SLUMS 14/30   CPT 3.6    With low functional status, now meets criteria for dementia dx    Plan: will need assist with all cares, ADLs and IADLs       (Z86.16) History of COVID-19  Comment: fully recovered   Plan: follow     (I10) Essential  hypertension  Comment:   BP Readings from Last 3 Encounters:   12/02/24 128/76   11/07/24 118/74   11/04/24 123/74      Plan: propanolol ER 60 mg/day, also for tremor  Lisinopril 10 mg/day, hydrochlorothiazide 12.5 mg/day     (D33.3) Acoustic neuroma (H)  (G93.89) Cerebral ventriculomegaly  Comment: left vestibular Schwannoma, s/p gamma knife procedure x2  Changes on above head CT   Plan: follow up with Neurosurgery and Neurology as scheduled    (F32.A) Depression, unspecified depression type  Comment: by hx  Plan: escitalopram 10 mg/day     (R53.81) Physical deconditioning  Comment: okay to discharge home with son's support   Plan: [facility] PHYSICAL THERAPY/ OCCUPATIONAL THERAPY /nursing and A   Home care Face to Face documentation done in [facility] attached to Home care orders for [facility].     Fatuma Vargas MD

## 2024-12-02 NOTE — CONFIDENTIAL NOTE
Lenora Hammonds  YOB: 1945                                 SSN: [id number]  [facility] MRN#:[id number]  Medical Center Admission Date: 10/24/24 Anticipated Discharge Date: 12/3/24  PHYSICIAN's DISCHARGE SUMMARY / ORDER SHEET  1. Discharge to: previous independent home  2. Medications:      Current Outpatient Medications   Medication Sig Dispense Refill    acetaminophen (TYLENOL) 325 MG tablet Take 2 tablets (650 mg) by mouth every 4 hours as needed for mild pain (max 3000 mg in 24 hours).      escitalopram (LEXAPRO) 10 MG tablet Take 1 tablet (10 mg) by mouth daily 90 tablet 3    lisinopril-hydrochlorothiazide (ZESTORETIC) 10-12.5 MG tablet Take 1 tablet by mouth daily 90 tablet 3    polyethylene glycol (MIRALAX) 17 GM/Dose powder Take 17 g by mouth daily.      propranolol ER (INDERAL LA) 60 MG 24 hr capsule Take 1 capsule (60 mg) by mouth daily.            Discharge patient to home with current medications and treatments  Discharge Home with Home care as above  - Nursing call in 30 days supply of needed meds to pharmacy of choice upon discharge  - please send home with original of these discharge instructions and copy for chart.       ______________________________  Electronically signed:  STACY Munguia [facility]                   12/2/2024

## 2024-12-04 ENCOUNTER — TELEPHONE (OUTPATIENT)
Dept: FAMILY MEDICINE | Facility: CLINIC | Age: 79
End: 2024-12-04
Payer: COMMERCIAL

## 2024-12-04 NOTE — TELEPHONE ENCOUNTER
Home Care is calling regarding an established patient with [facility].     Requesting orders from: Valerio Mccain  Provider is following patient: Yes  Is this a 60-day recertification request?  No    Orders Requested    Skilled Nursing  Request for delay in care, service is not able to be provided within same scheduled day.  Delay in SOC. Patient requesting to be seen on Friday, 12/6/2024.       Information was gathered and will be sent to provider for review.  RN will contact Home Care with information after provider review.  Confirmed ok to leave a detailed message with call back.  Contact information confirmed and updated as needed.    Denise Pham RN    Shayy DANIELLE with [facility] @ [phone]. Extension: [phone].

## 2024-12-06 ENCOUNTER — TELEPHONE (OUTPATIENT)
Dept: FAMILY MEDICINE | Facility: CLINIC | Age: 79
End: 2024-12-06
Payer: COMMERCIAL

## 2024-12-06 NOTE — TELEPHONE ENCOUNTER
Verbal Orders Requested for Home Care    Recent hospital discharge related to COVID-19 and UTI. Start of home care was on 12/5/24.     Skilled Nursing  Once a week for 4 weeks   Then, every other week for 5 weeks     Social work initial evaluation.     Mary Davis RN  [facility]

## 2024-12-09 NOTE — TELEPHONE ENCOUNTER
Writer called [facility] and gave verbal order per Dr. Mccain.    MARSHAL Palomares, BSN, RN-[facility]

## 2024-12-10 NOTE — TELEPHONE ENCOUNTER
Relayed home care orders approval via confidential voicemail.    MARIA ANTONIA WhiteN, RN (she/her)  [facility] Primary [facility] Clinic RN

## 2024-12-17 ENCOUNTER — TELEPHONE (OUTPATIENT)
Dept: FAMILY MEDICINE | Facility: CLINIC | Age: 79
End: 2024-12-17
Payer: COMMERCIAL

## 2024-12-17 NOTE — TELEPHONE ENCOUNTER
SERGIO Chavez [facility]  [phone], [facility]      Home Care is calling regarding an established patient with [facility].       Requesting orders from: Valerio Mccain  Provider is following patient: Yes  Is this a 60-day recertification request?  No    Orders Requested    Occupational Therapy  Request for initial certification (first set of orders)   Frequency:  1x/wk for 6 wks      Confirmed ok to leave a detailed message with call back.  Contact information confirmed and updated as needed.    Rina Proctor RN

## 2024-12-19 ENCOUNTER — MEDICAL CORRESPONDENCE (OUTPATIENT)
Dept: HEALTH INFORMATION MANAGEMENT | Facility: CLINIC | Age: 79
End: 2024-12-19
Payer: COMMERCIAL

## 2024-12-19 NOTE — TELEPHONE ENCOUNTER
Relayed home care orders approval via confidential voicemail.    MARIA ANTONIA WhiteN, RN (she/her)  [facility] Clinic RN

## 2024-12-23 ENCOUNTER — TELEPHONE (OUTPATIENT)
Dept: FAMILY MEDICINE | Facility: CLINIC | Age: 79
End: 2024-12-23
Payer: COMMERCIAL

## 2024-12-23 NOTE — TELEPHONE ENCOUNTER
Home Care is calling regarding an established patient with [facility].       Requesting orders from: Valerio Mccain  Provider is following patient: Yes  Is this a 60-day recertification request?  No    Orders Requested    Social Work  Request for delay in care, service is not able to be provided within same scheduled day.   Patient does not want to be seen this week.  will go see her next week instead of this week.      Confirmed ok to leave a detailed message with call back.  Contact information confirmed and updated as needed.    Zina  with [facility]. [phone]      Sanjiv Snyder RN

## 2024-12-26 NOTE — TELEPHONE ENCOUNTER
Relayed home care orders approval via confidential voicemail.    MARIA ANTONIA WhiteN, RN (she/her)  [facility] Primary Care Clinic RN

## 2024-12-26 NOTE — TELEPHONE ENCOUNTER
Home Care is calling regarding an established patient with [facility].  Requesting orders from: Valerio Mccain  Provider is following patient: Yes  Is this a 60-day recertification request?  No    Orders Requested    Skilled Nursing  Request for initial certification (first set of orders)   Frequency:  1x/wk for 3 wks  then 2x/wk for 4 wks    Physical Therapy  Request for initial evaluation and treatment (one time)     Speech Therapy  Request for initial evaluation and treatment (one time)       Verbal orders given for PT and Speech for initial eval and treat.  Information was gathered for skilled nursing.  Provider review needed.  RN will contact Home Care with information after provider review.  Confirmed ok to leave a detailed message with call back.  Contact information confirmed and updated as needed.    Sean Esquivel RN

## 2024-12-30 ENCOUNTER — TELEPHONE (OUTPATIENT)
Dept: FAMILY MEDICINE | Facility: CLINIC | Age: 79
End: 2024-12-30
Payer: COMMERCIAL

## 2024-12-30 NOTE — TELEPHONE ENCOUNTER
Forms/Letter Request    Type of form/letter: Home Health Certification      Do we have the form/letter: Yes: Form folder CARE TEAM 1    Who is the form from? Home care    Where did/will the form come from? form was faxed in    When is form/letter needed by: N/A    How would you like the form/letter returned: Fax : Number listed in contact

## 2025-01-06 ENCOUNTER — TELEPHONE (OUTPATIENT)
Dept: FAMILY MEDICINE | Facility: CLINIC | Age: 80
End: 2025-01-06
Payer: COMMERCIAL

## 2025-01-06 NOTE — TELEPHONE ENCOUNTER
Home Care is calling regarding an established patient with [facility].       Requesting orders from: Valerio Mccain  Provider is following patient: Yes  Is this a 60-day recertification request?  No    Orders Requested    Physical Therapy  Request for initial certification (first set of orders)   Frequency:  add 1 visit for the week of 1/12/25    Update: patient had a fall last night but no injury noted.     Confirmed ok to leave a detailed message with call back.  Contact information confirmed and updated as needed.    [place], [facility]  [phone]     Sanjiv Snyder RN

## 2025-01-07 DIAGNOSIS — I10 ESSENTIAL HYPERTENSION: ICD-10-CM

## 2025-01-07 DIAGNOSIS — I51.89 DIASTOLIC DYSFUNCTION: Primary | ICD-10-CM

## 2025-01-08 RX ORDER — PROPRANOLOL HYDROCHLORIDE 60 MG/1
60 CAPSULE, EXTENDED RELEASE ORAL DAILY
Qty: 90 CAPSULE | Refills: 0 | Status: SHIPPED | OUTPATIENT
Start: 2025-01-08 | End: 2025-01-10

## 2025-01-10 ENCOUNTER — ANCILLARY PROCEDURE (OUTPATIENT)
Dept: GENERAL RADIOLOGY | Facility: CLINIC | Age: 80
End: 2025-01-10
Attending: FAMILY MEDICINE
Payer: COMMERCIAL

## 2025-01-10 ENCOUNTER — OFFICE VISIT (OUTPATIENT)
Dept: FAMILY MEDICINE | Facility: CLINIC | Age: 80
End: 2025-01-10
Payer: COMMERCIAL

## 2025-01-10 VITALS
TEMPERATURE: 97.6 F | DIASTOLIC BLOOD PRESSURE: 67 MMHG | RESPIRATION RATE: 16 BRPM | HEART RATE: 64 BPM | SYSTOLIC BLOOD PRESSURE: 117 MMHG | OXYGEN SATURATION: 94 %

## 2025-01-10 DIAGNOSIS — I10 ESSENTIAL HYPERTENSION: ICD-10-CM

## 2025-01-10 DIAGNOSIS — I51.89 DIASTOLIC DYSFUNCTION: ICD-10-CM

## 2025-01-10 DIAGNOSIS — F32.0 MAJOR DEPRESSIVE DISORDER, SINGLE EPISODE, MILD: ICD-10-CM

## 2025-01-10 DIAGNOSIS — M79.652 PAIN OF LEFT THIGH: ICD-10-CM

## 2025-01-10 DIAGNOSIS — M79.18 PAIN IN LEFT BUTTOCK: ICD-10-CM

## 2025-01-10 DIAGNOSIS — I10 HYPERTENSION GOAL BP (BLOOD PRESSURE) < 140/90: ICD-10-CM

## 2025-01-10 LAB
ANION GAP SERPL CALCULATED.3IONS-SCNC: 12 MMOL/L (ref 7–15)
BUN SERPL-MCNC: 28.9 MG/DL (ref 8–23)
CALCIUM SERPL-MCNC: 10 MG/DL (ref 8.8–10.4)
CHLORIDE SERPL-SCNC: 96 MMOL/L (ref 98–107)
CREAT SERPL-MCNC: 0.76 MG/DL (ref 0.51–0.95)
EGFRCR SERPLBLD CKD-EPI 2021: 78 ML/MIN/1.73M2
ERYTHROCYTE [DISTWIDTH] IN BLOOD BY AUTOMATED COUNT: 12.6 % (ref 10–15)
GLUCOSE SERPL-MCNC: 101 MG/DL (ref 70–99)
HCO3 SERPL-SCNC: 30 MMOL/L (ref 22–29)
HCT VFR BLD AUTO: 38.3 % (ref 35–47)
HGB BLD-MCNC: 12.2 G/DL (ref 11.7–15.7)
MCH RBC QN AUTO: 30.4 PG (ref 26.5–33)
MCHC RBC AUTO-ENTMCNC: 31.9 G/DL (ref 31.5–36.5)
MCV RBC AUTO: 96 FL (ref 78–100)
PLATELET # BLD AUTO: 261 10E3/UL (ref 150–450)
POTASSIUM SERPL-SCNC: 3.2 MMOL/L (ref 3.4–5.3)
RBC # BLD AUTO: 4.01 10E6/UL (ref 3.8–5.2)
SODIUM SERPL-SCNC: 138 MMOL/L (ref 135–145)
WBC # BLD AUTO: 8.9 10E3/UL (ref 4–11)

## 2025-01-10 PROCEDURE — 80048 BASIC METABOLIC PNL TOTAL CA: CPT | Performed by: FAMILY MEDICINE

## 2025-01-10 PROCEDURE — 73522 X-RAY EXAM HIPS BI 3-4 VIEWS: CPT | Mod: TC | Performed by: RADIOLOGY

## 2025-01-10 PROCEDURE — 99214 OFFICE O/P EST MOD 30 MIN: CPT | Performed by: FAMILY MEDICINE

## 2025-01-10 PROCEDURE — 85027 COMPLETE CBC AUTOMATED: CPT | Performed by: FAMILY MEDICINE

## 2025-01-10 PROCEDURE — 36415 COLL VENOUS BLD VENIPUNCTURE: CPT | Performed by: FAMILY MEDICINE

## 2025-01-10 RX ORDER — PROPRANOLOL HYDROCHLORIDE 60 MG/1
60 CAPSULE, EXTENDED RELEASE ORAL DAILY
Qty: 90 CAPSULE | Refills: 3 | Status: SHIPPED | OUTPATIENT
Start: 2025-01-10

## 2025-01-10 RX ORDER — ESCITALOPRAM OXALATE 10 MG/1
10 TABLET ORAL DAILY
Qty: 90 TABLET | Refills: 3 | Status: SHIPPED | OUTPATIENT
Start: 2025-01-10

## 2025-01-10 RX ORDER — LISINOPRIL AND HYDROCHLOROTHIAZIDE 10; 12.5 MG/1; MG/1
1 TABLET ORAL DAILY
Qty: 90 TABLET | Refills: 3 | Status: SHIPPED | OUTPATIENT
Start: 2025-01-10

## 2025-01-10 NOTE — PROGRESS NOTES
Assessment & Plan     Major depressive disorder, single episode, mild  - Primary Care - Care Coordination Referral; Future  - escitalopram (LEXAPRO) 10 MG tablet; Take 1 tablet (10 mg) by mouth daily.    Essential hypertension  - refilled   - propranolol ER (INDERAL LA) 60 MG 24 hr capsule; Take 1 capsule (60 mg) by mouth daily.  - CBC with platelets; Future  - Basic metabolic panel  (Ca, Cl, CO2, Creat, Gluc, K, Na, BUN); Future  - CBC with platelets  - Basic metabolic panel  (Ca, Cl, CO2, Creat, Gluc, K, Na, BUN)  - Basic metabolic panel  (Ca, Cl, CO2, Creat, Gluc, K, Na, BUN); Future    Diastolic dysfunction  - propranolol ER (INDERAL LA) 60 MG 24 hr capsule; Take 1 capsule (60 mg) by mouth daily.    Hypertension goal BP (blood pressure) < 140/90  - lisinopril-hydrochlorothiazide (ZESTORETIC) 10-12.5 MG tablet; Take 1 tablet by mouth daily.    Pain of left thigh  Pain in left buttock  - ordered XR for further evaluation; discussed likely bone bruise  - discussed symptomatic tx  - tolerating home PT  - XR Pelvis and Hip Bilateral 2 Views; Future  - Follow if symptoms worsen or fail to improve.         MED REC REQUIRED  Post Medication Reconciliation Status:  Discharge medications reconciled, continue medications without change        Clemente Cooley is a 79 year old, presenting for the following health issues:  Follow Up and Hospital F/U      1/10/2025     8:56 AM   Additional Questions   Roomed by tamar   Accompanied by daughter jose luis and son     History of Present Illness       Reason for visit:  Follow up    She eats 2-3 servings of fruits and vegetables daily.She consumes 0 sweetened beverage(s) daily.She exercises with enough effort to increase her heart rate 9 or less minutes per day.  She exercises with enough effort to increase her heart rate 3 or less days per week.   She is taking medications regularly.           Hospital Follow-up Visit:    Hospital/Nursing Home/IP Rehab Facility: [facility]  Date of Admission: 11/4/2024  Date of Discharge: 12/03/2024  Reason(s) for Admission: covid and uti  Was the patient in the ICU or did the patient experience delirium during hospitalization?  No  Do you have any other stressors you would like to discuss with your provider? No    Problems taking medications regularly:  None  Medication changes since discharge: None  Problems adhering to non-medication therapy:  None    Summary of hospitalization:  [facility] discharge summary reviewed  TCU  Diagnostic Tests/Treatments reviewed.  Follow up needed: none  Other Healthcare Providers Involved in Patient s Care:         Homecare  Update since discharge: stable.         Plan of care communicated with patient and family             Home with her son and daughter in law.   They are using depends. She is not able to move her legs without assistance. Daughter in law needs help with bathing as she is unable to stand unassisted in the shower. They have a shower chair. They had a  come out - declined meals on wheels.   In the middle of the night on Sunday she took a fall and her leg upper leg is painful. Pain is in the left buttock, moderate, not worsening, constant, aching, no aggravating or relieving factors. No numbness/tingling of extremities. No changes in urine/bowel. She had the fall while trying to go to the bathroom. Her family heard the fall in the evening and saw her seated by her walker. No head injury. No OTC medication. No bruises noted by nursing staff.   Home care orders - PT and OT. Home health X 1. Nursing will sign off in two weeks.   Have been working with [employer] through [facility] [employer] to get new wheelchair. She needs paperwork signed.  Need medication refills.   No dizziness or light headiness.             Objective    LMP  (LMP Unknown)   There is no height or weight on file to calculate BMI.  Physical Exam               Signed Electronically by: Valerio Lea  MD Kalyn

## 2025-01-10 NOTE — LETTER
January 13, 2025      Lenora Hammonds  [address] [address] [address]        Dear ,    We are writing to inform you of your test results.    Your potassium was mildly low. I recommend increasing potassium rich food in your diet including green leafy vegetables, bananas, prunes, raisins, seafood, chicken, lentils or potatoes. Please schedule a lab only visit in one month to recheck your levels. If your levels continue to be low then you would benefit from starting a potassium supplement.       Resulted Orders   CBC with platelets   Result Value Ref Range    WBC Count 8.9 4.0 - 11.0 10e3/uL    RBC Count 4.01 3.80 - 5.20 10e6/uL    Hemoglobin 12.2 11.7 - 15.7 g/dL    Hematocrit 38.3 35.0 - 47.0 %    MCV 96 78 - 100 fL    MCH 30.4 26.5 - 33.0 pg    MCHC 31.9 31.5 - 36.5 g/dL    RDW 12.6 10.0 - 15.0 %    Platelet Count 261 150 - 450 10e3/uL   Basic metabolic panel  (Ca, Cl, CO2, Creat, Gluc, K, Na, BUN)   Result Value Ref Range    Sodium 138 135 - 145 mmol/L    Potassium 3.2 (L) 3.4 - 5.3 mmol/L    Chloride 96 (L) 98 - 107 mmol/L    Carbon Dioxide (CO2) 30 (H) 22 - 29 mmol/L    Anion Gap 12 7 - 15 mmol/L    Urea Nitrogen 28.9 (H) 8.0 - 23.0 mg/dL    Creatinine 0.76 0.51 - 0.95 mg/dL    GFR Estimate 78 >60 mL/min/1.73m2      Comment:      eGFR calculated using 2021 CKD-EPI equation.    Calcium 10.0 8.8 - 10.4 mg/dL      Comment:      Reference intervals for this test were updated on 7/16/2024 to reflect our healthy population more accurately. There may be differences in the flagging of prior results with similar values performed with this method. Those prior results can be interpreted in the context of the updated reference intervals.    Glucose 101 (H) 70 - 99 mg/dL       If you have any questions or concerns, please call the clinic at the number listed above.       Sincerely,      Valerio Mccain MD    Electronically signed

## 2025-01-10 NOTE — LETTER
January 13, 2025      Lenora MALKA Cassius  [address] [address] [address]        Dear ,    We are writing to inform you of your test results.    Your xray showed no fracture. Please call or message me if you have questions or concerns.     Resulted Orders   XR Pelvis and Hip Bilateral 2 Views    Narrative    EXAM: XR PELVIS AND HIP BILATERAL 2 VIEWS  LOCATION: [facility]  DATE: 1/10/2025    INDICATION: Fall. Left hip pain. Buttock pain.  COMPARISON: None.      Impression    IMPRESSION: Anatomic alignment left hip. No acute displaced left hip fracture. Mild bilateral hip joint space narrowing with chondrocalcinosis. Diffuse bone demineralization. No acute displaced pelvic fracture.               If you have any questions or concerns, please call the clinic at the number listed above.       Sincerely,      Valerio Mccain MD    Electronically signed

## 2025-01-13 ENCOUNTER — TELEPHONE (OUTPATIENT)
Dept: FAMILY MEDICINE | Facility: CLINIC | Age: 80
End: 2025-01-13
Payer: COMMERCIAL

## 2025-01-13 NOTE — RESULT ENCOUNTER NOTE
Please send the letter to the patient with the following.         Your xray showed no fracture. Please call or message me if you have questions or concerns.

## 2025-01-13 NOTE — TELEPHONE ENCOUNTER
[facility] calling back again.  Will send HIGH PRIORITY to PCP.    Can this be approved?  SHSAHI Haq

## 2025-01-13 NOTE — RESULT ENCOUNTER NOTE
Please send the letter to the patient with the following.       Your potassium was mildly low. I recommend increasing potassium rich food in your diet including green leafy vegetables, bananas, prunes, raisins, seafood, chicken, lentils or potatoes. Please schedule a lab only visit in one month to recheck your levels. If your levels continue to be low then you would benefit from starting a potassium supplement.     Please call or message me if you have questions or concerns.

## 2025-01-13 NOTE — TELEPHONE ENCOUNTER
Test Results    Who ordered the test:  Dr. Mccain    Type of test: Lab and X-ray    Date of test:  1/10/25    Where was the test performed:  [facility] [place] 2[place]    What are your questions/concerns?:  Edith (CTC on file) calling regarding results. RN reviewed lab and xray results with Edith who verbalized understanding.     Lc MANN RN  [facility] Primary Care [facility]

## 2025-01-14 ENCOUNTER — PATIENT OUTREACH (OUTPATIENT)
Dept: CARE COORDINATION | Facility: CLINIC | Age: 80
End: 2025-01-14
Payer: COMMERCIAL

## 2025-01-14 NOTE — PROGRESS NOTES
Clinic Care Coordination Contact  Community Health Worker Initial Outreach    CHW Initial Information Gathering:  Referral Source: PCP  Preferred Hospital: [facility]  [phone]  Preferred Urgent Care: [facility], [phone]  Current living arrangement:: I live in a private home with family  Equipment Currently Used at Home: wheelchair, manual, walker, standard  Informal Support system:: Family  No PCP office visit in Past Year: No  Transportation means:: Regular car  CHW Additional Questions  Medication changes made following ED/Hospital discharge?: N/A  MyChart active?: No    Patient accepts CC: Yes. Patient scheduled for assessment with  TUCKER Pena on 1/20/25 at 12:00pm. Patient noted desire to discuss PCA services.     CHW notes:  CHW spoke to patient's daughter in law, Sepideh (C2C on file). Sepideh can be reached at [phone].  Sepideh states that she is the primary care giver for the patient. They are wondering if the the patient could get some PCA hours for bathing.    Teena Irving CHW, B.A. [facility] Care Coordination  [facility]:   [facility]  [phone]

## 2025-01-20 ENCOUNTER — PATIENT OUTREACH (OUTPATIENT)
Dept: FAMILY MEDICINE | Facility: CLINIC | Age: 80
End: 2025-01-20
Payer: COMMERCIAL

## 2025-01-20 ASSESSMENT — ACTIVITIES OF DAILY LIVING (ADL)
DEPENDENT_IADLS:: CLEANING;COOKING;LAUNDRY;SHOPPING;MEAL PREPARATION;MEDICATION MANAGEMENT;MONEY MANAGEMENT;TRANSPORTATION

## 2025-01-20 NOTE — PROGRESS NOTES
Clinic Care Coordination Contact  Care Team Conversations    Spoke with daughter in law who is primary care giver.  Her only question was to find out if medicare pays for PCA supports once home care is completed.  Reviewed that Medicare does not pay and then it is either Pending sale to [facility] supports that are based on need and financial situation or private pay.     Daughter in law noted that was the only question she had and has no concerns for care of pt.    Will send introduction letter for any future needs. Will not open to care coordination as they were only looking for information.     Becky Ferro, [facility] Primary Care - Care Coordination  [facility]   [phone]

## 2025-01-20 NOTE — LETTER
[facility] CARE COORDINATION  [address]  [place] [address]    January 20, 2025    Lenora Hammonds  [address] [address] [address]      Dear Lenora,    I am a clinic care coordinator who works with Valerio Mccain MD with the [facility]. I wanted to thank you for spending the time to talk with me.  Below is a description of clinic care coordination and how I can further assist you.       The clinic care coordination team is made up of a registered nurse, , financial resource worker and community health worker who understand the health care system. The goal of clinic care coordination is to help you manage your health and improve access to the health care system. Our team works alongside your provider to assist you in determining your health and social needs. We can help you obtain health care and community resources, providing you with necessary information and education. We can work with you through any barriers and develop a care plan that helps coordinate and strengthen the communication between you and your care team.  Our services are voluntary and are offered without charge to you personally.    Please feel free to contact me with any questions or concerns regarding care coordination and what we can offer.      We are focused on providing you with the highest-quality healthcare experience possible.    Sincerely,     Becky Ferro HARESH   [facility] - Care Coordination  [facility]   [phone]

## 2025-01-29 NOTE — PROGRESS NOTES
HPI:  Lenora Hammonds is a 73 year old female patient here today for spot on right flank .  Patient states this has been present for years.  Patient reports the following symptoms: itches and is irritating .  Patient reports the following previous treatments: none.  Patient reports the following modifying factors: none.  Associated symptoms: none.  Patient has no other skin complaints today.  Remainder of the HPI, Meds, PMH, Allergies, FH, and SH was reviewed in chart.    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     Snores 10/6/2011       Past Surgical History:   Procedure Laterality Date     C THORACOTOMY,MAJOR,EXPLOR/BIOPSY      histoplasmosis, right lower lobe     COLONOSCOPY N/A 2017    Procedure: COMBINED COLONOSCOPY, SINGLE OR MULTIPLE BIOPSY/POLYPECTOMY BY BIOPSY;  colonoscopy;  Surgeon: Moise Vásquez MD;  Location:  GI     COLONOSCOPY N/A 2018    Procedure: COMBINED COLONOSCOPY, SINGLE OR MULTIPLE BIOPSY/POLYPECTOMY BY BIOPSY;  COLONOSCOPY;  Surgeon: Moise Vásquez MD;  Location:  GI     THORACIC SURGERY       TONSILLECTOMY          Family History   Problem Relation Age of Onset     Hypertension Mother       at age 84     Neurologic Disorder Mother      ?parkinsonism - she had a tremor; walked normally. voice was soft     Cancer Father      brain tumor;  at age 62 y rs     Cancer Sister      breast cancer     Cancer Brother      bladder cancer;        Social History     Social History     Marital status:      Spouse name: Yanick     Number of children: 2     Years of education: N/A     Occupational History     wholesale hardware Retired     Social History Main Topics     Smoking status:  Former Smoker     Packs/day: 0.30     Years: 50.00     Types: Cigarettes     Smokeless tobacco: Never Used      Comment: quit 12/2014     Alcohol use Yes      Comment: occasionally      Drug use: No     Sexual activity: Yes     Partners: Male     Other Topics Concern     Parent/Sibling W/ Cabg, Mi Or Angioplasty Before 65f 55m? No     Blood Transfusions No     Caffeine Concern Yes     3 cups caffeine per day     Sleep Concern No     Stress Concern No     Weight Concern No     Special Diet No     Back Care No     Exercise Yes     walks daily 30 minutes and weights     Seat Belt Yes     Social History Narrative    Dairy/d 1-2 servings/d    Caffeine 3 servings/d    Exercise 3-5 x week    Sunscreen used - No    Seatbelts used - Yes    Working smoke/CO detectors in the home - Yes, NO CO    Guns stored in the home - No    Self Breast Exams - Yes    Self Testicular Exam - NOT APPLICABLE    Eye Exam up to date - Yes    Dental Exam up to date - Yes    Pap Smear up to date - yes    Mammogram up to date - Yes    PSA up to date - NOT APPLICABLE    Dexa Scan up to date - No    Flex Sig / Colonoscopy up to date - no    Immunizations up to date - yes    Abuse: Current or Past(Physical, Sexual or Emotional)- No    Do you feel safe in your environment - Yes    6/10       Outpatient Encounter Prescriptions as of 10/15/2018   Medication Sig Dispense Refill     albuterol (PROAIR HFA/PROVENTIL HFA/VENTOLIN HFA) 108 (90 Base) MCG/ACT inhaler Inhale 1-2 puffs into the lungs every 4 hours as needed for shortness of breath / dyspnea or wheezing 1 Inhaler 5     amLODIPine (NORVASC) 2.5 MG tablet Take 1 tablet (2.5 mg) by mouth daily 90 tablet 3     aspirin 325 MG tablet Take 325 mg by mouth daily       Cholecalciferol (VITAMIN D3 PO) Take 1,000 Units by mouth daily       escitalopram (LEXAPRO) 10 MG tablet Take 1 tablet (10 mg) by mouth daily 90 tablet 3     lisinopril-hydrochlorothiazide (PRINZIDE/ZESTORETIC) 20-25 MG per tablet Take 1  tablet by mouth daily 90 tablet 3     polyethylene glycol (MIRALAX) powder Take 1 capful by mouth daily       No facility-administered encounter medications on file as of 10/15/2018.        Review Of Systems:  Skin: As above  Eyes: negative  Ears/Nose/Throat: negative  Respiratory: No shortness of breath, dyspnea on exertion, cough, or hemoptysis  Cardiovascular: negative  Gastrointestinal: negative  Genitourinary: negative  Musculoskeletal: negative  Neurologic: negative  Psychiatric: negative  Hematologic/Lymphatic/Immunologic: negative  Endocrine: negative      Objective:     /62  Pulse 74  SpO2 96%  Breastfeeding? No  Eyes: Conjunctivae/lids: Normal   ENT: Lips:  Normal  MSK: Normal  Cardiovascular: Peripheral edema none  Pulm: Breathing Normal  Neuro/Psych: Orientation: Normal; Mood/Affect: Normal, NAD, WDWN  Pt accompanied by: self  Following areas examined:   Scalp, face, eyelids, lips, neck, chest, abdomen, back, buttocks, and R&L upper and lower extremities.  Baxter skin type:i   Findings:  Red smooth well-defined macules on trunk and extremities.  Brown, stuck-on scaly appearing papules on trunk and extremities.  Inflamed brown, stuck-on scaly appearing papules on right flank  Well circumscribed macules with symmetric color distribution on trunk and extremities 2-3mm  Blue blanchable smooth papule on inferior lip  Assessment and Plan:  1) Lentigines, multiple benign nevi, cherry angiomas, venous lake, and seborrheic keratoses    Treatment of these lesions would be purely cosmetic and not medically neccessary.  Lesion may recur and/or may not completely resolve. May need additional treatment.  Different removal options including excision, cryotherapy, cautery and /or laser.      2) ISK x 1  Discussed etiology and course.  LN2: Treated with LN2 for 5s for 1-2 cycles. Warned risks of blistering, pain, pigment change, scarring, and incomplete resolution.  Advised patient to return if lesions do  not completely resolve within 2-3 months.  Wound care sheet given.    Signs and Symptoms of non-melanoma skin cancer and ABCDEs of melanoma reviewed with patient. Patient encouraged to perform monthly self skin exams and educated on how to perform them. UV precautions reviewed with patient. Patient was asked about new or changing moles/lesions on body.   Wear a sunscreen with at least SPF 30 on your face, ears, neck and V of the chest daily. Wear sunscreen on other areas of the body if those areas are exposed to the sun throughout the day. Sunscreens can contain physical and/or chemical blockers. Physical blockers are less likely to clog pores, these include zinc oxide and titanium dioxide. Reapply every two hour and after swimming. Sunscreen examples include Neutrogena, CeraVe, Blue Lizard, Elta MD and many others.    Proper skin care from [facility]:    -Eliminate harsh soaps as they strip the natural oils from the skin, often resulting in dry itchy skin ( i.e. Dial, Zest, [place] Spring)  -Use mild soaps such as Cetaphil or Dove Sensitive Skin in the shower. You do not need to use soap on arms, legs, and trunk every time you shower unless visibly soiled.   -Avoid hot or cold showers.  -After showering, lightly dry off and apply moisturizing within 2-3 minutes. This will help trap moisture in the skin.   -Aggressive use of a moisturizer at least 1-2 times a day to the entire body (including -Vanicream, Cetaphil, Aquaphor or Cerave) and moisturize hands after every washing.  -We recommend using moisturizers that come in a tub that needs to be scooped out, not a pump. This has more of an oil base. It will hold moisture in your skin much better than a water base moisturizer. The above recommended are non-pore clogging.         Follow up in yearly FBE      used

## 2025-01-30 ENCOUNTER — TELEPHONE (OUTPATIENT)
Dept: FAMILY MEDICINE | Facility: CLINIC | Age: 80
End: 2025-01-30
Payer: COMMERCIAL

## 2025-01-30 NOTE — TELEPHONE ENCOUNTER
Dr. Mccain-Please review and advise if agree to requested home care orders.      Yari PT with [facility] is calling regarding an established patient with [facility].    Requesting orders from: Valerio Mccain  Provider is following patient: No      Orders Requested    Physical Therapy  Request for  Recertification, 1 visit per week for 8 weeks      Information was gathered and will be sent to provider for review.  RN will contact Home Care with information after provider review.  Information was gathered and will be sent to provider to confirm provider will be following patient.  RN will contact Home Care with information after provider review.  Confirmed ok to leave a detailed message with call back.  Contact information confirmed and updated as needed.    Thank you!  GUS Smith, RN-[facility]

## 2025-01-30 NOTE — TELEPHONE ENCOUNTER
Home Care is calling regarding an established patient with [facility].    Requesting orders from: Valerio Mccain  Provider is following patient: Yes  Is this a 60-day recertification request?  Yes    Orders Requested    Occupational Therapy  Request for recertification   Frequency:  3 visits within 5 weeks    HHA (Home Health Aide)  Request for recertification   Frequency:  1x/wk for 5 wks  For bathing    Information was gathered and will be sent to provider for review.      RN will contact Home Care with information after provider review.  Confirmed ok to leave a detailed message with call back.  Contact information confirmed and updated as needed.    Dee Contreras RN BSN  [facility]

## 2025-02-03 ENCOUNTER — LAB REQUISITION (OUTPATIENT)
Dept: LAB | Facility: CLINIC | Age: 80
End: 2025-02-03
Payer: COMMERCIAL

## 2025-02-03 DIAGNOSIS — R52 PAIN, UNSPECIFIED: ICD-10-CM

## 2025-02-04 ENCOUNTER — MEDICAL CORRESPONDENCE (OUTPATIENT)
Dept: HEALTH INFORMATION MANAGEMENT | Facility: CLINIC | Age: 80
End: 2025-02-04

## 2025-02-06 ENCOUNTER — TELEPHONE (OUTPATIENT)
Dept: FAMILY MEDICINE | Facility: CLINIC | Age: 80
End: 2025-02-06
Payer: COMMERCIAL

## 2025-02-06 NOTE — TELEPHONE ENCOUNTER
Home Care is calling regarding an established patient with [facility].     Requesting orders from: Valerio Mccain  Provider is following patient: Yes  Is this a 60-day recertification request?  No    Orders Requested    Physical Therapy  Request for continuation of care with increase in frequency  Frequency:  1x/wk for 3 wks        Verbal orders given.  Home Care will send orders for provider to sign.  Confirmed ok to leave a detailed message with call back.  Contact information confirmed and updated as needed.    Denise Pham RN

## 2025-02-13 ENCOUNTER — PATIENT OUTREACH (OUTPATIENT)
Dept: CARE COORDINATION | Facility: CLINIC | Age: 80
End: 2025-02-13
Payer: COMMERCIAL

## 2025-02-21 DIAGNOSIS — Z53.9 DIAGNOSIS NOT YET DEFINED: Primary | ICD-10-CM

## 2025-02-21 PROCEDURE — G0179 MD RECERTIFICATION HHA PT: HCPCS | Performed by: FAMILY MEDICINE

## 2025-02-25 ENCOUNTER — TELEPHONE (OUTPATIENT)
Dept: FAMILY MEDICINE | Facility: CLINIC | Age: 80
End: 2025-02-25
Payer: COMMERCIAL

## 2025-02-25 NOTE — TELEPHONE ENCOUNTER
Dr. Kalyn Chavez, OT with [facility] calling to notify PCP that she will be filing a vulnerable adult report today. While the family is very kind and good intentioned, Kathy states that they have made multiple requests for some sort of stair lift or safety plan for the patient who is primarily in the upper level of the home, and the family has not made any plan/change. The patient is home alone most days with the daughter-in-law, who could not reasonably get the patient downstairs in an emergency, and cannot lift the patient independently.    Kathy also notes poor patient appetite and that patient spent a few days primarily in bed last week. She states the patient seems a little better today, and the family is very kind, but home care OT feels that unfortunately overall the family does not have great caretaking judgment.     MARSHAL Esquivel, BSN, PHN, AMB-BC (she/her)  [facility] RN

## 2025-02-27 ENCOUNTER — PATIENT OUTREACH (OUTPATIENT)
Dept: CARE COORDINATION | Facility: CLINIC | Age: 80
End: 2025-02-27
Payer: COMMERCIAL

## 2025-03-03 ENCOUNTER — TELEPHONE (OUTPATIENT)
Dept: FAMILY MEDICINE | Facility: CLINIC | Age: 80
End: 2025-03-03
Payer: COMMERCIAL

## 2025-03-03 NOTE — TELEPHONE ENCOUNTER
[facility] on the [facility]st. They will resend the form. [id number] Order.

## 2025-03-06 ENCOUNTER — TELEPHONE (OUTPATIENT)
Dept: FAMILY MEDICINE | Facility: CLINIC | Age: 80
End: 2025-03-06
Payer: COMMERCIAL

## 2025-04-30 ENCOUNTER — OFFICE VISIT (OUTPATIENT)
Dept: FAMILY MEDICINE | Facility: CLINIC | Age: 80
End: 2025-04-30
Payer: COMMERCIAL

## 2025-04-30 VITALS
HEART RATE: 76 BPM | OXYGEN SATURATION: 92 % | DIASTOLIC BLOOD PRESSURE: 78 MMHG | WEIGHT: 84 LBS | SYSTOLIC BLOOD PRESSURE: 142 MMHG | RESPIRATION RATE: 16 BRPM | TEMPERATURE: 97.2 F | BODY MASS INDEX: 13.56 KG/M2

## 2025-04-30 DIAGNOSIS — G89.29 CHRONIC BILATERAL LOW BACK PAIN WITHOUT SCIATICA: ICD-10-CM

## 2025-04-30 DIAGNOSIS — M54.50 CHRONIC BILATERAL LOW BACK PAIN WITHOUT SCIATICA: ICD-10-CM

## 2025-04-30 DIAGNOSIS — I10 ESSENTIAL HYPERTENSION: ICD-10-CM

## 2025-04-30 DIAGNOSIS — R41.89 COGNITIVE CHANGE: ICD-10-CM

## 2025-04-30 DIAGNOSIS — R63.4 UNINTENTIONAL WEIGHT LOSS: Primary | ICD-10-CM

## 2025-04-30 DIAGNOSIS — D32.0 INTRACRANIAL MENINGIOMA (H): ICD-10-CM

## 2025-04-30 DIAGNOSIS — Z74.09 IMMOBILITY: ICD-10-CM

## 2025-04-30 DIAGNOSIS — D36.10 SCHWANNOMA: ICD-10-CM

## 2025-04-30 LAB
ANION GAP SERPL CALCULATED.3IONS-SCNC: 11 MMOL/L (ref 7–15)
BUN SERPL-MCNC: 28.8 MG/DL (ref 8–23)
CALCIUM SERPL-MCNC: 9.9 MG/DL (ref 8.8–10.4)
CHLORIDE SERPL-SCNC: 100 MMOL/L (ref 98–107)
CREAT SERPL-MCNC: 0.68 MG/DL (ref 0.51–0.95)
EGFRCR SERPLBLD CKD-EPI 2021: 87 ML/MIN/1.73M2
ERYTHROCYTE [DISTWIDTH] IN BLOOD BY AUTOMATED COUNT: 13.5 % (ref 10–15)
GLUCOSE SERPL-MCNC: 120 MG/DL (ref 70–99)
HCO3 SERPL-SCNC: 28 MMOL/L (ref 22–29)
HCT VFR BLD AUTO: 37.8 % (ref 35–47)
HGB BLD-MCNC: 12 G/DL (ref 11.7–15.7)
MCH RBC QN AUTO: 29.2 PG (ref 26.5–33)
MCHC RBC AUTO-ENTMCNC: 31.7 G/DL (ref 31.5–36.5)
MCV RBC AUTO: 92 FL (ref 78–100)
PLATELET # BLD AUTO: 270 10E3/UL (ref 150–450)
POTASSIUM SERPL-SCNC: 3.9 MMOL/L (ref 3.4–5.3)
RBC # BLD AUTO: 4.11 10E6/UL (ref 3.8–5.2)
SODIUM SERPL-SCNC: 139 MMOL/L (ref 135–145)
TSH SERPL DL<=0.005 MIU/L-ACNC: 0.46 UIU/ML (ref 0.3–4.2)
WBC # BLD AUTO: 10.1 10E3/UL (ref 4–11)

## 2025-04-30 PROCEDURE — 80048 BASIC METABOLIC PNL TOTAL CA: CPT | Performed by: FAMILY MEDICINE

## 2025-04-30 PROCEDURE — 84443 ASSAY THYROID STIM HORMONE: CPT | Performed by: FAMILY MEDICINE

## 2025-04-30 PROCEDURE — 36415 COLL VENOUS BLD VENIPUNCTURE: CPT | Performed by: FAMILY MEDICINE

## 2025-04-30 PROCEDURE — 85027 COMPLETE CBC AUTOMATED: CPT | Performed by: FAMILY MEDICINE

## 2025-04-30 RX ORDER — LIDOCAINE 4 G/G
1 PATCH TOPICAL EVERY 24 HOURS
Qty: 30 PATCH | Refills: 2 | Status: SHIPPED | OUTPATIENT
Start: 2025-04-30

## 2025-04-30 ASSESSMENT — PATIENT HEALTH QUESTIONNAIRE - PHQ9
SUM OF ALL RESPONSES TO PHQ QUESTIONS 1-9: 11
10. IF YOU CHECKED OFF ANY PROBLEMS, HOW DIFFICULT HAVE THESE PROBLEMS MADE IT FOR YOU TO DO YOUR WORK, TAKE CARE OF THINGS AT HOME, OR GET ALONG WITH OTHER PEOPLE: SOMEWHAT DIFFICULT
SUM OF ALL RESPONSES TO PHQ QUESTIONS 1-9: 11

## 2025-04-30 ASSESSMENT — PAIN SCALES - GENERAL: PAINLEVEL_OUTOF10: MODERATE PAIN (6)

## 2025-04-30 NOTE — PATIENT INSTRUCTIONS
General Requirements for Coverage of Hospital Beds    A physician's prescription and such additional documentation as the Medicare Administrative Contractor (MAC) medical staff may consider necessary, including medical records and physicians' reports, must establish the medical necessity for a hospital bed due to one of the following reasons:    The patient's condition requires positioning of the body; e.g., to alleviate pain, promote good body alignment, prevent contractures, avoid respiratory infections, in ways not feasible in an ordinary bed; or  The patient's condition requires special attachments that cannot be fixed and used on an ordinary bed.  B. Physician's Prescription    The physician's prescription, which must accompany the initial claim, and supplementing documentation when required, must establish that a hospital bed is medically necessary. If the stated reason for the need for a hospital bed is the patient's condition requires positioning, the prescription or other documentation must describe the medical condition, e.g., cardiac disease, chronic obstructive pulmonary disease, quadriplegia or paraplegia, and also the severity and frequency of the symptoms of the condition that necessitates a hospital bed for positioning.    If the stated reason for requiring a hospital bed is the patient's condition requires special attachments, the prescription must describe the patient's condition and specify the attachments that require a hospital bed.    C. Variable Height Feature    In well documented cases, the MAC medical staff may determine that a variable height feature of a hospital bed, approved for coverage under subsection A above, is medically necessary and, therefore, covered, for one of the following conditions:    Severe arthritis and other injuries to lower extremities; e.g., fractured hip. The condition requires the variable height feature to assist the patient to ambulate by enabling the patient to  "place his or her feet on the floor while sitting on the edge of the bed;  Severe cardiac conditions. For those cardiac patients who are able to leave bed, but who must avoid the strain of \"jumping\" up or down;  Spinal cord injuries, including quadriplegic and paraplegic patients, multiple limb amputee and stroke patients. For those patients who are able to transfer from bed to a wheelchair, with or without help; or  Other severely debilitating diseases and conditions, if the variable height feature is required to assist the patient to ambulate.  LOLI Electric Powered Hospital Bed Adjustments    Electric powered adjustments to lower and raise head and foot may be covered when the [facility] medical staff determines that the patient's condition requires frequent change in body position and/or there may be an immediate need for a change in body position (i.e., no delay can be tolerated) and the patient can operate the controls and cause the adjustments. Exceptions may be made to this last requirement in cases of spinal cord injury and brain damaged patients.  "

## 2025-04-30 NOTE — PROGRESS NOTES
Assessment & Plan     Unintentional weight loss  Cognitive changes  Immobility  Referral placed for hospice as requested.  Basic labs also ordered to rule out organic causes of weight loss.  Patient and family will return for further workup in the future if desired.  Encouraged 3 full meals per day with healthy snacks/shake supplement and also 1-2 week of intake journal.  - TSH with free T4 reflex  - CBC with platelets    Intracranial meningioma  Vestibular schwannoma  Reviewed Neurosurgery records - most recent visit from 7/2024.   Plan for repeat MRI in 2 years (2026).  Family reports that patient has seen neurology numerous times in the past for cognitive changes    Essential hypertension  Hypokalemia  Blood pressure improved with recheck.  Family will obtain home BP monitoring and continue to check outside of office.  Will return to office if needed for potential adjustment of antihypertensive medication.  Repeat BMP today.    Chronic bilateral low back pain without sciatica  Reassuring back examination today.  Will do trial of topical Voltaren and lidocaine patch as noted below.  - diclofenac (VOLTAREN) 1 % topical gel; Apply 2 g topically 4 times daily as needed for moderate pain.  - Lidocaine (LIDOCARE) 4 % Patch; Place 1 patch over 12 hours onto the skin every 24 hours. To prevent lidocaine toxicity, patient should be patch free for 12 hrs daily.    Follow-up at earliest convenience for AWV or sooner if needed.    Clemente Cooley is a 79 year old, presenting for the following health issues:  Establish Care (Est Care - Hospice care ), RECHECK (Lost Total Mobility in the last couple months- can't walk or move at all, only feeding /Hospital Bed order ), and Pain (Pain in back, neck, butt)      4/30/2025    10:59 AM   Additional Questions   Roomed by Cesilia Webb   Accompanied by Edith Tobin         4/30/2025   Forms   Any forms needing to be completed Yes     History of Present Illness       Back Pain:   She presents for follow up of back pain. Patient's back pain is a recurring problem.  Location of back pain:  Right middle of back, right buttock and left buttock  Description of back pain: dull ache  Back pain spreads: right buttocks, left buttocks, right side of neck and left side of neck    Since patient first noticed back pain, pain is: gradually worsening  Does back pain interfere with her job:  Not applicable       Hypertension: She presents for follow up of hypertension.  She does not check blood pressure  regularly outside of the clinic. Outpatient blood pressures have not been over 140/90. She does not follow a low salt diet.     Reason for visit:  New primary physician    She eats 2-3 servings of fruits and vegetables daily.She consumes 0 sweetened beverage(s) daily.She exercises with enough effort to increase her heart rate 9 or less minutes per day.  She exercises with enough effort to increase her heart rate 3 or less days per week.   She is taking medications regularly.      79-year-old female accompanied by her son Mahad and daughter-in-law Edith.  And is primary caretaker for patient and both son and daughter-in-law report that patient has been declining cognitively and physically for quite some time now.  Patient previously had issues with mobility but this is worsened significantly to the point where she is immobile and requires help with all of her ADLs except for eating.  Hospitalized end of 2020 for for COVID infection and conditioning has periodically worsened since then.  Medical history notable for left-sided acoustic neuroma, hypertension, dyslipidemia, depression, and back pain.  Patient and family would like referral for hospice.    Regarding weight loss, patient is eating 3 meals per day of various foods.  Family reports that in November 2020 for when she was hospitalized she was approximately 107 pounds.  At baseline patient is incontinent of urine and bowels.  Denies any systemic signs of  illness at this time.    Additionally, patient has been experiencing intermittent back and neck pain.  Therapies tried at this time include Tylenol and heating pads which help at times.  Laying down also helps.  Had home PT following hospitalization which did not help as much.        Objective    BP (!) 153/77 (BP Location: Right arm, Patient Position: Sitting, Cuff Size: Adult Small)   Pulse 76   Temp 97.2  F (36.2  C) (Temporal)   Resp 16   Wt 38.1 kg (84 lb)   LMP  (LMP Unknown)   SpO2 92%   BMI 13.56 kg/m    Body mass index is 13.56 kg/m .  Physical Exam   GENERAL: alert and no distress, sitting comfortably in wheelchair  EYES: Eyes grossly normal to inspection, PERRL and conjunctivae and sclerae normal  NECK: no adenopathy, no asymmetry, masses, or scars  RESP: lungs clear to auscultation - no rales, rhonchi or wheezes  CV: regular rate and rhythm, normal S1 S2, no S3 or S4, no murmur, click or rub, no peripheral edema  ORTHO: Lumber/Thoracic Spine Exam: Non-tender  Special tests:  Negative SLT bilaterally  SKIN: no suspicious lesions or rashes  NEURO: Decreased strength in bilateral lower extremities - required assistance to get on exam table, mentation intact and speech normal  PSYCH: affect normal/bright       Signed Electronically by: Omar Mccain MD

## 2025-05-05 ENCOUNTER — TELEPHONE (OUTPATIENT)
Dept: FAMILY MEDICINE | Facility: CLINIC | Age: 80
End: 2025-05-05
Payer: COMMERCIAL

## 2025-05-05 NOTE — LETTER
May 5, 2025      Lenora Hammonds  [address] [address] [address]        Dear ,    We are writing to inform you of your test results.    ***    No results found from the In Basket message.    If you have any questions or concerns, please call the clinic at the number listed above.       Sincerely,          Electronically signed

## 2025-05-05 NOTE — Clinical Note
May 5, 2025      Lenora Hammonds  [address] [address] [address]        Dear ,    We are writing to inform you of your test results.    {results letter list:[id number]}    No results found from the In Basket message.    If you have any questions or concerns, please call the clinic at the number listed above.       Sincerely,          Electronically signed

## 2025-05-05 NOTE — LETTER
May 5, 2025      Lenora Hammonds  [address] [address] [address]        Dear ,    We are writing to inform you of your test results.    Recent labs are largely normal.  Glucose was mildly high on BMP which is expected with nonfasting sample.                       If you have any questions or concerns, please call the clinic at the number listed above.       Sincerely,    Dr. Omar Mccain      Electronically signed

## 2025-05-05 NOTE — TELEPHONE ENCOUNTER
Writer contacted daughter-in-law, Edith Hammonds, [phone]. Relayed Dr. Mccain's message below. Edith reported they couldn't figure out how to work with [employer]. Writer confirmed mailing address. Results letter sent today.          Cj Soares, MARIA ANTONIAN, PHN, RN-[facility]

## 2025-05-05 NOTE — TELEPHONE ENCOUNTER
Patient's Daughter in law (CTC on file for Edith) called requesting to get further updates on the lab results on 04/30/25. Patient doesn't have access to their MyChart and so they want someone to reach out to them. Writer informed that this will be passed to PCP and care team to review. Patient and caller wants a call back for updates. Please contact Edith, Daughter in law, for the results.

## 2025-05-16 ENCOUNTER — TELEPHONE (OUTPATIENT)
Dept: FAMILY MEDICINE | Facility: CLINIC | Age: 80
End: 2025-05-16

## 2025-05-16 NOTE — TELEPHONE ENCOUNTER
FYI - Status Update  Home Health Care / Hospice Care    Who is Calling: [facility]    Update:   Patient has elected to take on Hospice thru [facility]    Does caller want a call/response back: No    Message routed to Dr Mccain for FYI    Roseann Yang RN  [facility]

## 2025-06-18 ENCOUNTER — TRANSITIONAL CARE UNIT VISIT (OUTPATIENT)
Dept: GERIATRICS | Facility: CLINIC | Age: 80
End: 2025-06-18
Payer: COMMERCIAL

## 2025-06-18 ENCOUNTER — TELEPHONE (OUTPATIENT)
Dept: GERIATRICS | Facility: CLINIC | Age: 80
End: 2025-06-18

## 2025-06-18 ENCOUNTER — LAB REQUISITION (OUTPATIENT)
Dept: LAB | Facility: CLINIC | Age: 80
End: 2025-06-18
Payer: COMMERCIAL

## 2025-06-18 ENCOUNTER — DOCUMENTATION ONLY (OUTPATIENT)
Dept: GERIATRICS | Facility: CLINIC | Age: 80
End: 2025-06-18

## 2025-06-18 VITALS
BODY MASS INDEX: 16.37 KG/M2 | TEMPERATURE: 97.3 F | HEART RATE: 84 BPM | SYSTOLIC BLOOD PRESSURE: 107 MMHG | DIASTOLIC BLOOD PRESSURE: 61 MMHG | RESPIRATION RATE: 18 BRPM | WEIGHT: 101.4 LBS | OXYGEN SATURATION: 94 %

## 2025-06-18 DIAGNOSIS — R41.89 COGNITIVE IMPAIRMENT: ICD-10-CM

## 2025-06-18 DIAGNOSIS — Z11.1 ENCOUNTER FOR SCREENING FOR RESPIRATORY TUBERCULOSIS: ICD-10-CM

## 2025-06-18 DIAGNOSIS — Z51.5 HOSPICE CARE PATIENT: ICD-10-CM

## 2025-06-18 DIAGNOSIS — G31.9 DEGENERATIVE DISEASE OF NERVOUS SYSTEM: ICD-10-CM

## 2025-06-18 DIAGNOSIS — I51.89 DIASTOLIC DYSFUNCTION: ICD-10-CM

## 2025-06-18 PROCEDURE — 99310 SBSQ NF CARE HIGH MDM 45: CPT | Performed by: NURSE PRACTITIONER

## 2025-06-18 RX ORDER — LORAZEPAM 0.5 MG/1
0.5 TABLET ORAL EVERY 6 HOURS PRN
COMMUNITY

## 2025-06-18 RX ORDER — PROCHLORPERAZINE MALEATE 10 MG
10 TABLET ORAL EVERY 6 HOURS PRN
COMMUNITY

## 2025-06-18 RX ORDER — BISACODYL 10 MG
10 SUPPOSITORY, RECTAL RECTAL DAILY PRN
COMMUNITY

## 2025-06-18 RX ORDER — MORPHINE SULFATE 20 MG/5ML
5 SOLUTION ORAL
COMMUNITY

## 2025-06-18 RX ORDER — HALOPERIDOL 1 MG/1
0.5 TABLET ORAL EVERY 6 HOURS PRN
COMMUNITY
End: 2025-06-18

## 2025-06-18 RX ORDER — HYOSCYAMINE SULFATE 0.125 MG
0.12 TABLET,DISINTEGRATING ORAL EVERY 4 HOURS PRN
COMMUNITY

## 2025-06-18 RX ORDER — HALOPERIDOL 1 MG/1
1 TABLET ORAL EVERY 6 HOURS PRN
Qty: 30 TABLET | Refills: 0 | Status: SHIPPED | OUTPATIENT
Start: 2025-06-18

## 2025-06-18 RX ORDER — ACETAMINOPHEN 325 MG/1
650 TABLET ORAL EVERY 6 HOURS PRN
COMMUNITY

## 2025-06-18 NOTE — LETTER
6/18/2025      Lenora Hammonds  [address] [facility] [address]        [facility]  PRIMARY CARE PROVIDER AND CLINIC:  Omar Mccain MD, [address] [address] / [place] [address]  Chief Complaint   Patient presents with     Establish Care     [place] Medical Record Number:  [id number]  Place of Service where encounter took place:  [facility] ([facility]) [[id number]]    Lenora Hammonds  is a 79 year old  (1945), admitted to the above facility from home due to care needs  . .  Admitted to this facility for  rehab, medical management, and nursing care.    HPI:    HPI information obtained from: facility chart records, facility staff, and patient report.   Brief Summary of Hospital Course:   Updates on Status Since Skilled nursing Admission:     CODE STATUS/ADVANCE DIRECTIVES DISCUSSION:   DNR  Patient's living condition: Had been living at home with daughter in law/son    Patient Cindy Hammonds is a 79 yr old female admitted to [facility] for respite cares.   Patient is hospice patient living at home with family and family is going on vacation and patient needs 24hr care.    Today  Patient reports comfortable, pain managed with current pain medications  Has chronic arthritis in legs   Denies feeling ill, shortness of breath or chest pain  States having regular elimination; incontinent bowel & bladder   Family report patient pivot transfer, not walked for months and has been on hospice since May due to progressive decline physically and mentally   Patient mood appear good, smiles often  States she slept fine  States not much appetite, however, eats meals and family bring in treats  Family denies any open areas  Seen today with son and daughter in law. 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    She has no past medical history of Basal cell carcinoma, Complication of anesthesia, Malignant melanoma (H), or Squamous cell carcinoma.  PAST SURGICAL HISTORY:   has a past surgical history that includes THORACOTOMY,MAJOR,EXPLOR/BIOPSY (1983); tonsillectomy; Thoracic surgery; Colonoscopy (N/A, 12/6/2017); and Colonoscopy (N/A, 6/28/2018).  FAMILY HISTORY: family history includes Breast Cancer in her sister; Cancer in her brother and father; Hypertension in her mother; Neurologic Disorder in her mother; Other - See Comments in her sister.  SOCIAL HISTORY:   reports that she quit smoking about 11 years ago. Her smoking use included cigarettes. She started smoking about 61 years ago. She has a 25 pack-year smoking history. She has been exposed to tobacco smoke. She has never used smokeless tobacco. She reports current alcohol use. She reports that she does not use drugs.    Post Discharge Medication Reconciliation Status: discharge medications reconciled and changed, per note/orders    Current Outpatient Medications   Medication Sig Dispense Refill     acetaminophen (TYLENOL) 325 MG suppository Place 650 mg rectally every 6 hours as needed for fever.       bisacodyl (DULCOLAX) 10 MG suppository Place 10 mg rectally daily as needed for constipation.       escitalopram (LEXAPRO) 10 MG tablet Take 1 tablet (10 mg) by mouth daily. 90 tablet 3     haloperidol (HALDOL) 1 MG tablet Take 1 tablet (1 mg) by mouth every 6 hours as needed for agitation. 30 tablet 0     hyoscyamine 0.125 MG TBDP Take 0.125 mg by mouth every 4 hours as needed for cramping.       lisinopril-hydrochlorothiazide (ZESTORETIC) 10-12.5 MG tablet Take 1 tablet by mouth daily. 90 tablet 3     LORazepam (ATIVAN) 0.5 MG tablet Take 0.5 mg by mouth every 6 hours as needed for anxiety.       morphine sulfate 20 MG/5ML SOLN Take 5 mg by mouth every 3 hours as needed.       prochlorperazine  (COMPAZINE) 10 MG tablet Take 10 mg by mouth every 6 hours as needed for nausea or vomiting.         ROS:  10 point ROS of systems including Constitutional, Eyes, Respiratory, Cardiovascular, Gastroenterology, Genitourinary, Integumentary, Musculoskeletal, Psychiatric were all negative except for pertinent positives noted in my HPI.    Vitals:    Visit Vitals  /61   Pulse 84   Temp 97.3  F (36.3  C)   Resp 18       /61   Pulse 84   Temp 97.3  F (36.3  C)   Resp 18   Wt 46 kg (101 lb 6.4 oz)   LMP  (LMP Unknown)   SpO2 94%   BMI 16.37 kg/m    Exam:  GENERAL APPEARANCE:  Alert, in no distress, thin; dressed in top and pants  ENT:  Mouth and posterior oropharynx normal, moist mucous membranes  EYES:  EOM, conjunctivae, lids, pupils and irises normal  NECK:  No adenopathy,masses or thyromegaly  RESP:  respiratory effort and palpation of chest normal, lungs clear to auscultation , no respiratory distress  CV:  Palpation and auscultation of heart done , regular rate and rhythm  ABDOMEN:  normal bowel sounds, soft, nontender  M/S:   sitting on wheelchair , equal strength upper extremities and lower, weakness noted lower extremity bilateral   SKIN:  Inspection of skin and subcutaneous tissue baseline  NEURO:   Cranial nerves 2-12 are normal tested and grossly at patient's baseline  PSYCH:  oriented X 3, memory impaired     Lab/Diagnostic data:  Labs done in SNF are in [facility]. Please refer to them using [employer]Care Everywhere.    Last Comprehensive Metabolic Panel:  Lab Results   Component Value Date     04/30/2025    POTASSIUM 3.9 04/30/2025    CHLORIDE 100 04/30/2025    CO2 28 04/30/2025    ANIONGAP 11 04/30/2025     (H) 04/30/2025    BUN 28.8 (H) 04/30/2025    CR 0.68 04/30/2025    GFRESTIMATED 87 04/30/2025    KATHLEEN 9.9 04/30/2025       Lab Results   Component Value Date    WBC 10.1 04/30/2025    WBC 5.5 01/08/2018     Lab Results   Component Value Date    RBC 4.11 04/30/2025    RBC  "3.90 01/08/2018     Lab Results   Component Value Date    HGB 12.0 04/30/2025    HGB 11.6 01/08/2018     Lab Results   Component Value Date    HCT 37.8 04/30/2025    HCT 37.8 01/08/2018     No components found for: \"MCT\"  Lab Results   Component Value Date    MCV 92 04/30/2025    MCV 97 01/08/2018     Lab Results   Component Value Date    MCH 29.2 04/30/2025    MCH 29.7 01/08/2018     Lab Results   Component Value Date    MCHC 31.7 04/30/2025    MCHC 30.7 01/08/2018     Lab Results   Component Value Date    RDW 13.5 04/30/2025    RDW 13.6 01/08/2018     Lab Results   Component Value Date     04/30/2025     01/08/2018         ASSESSMENT/PLAN:    Major depressive disorder, single episode, mild  Continue (LEXAPRO) 10 MG tablet daily  Consider  referral      Diastolic dysfunction  Hypertension goal BP (blood pressure) < 140/90  Continue lisinopril-hydrochlorothiazide (ZESTORETIC) 10-12.5 MG tablet  Blood pressure and hr managed; no appear fluid up   Monitor     Degenerative disease of nervous system  Medicare hospice respite  Continue hospice control medications     Assist of 2 with pivot transfers, incontinent of bowel & bladder, non-ambulatory   Hospice Respite -up as tolerated        Total time spent with patient visit at the skilled nursing facility was 45 min including patient visit and review of past records. Greater than 50% of total time spent with counseling and coordinating care due to discussion on medication management, pain management, and discharge planning     Electronically signed by:  STACY Mcgregor CNP                       Sincerely,        STACY Mcgregor CNP    Electronically signed  "

## 2025-06-18 NOTE — TELEPHONE ENCOUNTER
[facility] Geriatrics Triage Nurse Telephone Encounter    Provider: STACY Ruiz CNP   Facility: [facility] Type:  TCU    Caller: Lindsay    Allergies:    Allergies   Allergen Reactions    Rosuvastatin Hives, Itching and Rash        Reason for call: New admit, enrolled with hospice. Currently has orders for Tylenol 650mg suppository q6h PRN. Pt is alert and oriented and able to take oral medications. Nurse requests changing order to tablet form.     Verbal Order/Direction given by Provider:   - Ok to change Tylenol to 650mg tablet q6h PRN    Provider giving Order:  STACY Ruiz CNP     Verbal Order given to: Lindsay Anderson RN

## 2025-06-18 NOTE — PROGRESS NOTES
[facility]  PRIMARY CARE PROVIDER AND CLINIC:  Omar Mccain MD, [address] / [place] [address]  Chief Complaint   Patient presents with    Establish Care     [place] Medical Record Number:  [id number]  Place of Service where encounter took place:  [facility] ([facility]) [[id number]]    Lenora Hammonds  is a 79 year old  (1945), admitted to the above facility from home due to care needs  . .  Admitted to this facility for  rehab, medical management, and nursing care.    HPI:    HPI information obtained from: facility chart records, facility staff, and patient report.   Brief Summary of Hospital Course:   Updates on Status Since Skilled nursing Admission:     CODE STATUS/ADVANCE DIRECTIVES DISCUSSION:   DNR  Patient's living condition: Had been living at home with daughter in law/son    Patient Cindy Hammonds is a 79 yr old female admitted to [facility] for respite cares.   Patient is hospice patient living at home with family and family is going on vacation and patient needs 24hr care.    Today  Patient reports comfortable, pain managed with current pain medications  Has chronic arthritis in legs   Denies feeling ill, shortness of breath or chest pain  States having regular elimination; incontinent bowel & bladder   Family report patient pivot transfer, not walked for months and has been on hospice since May due to progressive decline physically and mentally   Patient mood appear good, smiles often  States she slept fine  States not much appetite, however, eats meals and family bring in treats  Family denies any open areas  Seen today with son and daughter in law. Questions answered      ALLERGIES: Rosuvastatin  PAST MEDICAL HISTORY:  has a past medical history of Acoustic neuroma (H), Depressive disorder, histoplasmosis, Histoplasmosis (10/6/2011), Hypertension, Pancolitis (H) (12/5/2017), Shaking, and Snores (10/6/2011).    She has no past  medical history of Basal cell carcinoma, Complication of anesthesia, Malignant melanoma (H), or Squamous cell carcinoma.  PAST SURGICAL HISTORY:   has a past surgical history that includes THORACOTOMY,MAJOR,EXPLOR/BIOPSY (1983); tonsillectomy; Thoracic surgery; Colonoscopy (N/A, 12/6/2017); and Colonoscopy (N/A, 6/28/2018).  FAMILY HISTORY: family history includes Breast Cancer in her sister; Cancer in her brother and father; Hypertension in her mother; Neurologic Disorder in her mother; Other - See Comments in her sister.  SOCIAL HISTORY:   reports that she quit smoking about 11 years ago. Her smoking use included cigarettes. She started smoking about 61 years ago. She has a 25 pack-year smoking history. She has been exposed to tobacco smoke. She has never used smokeless tobacco. She reports current alcohol use. She reports that she does not use drugs.    Post Discharge Medication Reconciliation Status: discharge medications reconciled and changed, per note/orders    Current Outpatient Medications   Medication Sig Dispense Refill    acetaminophen (TYLENOL) 325 MG suppository Place 650 mg rectally every 6 hours as needed for fever.      bisacodyl (DULCOLAX) 10 MG suppository Place 10 mg rectally daily as needed for constipation.      escitalopram (LEXAPRO) 10 MG tablet Take 1 tablet (10 mg) by mouth daily. 90 tablet 3    haloperidol (HALDOL) 1 MG tablet Take 1 tablet (1 mg) by mouth every 6 hours as needed for agitation. 30 tablet 0    hyoscyamine 0.125 MG TBDP Take 0.125 mg by mouth every 4 hours as needed for cramping.      lisinopril-hydrochlorothiazide (ZESTORETIC) 10-12.5 MG tablet Take 1 tablet by mouth daily. 90 tablet 3    LORazepam (ATIVAN) 0.5 MG tablet Take 0.5 mg by mouth every 6 hours as needed for anxiety.      morphine sulfate 20 MG/5ML SOLN Take 5 mg by mouth every 3 hours as needed.      prochlorperazine (COMPAZINE) 10 MG tablet Take 10 mg by mouth every 6 hours as needed for nausea or vomiting.          ROS:  10 point ROS of systems including Constitutional, Eyes, Respiratory, Cardiovascular, Gastroenterology, Genitourinary, Integumentary, Musculoskeletal, Psychiatric were all negative except for pertinent positives noted in my HPI.    Vitals:    Visit Vitals  /61   Pulse 84   Temp 97.3  F (36.3  C)   Resp 18       /61   Pulse 84   Temp 97.3  F (36.3  C)   Resp 18   Wt 46 kg (101 lb 6.4 oz)   LMP  (LMP Unknown)   SpO2 94%   BMI 16.37 kg/m    Exam:  GENERAL APPEARANCE:  Alert, in no distress, thin; dressed in top and pants  ENT:  Mouth and posterior oropharynx normal, moist mucous membranes  EYES:  EOM, conjunctivae, lids, pupils and irises normal  NECK:  No adenopathy,masses or thyromegaly  RESP:  respiratory effort and palpation of chest normal, lungs clear to auscultation , no respiratory distress  CV:  Palpation and auscultation of heart done , regular rate and rhythm  ABDOMEN:  normal bowel sounds, soft, nontender  M/S:   sitting on wheelchair , equal strength upper extremities and lower, weakness noted lower extremity bilateral   SKIN:  Inspection of skin and subcutaneous tissue baseline  NEURO:   Cranial nerves 2-12 are normal tested and grossly at patient's baseline  PSYCH:  oriented X 3, memory impaired     Lab/Diagnostic data:  Labs done in SNF are in [facility]. Please refer to them using [employer]Care Everywhere.    Last Comprehensive Metabolic Panel:  Lab Results   Component Value Date     04/30/2025    POTASSIUM 3.9 04/30/2025    CHLORIDE 100 04/30/2025    CO2 28 04/30/2025    ANIONGAP 11 04/30/2025     (H) 04/30/2025    BUN 28.8 (H) 04/30/2025    CR 0.68 04/30/2025    GFRESTIMATED 87 04/30/2025    KATHLEEN 9.9 04/30/2025       Lab Results   Component Value Date    WBC 10.1 04/30/2025    WBC 5.5 01/08/2018     Lab Results   Component Value Date    RBC 4.11 04/30/2025    RBC 3.90 01/08/2018     Lab Results   Component Value Date    HGB 12.0 04/30/2025    HGB 11.6  "01/08/2018     Lab Results   Component Value Date    HCT 37.8 04/30/2025    HCT 37.8 01/08/2018     No components found for: \"MCT\"  Lab Results   Component Value Date    MCV 92 04/30/2025    MCV 97 01/08/2018     Lab Results   Component Value Date    MCH 29.2 04/30/2025    MCH 29.7 01/08/2018     Lab Results   Component Value Date    MCHC 31.7 04/30/2025    MCHC 30.7 01/08/2018     Lab Results   Component Value Date    RDW 13.5 04/30/2025    RDW 13.6 01/08/2018     Lab Results   Component Value Date     04/30/2025     01/08/2018         ASSESSMENT/PLAN:    Major depressive disorder, single episode, mild  Continue (LEXAPRO) 10 MG tablet daily  Consider  referral      Diastolic dysfunction  Hypertension goal BP (blood pressure) < 140/90  Continue lisinopril-hydrochlorothiazide (ZESTORETIC) 10-12.5 MG tablet  Blood pressure and hr managed; no appear fluid up   Monitor     Degenerative disease of nervous system  Medicare hospice respite  Continue hospice control medications     Assist of 2 with pivot transfers, incontinent of bowel & bladder, non-ambulatory   Hospice Respite -up as tolerated        Total time spent with patient visit at the skilled nursing facility was 45 min including patient visit and review of past records. Greater than 50% of total time spent with counseling and coordinating care due to discussion on medication management, pain management, and discharge planning     Electronically signed by:  STACY Mcgregor CNP                     "

## 2025-06-19 PROCEDURE — 86481 TB AG RESPONSE T-CELL SUSP: CPT | Performed by: NURSE PRACTITIONER

## 2025-06-19 PROCEDURE — P9604 ONE-WAY ALLOW PRORATED TRIP: HCPCS | Performed by: NURSE PRACTITIONER

## 2025-06-19 PROCEDURE — 36415 COLL VENOUS BLD VENIPUNCTURE: CPT | Performed by: NURSE PRACTITIONER

## 2025-06-20 LAB
GAMMA INTERFERON BACKGROUND BLD IA-ACNC: 0.03 IU/ML
M TB IFN-G BLD-IMP: NEGATIVE
M TB IFN-G CD4+ BCKGRND COR BLD-ACNC: 5.65 IU/ML
MITOGEN IGNF BCKGRD COR BLD-ACNC: 0 IU/ML
MITOGEN IGNF BCKGRD COR BLD-ACNC: 0.01 IU/ML
QUANTIFERON MITOGEN: 5.68 IU/ML
QUANTIFERON NIL TUBE: 0.03 IU/ML
QUANTIFERON TB1 TUBE: 0.03 IU/ML
QUANTIFERON TB2 TUBE: 0.04

## (undated) RX ORDER — FENTANYL CITRATE 50 UG/ML
INJECTION, SOLUTION INTRAMUSCULAR; INTRAVENOUS
Status: DISPENSED
Start: 2018-06-28

## (undated) RX ORDER — LORAZEPAM 0.5 MG/1
TABLET ORAL
Status: DISPENSED
Start: 2021-04-28

## (undated) RX ORDER — FENTANYL CITRATE 50 UG/ML
INJECTION, SOLUTION INTRAMUSCULAR; INTRAVENOUS
Status: DISPENSED
Start: 2017-12-06

## (undated) RX ORDER — LIDOCAINE 40 MG/G
CREAM TOPICAL
Status: DISPENSED
Start: 2021-04-28

## (undated) RX ORDER — DEXAMETHASONE 4 MG/1
TABLET ORAL
Status: DISPENSED
Start: 2021-04-28